# Patient Record
Sex: MALE | Race: WHITE | NOT HISPANIC OR LATINO | Employment: OTHER | URBAN - METROPOLITAN AREA
[De-identification: names, ages, dates, MRNs, and addresses within clinical notes are randomized per-mention and may not be internally consistent; named-entity substitution may affect disease eponyms.]

---

## 2017-01-03 ENCOUNTER — THERAPY VISIT (OUTPATIENT)
Dept: PHYSICAL THERAPY | Facility: CLINIC | Age: 52
End: 2017-01-03
Payer: COMMERCIAL

## 2017-01-03 DIAGNOSIS — M25.512 ACUTE PAIN OF LEFT SHOULDER: Primary | ICD-10-CM

## 2017-01-03 PROCEDURE — 97110 THERAPEUTIC EXERCISES: CPT | Mod: GP | Performed by: PHYSICAL THERAPIST

## 2017-01-03 PROCEDURE — 97530 THERAPEUTIC ACTIVITIES: CPT | Mod: GP | Performed by: PHYSICAL THERAPIST

## 2017-01-03 NOTE — PROGRESS NOTES
Subjective:    HPI                    Objective:    System    Physical Exam    General     ROS    Assessment/Plan:      DISCHARGE REPORT    Progress reporting period is from IE  to 1/3/17.       SUBJECTIVE  Subjective changes noted by patient:  .  Subjective: Had MRI showing full thickness tear.  Is scheduled to have repair 1/12/17.  states shoulder moving little better but still sore    Current pain level is  Current Pain level: 3/10.     Previous pain level was   Initial Pain level: 3/10.   Changes in function:  Yes (See Goal flowsheet attached for changes in current functional level)  Adverse reaction to treatment or activity: None    OBJECTIVE  Changes noted in objective findings:    Objective: PROM: flex 150, abd 140, ER 55     ASSESSMENT/PLAN  Updated problem list and treatment plan: Diagnosis 1:  L shoulder  Pain -  home program  Decreased ROM/flexibility - home program  Decreased strength - home program  Impaired muscle performance - home program  Decreased function - home program  STG/LTGs have been met or progress has been made towards goals:  None  Assessment of Progress: The patient's condition is improving.  Self Management Plans:  Patient is independent in a home treatment program.  Patient is independent in self management of symptoms.  I have re-evaluated this patient and find that the nature, scope, duration and intensity of the therapy is appropriate for the medical condition of the patient.  Venkata continues to require the following intervention to meet STG and LTG's:  PT intervention is no longer required to meet STG/LTG.    Recommendations:  This patient is ready to be discharged from therapy and continue their home treatment program as pt scheduled for L RCR 1/12/17.    Please refer to the daily flowsheet for treatment today, total treatment time and time spent performing 1:1 timed codes.

## 2017-01-03 NOTE — Clinical Note
[facility] PHYSICAL THERAPY  [address]  [facility] [address]  [phone]    January 3, 2017    Re: Venkata Lynn   :   1965  MRN:  [id number]   REFERRING PHYSICIAN:   Lyndon Stoll    [facility] PHYSICAL [facility]  Date of Initial Evaluation:  16  Visits:  Rxs Used: 2  Reason for Referral:  Acute pain of left shoulder    DISCHARGE REPORT  Progress reporting period is from IE  to 1/3/17.       SUBJECTIVE  Subjective changes noted by patient:  .  Subjective: Had MRI showing full thickness tear.  Is scheduled to have repair 17.  states shoulder moving little better but still sore    Current pain level is  Current Pain level: 3/10.     Previous pain level was   Initial Pain level: 3/10.   Changes in function:  Yes (See Goal flowsheet attached for changes in current functional level)  Adverse reaction to treatment or activity: None    OBJECTIVE  Changes noted in objective findings:    Objective: PROM: flex 150, abd 140, ER 55     ASSESSMENT/PLAN  Updated problem list and treatment plan: Diagnosis 1:  L shoulder  Pain -  home program  Decreased ROM/flexibility - home program  Decreased strength - home program  Impaired muscle performance - home program  Decreased function - home program  STG/LTGs have been met or progress has been made towards goals:  None  Assessment of Progress: The patient's condition is improving.  Self Management Plans:  Patient is independent in a home treatment program.  Patient is independent in self management of symptoms.  I have re-evaluated this patient and find that the nature, scope, duration and intensity of the therapy is appropriate for the medical condition of the patient.  Venkata continues to require the following intervention to meet STG and LTG's:  PT intervention is no longer required to meet STG/LTG.    Recommendations:  This patient is ready to be discharged from  therapy and continue their home treatment program as pt scheduled for L RCR 1/12/17.    Thank you for your referral.    INQUIRIES  Therapist:  Wilder Brothers, [facility]  [facility]  [address] [place] [address]  Phone: [phone]  Fax: [phone]

## 2017-01-04 DIAGNOSIS — M75.122 COMPLETE TEAR OF LEFT ROTATOR CUFF: Primary | ICD-10-CM

## 2017-01-09 ENCOUNTER — OFFICE VISIT (OUTPATIENT)
Dept: FAMILY MEDICINE | Facility: CLINIC | Age: 52
End: 2017-01-09
Payer: COMMERCIAL

## 2017-01-09 VITALS
HEART RATE: 89 BPM | SYSTOLIC BLOOD PRESSURE: 137 MMHG | DIASTOLIC BLOOD PRESSURE: 95 MMHG | BODY MASS INDEX: 29.46 KG/M2 | TEMPERATURE: 98.1 F | RESPIRATION RATE: 14 BRPM | WEIGHT: 210.4 LBS | HEIGHT: 71 IN

## 2017-01-09 DIAGNOSIS — R05.9 COUGH: ICD-10-CM

## 2017-01-09 DIAGNOSIS — R03.0 ELEVATED BLOOD PRESSURE READING WITHOUT DIAGNOSIS OF HYPERTENSION: ICD-10-CM

## 2017-01-09 DIAGNOSIS — F41.9 ANXIETY: ICD-10-CM

## 2017-01-09 DIAGNOSIS — Z01.818 PREOP GENERAL PHYSICAL EXAM: Primary | ICD-10-CM

## 2017-01-09 DIAGNOSIS — J30.89 NON-SEASONAL ALLERGIC RHINITIS DUE TO OTHER ALLERGIC TRIGGER: ICD-10-CM

## 2017-01-09 DIAGNOSIS — L71.9 ROSACEA: ICD-10-CM

## 2017-01-09 DIAGNOSIS — L71.1 RHINOPHYMA: ICD-10-CM

## 2017-01-09 DIAGNOSIS — J45.30 MILD PERSISTENT ASTHMA WITHOUT COMPLICATION: ICD-10-CM

## 2017-01-09 LAB
BASOPHILS # BLD AUTO: 0 10E9/L (ref 0–0.2)
BASOPHILS NFR BLD AUTO: 0.4 %
DIFFERENTIAL METHOD BLD: NORMAL
EOSINOPHIL # BLD AUTO: 0.3 10E9/L (ref 0–0.7)
EOSINOPHIL NFR BLD AUTO: 3 %
ERYTHROCYTE [DISTWIDTH] IN BLOOD BY AUTOMATED COUNT: 13 % (ref 10–15)
FEF 25/75: NORMAL
FEV-1: NORMAL
FEV1/FVC: NORMAL
FVC: NORMAL
HCT VFR BLD AUTO: 44.3 % (ref 40–53)
HGB BLD-MCNC: 14.9 G/DL (ref 13.3–17.7)
LYMPHOCYTES # BLD AUTO: 2 10E9/L (ref 0.8–5.3)
LYMPHOCYTES NFR BLD AUTO: 20.4 %
MCH RBC QN AUTO: 29.6 PG (ref 26.5–33)
MCHC RBC AUTO-ENTMCNC: 33.6 G/DL (ref 31.5–36.5)
MCV RBC AUTO: 88 FL (ref 78–100)
MONOCYTES # BLD AUTO: 0.7 10E9/L (ref 0–1.3)
MONOCYTES NFR BLD AUTO: 7.2 %
NEUTROPHILS # BLD AUTO: 6.6 10E9/L (ref 1.6–8.3)
NEUTROPHILS NFR BLD AUTO: 69 %
PLATELET # BLD AUTO: 364 10E9/L (ref 150–450)
RBC # BLD AUTO: 5.04 10E12/L (ref 4.4–5.9)
WBC # BLD AUTO: 9.6 10E9/L (ref 4–11)

## 2017-01-09 PROCEDURE — 36415 COLL VENOUS BLD VENIPUNCTURE: CPT | Performed by: FAMILY MEDICINE

## 2017-01-09 PROCEDURE — 99214 OFFICE O/P EST MOD 30 MIN: CPT | Mod: 25 | Performed by: FAMILY MEDICINE

## 2017-01-09 PROCEDURE — 85025 COMPLETE CBC W/AUTO DIFF WBC: CPT | Performed by: FAMILY MEDICINE

## 2017-01-09 PROCEDURE — 94010 BREATHING CAPACITY TEST: CPT | Performed by: FAMILY MEDICINE

## 2017-01-09 RX ORDER — MONTELUKAST SODIUM 10 MG/1
10 TABLET ORAL AT BEDTIME
Qty: 90 TABLET | Refills: 1 | Status: SHIPPED | OUTPATIENT
Start: 2017-01-09 | End: 2017-09-09

## 2017-01-09 RX ORDER — ALPRAZOLAM 1 MG
1 TABLET ORAL DAILY PRN
Qty: 30 TABLET | Refills: 0 | Status: SHIPPED | OUTPATIENT
Start: 2017-01-09 | End: 2017-02-17

## 2017-01-09 RX ORDER — DOXYCYCLINE HYCLATE 50 MG/1
50 CAPSULE ORAL 2 TIMES DAILY
Qty: 180 CAPSULE | Refills: 3 | Status: SHIPPED | OUTPATIENT
Start: 2017-01-09 | End: 2018-02-20

## 2017-01-09 RX ORDER — FLUTICASONE PROPIONATE 50 MCG
2 SPRAY, SUSPENSION (ML) NASAL DAILY
Qty: 16 G | Refills: 10 | Status: SHIPPED | OUTPATIENT
Start: 2017-01-09 | End: 2018-01-15

## 2017-01-09 RX ORDER — ALBUTEROL SULFATE 90 UG/1
2 AEROSOL, METERED RESPIRATORY (INHALATION) EVERY 6 HOURS PRN
Qty: 3 INHALER | Refills: 1 | Status: SHIPPED | OUTPATIENT
Start: 2017-01-09 | End: 2018-02-20

## 2017-01-09 NOTE — NURSING NOTE
"Chief Complaint   Patient presents with     Pre-Op Exam     1/12/17 Left shoulder       Initial There were no vitals taken for this visit. Estimated body mass index is 29.83 kg/(m^2) as calculated from the following:    Height as of 12/8/16: 5' 11\" (1.803 m).    Weight as of 12/8/16: 213 lb 12.8 oz (96.979 kg).  BP completed using cuff size: large left arm Mini Slade MA      "

## 2017-01-09 NOTE — PROGRESS NOTES
[facility]  [address] [facility] [id number] [phone]  Dept: [phone]    PRE-OP EVALUATION:  Today's date: 2017    Venkata Lynn (: 1965) presents for pre-operative evaluation assessment as requested by Dr. Milligan.  He requires evaluation and anesthesia risk assessment prior to undergoing surgery/procedure for treatment of left shoulder .  Proposed procedure: Left shoulder    Date of Surgery/ Procedure: 2017  Time of Surgery/ Procedure: 915 am  Hospital/Surgical Facility: [facility]  Fax [phone]  Primary Physician: Lyndon Stoll  Type of Anesthesia Anticipated: to be determined    Patient has a Health Care Directive or Living Will:  YES     1. NO - Do you have a history of heart attack, stroke, stent, bypass or surgery on an artery in the head, neck, heart or legs?  2. NO - Do you ever have any pain or discomfort in your chest?  3. NO - Do you have a history of  Heart Failure?  4. NO - Are you troubled by shortness of breath when: walking on the level, up a slight hill or at night?  5. NO - Do you currently have a cold, bronchitis or other respiratory infection?  7. NO - Do you sometimes get pains in the calves of your legs when you walk?  8. NO - Do you or anyone in your family have previous history of blood clots?  9. NO - Do you or does anyone in your family have a serious bleeding problem such as prolonged bleeding following surgeries or cuts?  10. NO - Have you ever had problems with anemia or been told to take iron pills?  11. NO - Have you had any abnormal blood loss such as black, tarry or bloody stools, or abnormal vaginal bleeding?  12. NO - Have you ever had a blood transfusion?  13. NO - Have you or any of your relatives ever had problems with anesthesia?  14. NO - Do you have sleep apnea, excessive snoring or daytime drowsiness?  15. NO - Do you have any prosthetic heart valves?  16. NO - Do you have prosthetic joints?  17. NO - Is  there any chance that you may be pregnant?  6. YES, coughs at night - Do you have a cough, shortness of breath or wheezing?     HPI:                                                      Brief HPI related to upcoming procedure: symptomatic complete tear of left rotator cuff.    MEDICAL HISTORY:                                                      Patient Active Problem List    Diagnosis Date Noted     Shoulder pain, left 12/08/2016     Priority: Medium     Complete tear of right rotator cuff 10/10/2016     Priority: Medium     Labral tear of shoulder, right, subsequent encounter 10/10/2016     Priority: Medium     Familial tremor 08/16/2016     Priority: Medium     Dyspepsia 08/16/2016     Priority: Medium     Mild persistent asthma without complication 08/16/2016     Priority: Medium     Status post surgery 03/18/2016     Priority: Medium     Cyst of meniscus of left knee 01/28/2016     Priority: Medium     Cyst of meniscus of right knee 01/28/2016     Priority: Medium     Controlled substance agreement signed 01/11/2016     Priority: Medium     Patient is followed by SAMUEL BAEZA for ongoing prescription of benzodiazepines.  All refills should be approved by this provider, or covering partner.    Medication(s): alprazolam zolpidem.   Maximum quantity per month: 30 ea  Clinic visit frequency required: Q 6  months     Controlled substance agreement on file: Yes       Date(s):    Benzodiazepine use reviewed by psychiatry:      Last [facility] website verification:     [email or web address]           Elevated blood pressure reading without diagnosis of hypertension 01/11/2016     Priority: Medium     Arthralgia of both knees 09/08/2015     Priority: Medium     Anxiety state 05/23/2014     Priority: Medium     Problem list name updated by automated process. Provider to review       Adjustment reaction 05/02/2014     Priority: Medium     Problem list name updated by automated process. Provider to review        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     Priority: Medium     Family history of rhinophyma 01/18/2012     Priority: Medium     Rosacea 01/18/2012     Priority: Medium     Sebaceous hyperplasia 01/18/2012     Priority: Medium     Tobacco abuse 01/18/2012     Priority: Medium     Rhinitis 01/18/2012     Priority: Medium     Anxiety 03/28/2011     Priority: Medium     Patient is followed by SAMUEL BAEZA for ongoing prescription of benzodiazepines.  All refills should be approved by this provider, or covering partner.    Medication(s): Xanax 1 mg.   Maximum quantity per month: 30  Clinic visit frequency required: Q 6  months     Controlled substance agreement on file: Yes       Date(s): 1/11/16  Benzodiazepine use reviewed by psychiatry:  No    Last [facility] website verification:  done on 7/26/16   [email or web address]           CARDIOVASCULAR SCREENING; LDL GOAL LESS THAN 160 10/31/2010     Priority: Medium     1.7% 10 yr risk 2015        Past Medical History   Diagnosis Date     Insomnia      Anxiety disorder      Low back pains      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     Family history of rhinophyma 1/18/2012     Rosacea 1/18/2012     Sebaceous hyperplasia 1/18/2012     CARDIOVASCULAR SCREENING; LDL GOAL LESS THAN 160 10/31/2010     Anxiety 3/28/2011     Intermittent asthma 3/28/2011     Tobacco abuse 1/18/2012     LBP (low back pain) 1/18/2012     Knee pain 1/18/2012     Rhinitis 1/18/2012     Acne vulgaris      Controlled substance agreement signed 1/11/2016     Elevated blood pressure reading without diagnosis of hypertension 1/11/2016     Coughing      at night     Arthritis      both knees     Shortness of breath      due to asthma     Familial tremor 8/16/2016     Dyspepsia 8/16/2016     Mild persistent asthma without complication 8/16/2016       Breo started 11/18/2016,       Past Surgical History   Procedure Laterality Date     Head & neck surgery       fx root of tooth     Vasectomy       Ent surgery       lipoma removed from  neck     Arthroscopy knee with medial meniscectomy Left 2/22/2016     Procedure: ARTHROSCOPY KNEE WITH MEDIAL MENISCECTOMY;  Surgeon: Chaz Moe MD;  Location: [facility] OR     Current Outpatient Prescriptions   Medication Sig Dispense Refill     albuterol (PROAIR HFA/PROVENTIL HFA/VENTOLIN HFA) 108 (90 BASE) MCG/ACT Inhaler Inhale 2 puffs into the lungs every 6 hours as needed for shortness of breath / dyspnea 3 Inhaler 1     ALPRAZolam (XANAX) 1 MG tablet Take 1 tablet (1 mg) by mouth daily as needed for anxiety 30 tablet 0     fluticasone-vilanterol (BREO ELLIPTA) 200-25 MCG/INH oral inhaler Inhale 1 puff into the lungs daily 3 Inhaler 1     montelukast (SINGULAIR) 10 MG tablet Take 1 tablet (10 mg) by mouth At Bedtime 90 tablet 1     fluticasone (FLONASE) 50 MCG/ACT spray Spray 2 sprays into both nostrils daily 16 g 10     doxycycline (VIBRAMYCIN) 50 MG capsule Take 1 capsule (50 mg) by mouth 2 times daily 180 capsule 3     VITAMIN D, CHOLECALCIFEROL, PO Take 1,000 Units by mouth daily       Multiple Vitamins-Minerals (MULTIVITAMIN ADULT PO) Take 1 tablet by mouth       aspirin 325 MG tablet Take by mouth daily       vitamin  B complex with vitamin C (VITAMIN  B COMPLEX) TABS Take 1 tablet by mouth daily       zolpidem (AMBIEN) 10 MG tablet Take 1 tablet (10 mg) by mouth nightly as needed for sleep at bedtime. 30 tablet 5     RANITIDINE HCL PO Take 300 mg by mouth as needed for heartburn       IBUPROFEN PO        [DISCONTINUED] albuterol (PROAIR HFA, PROVENTIL HFA, VENTOLIN HFA) 108 (90 BASE) MCG/ACT inhaler Inhale 2 puffs into the lungs every 6 hours as needed for shortness of breath / dyspnea 3 Inhaler 1     [DISCONTINUED] ALPRAZolam (XANAX) 1 MG tablet Take 1 tablet (1 mg) by mouth daily as needed for anxiety 30 tablet 0     [DISCONTINUED] montelukast (SINGULAIR) 10 MG tablet Take 1 tablet (10 mg) by mouth At Bedtime 90 tablet 1     [DISCONTINUED] fluticasone (FLONASE) 50 MCG/ACT nasal spray Spray 2  "sprays into both nostrils daily 16 g 10     OTC products: None, except as noted above    Allergies   Allergen Reactions     Penicillins Hives and Rash     Amoxicillin      Latex Allergy: NO    Social History   Substance Use Topics     Smoking status: Never Smoker      Smokeless tobacco: Current User     Types: Chew      Comment: occ     Alcohol Use: Yes      Comment: few beers per week     History   Drug Use No     REVIEW OF SYSTEMS:                                                    ROS: occasional chest tightness. Mild cough at night (imporves with Albuterol). No vision changes, palpitations, chest pain, heartburn, diarrhea, constipation, numbness, or tingling , bruising    This document serves as a record of the services and decisions personally performed and made by Jerman Haney MD. It was created on his behalf by Farhat Valdez a trained medical scribe. The creation of this document is based the provider's statements to the medical scribe. Farhat Valdez January 9, 2017 2:34 PM  EXAM:                                                    /95 mmHg  Pulse 89  Temp(Src) 98.1  F (36.7  C) (Oral)  Resp 14  Ht 5' 11\" (1.803 m)  Wt 210 lb 6.4 oz (95.437 kg)  BMI 29.36 kg/m2    GEN: Healthy, Alert, No distress  EYES: pupils are symmetric  ENT: ears without cerumen, mucus membranes moist  NECK: no thyroidmegaly  CHEST: Clear to auscultation, respirations unlabored  HEART: S1S2 RRR no murmur nor apical displacement  ABD: soft without mass or organomegaly   MS: no edema     DIAGNOSTICS:                                                    EKG: Not indicated due to non-vascular surgery and low risk of event (age <65 and without cardiac risk factors)    Recent Labs   Lab Test  03/21/11   1008   HGB  15.1   PLT  291   NA  140   POTASSIUM  4.5   CR  0.97      Results for orders placed or performed in visit on 01/09/17   Spirometry, Breathing Capacity: Normal Order, Clinic Performed   Result Value Ref Range    FEV-1   "    FVC      FEV1/FVC      FEF 25/75     CBC with platelets and differential   Result Value Ref Range    WBC 9.6 4.0 - 11.0 10e9/L    RBC Count 5.04 4.4 - 5.9 10e12/L    Hemoglobin 14.9 13.3 - 17.7 g/dL    Hematocrit 44.3 40.0 - 53.0 %    MCV 88 78 - 100 fl    MCH 29.6 26.5 - 33.0 pg    MCHC 33.6 31.5 - 36.5 g/dL    RDW 13.0 10.0 - 15.0 %    Platelet Count 364 150 - 450 10e9/L    Diff Method Automated Method     % Neutrophils 69.0 %    % Lymphocytes 20.4 %    % Monocytes 7.2 %    % Eosinophils 3.0 %    % Basophils 0.4 %    Absolute Neutrophil 6.6 1.6 - 8.3 10e9/L    Absolute Lymphocytes 2.0 0.8 - 5.3 10e9/L    Absolute Monocytes 0.7 0.0 - 1.3 10e9/L    Absolute Eosinophils 0.3 0.0 - 0.7 10e9/L    Absolute Basophils 0.0 0.0 - 0.2 10e9/L       IMPRESSION:                                                    Reason for surgery/procedure: symptomatic complete tear of left rotator cuff.  Diagnosis/reason for consult: assess perioperative risk     The proposed surgical procedure is considered INTERMEDIATE risk.    REVISED CARDIAC RISK INDEX  The patient has the following serious cardiovascular risks for perioperative complications such as (MI, PE, VFib and 3  AV Block):  No serious cardiac risks  INTERPRETATION: 0 risks: Class I (very low risk - 0.4% complication rate)    The patient has the following additional risks for perioperative complications:    History of asthma: Spirometry is normalized with Breo. Pulmonary function is optimized.      ICD-10-CM    1. Preop general physical exam Z01.818 CBC with platelets and differential   2. Mild persistent asthma without complication J45.30 albuterol (PROAIR HFA/PROVENTIL HFA/VENTOLIN HFA) 108 (90 BASE) MCG/ACT Inhaler     Spirometry, Breathing Capacity: Normal Order, Clinic Performed     fluticasone-vilanterol (BREO ELLIPTA) 200-25 MCG/INH oral inhaler     montelukast (SINGULAIR) 10 MG tablet   3. Anxiety F41.9 ALPRAZolam (XANAX) 1 MG tablet   4. Cough R05 Spirometry, Breathing  Capacity: Normal Order, Clinic Performed   5. Non-seasonal allergic rhinitis due to other allergic trigger J30.89 fluticasone (FLONASE) 50 MCG/ACT spray   6. Rhinophyma L71.1 doxycycline (VIBRAMYCIN) 50 MG capsule   7. Rosacea L71.9 doxycycline (VIBRAMYCIN) 50 MG capsule   8. Elevated blood pressure reading without diagnosis of hypertension R03.0      RECOMMENDATIONS:                                                      Patient is to take NO medications the AM of surgery except inhaler    APPROVAL GIVEN to proceed with proposed procedure, without further diagnostic evaluation     The information in this document, created by a scribe for me, accurately reflects the services I personally performed and the decisions made by me. I have reviewed and approved this document for accuracy.  2:06 PM January 9, 2017    Signed Electronically by: Lyndon Stoll MD    Copy of this evaluation report is provided to requesting physician.    [facility] Preop Guidelines

## 2017-01-09 NOTE — MR AVS SNAPSHOT
After Visit Summary   1/9/2017    Venkata Lynn    MRN: [id number]           Patient Information     Date Of Birth          1965        Visit Information        Provider Department      1/9/2017 1:30 PM Lyndon Stoll MD [facility]        Today's Diagnoses     Preop general physical exam    -  1     Mild persistent asthma without complication         Anxiety         Cough         Non-seasonal allergic rhinitis due to other allergic trigger         Rhinophyma         Rosacea         Elevated blood pressure reading without diagnosis of hypertension           Care Instructions      Before Your Surgery      Call your surgeon if there is any change in your health. This includes signs of a cold or flu (such as a sore throat, runny nose, cough, rash or fever).    Do not smoke, drink alcohol or take over the counter medicine (unless your surgeon or primary care doctor tells you to) for the 24 hours before and after surgery.    If you take prescribed drugs: Follow your doctor s orders about which medicines to take and which to stop until after surgery.    Eating and drinking prior to surgery: follow the instructions from your surgeon    Take a shower or bath the night before surgery. Use the soap your surgeon gave you to gently clean your skin. If you do not have soap from your surgeon, use your regular soap. Do not shave or scrub the surgery site.  Wear clean pajamas and have clean sheets on your bed.     No meds AM of surgery except inhalers        Follow-ups after your visit        Who to contact     If you have questions or need follow up information about today's clinic visit or your schedule please contact [facility] directly at [phone].  Normal or non-critical lab and imaging results will be communicated to you by MyChart, letter or phone within 4 business days after the clinic has received the results. If you do not hear from us within 7 days, please  "contact the clinic through [employer] or phone. If you have a critical or abnormal lab result, we will notify you by phone as soon as possible.  Submit refill requests through [employer] or call your pharmacy and they will forward the refill request to us. Please allow 3 business days for your refill to be completed.          Additional Information About Your Visit        [employer] Information     [employer] gives you secure access to your electronic health record. If you see a primary care provider, you can also send messages to your care team and make appointments. If you have questions, please call your primary care clinic.  If you do not have a primary care provider, please call [phone] and they will assist you.        Care EveryWhere ID     This is your Care EveryWhere ID. This could be used by other organizations to access your [facility] [facility] medical records  [id number]        Your Vitals Were     Pulse Temperature Respirations Height BMI (Body Mass Index)       89 98.1  F (36.7  C) (Oral) 14 5' 11\" (1.803 m) 29.36 kg/m2        Blood Pressure from Last 3 Encounters:   01/09/17 137/95   12/08/16 128/82   11/28/16 156/122    Weight from Last 3 Encounters:   01/09/17 210 lb 6.4 oz (95.437 kg)   12/08/16 213 lb 12.8 oz (96.979 kg)   11/28/16 210 lb (95.255 kg)              We Performed the Following     CBC with platelets and differential     Spirometry, Breathing Capacity: Normal Order, Clinic Performed          Where to get your medicines      These medications were sent to [facility] [place] [address]  [address] [facility] [address]     Phone:  [phone]    - albuterol 108 (90 BASE) MCG/ACT Inhaler  - doxycycline 50 MG capsule  - fluticasone 50 MCG/ACT spray  - fluticasone-vilanterol 200-25 MCG/INH oral inhaler  - montelukast 10 MG tablet      Some of these will need a paper prescription and others can be bought over the counter.  Ask your nurse if you have questions.     " Bring a paper prescription for each of these medications    - ALPRAZolam 1 MG tablet       Primary Care Provider Office Phone # Fax #    Lyndno Stoll [phone]       [place] [address] [address]  [facility] [address]        Thank you!     Thank you for choosing [facility]  for your care. Our goal is always to provide you with excellent care. Hearing back from our patients is one way we can continue to improve our services. Please take a few minutes to complete the written survey that you may receive in the mail after your visit with us. Thank you!             Your Updated Medication List - Protect others around you: Learn how to safely use, store and throw away your medicines at www.disposemymeds.org.          This list is accurate as of: 1/9/17  2:40 PM.  Always use your most recent med list.                   Brand Name Dispense Instructions for use    albuterol 108 (90 BASE) MCG/ACT Inhaler    PROAIR HFA/PROVENTIL HFA/VENTOLIN HFA    3 Inhaler    Inhale 2 puffs into the lungs every 6 hours as needed for shortness of breath / dyspnea       ALPRAZolam 1 MG tablet    XANAX    30 tablet    Take 1 tablet (1 mg) by mouth daily as needed for anxiety       aspirin 325 MG tablet      Take by mouth daily       doxycycline 50 MG capsule    VIBRAMYCIN    180 capsule    Take 1 capsule (50 mg) by mouth 2 times daily       fluticasone 50 MCG/ACT spray    FLONASE    16 g    Spray 2 sprays into both nostrils daily       fluticasone-vilanterol 200-25 MCG/INH oral inhaler    BREO ELLIPTA    3 Inhaler    Inhale 1 puff into the lungs daily       IBUPROFEN PO          montelukast 10 MG tablet    SINGULAIR    90 tablet    Take 1 tablet (10 mg) by mouth At Bedtime       MULTIVITAMIN ADULT PO      Take 1 tablet by mouth       RANITIDINE HCL PO      Take 300 mg by mouth as needed for heartburn       vitamin B complex with vitamin C Tabs tablet      Take 1 tablet by mouth daily        VITAMIN D (CHOLECALCIFEROL) PO      Take 1,000 Units by mouth daily       zolpidem 10 MG tablet    AMBIEN    30 tablet    Take 1 tablet (10 mg) by mouth nightly as needed for sleep at bedtime.

## 2017-01-09 NOTE — PATIENT INSTRUCTIONS
Before Your Surgery      Call your surgeon if there is any change in your health. This includes signs of a cold or flu (such as a sore throat, runny nose, cough, rash or fever).    Do not smoke, drink alcohol or take over the counter medicine (unless your surgeon or primary care doctor tells you to) for the 24 hours before and after surgery.    If you take prescribed drugs: Follow your doctor s orders about which medicines to take and which to stop until after surgery.    Eating and drinking prior to surgery: follow the instructions from your surgeon    Take a shower or bath the night before surgery. Use the soap your surgeon gave you to gently clean your skin. If you do not have soap from your surgeon, use your regular soap. Do not shave or scrub the surgery site.  Wear clean pajamas and have clean sheets on your bed.     No meds AM of surgery except inhalers

## 2017-02-17 DIAGNOSIS — F51.01 PRIMARY INSOMNIA: ICD-10-CM

## 2017-02-17 DIAGNOSIS — F41.9 ANXIETY: ICD-10-CM

## 2017-02-17 RX ORDER — ALPRAZOLAM 1 MG
1 TABLET ORAL DAILY PRN
Qty: 30 TABLET | Refills: 0 | Status: SHIPPED | OUTPATIENT
Start: 2017-02-17 | End: 2017-04-10

## 2017-02-17 RX ORDER — ZOLPIDEM TARTRATE 10 MG/1
10 TABLET ORAL
Qty: 30 TABLET | Refills: 5 | Status: SHIPPED | OUTPATIENT
Start: 2017-02-17 | End: 2017-09-21

## 2017-02-17 NOTE — TELEPHONE ENCOUNTER
Walked 2 RX's over to the [facility] pharmacy.  Ambien & Conrado.      Mecca Brady/

## 2017-02-17 NOTE — TELEPHONE ENCOUNTER
Xanax 1mg      Last Written Prescription Date:  01/09/17  Last Fill Quantity: 30,   # refills: 0  Last Office Visit with [facility], [facility] or [facility] prescribing provider: 01/09/17  Future Office visit:       Routing refill request to provider for review/approval because:  Drug not on the [facility], [facility] or [facility] refill protocol or controlled substance      Ambien 10mg      Last Written Prescription Date:  08/16/16  Last Fill Quantity: 30,   # refills: 5  Last Office Visit with [facility], [facility] or [facility] prescribing provider: 01/09/17  Future Office visit:       Routing refill request to provider for review/approval because:  Drug not on the [facility], [facility] or [facility] refill protocol or controlled substance      Thank you -  Joey Stephen, Pharmacy Technician  [facility]  On Behalf Of [facility]

## 2017-04-06 ENCOUNTER — THERAPY VISIT (OUTPATIENT)
Dept: PHYSICAL THERAPY | Facility: CLINIC | Age: 52
End: 2017-04-06
Payer: COMMERCIAL

## 2017-04-06 DIAGNOSIS — Z47.89 AFTERCARE FOLLOWING SURGERY OF THE MUSCULOSKELETAL SYSTEM: ICD-10-CM

## 2017-04-06 DIAGNOSIS — S43.422A SPRAIN OF LEFT ROTATOR CUFF CAPSULE, INITIAL ENCOUNTER: ICD-10-CM

## 2017-04-06 PROCEDURE — 97110 THERAPEUTIC EXERCISES: CPT | Mod: GP | Performed by: PHYSICAL THERAPIST

## 2017-04-06 PROCEDURE — 97161 PT EVAL LOW COMPLEX 20 MIN: CPT | Mod: GP | Performed by: PHYSICAL THERAPIST

## 2017-04-06 PROCEDURE — 97112 NEUROMUSCULAR REEDUCATION: CPT | Mod: GP | Performed by: PHYSICAL THERAPIST

## 2017-04-06 NOTE — LETTER
[facility] ATHLETIC [facility] PHYSICAL THERAPY  [address]  [facility] [address]  [phone]    2017    Re: Venkata Lynn   :   1965  MRN:  [id number]   REFERRING PHYSICIAN:   Nils Milligan    [facility]THLETIC [facility] PHYSICAL THERAPY  Date of Initial Evaluation:  17  Visits:  Rxs Used: 1  Reason for Referral:  Sprain of left rotator cuff capsule, initial encounter  Aftercare following surgery of the musculoskeletal system    EVALUATION SUMMARY    Subjective:  Venkata Lynn is a 51 year old male with a left shoulder condition.  Condition occurred with:  A fall.  Condition occurred: at home.  This is a new condition  Pt c/o L shoulder pain and weakness S/P L RCR and biceps tenodesis 17.  Initial injury 2016 after falling.  Failed conservative PT prior to surgery.  Pt is R handed.  Pt had PT at other facility initially and is starting here today.  Was in sling x 8 weeks (starting ROM PT at 6 weeks).  Patient reports pain:  Anterior and lateral.  Pain is described as shooting and aching and is intermittent and reported as 1/10 and 5/10.  Associated symptoms:  Loss of motion/stiffness and loss of strength. Pain is the same all the time.  Symptoms are exacerbated by using arm overhead, using arm behind back, using arm at shoulder level, lifting, carrying and lying on extremity and relieved by rest and ice.  Since onset symptoms are gradually improving.  General health as reported by patient is good.  Pertinent medical history includes:  Asthma and history of fractures.  Medical allergies: yes (penicillins).  Other surgeries include:  Orthopedic surgery (L knee and shoulder).  Current medications:  Sleep medication, pain medication and other (asthma).  Current occupation is Construction.  Primary job tasks include:  Lifting, repetitive tasks, driving and other (push pull).           Objective:  Shoulder  Evaluation:  ROM:  AROM:    Flexion:  Left:  123      Abduction:  Left: 115     External Rotation:  Left:  54      Extension/Internal Rotation:  Left:  L3        PROM:    Flexion:  Left:  147        Abduction:  Left:  133      External Rotation:  Left:  60      Strength:  : poor scap stab recruitment.  Flexion: Left:4-/5   Pain:      Extension:  Left: 4/5     Pain:-/+      Abduction:  Left: 4- and 3+/5   Pain:-/+      Internal Rotation:  Left:4/5      Pain:-      External Rotation:   Left:3+/5      Pain:-/+     Re: Venkata Lynn   :   1965          Mobility Tests:    Glenohumeral posterior left:  Hypomobile      Assessment/Plan:      Patient is a 51 year old male with left side shoulder complaints.    Patient has the following significant findings with corresponding treatment plan.                Diagnosis 1:  S/P L RCR  Pain -  hot/cold therapy, manual therapy, directional preference exercise and home program  Decreased ROM/flexibility - manual therapy and therapeutic exercise  Decreased joint mobility - manual therapy and therapeutic exercise  Decreased strength - therapeutic exercise and therapeutic activities  Impaired muscle performance - neuro re-education  Decreased function - therapeutic activities  Impaired posture - neuro re-education    Therapy Evaluation Codes:   1) History comprised of:   Personal factors that impact the plan of care:      None.    Comorbidity factors that impact the plan of care are:      Asthma.     Medications impacting care: Pain and Sleep.  2) Examination of Body Systems comprised of:   Body structures and functions that impact the plan of care:      Shoulder.   Activity limitations that impact the plan of care are:      Dressing, Lifting and Working.  3) Clinical presentation characteristics are:   Stable/Uncomplicated.  4) Decision-Making    Low complexity using standardized patient assessment instrument and/or measureable assessment of functional outcome.  Cumulative  Therapy Evaluation is: Low complexity.    Previous and current functional limitations:  (See Goal Flow Sheet for this information)    Short term and Long term goals: (See Goal Flow Sheet for this information)     Communication ability:  Patient appears to be able to clearly communicate and understand verbal and written communication and follow directions correctly.  Treatment Explanation - The following has been discussed with the patient:   RX ordered/plan of care  Anticipated outcomes  Possible risks and side effects  This patient would benefit from PT intervention to resume normal activities.   Rehab potential is excellent.        Re: Venkata Lynn   :   1965          Frequency:  1 X week, once daily  Duration:  for 8 weeks  Discharge Plan:  Achieve all LTG.  Independent in home treatment program.  Reach maximal therapeutic benefit.    Thank you for your referral.    INQUIRIES  Therapist: Wilder Brothers, [facility]   [facility] [place] [facility]  [address] [place] [address]  Phone: [phone]  Fax: [phone]

## 2017-04-06 NOTE — PROGRESS NOTES
Subjective:    Venkata Lynn is a 51 year old male with a left shoulder condition.  Condition occurred with:  A fall.  Condition occurred: at home.  This is a new condition  Pt c/o L shoulder pain and weakness S/P L RCR and biceps tenodesis 1/12/17.  Initial injury 11/2016 after falling.  Failed conservative PT prior to surgery.  Pt is R handed.  Pt had PT at other facility initially and is starting here today.  Was in sling x 8 weeks (starting ROM PT at 6 weeks)..    Patient reports pain:  Anterior and lateral.    Pain is described as shooting and aching and is intermittent and reported as 1/10 and 5/10.  Associated symptoms:  Loss of motion/stiffness and loss of strength. Pain is the same all the time.  Symptoms are exacerbated by using arm overhead, using arm behind back, using arm at shoulder level, lifting, carrying and lying on extremity and relieved by rest and ice.  Since onset symptoms are gradually improving.        General health as reported by patient is good.  Pertinent medical history includes:  Asthma and history of fractures.  Medical allergies: yes (penicillins).  Other surgeries include:  Orthopedic surgery (L knee and shoulder).  Current medications:  Sleep medication, pain medication and other (asthma).  Current occupation is Construction  .    Primary job tasks include:  Lifting, repetitive tasks, driving and other (push pull).                                Objective:    System                   Shoulder Evaluation:  ROM:  AROM:    Flexion:  Left:  123        Abduction:  Left: 115         External Rotation:  Left:  54                Extension/Internal Rotation:  Left:  L3        PROM:    Flexion:  Left:  147          Abduction:  Left:  133          External Rotation:  Left:  60                        Strength:  : poor scap stab recruitment.  Flexion: Left:4-/5   Pain:      Extension:  Left: 4/5     Pain:-/+      Abduction:  Left: 4- and 3+/5   Pain:-/+        Internal Rotation:  Left:4/5       Pain:-      External Rotation:   Left:3+/5      Pain:-/+                   Mobility Tests:      Glenohumeral posterior left:  Hypomobile                                               General     ROS    Assessment/Plan:      Patient is a 51 year old male with left side shoulder complaints.    Patient has the following significant findings with corresponding treatment plan.                Diagnosis 1:  S/P L RCR  Pain -  hot/cold therapy, manual therapy, directional preference exercise and home program  Decreased ROM/flexibility - manual therapy and therapeutic exercise  Decreased joint mobility - manual therapy and therapeutic exercise  Decreased strength - therapeutic exercise and therapeutic activities  Impaired muscle performance - neuro re-education  Decreased function - therapeutic activities  Impaired posture - neuro re-education    Therapy Evaluation Codes:   1) History comprised of:   Personal factors that impact the plan of care:      None.    Comorbidity factors that impact the plan of care are:      Asthma.     Medications impacting care: Pain and Sleep.  2) Examination of Body Systems comprised of:   Body structures and functions that impact the plan of care:      Shoulder.   Activity limitations that impact the plan of care are:      Dressing, Lifting and Working.  3) Clinical presentation characteristics are:   Stable/Uncomplicated.  4) Decision-Making    Low complexity using standardized patient assessment instrument and/or measureable assessment of functional outcome.  Cumulative Therapy Evaluation is: Low complexity.    Previous and current functional limitations:  (See Goal Flow Sheet for this information)    Short term and Long term goals: (See Goal Flow Sheet for this information)     Communication ability:  Patient appears to be able to clearly communicate and understand verbal and written communication and follow directions correctly.  Treatment Explanation - The following has been discussed  with the patient:   RX ordered/plan of care  Anticipated outcomes  Possible risks and side effects  This patient would benefit from PT intervention to resume normal activities.   Rehab potential is excellent.    Frequency:  1 X week, once daily  Duration:  for 8 weeks  Discharge Plan:  Achieve all LTG.  Independent in home treatment program.  Reach maximal therapeutic benefit.    Please refer to the daily flowsheet for treatment today, total treatment time and time spent performing 1:1 timed codes.

## 2017-04-10 DIAGNOSIS — Z79.899 CONTROLLED SUBSTANCE AGREEMENT SIGNED: ICD-10-CM

## 2017-04-10 DIAGNOSIS — F41.9 ANXIETY: ICD-10-CM

## 2017-04-10 NOTE — TELEPHONE ENCOUNTER
Controlled Substance Refill Request for Alprazolam  Problem List Complete:  Yes    Patient is followed by SMAUEL BAEZA for ongoing prescription of benzodiazepines.  All refills should be approved by this provider, or covering partner.    Medication(s): Xanax 1 mg.   Maximum quantity per month: 30  Clinic visit frequency required: Q 6  Months Last office visit: 1/9/17    Controlled substance agreement on file: Yes       Date(s): 1/11/16  Benzodiazepine use reviewed by psychiatry:  No    Last [facility] website verification:  done on 7/26/16     checked in past 6 months?  No, route to RN Overdue    Last picked up here: 2/20/17. Qty 30 for 30 days supply    Heather Alicia, Pharmacy [facility]

## 2017-04-11 RX ORDER — ALPRAZOLAM 1 MG
1 TABLET ORAL DAILY PRN
Qty: 30 TABLET | Refills: 0 | Status: SHIPPED | OUTPATIENT
Start: 2017-04-11 | End: 2017-05-22

## 2017-04-11 NOTE — TELEPHONE ENCOUNTER
Routing refill request to provider for review/approval because:  Drug not on the [facility] refill protocol    4.11.17, report on desk, narcotics prescribed by ortho surg Chel Lion RN, BS  Clinical Nurse Triage.

## 2017-04-13 ENCOUNTER — THERAPY VISIT (OUTPATIENT)
Dept: PHYSICAL THERAPY | Facility: CLINIC | Age: 52
End: 2017-04-13
Payer: COMMERCIAL

## 2017-04-13 DIAGNOSIS — S43.422A SPRAIN OF LEFT ROTATOR CUFF CAPSULE, INITIAL ENCOUNTER: ICD-10-CM

## 2017-04-13 DIAGNOSIS — Z47.89 AFTERCARE FOLLOWING SURGERY OF THE MUSCULOSKELETAL SYSTEM: ICD-10-CM

## 2017-04-13 PROCEDURE — 97112 NEUROMUSCULAR REEDUCATION: CPT | Mod: GP | Performed by: PHYSICAL THERAPIST

## 2017-04-13 PROCEDURE — 97110 THERAPEUTIC EXERCISES: CPT | Mod: GP | Performed by: PHYSICAL THERAPIST

## 2017-04-20 ENCOUNTER — THERAPY VISIT (OUTPATIENT)
Dept: PHYSICAL THERAPY | Facility: CLINIC | Age: 52
End: 2017-04-20
Payer: COMMERCIAL

## 2017-04-20 DIAGNOSIS — S43.422A SPRAIN OF LEFT ROTATOR CUFF CAPSULE, INITIAL ENCOUNTER: ICD-10-CM

## 2017-04-20 DIAGNOSIS — Z47.89 AFTERCARE FOLLOWING SURGERY OF THE MUSCULOSKELETAL SYSTEM: ICD-10-CM

## 2017-04-20 PROCEDURE — 97112 NEUROMUSCULAR REEDUCATION: CPT | Mod: GP | Performed by: PHYSICAL THERAPIST

## 2017-04-20 PROCEDURE — 97110 THERAPEUTIC EXERCISES: CPT | Mod: GP | Performed by: PHYSICAL THERAPIST

## 2017-04-27 ENCOUNTER — THERAPY VISIT (OUTPATIENT)
Dept: PHYSICAL THERAPY | Facility: CLINIC | Age: 52
End: 2017-04-27
Payer: COMMERCIAL

## 2017-04-27 DIAGNOSIS — S43.422A SPRAIN OF LEFT ROTATOR CUFF CAPSULE, INITIAL ENCOUNTER: ICD-10-CM

## 2017-04-27 DIAGNOSIS — Z47.89 AFTERCARE FOLLOWING SURGERY OF THE MUSCULOSKELETAL SYSTEM: ICD-10-CM

## 2017-04-27 PROCEDURE — 97112 NEUROMUSCULAR REEDUCATION: CPT | Mod: GP | Performed by: PHYSICAL THERAPIST

## 2017-04-27 PROCEDURE — 97110 THERAPEUTIC EXERCISES: CPT | Mod: GP | Performed by: PHYSICAL THERAPIST

## 2017-05-19 ENCOUNTER — THERAPY VISIT (OUTPATIENT)
Dept: PHYSICAL THERAPY | Facility: CLINIC | Age: 52
End: 2017-05-19
Payer: COMMERCIAL

## 2017-05-19 DIAGNOSIS — Z47.89 AFTERCARE FOLLOWING SURGERY OF THE MUSCULOSKELETAL SYSTEM: ICD-10-CM

## 2017-05-19 DIAGNOSIS — S43.422A SPRAIN OF LEFT ROTATOR CUFF CAPSULE, INITIAL ENCOUNTER: ICD-10-CM

## 2017-05-19 PROCEDURE — 97110 THERAPEUTIC EXERCISES: CPT | Mod: GP | Performed by: PHYSICAL THERAPIST

## 2017-05-19 PROCEDURE — 97112 NEUROMUSCULAR REEDUCATION: CPT | Mod: GP | Performed by: PHYSICAL THERAPIST

## 2017-05-22 DIAGNOSIS — F41.9 ANXIETY: ICD-10-CM

## 2017-05-22 RX ORDER — ALPRAZOLAM 1 MG
1 TABLET ORAL DAILY PRN
Qty: 30 TABLET | Refills: 0 | Status: SHIPPED | OUTPATIENT
Start: 2017-05-22 | End: 2017-05-22

## 2017-05-22 RX ORDER — ALPRAZOLAM 1 MG
1 TABLET ORAL DAILY PRN
Qty: 30 TABLET | Refills: 0 | Status: SHIPPED | OUTPATIENT
Start: 2017-06-22 | End: 2017-08-11

## 2017-05-22 NOTE — TELEPHONE ENCOUNTER
Controlled Substance Refill Request for alprazolam  Problem List Complete:  Yes    Patient is followed by SAMUEL BAEZA for ongoing prescription of benzodiazepines.  All refills should be approved by this provider, or covering partner.    Medication(s): alprazolam zolpidem.   Maximum quantity per month: 30 ea  Clinic visit frequency required: Q 6  months     Controlled substance agreement on file: Yes       Date(s):    Benzodiazepine use reviewed by psychiatry:      Last [facility] website verification:  Done 4.11.17   [email or web address]       checked in past 6 months?  Yes 4/11/17     Heather Alicia, Pharmacy [facility]

## 2017-06-07 ENCOUNTER — THERAPY VISIT (OUTPATIENT)
Dept: PHYSICAL THERAPY | Facility: CLINIC | Age: 52
End: 2017-06-07
Payer: COMMERCIAL

## 2017-06-07 DIAGNOSIS — Z47.89 AFTERCARE FOLLOWING SURGERY OF THE MUSCULOSKELETAL SYSTEM: ICD-10-CM

## 2017-06-07 DIAGNOSIS — S43.422A SPRAIN OF LEFT ROTATOR CUFF CAPSULE, INITIAL ENCOUNTER: ICD-10-CM

## 2017-06-07 PROCEDURE — 97112 NEUROMUSCULAR REEDUCATION: CPT | Mod: GP | Performed by: PHYSICAL THERAPIST

## 2017-06-07 PROCEDURE — 97110 THERAPEUTIC EXERCISES: CPT | Mod: GP | Performed by: PHYSICAL THERAPIST

## 2017-06-07 NOTE — LETTER
[facility] ATHLETIC [facility] PHYSICAL THERAPY  [address]  [facility] [address]  [phone]    2017    Re: Venkata Lynn   :   1965  MRN:  [id number]   REFERRING PHYSICIAN:   Nils Milligan    [facility]  Date of Initial Evaluation:  17  Visits:  Rxs Used: 6  Reason for Referral: Sprain of left rotator cuff capsule, initial encounter  Aftercare following surgery of the musculoskeletal system    DISCHARGE REPORT  Progress reporting period is from IE to 17.       SUBJECTIVE  Subjective changes noted by patient:  Doing well, noting no limitations functionly.       Current pain level is  Current Pain level: 0/10.  Previous pain level was   Initial Pain level: 1/10 (1-4/10).  Changes in function:  Yes (See Goal flowsheet attached for changes in current functional level).  Adverse reaction to treatment or activity: None    OBJECTIVE  Changes noted in objective findings:  Objective: L shoulder AROM WNL, MMT: flex 5/5, abd 5-/5, ER (in 90/90) 4+/5, IR (in 90/90) 5-/5.  good scap stab     ASSESSMENT/PLAN  Updated problem list and treatment plan: Diagnosis 1:  S/P L RCR  Pain -  home program.  Decreased strength - home program.  Impaired muscle performance - home program.  Decreased function - home program.  STG/LTGs have been met or progress has been made towards goals:  Yes (See Goal flow sheet completed today.).  Assessment of Progress: The patient's condition is improving.  The patient's condition has potential to improve.  Self Management Plans:  Patient is independent in a home treatment program.  Patient is independent in self management of symptoms.  I have re-evaluated this patient and find that the nature, scope, duration and intensity of the therapy is appropriate for the medical condition of the patient.  Venkata continues to require the following intervention to meet STG and LTG's:  PT  intervention is no longer required to meet STG/LTG.    Recommendations:  This patient is ready to be discharged from therapy and continue their home treatment program.    Thank you for your referral.    INQUIRIES  Therapist:  Wilder Brothers, [facility]  [facility] [place] PH[facility]ICAL TH[facility]AP[facility]  [address] [address]  Phone: [phone]/Fax: [phone]

## 2017-06-07 NOTE — PROGRESS NOTES
Subjective:    HPI                    Objective:    System    Physical Exam    General     ROS    Assessment/Plan:      DISCHARGE REPORT    Progress reporting period is from IE to 6/7/17.       SUBJECTIVE  Subjective changes noted by patient:  Doing well, noting no limitations functionly.       Current pain level is  Current Pain level: 0/10.     Previous pain level was   Initial Pain level: 1/10 (1-4/10).   Changes in function:  Yes (See Goal flowsheet attached for changes in current functional level)  Adverse reaction to treatment or activity: None    OBJECTIVE  Changes noted in objective findings:    Objective: L shoulder AROM WNL, MMT: flex 5/5, abd 5-/5, ER (in 90/90) 4+/5, IR (in 90/90) 5-/5.  good scap stab     ASSESSMENT/PLAN  Updated problem list and treatment plan: Diagnosis 1:  S/P L RCR  Pain -  home program  Decreased strength - home program  Impaired muscle performance - home program  Decreased function - home program  STG/LTGs have been met or progress has been made towards goals:  Yes (See Goal flow sheet completed today.)  Assessment of Progress: The patient's condition is improving.  The patient's condition has potential to improve.  Self Management Plans:  Patient is independent in a home treatment program.  Patient is independent in self management of symptoms.  I have re-evaluated this patient and find that the nature, scope, duration and intensity of the therapy is appropriate for the medical condition of the patient.  Venkata continues to require the following intervention to meet STG and LTG's:  PT intervention is no longer required to meet STG/LTG.    Recommendations:  This patient is ready to be discharged from therapy and continue their home treatment program.    Please refer to the daily flowsheet for treatment today, total treatment time and time spent performing 1:1 timed codes.

## 2017-06-07 NOTE — MR AVS SNAPSHOT
After Visit Summary   6/7/2017    Venkata Lynn    MRN: [id number]           Patient Information     Date Of Birth          1965        Visit Information        Provider Department      6/7/2017 8:20 AM Wilder Brothers, KEVIN [place] [facility] Physical Therapy        Today's Diagnoses     Sprain of left rotator cuff capsule, initial encounter        Aftercare following surgery of the musculoskeletal system           Follow-ups after your visit        Who to contact     If you have questions or need follow up information about today's clinic visit or your schedule please contact [place] [facility] PHYSICAL [facility] directly at [phone].  Normal or non-critical lab and imaging results will be communicated to you by eyefactivehart, letter or phone within 4 business days after the clinic has received the results. If you do not hear from us within 7 days, please contact the clinic through eyefactivehart or phone. If you have a critical or abnormal lab result, we will notify you by phone as soon as possible.  Submit refill requests through Visualmarks or call your pharmacy and they will forward the refill request to us. Please allow 3 business days for your refill to be completed.          Additional Information About Your Visit        MyChart Information     [employer] gives you secure access to your electronic health record. If you see a primary care provider, you can also send messages to your care team and make appointments. If you have questions, please call your primary care clinic.  If you do not have a primary care provider, please call [phone] and they will assist you.        Care EveryWhere ID     This is your Care EveryWhere ID. This could be used by other organizations to access your [place] medical records  [id number]         Blood Pressure from Last 3 Encounters:   01/09/17 (!) 137/95   12/08/16 128/82   11/28/16 (!) 156/122    Weight from  Last 3 Encounters:   01/09/17 95.4 kg (210 lb 6.4 oz)   12/08/16 97 kg (213 lb 12.8 oz)   11/28/16 95.3 kg (210 lb)              We Performed the Following     ROSEANNA PROGRESS NOTES REPORT     NEUROMUSCULAR RE-EDUCATION     THERAPEUTIC EXERCISES        Primary Care Provider Office Phone # Fax #    Lyndon Stoll [phone]       [address] [address]        Thank you!     Thank you for choosing [place] [facility] PHYSICAL THERAPY  for your care. Our goal is always to provide you with excellent care. Hearing back from our patients is one way we can continue to improve our services. Please take a few minutes to complete the written survey that you may receive in the mail after your visit with us. Thank you!             Your Updated Medication List - Protect others around you: Learn how to safely use, store and throw away your medicines at www.disposemymeds.org.          This list is accurate as of: 6/7/17  8:56 AM.  Always use your most recent med list.                   Brand Name Dispense Instructions for use    albuterol 108 (90 BASE) MCG/ACT Inhaler    PROAIR HFA/PROVENTIL HFA/VENTOLIN HFA    3 Inhaler    Inhale 2 puffs into the lungs every 6 hours as needed for shortness of breath / dyspnea       ALPRAZolam 1 MG tablet   Start taking on:  6/22/2017    XANAX    30 tablet    Take 1 tablet (1 mg) by mouth daily as needed for anxiety       aspirin 325 MG tablet      Take by mouth daily       doxycycline 50 MG capsule    VIBRAMYCIN    180 capsule    Take 1 capsule (50 mg) by mouth 2 times daily       fluticasone 50 MCG/ACT spray    FLONASE    16 g    Spray 2 sprays into both nostrils daily       fluticasone-vilanterol 200-25 MCG/INH oral inhaler    BREO ELLIPTA    3 Inhaler    Inhale 1 puff into the lungs daily       IBUPROFEN PO          montelukast 10 MG tablet    SINGULAIR    90 tablet    Take 1 tablet (10 mg) by mouth At  Bedtime       MULTIVITAMIN ADULT PO      Take 1 tablet by mouth       RANITIDINE HCL PO      Take 300 mg by mouth as needed for heartburn       vitamin B complex with vitamin C Tabs tablet      Take 1 tablet by mouth daily       VITAMIN D (CHOLECALCIFEROL) PO      Take 1,000 Units by mouth daily       zolpidem 10 MG tablet    AMBIEN    30 tablet    Take 1 tablet (10 mg) by mouth nightly as needed for sleep at bedtime.

## 2017-06-22 ENCOUNTER — OFFICE VISIT (OUTPATIENT)
Dept: FAMILY MEDICINE | Facility: CLINIC | Age: 52
End: 2017-06-22
Payer: COMMERCIAL

## 2017-06-22 ENCOUNTER — TELEPHONE (OUTPATIENT)
Dept: FAMILY MEDICINE | Facility: CLINIC | Age: 52
End: 2017-06-22

## 2017-06-22 VITALS
HEART RATE: 84 BPM | WEIGHT: 210 LBS | OXYGEN SATURATION: 97 % | BODY MASS INDEX: 29.29 KG/M2 | RESPIRATION RATE: 16 BRPM | SYSTOLIC BLOOD PRESSURE: 118 MMHG | TEMPERATURE: 98.7 F | DIASTOLIC BLOOD PRESSURE: 70 MMHG

## 2017-06-22 DIAGNOSIS — J45.30 MILD PERSISTENT ASTHMA WITHOUT COMPLICATION: ICD-10-CM

## 2017-06-22 DIAGNOSIS — M75.121 COMPLETE TEAR OF RIGHT ROTATOR CUFF: ICD-10-CM

## 2017-06-22 DIAGNOSIS — K92.1 BLOOD IN STOOL: Primary | ICD-10-CM

## 2017-06-22 DIAGNOSIS — Z72.0 TOBACCO ABUSE: ICD-10-CM

## 2017-06-22 DIAGNOSIS — S43.431D LABRAL TEAR OF SHOULDER, RIGHT, SUBSEQUENT ENCOUNTER: ICD-10-CM

## 2017-06-22 DIAGNOSIS — R03.0 ELEVATED BLOOD PRESSURE READING WITHOUT DIAGNOSIS OF HYPERTENSION: ICD-10-CM

## 2017-06-22 DIAGNOSIS — R10.13 DYSPEPSIA: ICD-10-CM

## 2017-06-22 PROCEDURE — 99214 OFFICE O/P EST MOD 30 MIN: CPT | Performed by: FAMILY MEDICINE

## 2017-06-22 NOTE — NURSING NOTE
"Chief Complaint   Patient presents with     Rectal Problem       Initial /70 (BP Location: Right arm, Patient Position: Chair, Cuff Size: Adult Large)  Pulse 84  Temp 98.7  F (37.1  C) (Oral)  Resp 16  Wt 210 lb (95.3 kg)  SpO2 97%  BMI 29.29 kg/m2 Estimated body mass index is 29.29 kg/(m^2) as calculated from the following:    Height as of 1/9/17: 5' 11\" (1.803 m).    Weight as of this encounter: 210 lb (95.3 kg).  Medication Reconciliation: complete.Barbara EDMOND MA      "

## 2017-06-22 NOTE — MR AVS SNAPSHOT
After Visit Summary   6/22/2017    Venkata Lynn    MRN: [id number]           Patient Information     Date Of Birth          1965        Visit Information        Provider Department      6/22/2017 1:45 PM Lyndon Stoll MD [facility]        Today's Diagnoses     Blood in stool    -  1    Tobacco abuse        Dyspepsia        Complete tear of right rotator cuff        Labral tear of shoulder, right, subsequent encounter        Elevated blood pressure reading without diagnosis of hypertension          Care Instructions    Famotidine (pepcid) 20 or ranitidine(zantac) 150    Kinzi            Follow-ups after your visit        Who to contact     If you have questions or need follow up information about today's clinic visit or your schedule please contact [facility] directly at [phone].  Normal or non-critical lab and imaging results will be communicated to you by MyChart, letter or phone within 4 business days after the clinic has received the results. If you do not hear from us within 7 days, please contact the clinic through MyChart or phone. If you have a critical or abnormal lab result, we will notify you by phone as soon as possible.  Submit refill requests through [employer] or call your pharmacy and they will forward the refill request to us. Please allow 3 business days for your refill to be completed.          Additional Information About Your Visit        MyChart Information     [employer] gives you secure access to your electronic health record. If you see a primary care provider, you can also send messages to your care team and make appointments. If you have questions, please call your primary care clinic.  If you do not have a primary care provider, please call [phone] and they will assist you.        Care EveryWhere ID     This is your Care EveryWhere ID. This could be used by other organizations to access your [facility]  records  [id number]        Your Vitals Were     Pulse Temperature Respirations Pulse Oximetry BMI (Body Mass Index)       84 98.7  F (37.1  C) (Oral) 16 97% 29.29 kg/m2        Blood Pressure from Last 3 Encounters:   06/22/17 118/70   01/09/17 (!) 137/95   12/08/16 128/82    Weight from Last 3 Encounters:   06/22/17 210 lb (95.3 kg)   01/09/17 210 lb 6.4 oz (95.4 kg)   12/08/16 213 lb 12.8 oz (97 kg)              Today, you had the following     No orders found for display       Primary Care Provider Office Phone # Fax #    Lyndon Stoll [phone]       [facility] [id number] [place] [id number]        Equal Access to Services     MEL REYES : Hadii juan carlos villa hadasho Somili, waaxda luqadaha, qaybta kaalmada adeegyada, jerel lange hayquintin loredo . So [facility] [phone].    ATENCIÓN: Si habla español, tiene a rivera disposición servicios gratuitos de asistencia lingüística. Tg al [phone].    We comply with applicable federal civil rights laws and [place] laws. We do not discriminate on the basis of race, color, national origin, age, disability sex, sexual orientation or gender identity.            Thank you!     Thank you for choosing [facility]  for your care. Our goal is always to provide you with excellent care. Hearing back from our patients is one way we can continue to improve our services. Please take a few minutes to complete the written survey that you may receive in the mail after your visit with us. Thank you!             Your Updated Medication List - Protect others around you: Learn how to safely use, store and throw away your medicines at www.disposemymeds.org.          This list is accurate as of: 6/22/17  2:18 PM.  Always use your most recent med list.                   Brand Name Dispense Instructions for use Diagnosis    albuterol 108 (90 BASE) MCG/ACT Inhaler    PROAIR HFA/PROVENTIL HFA/VENTOLIN HFA    3 Inhaler     Inhale 2 puffs into the lungs every 6 hours as needed for shortness of breath / dyspnea    Mild persistent asthma without complication       ALPRAZolam 1 MG tablet    XANAX    30 tablet    Take 1 tablet (1 mg) by mouth daily as needed for anxiety    Anxiety       aspirin 325 MG tablet      Take by mouth daily        doxycycline 50 MG capsule    VIBRAMYCIN    180 capsule    Take 1 capsule (50 mg) by mouth 2 times daily    Rhinophyma, Rosacea       fluticasone 50 MCG/ACT spray    FLONASE    16 g    Spray 2 sprays into both nostrils daily    Non-seasonal allergic rhinitis due to other allergic trigger       fluticasone-vilanterol 200-25 MCG/INH oral inhaler    BREO ELLIPTA    3 Inhaler    Inhale 1 puff into the lungs daily    Mild persistent asthma without complication       IBUPROFEN PO           montelukast 10 MG tablet    SINGULAIR    90 tablet    Take 1 tablet (10 mg) by mouth At Bedtime    Mild persistent asthma without complication       MULTIVITAMIN ADULT PO      Take 1 tablet by mouth        RANITIDINE HCL PO      Take 300 mg by mouth as needed for heartburn        vitamin B complex with vitamin C Tabs tablet      Take 1 tablet by mouth daily        VITAMIN D (CHOLECALCIFEROL) PO      Take 1,000 Units by mouth daily        zolpidem 10 MG tablet    AMBIEN    30 tablet    Take 1 tablet (10 mg) by mouth nightly as needed for sleep at bedtime.    Primary insomnia

## 2017-06-22 NOTE — PROGRESS NOTES
SUBJECTIVE:                                                    Venkata Lynn is a 51 year old male who presents to clinic today for the following health issues:      Hemorrhoids     Onset: ongoing for some time    Description:   Pain: no   Itching: YES    Accompanying Signs & Symptoms:  Blood streaked toilet paper: YES  Blood in stool: YES- sometimes  Changes in stool pattern: no    History:   Any previous GI studies done:Colonoscopy  Family History of colon cancer: no     Precipitating factors:   None    Alleviating factors:  None     Therapies Tried and outcome: none        Blood in stool  (primary encounter diagnosis)painless q 6 mos colonoscopy UTD  Tobacco abuse chewing  Dyspepsia onprn  PPI quiescent  Complete tear of right rotator cuff by previous eval.  Labral tear of shoulder, right, subsequent encounter same  Elevated blood pressure reading without diagnosis of hypertension   BP Readings from Last 6 Encounters:   06/22/17 118/70   01/09/17 (!) 137/95   12/08/16 128/82   11/28/16 (!) 156/122   11/18/16 126/87   08/16/16 141/85         Problem list and histories reviewed & adjusted, as indicated.  Additional history: as documented        Reviewed and updated as needed this visit by clinical staff  Tobacco  Allergies  Med Hx  Surg Hx  Fam Hx  Soc Hx       Past Medical History:   Diagnosis Date     Acne vulgaris      Anxiety 3/28/2011     Anxiety disorder      Arthritis     both knees     CARDIOVASCULAR SCREENING; LDL GOAL LESS THAN 160 10/31/2010     Controlled substance agreement signed 1/11/2016     Coughing     at night     Dyspepsia 8/16/2016     Elevated blood pressure reading without diagnosis of hypertension 1/11/2016     Familial tremor 8/16/2016     Family history of rhinophyma 1/18/2012     Insomnia      Intermittent asthma 3/28/2011     Knee pain 1/18/2012     LBP (low back pain) 1/18/2012     Low back pains      Mild persistent asthma without complication 8/16/2016     Rhinitis  1/18/2012     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     Rosacea 1/18/2012     Sebaceous hyperplasia 1/18/2012     Shortness of breath     due to asthma     Tobacco abuse 1/18/2012       Past Surgical History:   Procedure Laterality Date     ARTHROSCOPY KNEE WITH MEDIAL MENISCECTOMY Left 2/22/2016    Procedure: ARTHROSCOPY KNEE WITH MEDIAL MENISCECTOMY;  Surgeon: Chaz Moe MD;  Location: [facility] OR     ENT SURGERY      lipoma removed from neck     HEAD & NECK SURGERY      fx root of tooth     ROTATOR CUFF REPAIR RT/LT Left 01/2017     VASECTOMY         Family History   Problem Relation Age of Onset     Prostate Cancer Father      Other - See Comments Other      tremor       Social History   Substance Use Topics     Smoking status: Never Smoker     Smokeless tobacco: Current User     Types: Chew      Comment: occ     Alcohol use Yes      Comment: few beers per week     Reviewed and updated as needed this visit by Provider    ROS:left shoulder post surg great, no other bleeding    This document serves as a record of the services and decisions personally performed and made by Lyndon Stoll MD. It was created on his behalf by Eileen Wick, a trained medical scribe.  The creation of this document is based on the scribe's personal observations and the provider's statements to the medical scribe.  Eileen Wick, June 22, 2017 2:02 PM    OBJECTIVE  /70 (BP Location: Right arm, Patient Position: Chair, Cuff Size: Adult Large)  Pulse 84  Temp 98.7  F (37.1  C) (Oral)  Resp 16  Wt 95.3 kg (210 lb)  SpO2 97%  BMI 29.29 kg/m2    EXAM:r shoulder tender to int rotation    ASSESSMENT / PLAN:  (K92.1) Blood in stool  (primary encounter diagnosis)  Comment: rare colonoscopy UTD  Plan: discussed    (Z72.0) Tobacco abuse  Comment: cessation urged  Plan:     (R10.13) Dyspepsia  Comment: discussed famotidine (pepcid) 20 or ranitidine(zantac) 150    Plan: if possible stop PPI    (M75.121) Complete tear of right rotator cuff  Comment:  to his ortho  Plan:  he will contact his surgeon     (S43.431D) Labral tear of shoulder, right, subsequent encounter  Comment: to his  ortho  Plan: he will contact his surgeon     (R03.0) Elevated blood pressure reading without diagnosis of hypertension  Comment:   BP Readings from Last 6 Encounters:   06/22/17 118/70   01/09/17 (!) 137/95   12/08/16 128/82   11/28/16 (!) 156/122   11/18/16 126/87   08/16/16 141/85     Plan: good today          Lyndon Stoll MD        The information in this document, created by the medical scribe for me, accurately reflects the services I personally performed and the decisions made by me. I have reviewed and approved this document for accuracy prior to leaving the patient care area.  Lyndon Stoll MD June 22, 2017 2:02 PM

## 2017-06-23 ASSESSMENT — ASTHMA QUESTIONNAIRES: ACT_TOTALSCORE: 24

## 2017-06-23 NOTE — TELEPHONE ENCOUNTER
Panel Management Review      Patient has the following on his problem list:     Asthma review     ACT Total Scores 6/22/2017   ACT TOTAL SCORE (Goal Greater than or Equal to 20) 24   In the past 12 months, how many times did you visit the emergency room for your asthma without being admitted to the hospital? 0   In the past 12 months, how many times were you hospitalized overnight because of your asthma? 0      1. Is Asthma diagnosis on the Problem List? Yes    2. Is Asthma listed on Health Maintenance? Yes    3. Patient is due for:  ACT      Composite cancer screening  Chart review shows that this patient is due/due soon for the following None  Summary:    Patient is due/failing the following:   ACT    Action needed:   Patient needs to do ACT.    Type of outreach:    After further review, patient was just seen in clinic today and ACT was completed. No patient contact needed    Questions for provider review:    None                                                                                                                                    [facility]       Chart Closed .

## 2017-08-11 DIAGNOSIS — F41.9 ANXIETY: ICD-10-CM

## 2017-08-11 RX ORDER — ALPRAZOLAM 1 MG
1 TABLET ORAL DAILY PRN
Qty: 30 TABLET | Refills: 0 | Status: SHIPPED | OUTPATIENT
Start: 2017-08-11 | End: 2017-09-21

## 2017-08-11 NOTE — TELEPHONE ENCOUNTER
Controlled Substance Refill Request for alprazolam 1  Problem List Complete:  Yes    Patient is followed by SAMUEL BAEZA for ongoing prescription of benzodiazepines.  All refills should be approved by this provider, or covering partner.    Medication(s): Xanax 1 mg.   Maximum quantity per month: 30  Clinic visit frequency required: Q 6  months     Controlled substance agreement on file: Yes       Date(s): 1/11/16  Benzodiazepine use reviewed by psychiatry:  No    Last [facility] website verification:  done on 7/26/16   [email or web address]         checked in past 6 months?  Yes 4/11/2017     Please walk signed prescription to pharmacy.  Thanks.  Olamide Bernal, Rob  [facility]  [phone]

## 2017-09-09 DIAGNOSIS — J45.30 MILD PERSISTENT ASTHMA WITHOUT COMPLICATION: ICD-10-CM

## 2017-09-11 RX ORDER — MONTELUKAST SODIUM 10 MG/1
1 TABLET ORAL AT BEDTIME
Qty: 90 TABLET | Refills: 1 | Status: SHIPPED | OUTPATIENT
Start: 2017-09-11 | End: 2018-02-20

## 2017-09-11 NOTE — TELEPHONE ENCOUNTER
Prescription approved per [facility] Refill Protocol  Radha Lion RN BS

## 2017-09-11 NOTE — TELEPHONE ENCOUNTER
Montelukast       Last Written Prescription Date: 01/09/2017  Last Fill Quantity: 90,05/22/2017 # refills: 1    Last Office Visit with [facility], ANGIE or  Health prescribing provider:  06/22/2017-Dr Stoll   Future Office Visit:       Date of Last Asthma Action Plan Letter:   Asthma Action Plan Q1 Year    Topic Date Due     Asthma Action Plan - yearly  08/16/2017      Asthma Control Test:   ACT Total Scores 6/22/2017   ACT TOTAL SCORE -   ASTHMA ER VISITS -   ASTHMA HOSPITALIZATIONS -   ACT TOTAL SCORE (Goal Greater than or Equal to 20) 24   In the past 12 months, how many times did you visit the emergency room for your asthma without being admitted to the hospital? 0   In the past 12 months, how many times were you hospitalized overnight because of your asthma? 0       Date of Last Spirometry Test:   No results found for this or any previous visit.      BREO ELLIPTA 200-25 MCG/INH Inhaler        Last Written Prescription Date: 01/09/2017  Last Fill Quantity: 3 inhaler,06/09/2017 # refills: 1    Last Office Visit with [facility] ANGIE or  Health prescribing provider:  06/22/2017-Dr Stoll   Future Office Visit:       Date of Last Asthma Action Plan Letter:   Asthma Action Plan Q1 Year    Topic Date Due     Asthma Action Plan - yearly  08/16/2017      Asthma Control Test:   ACT Total Scores 6/22/2017   ACT TOTAL SCORE -   ASTHMA ER VISITS -   ASTHMA HOSPITALIZATIONS -   ACT TOTAL SCORE (Goal Greater than or Equal to 20) 24   In the past 12 months, how many times did you visit the emergency room for your asthma without being admitted to the hospital? 0   In the past 12 months, how many times were you hospitalized overnight because of your asthma? 0       Date of Last Spirometry Test:   No results found for this or any previous visit.

## 2017-09-21 DIAGNOSIS — F41.9 ANXIETY: ICD-10-CM

## 2017-09-21 DIAGNOSIS — F51.01 PRIMARY INSOMNIA: ICD-10-CM

## 2017-09-21 RX ORDER — ZOLPIDEM TARTRATE 10 MG/1
10 TABLET ORAL
Qty: 30 TABLET | Refills: 5 | Status: SHIPPED | OUTPATIENT
Start: 2017-09-21 | End: 2018-02-20

## 2017-09-21 RX ORDER — ALPRAZOLAM 1 MG
1 TABLET ORAL DAILY PRN
Qty: 30 TABLET | Refills: 0 | Status: SHIPPED | OUTPATIENT
Start: 2017-09-21 | End: 2017-10-31

## 2017-09-21 NOTE — TELEPHONE ENCOUNTER
Controlled Substance Refill Request for zolpidem, alprazolam  Problem List Complete:  Yes  Patient is followed by SAMUEL BAEZA for ongoing prescription of benzodiazepines.  All refills should be approved by this provider, or covering partner.    Medication(s): Xanax 1 mg.   Maximum quantity per month: 30  Clinic visit frequency required: Q 6  months     Controlled substance agreement on file: Yes       Date(s): 1/11/16  Benzodiazepine use reviewed by psychiatry:  No    Last [facility] website verification:  done on 7/26/16   [email or web address]       checked in past 6 months?  Yes 4/11/17     Heather Alicia, Pharmacy [facility]

## 2017-09-22 NOTE — TELEPHONE ENCOUNTER
Walked 2 prescriptions (zolpidem, alprazolam) over to the [facility] pharmacy.      Mecca Brady/

## 2017-10-31 DIAGNOSIS — F41.9 ANXIETY: ICD-10-CM

## 2017-10-31 NOTE — TELEPHONE ENCOUNTER
Controlled Substance Refill Request for alprazolam 1mg  Problem List Complete:  Yes  Patient is followed by SAMUEL BAEZA for ongoing prescription of benzodiazepines.  All refills should be approved by this provider, or covering partner.    Medication(s): alprazolam zolpidem.   Maximum quantity per month: 30 ea  Clinic visit frequency required: Q 6  months     Controlled substance agreement on file: Yes       Date(s):    Benzodiazepine use reviewed by psychiatry:      Last [facility] website verification:  Done 4.11.17   [email or web address]       checked in past 6 months?  No, route to RN - overdue    Please walk signed prescription to pharmacy.  Thanks.  Olamide Bernal, Mendel  [facility]  [phone]

## 2017-11-01 NOTE — TELEPHONE ENCOUNTER
Routing refill request to provider for review/approval because:  Drug not on the [facility] refill protocol   : 11.1.17    Radha Lion RN, BS  Clinical Nurse Triage.

## 2017-11-02 DIAGNOSIS — F41.9 ANXIETY: ICD-10-CM

## 2017-11-02 RX ORDER — ALPRAZOLAM 1 MG
1 TABLET ORAL DAILY PRN
Qty: 30 TABLET | Refills: 0 | Status: SHIPPED | OUTPATIENT
Start: 2017-11-02 | End: 2017-12-08

## 2017-11-02 RX ORDER — ALPRAZOLAM 1 MG
1 TABLET ORAL DAILY PRN
Qty: 30 TABLET | Refills: 0 | Status: SHIPPED | OUTPATIENT
Start: 2017-11-02 | End: 2017-11-02

## 2017-11-02 NOTE — TELEPHONE ENCOUNTER
Controlled Substance Refill Request for alprazolam 1  Problem List Complete:  Yes  Patient is followed by SAMUEL BAEZA for ongoing prescription of benzodiazepines.  All refills should be approved by this provider, or covering partner.    Medication(s): Xanax 1 mg.   Maximum quantity per month: 30  Clinic visit frequency required: Q 6  months     Controlled substance agreement on file: Yes       Date(s): 1/11/16  Benzodiazepine use reviewed by psychiatry:  No    Last [facility] website verification:  11.1.17   [email or web address]       checked in past 6 months?  Yes 11/01/17     Please walk signed prescription to pharmacy.  Thanks.  Olamide Bernal, Rob  [facility]  [phone]

## 2017-12-08 DIAGNOSIS — F41.9 ANXIETY: ICD-10-CM

## 2017-12-08 RX ORDER — ALPRAZOLAM 1 MG
1 TABLET ORAL DAILY PRN
Qty: 30 TABLET | Refills: 0 | Status: SHIPPED | OUTPATIENT
Start: 2017-12-08 | End: 2018-01-15

## 2017-12-08 NOTE — TELEPHONE ENCOUNTER
Alprazolam    [place] reviewed:  reviewed- no concerns    [place]  [email or web address]      Last Filled    Alprazolam  11/2/2017, #30  9/22/2017, #30    Zolpidem  10/31/2017, #30  9/22/2017, #30    Ashwini COLVIN RN, BSN, PHN  [place] [facility] RN

## 2017-12-08 NOTE — TELEPHONE ENCOUNTER
Controlled Substance Refill Request for alprazolam 1  Problem List Complete:  Yes  Patient is followed by SAMUEL BAEZA for ongoing prescription of benzodiazepines.  All refills should be approved by this provider, or covering partner.    Medication(s): alprazolam zolpidem.   Maximum quantity per month: 30 ea  Clinic visit frequency required: Q 6  months     Controlled substance agreement on file: Yes       Date(s):    Benzodiazepine use reviewed by psychiatry:      Last [facility] website verification:  Done 4.11.17   [email or web address]       checked in past 6 months?  No, route to RN - looks overdue    Please walk signed prescription to pharmacy.  Thanks.  Olamide Bernal, Mendel  [facility]  [phone]

## 2017-12-12 ENCOUNTER — TRANSFERRED RECORDS (OUTPATIENT)
Dept: HEALTH INFORMATION MANAGEMENT | Facility: CLINIC | Age: 52
End: 2017-12-12

## 2018-01-02 ENCOUNTER — TRANSFERRED RECORDS (OUTPATIENT)
Dept: HEALTH INFORMATION MANAGEMENT | Facility: CLINIC | Age: 53
End: 2018-01-02

## 2018-01-15 DIAGNOSIS — F41.9 ANXIETY: ICD-10-CM

## 2018-01-15 DIAGNOSIS — J31.0 RHINITIS: Primary | ICD-10-CM

## 2018-01-15 RX ORDER — ALPRAZOLAM 1 MG
1 TABLET ORAL DAILY PRN
Qty: 30 TABLET | Refills: 0 | Status: SHIPPED | OUTPATIENT
Start: 2018-01-15 | End: 2018-02-20

## 2018-01-15 NOTE — TELEPHONE ENCOUNTER
Controlled Substance Refill Request for alprazolam  Problem List Complete:  Yes  Patient is followed by SAMUEL BAEZA for ongoing prescription of benzodiazepines.  All refills should be approved by this provider, or covering partner.    Medication(s): Xanax 1 mg.   Maximum quantity per month: 30  Clinic visit frequency required: Q 6  months     Controlled substance agreement on file: Yes       Date(s): 1/11/16  Benzodiazepine use reviewed by psychiatry:  No    Last [facility] website verification:  11.1.17   [email or web address]     checked in past 6 months?  Yes 11/1/17     Heather Alicia, Pharmacy [facility]

## 2018-01-18 RX ORDER — FLUTICASONE PROPIONATE 50 MCG
SPRAY, SUSPENSION (ML) NASAL
Qty: 16 G | Refills: 5 | Status: SHIPPED | OUTPATIENT
Start: 2018-01-18 | End: 2018-02-20

## 2018-01-18 NOTE — TELEPHONE ENCOUNTER
Prescription approved per [facility] Refill Protocol. -- Blaine Morrissey RN -- [facility]

## 2018-02-12 DIAGNOSIS — J45.30 MILD PERSISTENT ASTHMA WITHOUT COMPLICATION: ICD-10-CM

## 2018-02-12 NOTE — TELEPHONE ENCOUNTER
"Requested Prescriptions   Pending Prescriptions Disp Refills     BREO ELLIPTA 200-25 MCG/INH Inhaler [Pharmacy Med Name: BREO ELLIPTA 200-25MCG/INH AEPB]  Last Written Prescription Date:  9/11/2017  Last Fill Quantity: 180 inhaler,  # refills: 1   Last office visit: 6/22/2017 with prescribing provider:  Jerman Joy     Sig: INHALE 1 PUFF INTO LUNGS DAILY    Inhaled Steroids Protocol Failed    2/12/2018 10:21 AM       Failed - Asthma control test 20 or greater in last 6 months    Please review ACT score.   ACT Total Scores 8/16/2016 11/18/2016 6/22/2017   ACT TOTAL SCORE - - -   ASTHMA ER VISITS - - -   ASTHMA HOSPITALIZATIONS - - -   ACT TOTAL SCORE (Goal Greater than or Equal to 20) 14 15 24   In the past 12 months, how many times did you visit the emergency room for your asthma without being admitted to the hospital? 0 0 0   In the past 12 months, how many times were you hospitalized overnight because of your asthma? 0 0 0          Failed - Recent (6 mo) or future visit with authorizing provider's specialty    Patient had office visit in the last 6 months or has a visit in the next 30 days with authorizing provider.  See \"Patient Info\" tab in inbasket, or \"Choose Columns\" in Meds & Orders section of the refill encounter.           Passed - Patient is age 12 or older          "

## 2018-02-12 NOTE — LETTER
[facility]  [address] [place], [address]  [phone]        February 14, 2018    Venkata Lynn                                                                                                                              [address]  [facility] [address]            We recently received a call from your pharmacy requesting a refill of Breo.     A review of your chart indicates that an appointment is required with your provider for yearly physical, 6 month asthma visit and med refill. Please call the clinic at [phone] to schedule your appointment.     Taking care of your health is important to us and ongoing visits with your provider are vital to your care.  We look forward to seeing you in the near future.     Sincerely,     [facility]

## 2018-02-14 NOTE — TELEPHONE ENCOUNTER
Overdue for asthma visit and ACT.  No upcoming appt.  Letter sent.  Due for PE also.  Letter sent.  Sent to provider.  Ita Woodard RN

## 2018-02-20 ENCOUNTER — OFFICE VISIT (OUTPATIENT)
Dept: FAMILY MEDICINE | Facility: CLINIC | Age: 53
End: 2018-02-20
Payer: COMMERCIAL

## 2018-02-20 VITALS
BODY MASS INDEX: 29.82 KG/M2 | DIASTOLIC BLOOD PRESSURE: 83 MMHG | HEIGHT: 71 IN | RESPIRATION RATE: 14 BRPM | HEART RATE: 67 BPM | SYSTOLIC BLOOD PRESSURE: 128 MMHG | TEMPERATURE: 98 F | WEIGHT: 213 LBS

## 2018-02-20 DIAGNOSIS — Z79.899 CONTROLLED SUBSTANCE AGREEMENT SIGNED: ICD-10-CM

## 2018-02-20 DIAGNOSIS — F41.9 ANXIETY: ICD-10-CM

## 2018-02-20 DIAGNOSIS — Z72.0 TOBACCO ABUSE: ICD-10-CM

## 2018-02-20 DIAGNOSIS — G25.0 FAMILIAL TREMOR: ICD-10-CM

## 2018-02-20 DIAGNOSIS — F51.01 PRIMARY INSOMNIA: ICD-10-CM

## 2018-02-20 DIAGNOSIS — R03.0 ELEVATED BLOOD PRESSURE READING WITHOUT DIAGNOSIS OF HYPERTENSION: ICD-10-CM

## 2018-02-20 DIAGNOSIS — L71.1 RHINOPHYMA: ICD-10-CM

## 2018-02-20 DIAGNOSIS — Z00.00 ROUTINE GENERAL MEDICAL EXAMINATION AT A HEALTH CARE FACILITY: Primary | ICD-10-CM

## 2018-02-20 DIAGNOSIS — J30.81 ALLERGIC RHINITIS DUE TO ANIMAL HAIR AND DANDER, UNSPECIFIED CHRONICITY: ICD-10-CM

## 2018-02-20 DIAGNOSIS — J45.30 MILD PERSISTENT ASTHMA WITHOUT COMPLICATION: ICD-10-CM

## 2018-02-20 DIAGNOSIS — L71.9 ROSACEA: ICD-10-CM

## 2018-02-20 LAB
ALBUMIN SERPL-MCNC: 4.1 G/DL (ref 3.4–5)
ALP SERPL-CCNC: 81 U/L (ref 40–150)
ALT SERPL W P-5'-P-CCNC: 35 U/L (ref 0–70)
ANION GAP SERPL CALCULATED.3IONS-SCNC: 5 MMOL/L (ref 3–14)
AST SERPL W P-5'-P-CCNC: 26 U/L (ref 0–45)
BILIRUB SERPL-MCNC: 0.4 MG/DL (ref 0.2–1.3)
BUN SERPL-MCNC: 15 MG/DL (ref 7–30)
CALCIUM SERPL-MCNC: 9.3 MG/DL (ref 8.5–10.1)
CHLORIDE SERPL-SCNC: 104 MMOL/L (ref 94–109)
CHOLEST SERPL-MCNC: 211 MG/DL
CO2 SERPL-SCNC: 30 MMOL/L (ref 20–32)
CREAT SERPL-MCNC: 0.86 MG/DL (ref 0.66–1.25)
GFR SERPL CREATININE-BSD FRML MDRD: >90 ML/MIN/1.7M2
GLUCOSE SERPL-MCNC: 86 MG/DL (ref 70–99)
HDLC SERPL-MCNC: 65 MG/DL
LDLC SERPL CALC-MCNC: 118 MG/DL
NONHDLC SERPL-MCNC: 146 MG/DL
POTASSIUM SERPL-SCNC: 4.4 MMOL/L (ref 3.4–5.3)
PROT SERPL-MCNC: 7.7 G/DL (ref 6.8–8.8)
SODIUM SERPL-SCNC: 139 MMOL/L (ref 133–144)
TRIGL SERPL-MCNC: 142 MG/DL

## 2018-02-20 PROCEDURE — 36415 COLL VENOUS BLD VENIPUNCTURE: CPT | Performed by: FAMILY MEDICINE

## 2018-02-20 PROCEDURE — 99396 PREV VISIT EST AGE 40-64: CPT | Performed by: FAMILY MEDICINE

## 2018-02-20 PROCEDURE — 80053 COMPREHEN METABOLIC PANEL: CPT | Performed by: FAMILY MEDICINE

## 2018-02-20 PROCEDURE — 80061 LIPID PANEL: CPT | Performed by: FAMILY MEDICINE

## 2018-02-20 RX ORDER — ALPRAZOLAM 1 MG
1 TABLET ORAL DAILY PRN
Qty: 30 TABLET | Refills: 0 | Status: SHIPPED | OUTPATIENT
Start: 2018-02-20 | End: 2018-03-22

## 2018-02-20 RX ORDER — ZOLPIDEM TARTRATE 10 MG/1
10 TABLET ORAL
Qty: 30 TABLET | Refills: 5 | Status: SHIPPED | OUTPATIENT
Start: 2018-02-20 | End: 2018-09-14

## 2018-02-20 RX ORDER — ALBUTEROL SULFATE 90 UG/1
2 AEROSOL, METERED RESPIRATORY (INHALATION) EVERY 6 HOURS PRN
Qty: 1 INHALER | Refills: 11 | Status: SHIPPED | OUTPATIENT
Start: 2018-02-20 | End: 2020-06-15

## 2018-02-20 RX ORDER — DOXYCYCLINE HYCLATE 50 MG/1
50 CAPSULE ORAL 2 TIMES DAILY
Qty: 180 CAPSULE | Refills: 3 | Status: SHIPPED | OUTPATIENT
Start: 2018-02-20 | End: 2019-04-09

## 2018-02-20 RX ORDER — MONTELUKAST SODIUM 10 MG/1
1 TABLET ORAL AT BEDTIME
Qty: 90 TABLET | Refills: 3 | Status: SHIPPED | OUTPATIENT
Start: 2018-02-20 | End: 2018-09-14

## 2018-02-20 RX ORDER — FLUTICASONE PROPIONATE 50 MCG
SPRAY, SUSPENSION (ML) NASAL
Qty: 16 G | Refills: 5 | Status: SHIPPED | OUTPATIENT
Start: 2018-02-20 | End: 2018-10-18

## 2018-02-20 RX ORDER — ALPRAZOLAM 1 MG
1 TABLET ORAL DAILY PRN
Qty: 30 TABLET | Refills: 0 | Status: CANCELLED | OUTPATIENT
Start: 2018-02-20

## 2018-02-20 NOTE — LETTER
[facility]    02/20/18    Patient: Venkata Lynn  YOB: 1965  Medical Record Number: [id number]                                                                  Controlled Substance Agreement  I understand that my care provider has prescribed controlled substances (narcotics, tranquilizers, and/or stimulants) to help manage my condition(s).  I am taking this medicine to help me function or work.  I know that this is strong medicine.  It could have serious side effects and even cause a dependency on the drug.  If I stop these medicines suddenly, I could have severe withdrawal symptoms.    The risks, benefits, and side effects of these medication(s) were explained to me.  I agree that:  1. I will take part in other treatments as advised by my provider.  This may be psychiatry or counseling, physical therapy, behavioral therapy, group treatment, or a referral to a pain clinic.  I will reduce or stop my medicine when my provider tells me to do so.   2. I will take my medicines as prescribed.  I will not change the dose or schedule unless my provider tells me to.  There will be no refills if I  run out early.   I may be contacted at any time without warning and asked to complete a drug test or pill count.   3. I will keep all my appointments at the clinic.  If I miss appointments or fail to follow instructions, my provider may stop my medicine.  4. I will not ask other providers to prescribe controlled substances. And I will not accept controlled substances from other people. If I need another prescribed controlled substance for a new reason, I will notify my provider within one business day.  5. If I enroll in the [place] Medical Marijuana program, I will tell my provider.  I will also sign an agreement to share my medical records with my provider.  6. I will use one pharmacy to fill all of my controlled substance prescriptions.  If my prescription is mailed to my pharmacy, it may  take 5 to 7 days for my medicine to be ready.  7. I understand that my provider, clinic care team, and pharmacy can track controlled substance prescriptions from other providers through a central database (prescription monitoring program).  8. I will bring in my list of medications (or my medicine bottles) each time I come to the clinic.  REV- 04/2016                                                                                                                                            Page 1 of 2      [facility]    02/20/18    Patient: Venkata Lynn  YOB: 1965  Medical Record Number: [id number]    9. Refills of controlled substances will be made only during office hours.  It is up to me to make sure that I do not run out of my medicines on weekends or holidays.    10. I am responsible for my prescriptions.  If the medicine is lost or stolen, it will not be replaced.   I also agree not to share these medicines with anyone.  11. I agree to not use ANY illegal or recreational drugs.  This includes marijuana, cocaine, bath salts or other drugs.  I agree not to use alcohol unless my provider says I may.  I agree to give urine samples whenever asked.  If I fail to give a urine sample, the provider may stop my medicine.     12. I will tell my nurse or provider right away if I become pregnant or have a new medical problem treated outside of [facility].  13. I understand that this medicine can affect my thinking and judgment.  It may be unsafe for me to drive, use machinery and do dangerous tasks.  I will not do any of these things until I know how the medicine affects me.  If my dose changes, I will wait to see how it affects me.  I will contact my provider if I have concerns about medicine side effects.  I understand that if I do not follow any of the conditions above, my prescriptions or treatment may be stopped.    I agree that my provider, clinic care team, and pharmacy may  work with any city, state or federal law enforcement agency that investigates the misuse, sale, or other diversion of my controlled medicine. I will allow my provider to discuss my care with or share a copy of this agreement with any other treating provider, pharmacy or emergency room where I receive care.  I agree to give up (waive) any right of privacy or confidentiality with respect to these authorizations.   I have read this agreement and have asked questions about anything I did not understand.   ___________________________________    ___________________________  Patient Signature                                                           Date and Time  ___________________________________     ____________________________  Witness                                                                            Date and Time  ___________________________________  Lyndon Stoll MD  REV-  04/2016                                                                                                                                                                 Page 2 of 2

## 2018-02-20 NOTE — TELEPHONE ENCOUNTER
Controlled Substance Refill Request for alprazolam  Problem List Complete:  Yes    Patient is followed by SAMUEL BAEZA for ongoing prescription of benzodiazepines.  All refills should be approved by this provider, or covering partner.    Medication(s): Xanax 1 mg.   Maximum quantity per month: 30  Clinic visit frequency required: Q 6  months     Controlled substance agreement on file: Yes       Date(s): 1/11/16  Benzodiazepine use reviewed by psychiatry:  No    Last [facility] website verification:  11.1.17   [email or web address]     checked in past 6 months?  Yes 11/1/17     Heather Alicia, Pharmacy [facility]

## 2018-02-20 NOTE — PROGRESS NOTES
SUBJECTIVE:   CC: Venkata Lynn is an 52 year old male who presents for preventative health visit.     Physical   Annual:     Getting at least 3 servings of Calcium per day::  Yes    Bi-annual eye exam::  Yes    Dental care twice a year::  Yes    Sleep apnea or symptoms of sleep apnea::  None    Diet::  Regular (no restrictions)    Taking medications regularly::  Yes    Medication side effects::  Not applicable    Additional concerns today::  No          Asthma     Diet:  Regular (no restrictions)  Taking medications regularly:  Yes  Medication side effects:  Not applicable  Additional concerns today:  No    Patient is compliant with medications and denies adverse effects. He experiences some chest tightness in the winter time, which he attributes to being inside with his pets, as pet dander is a trigger. He denies shortness of breath. He uses his albuterol inhaler infrequently.     Patient is also concerned about his familial tremor. He notices the tremor more when he gets hungry. It is most bothersome when he is at work because he worries that his coworker notice it.      Today's PHQ-2 Score:   PHQ-2 ( 1999 [employer]) 2/20/2018   Q1: Little interest or pleasure in doing things -   Q2: Feeling down, depressed or hopeless -   PHQ-2 Score -   Q1: Little interest or pleasure in doing things Not at all   Q2: Feeling down, depressed or hopeless Not at all   PHQ-2 Score 0     Abuse: Current or Past(Physical, Sexual or Emotional)- No  Do you feel safe in your environment - Yes    Social History   Substance Use Topics     Smoking status: Never Smoker     Smokeless tobacco: Current User     Types: Chew      Comment: occ     Alcohol use Yes      Comment: few beers per week   Patient is using chewing tobacco twice a week. He recently went to the dentist, who did not find any oral lesions.       Alcohol Use 2/20/2018   If you drink alcohol, do you typically have greater than 3 drinks per day OR greater than 7 drinks per  week?   Yes     AUDIT - Alcohol Use Disorders Identification Test - Reproduced from the World Health Organization Audit 2001 (Second Edition) 2/20/2018   1.  How often do you have a drink containing alcohol? 2 to 3 times a week   2.  How many drinks containing alcohol do you have on a typical day when you are drinking? 3 or 4   3.  How often do you have five or more drinks on one occasion? Monthly   4.  How often during the last year have you found that you were not able to stop drinking once you had started? Never   5.  How often during the last year have you failed to do what was normally expected of you because of drinking? Never   6.  How often during the last year have you needed a first drink in the morning to get yourself going after a heavy drinking session? Never   7.  How often during the last year have you had a feeling of guilt or remorse after drinking? Less than monthly   8.  How often during the last year have you been unable to remember what happened the night before because of your drinking? Never   9.  Have you or someone else been injured because of your drinking? No   10. Has a relative, friend, doctor or other health care worker been concerned about your drinking or suggested you cut down? No   TOTAL SCORE 7       Last PSA: No results found for: PSA    Reviewed orders with patient. Reviewed health maintenance and updated orders accordingly - Yes      Reviewed and updated as needed this visit by clinical staff  Tobacco  Allergies  Meds  Med Hx  Surg Hx  Fam Hx  Soc Hx      Past Medical History:   Diagnosis Date     Acne vulgaris      Anxiety 3/28/2011     Anxiety disorder      Arthritis     both knees     CARDIOVASCULAR SCREENING; LDL GOAL LESS THAN 160 10/31/2010     Controlled substance agreement signed 1/11/2016     Coughing     at night     Dyspepsia 8/16/2016     Elevated blood pressure reading without diagnosis of hypertension 1/11/2016     Familial tremor 8/16/2016     Family history  of rhinophyma 1/18/2012     Insomnia      Intermittent asthma 3/28/2011     Knee pain 1/18/2012     LBP (low back pain) 1/18/2012     Low back pains      Mild persistent asthma without complication 8/16/2016     Rhinitis 1/18/2012     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     Rosacea 1/18/2012     Sebaceous hyperplasia 1/18/2012     Shortness of breath     due to asthma     Tobacco abuse 1/18/2012       Past Surgical History:   Procedure Laterality Date     ARTHROSCOPY KNEE WITH MEDIAL MENISCECTOMY Left 2/22/2016    Procedure: ARTHROSCOPY KNEE WITH MEDIAL MENISCECTOMY;  Surgeon: Chaz Moe MD;  Location: [facility] OR     ENT SURGERY      lipoma removed from neck     HEAD & NECK SURGERY      fx root of tooth     ROTATOR CUFF REPAIR RT/LT Left 01/2017     VASECTOMY         Family History   Problem Relation Age of Onset     Prostate Cancer Father      Other - See Comments Other      tremor       Social History   Substance Use Topics     Smoking status: Never Smoker     Smokeless tobacco: Current User     Types: Chew      Comment: occ     Alcohol use Yes      Comment: few beers per week       Reviewed and updated as needed this visit by Provider            Review of Systems  C: NEGATIVE for fever, chills, change in weight  E: NEGATIVE for vision changes or irritation  ENT: NEGATIVE for ear, mouth and throat problems  R: NEGATIVE for significant cough or SOB  CV: NEGATIVE for  palpitations or peripheral edema  GI: NEGATIVE for nausea, abdominal pain, heartburn, or change in bowel habits   male: negative for dysuria  M: NEGATIVE for significant arthralgias or myalgia. POSITIVE for tremor of his hands.   N: NEGATIVE for weakness, dizziness or paresthesias    This document serves as a record of the services and decisions personally performed and made by Lyndon Stoll MD. It was created on his behalf by Eileen Wick, a trained medical scribe.  The creation of this document is based on the scribe's personal observations and the  "provider's statements to the medical scribe.  Eileen Wick, February 20, 2018 11:22 AM    OBJECTIVE:   Ht 5' 11\" (1.803 m)   /83 (BP Location: Right arm, Patient Position: Chair, Cuff Size: Adult Large)  Pulse 67  Temp 98  F (36.7  C) (Oral)  Resp 14  Ht 5' 11\" (1.803 m)  Wt 213 lb (96.6 kg)  BMI 29.71 kg/m2      Physical Exam  GENERAL: healthy, alert and no distress  EYES: Eyes grossly normal to inspection, PERRL and conjunctivae and sclerae normal  HENT: ear canals and TM's normal, nose and mouth without ulcers or lesions  NECK: no adenopathy, no asymmetry, masses, or scars and thyroid normal to palpation  RESP: lungs clear to auscultation - no rales, rhonchi or wheezes  CV: regular rate and rhythm, normal S1 S2, no S3 or S4, no murmur, click or rub, no peripheral edema and peripheral pulses palpable.  ABDOMEN: soft, nontender, no hepatosplenomegaly, no masses and bowel sounds normal  MS: no gross musculoskeletal defects noted, no edema. Mild tremor of the hands noted.   PSYCH: mentation appears normal, affect normal/bright    No results found for this or any previous visit (from the past 168 hour(s)).      ASSESSMENT/PLAN:   ASSESSMENT / PLAN:  (Z00.00) Routine general medical examination at a health care facility  (primary encounter diagnosis)  Comment: Plans to follow up with eye doctor. Last colonoscopy was in 2014, with recommended repeat colonoscopy in 10 years.   Plan: Comprehensive metabolic panel, Lipid panel         reflex to direct LDL Non-fasting    (Z72.0) Tobacco abuse  Comment: Discussed options for cessation today. Patient not interested at this time.     (R03.0) Elevated blood pressure reading without diagnosis of hypertension  Comment: BP today is 128/83. Does not require medcation at this time.   Plan: Continue to monitor.     (J45.30) Mild persistent asthma without complication  Comment: Well-controlled.   Plan: Asthma Control Test - HIM Scan,         fluticasone-vilanterol (BREO " "ELLIPTA) 200-25         MCG/INH oral inhaler, montelukast (SINGULAIR)         10 MG tablet, albuterol (PROAIR HFA/PROVENTIL         HFA/VENTOLIN HFA) 108 (90 BASE) MCG/ACT Inhaler    (G25.0) Familial tremor  Comment: Discussed starting beta blocker and how it may affect his asthma. Patient declines at this time.   Plan: Continue to monitor.    (J30.81) Allergic rhinitis due to animal hair and dander, unspecified chronicity  Comment:   Plan: fluticasone (FLONASE) 50 MCG/ACT spray       (F51.01) Primary insomnia  Comment: Sleeping well with current medications.  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  Plan: doxycycline (VIBRAMYCIN) 50 MG capsule    (L71.9) Rosacea  Comment: Follows with dermatology. Uses doxycycline with flares, not using currently.   Plan: doxycycline (VIBRAMYCIN) 50 MG capsule    (Z79.899) Controlled substance agreement signed  Comment: today      RTC q 6 mos    Lyndon Stoll MD        COUNSELING:   Reviewed preventive health counseling, as reflected in patient instructions         reports that he has never smoked. His smokeless tobacco use includes Chew.  Tobacco Cessation Action Plan: Information offered: Patient not interested at this time  Estimated body mass index is 29.29 kg/(m^2) as calculated from the following:    Height as of 1/9/17: 5' 11\" (1.803 m).    Weight as of 6/22/17: 210 lb (95.3 kg).   Weight management plan: maintain    Counseling Resources:  ATP IV Guidelines  Pooled Cohorts Equation Calculator  FRAX Risk Assessment  ICSI Preventive Guidelines  Dietary Guidelines for Americans, 2010  USDA's MyPlate  ASA Prophylaxis  Lung CA Screening    Lyndon Stoll MD  [facility]  Answers for HPI/ROS submitted by the patient on 2/20/2018   PHQ-2 Score: 0  The information in this document, created by the medical scribe for me, accurately reflects the services I personally performed and the decisions made by me. I " have reviewed and approved this document for accuracy prior to leaving the patient care area.  Lyndon Stoll MD February 20, 2018 11:22 AM

## 2018-02-20 NOTE — NURSING NOTE
"Chief Complaint   Patient presents with     Physical     Asthma       Initial Ht 5' 11\" (1.803 m) Estimated body mass index is 29.29 kg/(m^2) as calculated from the following:    Height as of 1/9/17: 5' 11\" (1.803 m).    Weight as of 6/22/17: 210 lb (95.3 kg).  Medication Reconciliation: complete rt arm Mini Slade MA      "

## 2018-02-20 NOTE — MR AVS SNAPSHOT
After Visit Summary   2/20/2018    Venkata Lynn    MRN: [id number]           Patient Information     Date Of Birth          1965        Visit Information        Provider Department      2/20/2018 11:00 AM Lyndon Stoll MD [facility]        Today's Diagnoses     Routine general medical examination at a health care facility    -  1    Tobacco abuse        Elevated blood pressure reading without diagnosis of hypertension        Mild persistent asthma without complication        Familial tremor        Allergic rhinitis due to animal hair and dander, unspecified chronicity        Primary insomnia        Rhinophyma        Rosacea          Care Instructions      Preventive Health Recommendations  Male Ages 50 - 64    Yearly exam:             See your health care provider every year in order to  o   Review health changes.   o   Discuss preventive care.    o   Review your medicines if your doctor has prescribed any.     Have a cholesterol test every 5 years, or more frequently if you are at risk for high cholesterol/heart disease.     Have a diabetes test (fasting glucose) every three years. If you are at risk for diabetes, you should have this test more often.     Have a colonoscopy at age 50, or have a yearly FIT test (stool test). These exams will check for colon cancer.      Talk with your health care provider about whether or not a prostate cancer screening test (PSA) is right for you.    You should be tested each year for STDs (sexually transmitted diseases), if you re at risk.     Shots: Get a flu shot each year. Get a tetanus shot every 10 years.     Nutrition:    Eat at least 5 servings of fruits and vegetables daily.     Eat whole-grain bread, whole-wheat pasta and brown rice instead of white grains and rice.     Talk to your provider about Calcium and Vitamin D.     Lifestyle    Exercise for at least 150 minutes a week (30 minutes a day, 5 days a week). This will help  "you control your weight and prevent disease.     Limit alcohol to one drink per day.     No smoking.     Wear sunscreen to prevent skin cancer.     See your dentist every six months for an exam and cleaning.     See your eye doctor every 1 to 2 years.            Follow-ups after your visit        Follow-up notes from your care team     Return in about 6 months (around 8/20/2018).      Who to contact     If you have questions or need follow up information about today's clinic visit or your schedule please contact [facility] directly at [phone].  Normal or non-critical lab and imaging results will be communicated to you by MathZeehart, letter or phone within 4 business days after the clinic has received the results. If you do not hear from us within 7 days, please contact the clinic through Updoxt or phone. If you have a critical or abnormal lab result, we will notify you by phone as soon as possible.  Submit refill requests through FitOrbit or call your pharmacy and they will forward the refill request to us. Please allow 3 business days for your refill to be completed.          Additional Information About Your Visit        MathZeehart Information     FitOrbit gives you secure access to your electronic health record. If you see a primary care provider, you can also send messages to your care team and make appointments. If you have questions, please call your primary care clinic.  If you do not have a primary care provider, please call [phone] and they will assist you.        Care EveryWhere ID     This is your Care EveryWhere ID. This could be used by other organizations to access your [place] medical records  [id number]        Your Vitals Were     Pulse Temperature Respirations Height BMI (Body Mass Index)       67 98  F (36.7  C) (Oral) 14 5' 11\" (1.803 m) 29.71 kg/m2        Blood Pressure from Last 3 Encounters:   02/20/18 128/83   06/22/17 118/70   01/09/17 (!) 137/95    Weight from Last 3 " Encounters:   02/20/18 213 lb (96.6 kg)   06/22/17 210 lb (95.3 kg)   01/09/17 210 lb 6.4 oz (95.4 kg)              We Performed the Following     Asthma Control Test - HIM Scan     Comprehensive metabolic panel     Lipid panel reflex to direct LDL Non-fasting          Today's Medication Changes          These changes are accurate as of 2/20/18 11:52 AM.  If you have any questions, ask your nurse or doctor.               These medicines have changed or have updated prescriptions.        Dose/Directions    fluticasone 50 MCG/ACT spray   Commonly known as:  FLONASE   This may have changed:  See the new instructions.   Used for:  Allergic rhinitis due to animal hair and dander, unspecified chronicity   Changed by:  Lyndon Stoll MD        SHAKE GENTLY AND USE 2 SPRAYS INTO EACH NOSTRIL DAILY   Quantity:  16 g   Refills:  5       fluticasone-vilanterol 200-25 MCG/INH oral inhaler   Commonly known as:  BREO ELLIPTA   This may have changed:  See the new instructions.   Used for:  Mild persistent asthma without complication   Changed by:  Lyndon Stoll MD        INHALE 1 PUFF INTO LUNGS DAILY   Quantity:  3 Inhaler   Refills:  1            Where to get your medicines      These medications were sent to [facility] [id number] [place] [address]  [id number] [place] [address]     Phone:  [phone]     albuterol 108 (90 BASE) MCG/ACT Inhaler    doxycycline 50 MG capsule    fluticasone 50 MCG/ACT spray    fluticasone-vilanterol 200-25 MCG/INH oral inhaler    montelukast 10 MG tablet         Some of these will need a paper prescription and others can be bought over the counter.  Ask your nurse if you have questions.     Bring a paper prescription for each of these medications     zolpidem 10 MG tablet                Primary Care Provider Office Phone # Fax #    Lyndon Stoll [phone] [address] [place] [address]        Equal Access to Services      MEL REYES : Hadii aad ku selvin Christy, waaxda luqadaha, qaybta kaalmada adeeduardo, jerel nazario panchobridger goff robertdavid loredo . So [facility] [phone].    ATENCIÓN: Si habla alycia, tiene a rivera disposición servicios gratuitos de asistencia lingüística. Llame al [phone].    We comply with applicable federal civil rights laws and [place] laws. We do not discriminate on the basis of race, color, national origin, age, disability, sex, sexual orientation, or gender identity.            Thank you!     Thank you for choosing [facility]  for your care. Our goal is always to provide you with excellent care. Hearing back from our patients is one way we can continue to improve our services. Please take a few minutes to complete the written survey that you may receive in the mail after your visit with us. Thank you!             Your Updated Medication List - Protect others around you: Learn how to safely use, store and throw away your medicines at www.disposemymeds.org.          This list is accurate as of 2/20/18 11:52 AM.  Always use your most recent med list.                   Brand Name Dispense Instructions for use Diagnosis    albuterol 108 (90 BASE) MCG/ACT Inhaler    PROAIR HFA/PROVENTIL HFA/VENTOLIN HFA    1 Inhaler    Inhale 2 puffs into the lungs every 6 hours as needed for shortness of breath / dyspnea    Mild persistent asthma without complication       ALPRAZolam 1 MG tablet    XANAX    30 tablet    Take 1 tablet (1 mg) by mouth daily as needed for anxiety    Anxiety       aspirin 325 MG tablet      Take by mouth daily        doxycycline 50 MG capsule    VIBRAMYCIN    180 capsule    Take 1 capsule (50 mg) by mouth 2 times daily    Rhinophyma, Rosacea       fluticasone 50 MCG/ACT spray    FLONASE    16 g    SHAKE GENTLY AND USE 2 SPRAYS INTO EACH NOSTRIL DAILY    Allergic rhinitis due to animal hair and dander, unspecified chronicity       fluticasone-vilanterol 200-25 MCG/INH oral  inhaler    BREO ELLIPTA    3 Inhaler    INHALE 1 PUFF INTO LUNGS DAILY    Mild persistent asthma without complication       IBUPROFEN PO           montelukast 10 MG tablet    SINGULAIR    90 tablet    Take 1 tablet (10 mg) by mouth At Bedtime    Mild persistent asthma without complication       MULTIVITAMIN ADULT PO      Take 1 tablet by mouth        RANITIDINE HCL PO      Take 300 mg by mouth as needed for heartburn        vitamin B complex with vitamin C Tabs tablet      Take 1 tablet by mouth daily        VITAMIN D (CHOLECALCIFEROL) PO      Take 1,000 Units by mouth daily        zolpidem 10 MG tablet    AMBIEN    30 tablet    Take 1 tablet (10 mg) by mouth nightly as needed for sleep at bedtime.    Primary insomnia

## 2018-03-22 DIAGNOSIS — F41.9 ANXIETY: ICD-10-CM

## 2018-03-22 RX ORDER — ALPRAZOLAM 1 MG
1 TABLET ORAL DAILY PRN
Qty: 30 TABLET | Refills: 0 | Status: SHIPPED | OUTPATIENT
Start: 2018-03-22 | End: 2018-04-25

## 2018-03-22 NOTE — TELEPHONE ENCOUNTER
Controlled Substance Refill Request for alprazolam 1  Problem List Complete:  Yes  Patient is followed by SAMUEL BAEZA for ongoing prescription of benzodiazepines.  All refills should be approved by this provider, or covering partner.    Medication(s): Xanax 1 mg.   Maximum quantity per month: 30  Clinic visit frequency required: Q 6  months     Controlled substance agreement on file: Yes       Date(s): 1/11/16  Benzodiazepine use reviewed by psychiatry:  No    Last [facility] website verification:  11.1.17   [email or web address]       checked in past 6 months?  Yes 11/01/17   Please walk signed prescription over to pharmacy.  Thanks!  Olamide Bernal, Mendel  [facility]  [phone]

## 2018-04-25 DIAGNOSIS — F41.9 ANXIETY: ICD-10-CM

## 2018-04-25 NOTE — TELEPHONE ENCOUNTER
Controlled Substance Refill Request for alprazolam   Problem List Complete:  Yes    Patient is followed by SAMUEL BAEZA for ongoing prescription of benzodiazepines.  All refills should be approved by this provider, or covering partner.    Medication(s): Xanax 1 mg.   Maximum quantity per month: 30  Clinic visit frequency required: Q 6  months 2/20/18    Controlled substance agreement on file: Yes       Date(s): 1/11/16  Benzodiazepine use reviewed by psychiatry:  No    Last [facility] website verification:  11.1.17   [email or web address]     checked in past 6 months?  Yes 11/1/17     Heather Alicia, Pharmacy [facility]

## 2018-04-26 RX ORDER — ALPRAZOLAM 1 MG
1 TABLET ORAL DAILY PRN
Qty: 30 TABLET | Refills: 2 | Status: SHIPPED | OUTPATIENT
Start: 2018-04-26 | End: 2018-08-08

## 2018-05-31 ENCOUNTER — OFFICE VISIT (OUTPATIENT)
Dept: FAMILY MEDICINE | Facility: CLINIC | Age: 53
End: 2018-05-31
Payer: COMMERCIAL

## 2018-05-31 VITALS
RESPIRATION RATE: 16 BRPM | SYSTOLIC BLOOD PRESSURE: 137 MMHG | BODY MASS INDEX: 28.56 KG/M2 | WEIGHT: 204 LBS | HEIGHT: 71 IN | HEART RATE: 85 BPM | TEMPERATURE: 99.1 F | DIASTOLIC BLOOD PRESSURE: 76 MMHG

## 2018-05-31 DIAGNOSIS — B35.4 TINEA CORPORIS: Primary | ICD-10-CM

## 2018-05-31 DIAGNOSIS — M75.121 COMPLETE TEAR OF RIGHT ROTATOR CUFF: ICD-10-CM

## 2018-05-31 DIAGNOSIS — F41.9 ANXIETY: ICD-10-CM

## 2018-05-31 DIAGNOSIS — R03.0 ELEVATED BLOOD PRESSURE READING WITHOUT DIAGNOSIS OF HYPERTENSION: ICD-10-CM

## 2018-05-31 PROCEDURE — 99214 OFFICE O/P EST MOD 30 MIN: CPT | Performed by: FAMILY MEDICINE

## 2018-05-31 NOTE — PROGRESS NOTES
SUBJECTIVE:   Venkata Lynn is a 52 year old male who presents to clinic today for the following health issues:      Rash  Onset: 1 1/2 week     Description:   Location: under arm pit  Character: red burning  Itching (Pruritis): no     Progression of Symptoms:  same    Accompanying Signs & Symptoms:  Fever: no   Body aches or joint pain: no   Sore throat symptoms: no   Recent cold symptoms: no     History:   Previous similar rash: no     Precipitating factors:   Exposure to similar rash: no   New exposures: Deodorant   Recent travel: no     Alleviating factors:      Therapies Tried and outcome:  none    Anxiety questions concerns about benzo use, addiction  Elevated blood pressure reading without diagnosis of hypertension   BP Readings from Last 6 Encounters:   05/31/18 137/76   02/20/18 128/83   06/22/17 118/70   01/09/17 (!) 137/95   12/08/16 128/82   11/28/16 (!) 156/122       Complete tear of right rotator cuff he has been imaged, has a complete tear with retraction, stable. He desires our opinion. He has pain beyond 90 Degrees, is avoiding activities that hurt      Problem list and histories reviewed & adjusted, as indicated.  Additional history: none        Reviewed and updated as needed this visit by clinical staff  Tobacco  Allergies  Med Hx  Surg Hx  Fam Hx  Soc Hx      Reviewed and updated as needed this visit by Provider       Past Medical History:   Diagnosis Date     Acne vulgaris      Anxiety 3/28/2011     Anxiety disorder      Arthritis     both knees     CARDIOVASCULAR SCREENING; LDL GOAL LESS THAN 160 10/31/2010     Controlled substance agreement signed 1/11/2016     Coughing     at night     Dyspepsia 8/16/2016     Elevated blood pressure reading without diagnosis of hypertension 1/11/2016     Familial tremor 8/16/2016     Family history of rhinophyma 1/18/2012     Insomnia      Intermittent asthma 3/28/2011     Knee pain 1/18/2012     LBP (low back pain) 1/18/2012     Low back pains   "    Mild persistent asthma without complication 8/16/2016     Rhinitis 1/18/2012     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     Rosacea 1/18/2012     Sebaceous hyperplasia 1/18/2012     Shortness of breath     due to asthma     Tobacco abuse 1/18/2012       Past Surgical History:   Procedure Laterality Date     ARTHROSCOPY KNEE WITH MEDIAL MENISCECTOMY Left 2/22/2016    Procedure: ARTHROSCOPY KNEE WITH MEDIAL MENISCECTOMY;  Surgeon: Chaz Moe MD;  Location: [facility] OR     ENT SURGERY      lipoma removed from neck     HEAD & NECK SURGERY      fx root of tooth     ROTATOR CUFF REPAIR RT/LT Left 01/2017     VASECTOMY         Family History   Problem Relation Age of Onset     Prostate Cancer Father      Other - See Comments Other      tremor       Social History   Substance Use Topics     Smoking status: Never Smoker     Smokeless tobacco: Current User     Types: Chew      Comment: occ     Alcohol use Yes      Comment: few beers per week       ROS tinea cruris, no systemic symptoms.   /76 (BP Location: Right arm, Patient Position: Chair, Cuff Size: Adult Large)  Pulse 85  Temp 99.1  F (37.3  C) (Oral)  Resp 16  Ht 5' 11\" (1.803 m)  Wt 204 lb (92.5 kg)  BMI 28.45 kg/m2  Axillary intertrigo  ASSESSMENT / PLAN:  (B35.4) Tinea corporis  (primary encounter diagnosis)  Comment: discussed  Plan: antifungals, drying agents. May be related to deodorant: withdraw, challenge    (F41.9) Anxiety  Comment: low dose benzo. Discussed use as he ages  Plan: MENTAL HEALTH REFERRAL  - Adult; Outpatient         Treatment; Individual/Couples/Family/Group         Therapy/Health Psychology; [facility]: [facility] [phone]; We will         contact you to schedule the appointment or         please call with any questions        CBT    (R03.0) Elevated blood pressure reading without diagnosis of hypertension  Comment: not today  Plan:     (M75.121) Complete tear of right rotator cuff  Comment: I recommend elective " repair  Plan:       [facility]    Lyndon Stoll MD

## 2018-05-31 NOTE — MR AVS SNAPSHOT
After Visit Summary   5/31/2018    Venkata Lynn    MRN: [id number]           Patient Information     Date Of Birth          1965        Visit Information        Provider Department      5/31/2018 2:45 PM Lyndon Stoll MD [facility]        Today's Diagnoses     Tinea corporis    -  1    Anxiety        Elevated blood pressure reading without diagnosis of hypertension        Complete tear of right rotator cuff          Care Instructions    2 different  Antifungals\treat 2 weeks after gone          Follow-ups after your visit        Additional Services     MENTAL HEALTH REFERRAL  - Adult; Outpatient Treatment; Individual/Couples/Family/Group Therapy/Health Psychology; [facility]: [facility] [phone]; We will contact you to schedule the appointment or please call with any questions       All scheduling is subject to the client's specific insurance plan & benefits, provider/location availability, and provider clinical specialities.  Please arrive 15 minutes early for your first appointment and bring your completed paperwork.    Please be aware that coverage of these services is subject to the terms and limitations of your health insurance plan.  Call member services at your health plan with any benefit or coverage questions.  CBT for anxiety                  Follow-up notes from your care team     Return in about 3 months (around 8/31/2018).      Who to contact     If you have questions or need follow up information about today's clinic visit or your schedule please contact [facility] directly at [phone].  Normal or non-critical lab and imaging results will be communicated to you by MyChart, letter or phone within 4 business days after the clinic has received the results. If you do not hear from us within 7 days, please contact the clinic through MyChart or phone. If you have a critical or abnormal lab result, we will notify you by phone  "as soon as possible.  Submit refill requests through TrioMed Innovations or call your pharmacy and they will forward the refill request to us. Please allow 3 business days for your refill to be completed.          Additional Information About Your Visit        FishlabsharGlyde Information     TrioMed Innovations gives you secure access to your electronic health record. If you see a primary care provider, you can also send messages to your care team and make appointments. If you have questions, please call your primary care clinic.  If you do not have a primary care provider, please call [phone] and they will assist you.        Care EveryWhere ID     This is your Care EveryWhere ID. This could be used by other organizations to access your [place] medical records  [id number]        Your Vitals Were     Pulse Temperature Respirations Height BMI (Body Mass Index)       85 99.1  F (37.3  C) (Oral) 16 5' 11\" (1.803 m) 28.45 kg/m2        Blood Pressure from Last 3 Encounters:   05/31/18 137/76   02/20/18 128/83   06/22/17 118/70    Weight from Last 3 Encounters:   05/31/18 204 lb (92.5 kg)   02/20/18 213 lb (96.6 kg)   06/22/17 210 lb (95.3 kg)              We Performed the Following     MENTAL HEALTH REFERRAL  - Adult; Outpatient Treatment; Individual/Couples/Family/Group Therapy/Health Psychology; [facility]: [facility] [phone]; We will contact you to schedule the appointment or please call with any questions        Primary Care Provider Office Phone # Fax #    Lyndon Stoll [phone] [address] [facility] [address]        Equal Access to Services     [facility]: Hadii aad ku hadasho Soynesali, waaxda luqadaha, qaybta kaalmada jerel pradhan. So [facility] [phone].    ATENCIÓN: Si habla español, tiene a rivera disposición servicios gratuitos de asistencia lingüística. Llame al [phone].    We comply with applicable federal civil rights laws and [place] " laws. We do not discriminate on the basis of race, color, national origin, age, disability, sex, sexual orientation, or gender identity.            Thank you!     Thank you for choosing [facility]  for your care. Our goal is always to provide you with excellent care. Hearing back from our patients is one way we can continue to improve our services. Please take a few minutes to complete the written survey that you may receive in the mail after your visit with us. Thank you!             Your Updated Medication List - Protect others around you: Learn how to safely use, store and throw away your medicines at www.disposemymeds.org.          This list is accurate as of 5/31/18 11:59 PM.  Always use your most recent med list.                   Brand Name Dispense Instructions for use Diagnosis    albuterol 108 (90 Base) MCG/ACT Inhaler    PROAIR HFA/PROVENTIL HFA/VENTOLIN HFA    1 Inhaler    Inhale 2 puffs into the lungs every 6 hours as needed for shortness of breath / dyspnea    Mild persistent asthma without complication       ALPRAZolam 1 MG tablet    XANAX    30 tablet    Take 1 tablet (1 mg) by mouth daily as needed for anxiety    Anxiety       aspirin 325 MG tablet      Take by mouth daily        doxycycline 50 MG capsule    VIBRAMYCIN    180 capsule    Take 1 capsule (50 mg) by mouth 2 times daily    Rhinophyma, Rosacea       fluticasone 50 MCG/ACT spray    FLONASE    16 g    SHAKE GENTLY AND USE 2 SPRAYS INTO EACH NOSTRIL DAILY    Allergic rhinitis due to animal hair and dander, unspecified chronicity       fluticasone-vilanterol 200-25 MCG/INH oral inhaler    BREO ELLIPTA    3 Inhaler    INHALE 1 PUFF INTO LUNGS DAILY    Mild persistent asthma without complication       IBUPROFEN PO           montelukast 10 MG tablet    SINGULAIR    90 tablet    Take 1 tablet (10 mg) by mouth At Bedtime    Mild persistent asthma without complication       MULTIVITAMIN ADULT PO      Take 1 tablet by mouth         RANITIDINE HCL PO      Take 300 mg by mouth as needed for heartburn        vitamin B complex with vitamin C Tabs tablet      Take 1 tablet by mouth daily        VITAMIN D (CHOLECALCIFEROL) PO      Take 1,000 Units by mouth daily        zolpidem 10 MG tablet    AMBIEN    30 tablet    Take 1 tablet (10 mg) by mouth nightly as needed for sleep at bedtime.    Primary insomnia

## 2018-07-02 ENCOUNTER — OFFICE VISIT (OUTPATIENT)
Dept: BEHAVIORAL HEALTH | Facility: CLINIC | Age: 53
End: 2018-07-02
Attending: FAMILY MEDICINE
Payer: COMMERCIAL

## 2018-07-02 DIAGNOSIS — F41.0 PANIC DISORDER WITHOUT AGORAPHOBIA: Primary | ICD-10-CM

## 2018-07-02 PROCEDURE — 90791 PSYCH DIAGNOSTIC EVALUATION: CPT | Performed by: COUNSELOR

## 2018-07-02 ASSESSMENT — ANXIETY QUESTIONNAIRES
2. NOT BEING ABLE TO STOP OR CONTROL WORRYING: SEVERAL DAYS
3. WORRYING TOO MUCH ABOUT DIFFERENT THINGS: SEVERAL DAYS
GAD7 TOTAL SCORE: 6
7. FEELING AFRAID AS IF SOMETHING AWFUL MIGHT HAPPEN: SEVERAL DAYS
6. BECOMING EASILY ANNOYED OR IRRITABLE: SEVERAL DAYS
5. BEING SO RESTLESS THAT IT IS HARD TO SIT STILL: NOT AT ALL
1. FEELING NERVOUS, ANXIOUS, OR ON EDGE: SEVERAL DAYS
IF YOU CHECKED OFF ANY PROBLEMS ON THIS QUESTIONNAIRE, HOW DIFFICULT HAVE THESE PROBLEMS MADE IT FOR YOU TO DO YOUR WORK, TAKE CARE OF THINGS AT HOME, OR GET ALONG WITH OTHER PEOPLE: SOMEWHAT DIFFICULT

## 2018-07-02 ASSESSMENT — PATIENT HEALTH QUESTIONNAIRE - PHQ9: 5. POOR APPETITE OR OVEREATING: SEVERAL DAYS

## 2018-07-02 NOTE — MR AVS SNAPSHOT
MRN:[id number]                      After Visit Summary   7/2/2018    Venkata Lynn    MRN: [id number]           Visit Information        Provider Department      7/2/2018 5:00 PM Zuly Mas [facility]      Your next 10 appointments already scheduled     Jul 18, 2018  5:30 PM CDT   Return Visit with Zuly Mas [facility] [place] ([facility])    [address]  [facility] [address] [phone]            Aug 01, 2018  5:30 PM CDT   Return Visit with Zuly Mas [facility] [place] [facility]    [address]  [facility] [address] [phone]              MyChart Information     HidInImagehart gives you secure access to your electronic health record. If you see a primary care provider, you can also send messages to your care team and make appointments. If you have questions, please call your primary care clinic.  If you do not have a primary care provider, please call [phone] and they will assist you.        Care EveryWhere ID     This is your Care EveryWhere ID. This could be used by other organizations to access your [place] medical records  [id number]        Equal Access to Services     MEL REYES : Hadii juan carlos bonillao Soynesali, waaxda luqadaha, qaybta kaalmada adeegyada, jerel machado. So [facility] [phone].    ATENCIÓN: Si habla español, tiene a rivera disposición servicios gratuitos de asistencia lingüística. Llame al [phone].    We comply with applicable federal civil rights laws and [place] laws. We do not discriminate on the basis of race, color, national origin, age, disability, sex, sexual orientation, or gender identity.
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
## 2018-07-03 ASSESSMENT — ANXIETY QUESTIONNAIRES: GAD7 TOTAL SCORE: 6

## 2018-07-03 ASSESSMENT — PATIENT HEALTH QUESTIONNAIRE - PHQ9: SUM OF ALL RESPONSES TO PHQ QUESTIONS 1-9: 4

## 2018-07-18 ENCOUNTER — OFFICE VISIT (OUTPATIENT)
Dept: BEHAVIORAL HEALTH | Facility: CLINIC | Age: 53
End: 2018-07-18
Payer: COMMERCIAL

## 2018-07-18 DIAGNOSIS — F41.9 ANXIETY: Primary | ICD-10-CM

## 2018-07-18 PROCEDURE — 90834 PSYTX W PT 45 MINUTES: CPT | Performed by: COUNSELOR

## 2018-07-18 NOTE — PROGRESS NOTES
Progress Note    Client Name: Venkata Lynn  Date: 7/18/2018           Service Type: Individual      Session Start Time: 5:30pm  Session End Time: 6:10pm      Session Length: 40 min     Session #: 2     Attendees: Client attended alone    Treatment Plan Last Reviewed: to be created   PHQ-9 / CARMEN-7 : PHQ9=  GAD7=     DATA      Progress Since Last Session (Related to Symptoms / Goals / Homework):   Symptoms: No change still taking daily xanax, discussed switch to SSRI, job related     Current / Ongoing Stressors and Concerns:   Work, wife's health      Intervention:   CBT: what can I control vs. what I can't control worksheet, work boundaries        ASSESSMENT: Current Emotional / Mental Status (status of significant symptoms):   Risk status (Self / Other harm or suicidal ideation)   Client denies current fears or concerns for personal safety.   Client denies current or recent suicidal ideation or behaviors.   Client denies current or recent homicidal ideation or behaviors.   Client denies current or recent self injurious behavior or ideation.   Client denies other safety concerns.   Client Client reports there has been no change in risk factors since their last session.     Client Client reports there has been no change in protective factors since their last session.     A safety and risk management plan has not been developed at this time, however client was given the after-hours number / 911 should there be a change in any of these risk factors.     Appearance:   Appropriate    Eye Contact:   Good    Psychomotor Behavior: Restless    Attitude:   Cooperative    Orientation:   All   Speech    Rate / Production: Pressured     Volume:  Normal    Mood:    Anxious  Normal   Affect:    Appropriate    Thought Content:  Clear    Thought Form:  Coherent  Logical    Insight:    Good      Medication Review:   No changes to current psychiatric medication(s)     Medication  Compliance:   NA     Changes in Health Issues:   None reported     Chemical Use Review:   Substance Use: Chemical use reviewed, no active concerns identified      Tobacco Use: No change in amount of tobacco use since last session.  Provided encouragement to quit      Collateral Reports Completed:   Not Applicable    PLAN: (Client Tasks / Therapist Tasks / Other)  Client reported he has had a good couple of weeks. Client stated that his anxiety is situational around work and he has not experience any difficult clients lately. Client reported that he remains calm when clients are upset and does not engage in conflict with them. Client reported he has trouble with bringing his work home with him so he never really feels he has off time. 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 Provider encouraged client to evaluate these boundaries when it comes to his work and decide on on a time that works for him. Provider gave client locus of control worksheet so client can educate himself on what he can and cannot control and put his efforts into what he can. Client reported good use of boundaries with unhealthy family members.         DARRON Patricio                                                             DSM-V Diagnoses: 300.02 (F41.1) Generalized Anxiety Disorder  Psychosocial / Contextual Factors: work, family  WHODAS: due feb 2019    Referral / Collaboration:  Discussed talking to PCP about SSRI instead of daily xanax.    Anticipated number of session or this episode of care: 6        DARRON Patricio  July 18, 2018

## 2018-07-18 NOTE — MR AVS SNAPSHOT
MRN:[id number]                      After Visit Summary   7/18/2018    Venkata Lynn    MRN: [id number]           Visit Information        Provider Department      7/18/2018 5:30 PM Zuly Mas [facility]      Your next 10 appointments already scheduled     Aug 01, 2018  5:30 PM CDT   Return Visit with Zuly Mas [facility] [place] ([facility])    [address]  [facility] [address] [phone]            Aug 13, 2018  5:30 PM CDT   Return Visit with Zuly Mas [facility] [place] ([facility])    [address]  [facility] [address] [phone]              MyChart Information     [facility] [employer] gives you secure access to your electronic health record. If you see a primary care provider, you can also send messages to your care team and make appointments. If you have questions, please call your primary care clinic.  If you do not have a primary care provider, please call [phone] and they will assist you.        Care EveryWhere ID     This is your Care EveryWhere ID. This could be used by other organizations to access your [facility] medical records  [id number]        Equal Access to Services     MEL REYES : Hadii juan carlos bonillao Soynesali, waaxda luqadaha, qaybta kaalmada adeegyada, jerel machado. So [facility] [phone].    ATENCIÓN: Si habla español, tiene a rivera disposición servicios gratuitos de asistencia lingüística. Llame al [phone].    We comply with applicable federal civil rights laws and [place] laws. We do not discriminate on the basis of race, color, national origin, age, disability, sex, sexual orientation, or gender identity.

## 2018-07-19 ASSESSMENT — ANXIETY QUESTIONNAIRES
IF YOU CHECKED OFF ANY PROBLEMS ON THIS QUESTIONNAIRE, HOW DIFFICULT HAVE THESE PROBLEMS MADE IT FOR YOU TO DO YOUR WORK, TAKE CARE OF THINGS AT HOME, OR GET ALONG WITH OTHER PEOPLE: NOT DIFFICULT AT ALL
1. FEELING NERVOUS, ANXIOUS, OR ON EDGE: SEVERAL DAYS
6. BECOMING EASILY ANNOYED OR IRRITABLE: NOT AT ALL
GAD7 TOTAL SCORE: 1
2. NOT BEING ABLE TO STOP OR CONTROL WORRYING: NOT AT ALL
5. BEING SO RESTLESS THAT IT IS HARD TO SIT STILL: NOT AT ALL
3. WORRYING TOO MUCH ABOUT DIFFERENT THINGS: NOT AT ALL
7. FEELING AFRAID AS IF SOMETHING AWFUL MIGHT HAPPEN: NOT AT ALL

## 2018-07-19 ASSESSMENT — PATIENT HEALTH QUESTIONNAIRE - PHQ9: 5. POOR APPETITE OR OVEREATING: NOT AT ALL

## 2018-07-20 ASSESSMENT — ANXIETY QUESTIONNAIRES: GAD7 TOTAL SCORE: 1

## 2018-07-20 ASSESSMENT — PATIENT HEALTH QUESTIONNAIRE - PHQ9: SUM OF ALL RESPONSES TO PHQ QUESTIONS 1-9: 3

## 2018-07-24 ENCOUNTER — OFFICE VISIT (OUTPATIENT)
Dept: PODIATRY | Facility: CLINIC | Age: 53
End: 2018-07-24
Payer: COMMERCIAL

## 2018-07-24 ENCOUNTER — RADIANT APPOINTMENT (OUTPATIENT)
Dept: GENERAL RADIOLOGY | Facility: CLINIC | Age: 53
End: 2018-07-24
Attending: PODIATRIST
Payer: COMMERCIAL

## 2018-07-24 VITALS
WEIGHT: 200 LBS | HEIGHT: 71 IN | DIASTOLIC BLOOD PRESSURE: 70 MMHG | SYSTOLIC BLOOD PRESSURE: 128 MMHG | BODY MASS INDEX: 28 KG/M2

## 2018-07-24 DIAGNOSIS — M79.672 LEFT FOOT PAIN: ICD-10-CM

## 2018-07-24 DIAGNOSIS — S92.502A CLOSED FRACTURE OF FIFTH TOE OF LEFT FOOT, INITIAL ENCOUNTER: ICD-10-CM

## 2018-07-24 DIAGNOSIS — M79.672 LEFT FOOT PAIN: Primary | ICD-10-CM

## 2018-07-24 PROCEDURE — 99203 OFFICE O/P NEW LOW 30 MIN: CPT | Performed by: PODIATRIST

## 2018-07-24 PROCEDURE — 73630 X-RAY EXAM OF FOOT: CPT | Mod: RT

## 2018-07-24 NOTE — LETTER
7/24/2018         RE: Venkata Lynn  [address] [facility] [address]        Dear Colleague,    Thank you for referring your patient, Venkata Lynn, to the [facility]. Please see a copy of my visit note below.    PATIENT HISTORY:    Venkata Lynn is a 53 year old male who presents to clinic for pain to left great toe. Notes he stubbed it twice on the same box. Has been painful for 4 days. Pain is 8/10. Worse with walking. A little better with icing and ibuprofen. Is wondering if it is broken or what can be done to help with pain.     Review of Systems:  Patient denies fever, chills, rash, wound, stiffness, numbness, weakness, heart burn, blood in stool, chest pain with activity, calf pain when walking, shortness of breath with activity, chronic cough, easy bleeding/bruising, swelling of ankles, excessive thirst, fatigue, depression, anxiety.  Patient admits to limping.     PAST MEDICAL HISTORY:   Past Medical History:   Diagnosis Date     Acne vulgaris      Anxiety 3/28/2011     Anxiety disorder      Arthritis     both knees     CARDIOVASCULAR SCREENING; LDL GOAL LESS THAN 160 10/31/2010     Controlled substance agreement signed 1/11/2016     Coughing     at night     Dyspepsia 8/16/2016     Elevated blood pressure reading without diagnosis of hypertension 1/11/2016     Familial tremor 8/16/2016     Family history of rhinophyma 1/18/2012     Insomnia      Intermittent asthma 3/28/2011     Knee pain 1/18/2012     LBP (low back pain) 1/18/2012     Low back pains      Mild persistent asthma without complication 8/16/2016     Rhinitis 1/18/2012     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     Rosacea 1/18/2012     Sebaceous hyperplasia 1/18/2012     Shortness of breath     due to asthma     Tobacco abuse 1/18/2012        PAST SURGICAL HISTORY:   Past Surgical History:   Procedure Laterality Date     ARTHROSCOPY KNEE WITH MEDIAL MENISCECTOMY Left 2/22/2016    Procedure: ARTHROSCOPY KNEE WITH  MEDIAL MENISCECTOMY;  Surgeon: Chaz Moe MD;  Location: [facility] OR     ENT SURGERY      lipoma removed from neck     HEAD & NECK SURGERY      fx root of tooth     ROTATOR CUFF REPAIR RT/LT Left 01/2017     VASECTOMY          MEDICATIONS:   Current Outpatient Prescriptions:      ALPRAZolam (XANAX) 1 MG tablet, Take 1 tablet (1 mg) by mouth daily as needed for anxiety, Disp: 30 tablet, Rfl: 2     aspirin 325 MG tablet, Take by mouth daily, Disp: , Rfl:      doxycycline (VIBRAMYCIN) 50 MG capsule, Take 1 capsule (50 mg) by mouth 2 times daily, Disp: 180 capsule, Rfl: 3     fluticasone (FLONASE) 50 MCG/ACT spray, SHAKE GENTLY AND USE 2 SPRAYS INTO EACH NOSTRIL DAILY, Disp: 16 g, Rfl: 5     fluticasone-vilanterol (BREO ELLIPTA) 200-25 MCG/INH oral inhaler, INHALE 1 PUFF INTO LUNGS DAILY, Disp: 3 Inhaler, Rfl: 1     IBUPROFEN PO, , Disp: , Rfl:      Multiple Vitamins-Minerals (MULTIVITAMIN ADULT PO), Take 1 tablet by mouth, Disp: , Rfl:      RANITIDINE HCL PO, Take 300 mg by mouth as needed for heartburn, Disp: , Rfl:      vitamin  B complex with vitamin C (VITAMIN  B COMPLEX) TABS, Take 1 tablet by mouth daily, Disp: , Rfl:      VITAMIN D, CHOLECALCIFEROL, PO, Take 1,000 Units by mouth daily, Disp: , Rfl:      zolpidem (AMBIEN) 10 MG tablet, Take 1 tablet (10 mg) by mouth nightly as needed for sleep at bedtime., Disp: 30 tablet, Rfl: 5     albuterol (PROAIR HFA/PROVENTIL HFA/VENTOLIN HFA) 108 (90 BASE) MCG/ACT Inhaler, Inhale 2 puffs into the lungs every 6 hours as needed for shortness of breath / dyspnea (Patient not taking: Reported on 7/2/2018), Disp: 1 Inhaler, Rfl: 11     montelukast (SINGULAIR) 10 MG tablet, Take 1 tablet (10 mg) by mouth At Bedtime (Patient not taking: Reported on 7/2/2018), Disp: 90 tablet, Rfl: 3     ALLERGIES:    Allergies   Allergen Reactions     Penicillins Hives and Rash     Amoxicillin        SOCIAL HISTORY:   Social History     Social History     Marital status:      Spouse  "name: N/A     Number of children: N/A     Years of education: N/A     Occupational History     Not on file.     Social History Main Topics     Smoking status: Never Smoker     Smokeless tobacco: Current User     Types: Chew      Comment: occ     Alcohol use Yes      Comment: few beers per week     Drug use: No     Sexual activity: Yes     Partners: Female     Other Topics Concern     Not on file     Social History Narrative        FAMILY HISTORY:   Family History   Problem Relation Age of Onset     Prostate Cancer Father      Other - See Comments Other      tremor        EXAM:Vitals: /70  Ht 5' 11\" (1.803 m)  Wt 200 lb (90.7 kg)  BMI 27.89 kg/m2  BMI= Body mass index is 27.89 kg/(m^2).    General appearance: Patient is alert and fully cooperative with history & exam.  No sign of distress is noted during the visit.     Psychiatric: Affect is pleasant & appropriate.  Patient appears motivated to improve health.     Respiratory: Breathing is regular & unlabored while sitting.     HEENT: Hearing is intact to spoken word.  Speech is clear.  No gross evidence of visual impairment that would impact ambulation.     Dermatologic: Skin is intact to both lower extremities without significant lesions, rash or abrasion.  No paronychia or evidence of soft tissue infection is noted.     Vascular: DP & PT pulses are intact & regular bilaterally.  No significant edema or varicosities noted.  CFT and skin temperature is normal to both lower extremities.     Neurologic: Lower extremity sensation is intact to light touch.  No evidence of weakness or contracture in the lower extremities.  No evidence of neuropathy.     Musculoskeletal: Patient is ambulatory without assistive device or brace.  Pain to right 5th toe.    Radiographs:  I personally reviewed the xrays. Oblique non displaced fracture of right 5th proximal phalanx.      ASSESSMENT:    Left foot pain  Closed fracture of fifth toe of left foot, initial encounter   "   PLAN:  Reviewed patient's chart in epic. Reviewed xrays. Talked about fractures. Discussed that healing can take 6-10 weeks. Risk that the fracture will not heal and we may need to do surgery. Risk is increased 10-15% if you smoke.     Recommend taping. No surgery indicated. Recommend vit amin daily for next 6 weeks. Offered short aircast boot but is going to wear large working boot.        Grace Ortiz DPM, Podiatry/Foot and Ankle Surgery    Weight management plan: Patient was referred to their PCP to discuss a diet and exercise plan.      Again, thank you for allowing me to participate in the care of your patient.        Sincerely,        Grace Ortiz DPM, Podiatry/Foot and Ankle Surgery

## 2018-07-24 NOTE — PATIENT INSTRUCTIONS
Thank you for choosing [facility]!    DR. [facility]S CLINIC SCHEDULE  MONDAY AM - [place]U TUESDAY - [place]   [address] [address] [place] [address] [place] [address]   [phone] / [phone] / [phone]       WEDNESDAY - [place] FRIDAY AM - [facility]   [address]   [place] [address] [place] [address]   [phone] / [phone]       FRIDAY PM - [place] SCHEDULE SURGERY: [phone]   [address] [facility] [address] BILLING QUESTIONS: [phone]   [place] [address] AFTER HOURS: [phone]   [phone] / [phone] APPOINTMENTS: [phone]     [employer] Price Line (CPL) [phone]       TOE & METATARSAL FRACTURES  The structure of the foot is complex, consisting of bones, muscles, tendons, and other soft tissues. Of the 26 bones in the foot, 19 are toe bones (phalanges) and metatarsal bones (the long bones in the midfoot). Fractures of the toe and metatarsal bones are common and require evaluation by a specialist. A foot and ankle surgeon should be seen for proper diagnosis and treatment, even if initial treatment has been received in an emergency room.  A fracture is a break in the bone. Fractures can be divided into two categories: traumatic fractures and stress fractures.  TRAUMATIC FRACTURES (also called acute fractures) are caused by a direct blow or impact, such as seriously stubbing your toe. Traumatic fractures can be displaced or non-displaced. If the fracture is displaced, the bone is broken in such a way that it has changed in position (dislocated).  Signs and symptoms of a traumatic fracture include:  You may hear a sound at the time of the break.    Pinpoint pain  (pain at the place of impact) at the time the fracture occurs and perhaps for a few hours later, but often the pain goes away after several hours.   Crooked or abnormal appearance of the toe.    Bruising and swelling the next day.   It is not true that  if you can walk on it, it s not broken.  Evaluation by a foot and ankle surgeon is always recommended.   STRESS FRACTURES are tiny, hairline breaks that are usually caused by repetitive stress. Stress fractures often afflict athletes who, for example, too rapidly increase their running mileage. They can also be caused by an abnormal foot structure, deformities, or osteoporosis. Improper footwear may also lead to stress fractures. Stress fractures should not be ignored. They require proper medical attention to heal correctly.  Symptoms of stress fractures include:  Pain with or after normal activity   Pain that goes away when resting and then returns when standing or during activity    Pinpoint pain  (pain at the site of the fracture) when touched   Swelling, but no bruising   IMPROPER TREATMENT  Some people say that  the doctor can t do anything for a broken bone in the foot.  This is usually not true. In fact, if a fractured toe or metatarsal bone is not treated correctly, serious complications may develop. For example:  A deformity in the bony architecture which may limit the ability to move the foot or cause difficulty in fitting shoes   Arthritis, which may be caused by a fracture in a joint (the juncture where two bones meet), or may be a result of angular deformities that develop when a displaced fracture is severe or hasn t been properly corrected   Chronic pain and deformity   Non-union, or failure to heal, can lead to subsequent surgery or chronic pain.   PROPER TREATMENT FOR TOES  Fractures of the toe bones are almost always traumatic fractures. Treatment for traumatic fractures depends on the break itself and may include these options:  Rest. Sometimes rest is all that is needed to treat a traumatic fracture of the toe.   Splinting. The toe may be fitted with a splint to keep it in a fixed position.   Rigid or stiff-soled shoe. Wearing a  stiff-soled shoe protects the toe and helps keep it properly positioned.    Agustin taping  the fractured toe to another toe is sometimes appropriate, but in other cases it may be harmful.   Surgery. If the break is badly displaced or if the joint is affected, surgery may be necessary. Surgery often involves the use of fixation devices, such as pins.   PROPER TREATMENT OF METATARSALS  Breaks in the metatarsal bones may be either stress or traumatic fractures. Certain kinds of fractures of the metatarsal bones present unique challenges.  For example, sometimes a fracture of the first metatarsal bone (behind the big toe) can lead to arthritis. Since the big toe is used so frequently and bears more weight than other toes, arthritis in that area can make it painful to walk, bend, or even stand.  Another type of break, called a Zelaya fracture, occurs at the base of the fifth metatarsal bone (behind the little toe). It is often misdiagnosed as an ankle sprain, and misdiagnosis can have serious consequences since sprains and fractures require different treatments. Your foot and ankle surgeon is an expert in correctly identifying these conditions as well as other problems of the foot.  Treatment of metatarsal fractures depends on the type and extent of the fracture, and may include:  Rest. Sometimes rest is the only treatment needed to promote healing of a stress or traumatic fracture of a metatarsal bone.   Avoid the offending activity. Because stress fractures result from repetitive stress, it is important to avoid the activity that led to the fracture. Crutches or a wheelchair are sometimes required to offload weight from the foot to give it time to heal.   Immobilization, casting, or rigid shoe. A stiff-soled shoe or other form of immobilization may be used to protect the fractured bone while it is healing.   Surgery. Some traumatic fractures of the metatarsal bones require surgery, especially if the break is badly  displaced.   Follow-up care. Your foot and ankle surgeon will provide instructions for care following surgical or non-surgical treatment. Physical therapy, exercises and rehabilitation may be included in a schedule for return to normal activities.       Body Mass Index (BMI)  Many things can cause foot and ankle problems. Foot structure, activity level, foot mechanics and injuries are common causes of pain.  One very important issue that often goes unmentioned, is body weight. Extra weight can cause increased stress on muscles, ligaments, bones and tendons.  Sometimes just a few extra pounds is all it takes to put one over her/his threshold. Without reducing that stress, it can be difficult to alleviate pain. Some people are uncomfortable addressing this issue, but we feel it is important for you to think about it. As Foot &  Ankle specialists, our job is addressing the lower extremity problem and possible causes. Regarding extra body weight, we encourage patients to discuss diet and weight management plans with their primary care doctors. It is this team approach that gives you the best opportunity for pain relief and getting you back on your feet.

## 2018-07-24 NOTE — MR AVS SNAPSHOT
After Visit Summary   7/24/2018    Venkata Lynn    MRN: [id number]           Patient Information     Date Of Birth          1965        Visit Information        Provider Department      7/24/2018 1:45 PM Grace Ortiz DPM, Podiatry/Foot and Ankle Surgery [facility]        Today's Diagnoses     Left foot pain    -  1      Care Instructions    Thank you for choosing [facility]!    DR. ORTIZ'S CLINIC SCHEDULE  MONDAY AM - [place] TUESDAY - [place]   [address] [address] [address] [place] [address] [place] [address]   [phone] / [phone] / [phone]       WEDNESDAY - [place] FRIDAY AM - [facility]   [address]   [place] [address] [place] [address]   [phone] / [phone]       FRIDAY PM - [place] SCHEDULE SURGERY: [phone]   [address] BILLING QUESTIONS: [phone]   [place] [address] AFTER HOURS: [phone]   [phone] / [phone] APPOINTMENTS: [phone]     Consumer Price Line (CPL) [phone]       TOE & METATARSAL FRACTURES  The structure of the foot is complex, consisting of bones, muscles, tendons, and other soft tissues. Of the 26 bones in the foot, 19 are toe bones (phalanges) and metatarsal bones (the long bones in the midfoot). Fractures of the toe and metatarsal bones are common and require evaluation by a specialist. A foot and ankle surgeon should be seen for proper diagnosis and treatment, even if initial treatment has been received in an emergency room.  A fracture is a break in the bone. Fractures can be divided into two categories: traumatic fractures and stress fractures.  TRAUMATIC FRACTURES (also called acute fractures) are caused by a direct blow or impact, such as seriously stubbing your toe. Traumatic fractures can be displaced or non-displaced. If the fracture is displaced, the  bone is broken in such a way that it has changed in position (dislocated).  Signs and symptoms of a traumatic fracture include:  You may hear a sound at the time of the break.    Pinpoint pain  (pain at the place of impact) at the time the fracture occurs and perhaps for a few hours later, but often the pain goes away after several hours.   Crooked or abnormal appearance of the toe.   Bruising and swelling the next day.   It is not true that  if you can walk on it, it s not broken.  Evaluation by a foot and ankle surgeon is always recommended.   STRESS FRACTURES are tiny, hairline breaks that are usually caused by repetitive stress. Stress fractures often afflict athletes who, for example, too rapidly increase their running mileage. They can also be caused by an abnormal foot structure, deformities, or osteoporosis. Improper footwear may also lead to stress fractures. Stress fractures should not be ignored. They require proper medical attention to heal correctly.  Symptoms of stress fractures include:  Pain with or after normal activity   Pain that goes away when resting and then returns when standing or during activity    Pinpoint pain  (pain at the site of the fracture) when touched   Swelling, but no bruising   IMPROPER TREATMENT  Some people say that  the doctor can t do anything for a broken bone in the foot.  This is usually not true. In fact, if a fractured toe or metatarsal bone is not treated correctly, serious complications may develop. For example:  A deformity in the bony architecture which may limit the ability to move the foot or cause difficulty in fitting shoes   Arthritis, which may be caused by a fracture in a joint (the juncture where two bones meet), or may be a result of angular deformities that develop when a displaced fracture is severe or hasn t been properly corrected   Chronic pain and deformity   Non-union, or failure to heal, can lead to subsequent surgery or chronic pain.   PROPER  TREATMENT FOR TOES  Fractures of the toe bones are almost always traumatic fractures. Treatment for traumatic fractures depends on the break itself and may include these options:  Rest. Sometimes rest is all that is needed to treat a traumatic fracture of the toe.   Splinting. The toe may be fitted with a splint to keep it in a fixed position.   Rigid or stiff-soled shoe. Wearing a stiff-soled shoe protects the toe and helps keep it properly positioned.    Agustin taping  the fractured toe to another toe is sometimes appropriate, but in other cases it may be harmful.   Surgery. If the break is badly displaced or if the joint is affected, surgery may be necessary. Surgery often involves the use of fixation devices, such as pins.   PROPER TREATMENT OF METATARSALS  Breaks in the metatarsal bones may be either stress or traumatic fractures. Certain kinds of fractures of the metatarsal bones present unique challenges.  For example, sometimes a fracture of the first metatarsal bone (behind the big toe) can lead to arthritis. Since the big toe is used so frequently and bears more weight than other toes, arthritis in that area can make it painful to walk, bend, or even stand.  Another type of break, called a Zelaya fracture, occurs at the base of the fifth metatarsal bone (behind the little toe). It is often misdiagnosed as an ankle sprain, and misdiagnosis can have serious consequences since sprains and fractures require different treatments. Your foot and ankle surgeon is an expert in correctly identifying these conditions as well as other problems of the foot.  Treatment of metatarsal fractures depends on the type and extent of the fracture, and may include:  Rest. Sometimes rest is the only treatment needed to promote healing of a stress or traumatic fracture of a metatarsal bone.   Avoid the offending activity. Because stress fractures result from repetitive stress, it is important to avoid the activity that led to the  fracture. Crutches or a wheelchair are sometimes required to offload weight from the foot to give it time to heal.   Immobilization, casting, or rigid shoe. A stiff-soled shoe or other form of immobilization may be used to protect the fractured bone while it is healing.   Surgery. Some traumatic fractures of the metatarsal bones require surgery, especially if the break is badly displaced.   Follow-up care. Your foot and ankle surgeon will provide instructions for care following surgical or non-surgical treatment. Physical therapy, exercises and rehabilitation may be included in a schedule for return to normal activities.       Body Mass Index (BMI)  Many things can cause foot and ankle problems. Foot structure, activity level, foot mechanics and injuries are common causes of pain.  One very important issue that often goes unmentioned, is body weight. Extra weight can cause increased stress on muscles, ligaments, bones and tendons.  Sometimes just a few extra pounds is all it takes to put one over her/his threshold. Without reducing that stress, it can be difficult to alleviate pain. Some people are uncomfortable addressing this issue, but we feel it is important for you to think about it. As Foot &  Ankle specialists, our job is addressing the lower extremity problem and possible causes. Regarding extra body weight, we encourage patients to discuss diet and weight management plans with their primary care doctors. It is this team approach that gives you the best opportunity for pain relief and getting you back on your feet.                  Follow-ups after your visit        Your next 10 appointments already scheduled     Aug 01, 2018  5:30 PM CDT   Return Visit with DARRON [facility]    [address]  [facility] [address]   [phone]            Aug 13, 2018  5:30 PM CDT   Return Visit with DARRON Garcia [facility]  "[facility] [place] ([facility])    [address]  [facility] [address] [phone]              Who to contact     If you have questions or need follow up information about today's clinic visit or your schedule please contact [facility] directly at [phone].  Normal or non-critical lab and imaging results will be communicated to you by MyChart, letter or phone within 4 business days after the clinic has received the results. If you do not hear from us within 7 days, please contact the clinic through MyChart or phone. If you have a critical or abnormal lab result, we will notify you by phone as soon as possible.  Submit refill requests through [employer] or call your pharmacy and they will forward the refill request to us. Please allow 3 business days for your refill to be completed.          Additional Information About Your Visit        [email or web address] Information     [employer] gives you secure access to your electronic health record. If you see a primary care provider, you can also send messages to your care team and make appointments. If you have questions, please call your primary care clinic.  If you do not have a primary care provider, please call [phone] and they will assist you.        Care EveryWhere ID     This is your Care EveryWhere ID. This could be used by other organizations to access your [facility] medical records  [id number]        Your Vitals Were     Height BMI (Body Mass Index)                5' 11\" (1.803 m) 27.89 kg/m2           Blood Pressure from Last 3 Encounters:   07/24/18 128/70   05/31/18 137/76   02/20/18 128/83    Weight from Last 3 Encounters:   07/24/18 200 lb (90.7 kg)   05/31/18 204 lb (92.5 kg)   02/20/18 213 lb (96.6 kg)              Today, you had the following     No orders found for display       Primary Care Provider Office Phone # Fax #    Lyndon Stoll [phone] [address]  [facility] [address]        Equal " Access to Services     [facility]: Hadii aad ku hadhomerscotty Anabelali, waalainada luqadaha, qaybta kaalmajerel peter. So [facility] [phone].    ATENCIÓN: Si deepikala alycia, tiene a rivera disposición servicios gratuitos de asistencia lingüística. Llame al [phone].    We comply with applicable federal civil rights laws and [place] laws. We do not discriminate on the basis of race, color, national origin, age, disability, sex, sexual orientation, or gender identity.            Thank you!     Thank you for choosing [facility]  for your care. Our goal is always to provide you with excellent care. Hearing back from our patients is one way we can continue to improve our services. Please take a few minutes to complete the written survey that you may receive in the mail after your visit with us. Thank you!             Your Updated Medication List - Protect others around you: Learn how to safely use, store and throw away your medicines at www.disposemymeds.org.          This list is accurate as of 7/24/18  2:09 PM.  Always use your most recent med list.                   Brand Name Dispense Instructions for use Diagnosis    albuterol 108 (90 Base) MCG/ACT Inhaler    PROAIR HFA/PROVENTIL HFA/VENTOLIN HFA    1 Inhaler    Inhale 2 puffs into the lungs every 6 hours as needed for shortness of breath / dyspnea    Mild persistent asthma without complication       ALPRAZolam 1 MG tablet    XANAX    30 tablet    Take 1 tablet (1 mg) by mouth daily as needed for anxiety    Anxiety       aspirin 325 MG tablet      Take by mouth daily        doxycycline 50 MG capsule    VIBRAMYCIN    180 capsule    Take 1 capsule (50 mg) by mouth 2 times daily    Rhinophyma, Rosacea       fluticasone 50 MCG/ACT spray    FLONASE    16 g    SHAKE GENTLY AND USE 2 SPRAYS INTO EACH NOSTRIL DAILY    Allergic rhinitis due to animal hair and dander, unspecified chronicity       fluticasone-vilanterol  200-25 MCG/INH oral inhaler    BREO ELLIPTA    3 Inhaler    INHALE 1 PUFF INTO LUNGS DAILY    Mild persistent asthma without complication       IBUPROFEN PO           montelukast 10 MG tablet    SINGULAIR    90 tablet    Take 1 tablet (10 mg) by mouth At Bedtime    Mild persistent asthma without complication       MULTIVITAMIN ADULT PO      Take 1 tablet by mouth        RANITIDINE HCL PO      Take 300 mg by mouth as needed for heartburn        vitamin B complex with vitamin C Tabs tablet      Take 1 tablet by mouth daily        VITAMIN D (CHOLECALCIFEROL) PO      Take 1,000 Units by mouth daily        zolpidem 10 MG tablet    AMBIEN    30 tablet    Take 1 tablet (10 mg) by mouth nightly as needed for sleep at bedtime.    Primary insomnia

## 2018-07-24 NOTE — PROGRESS NOTES
PATIENT HISTORY:    Venkata Lynn is a 53 year old male who presents to clinic for pain to left great toe. Notes he stubbed it twice on the same box. Has been painful for 4 days. Pain is 8/10. Worse with walking. A little better with icing and ibuprofen. Is wondering if it is broken or what can be done to help with pain.     Review of Systems:  Patient denies fever, chills, rash, wound, stiffness, numbness, weakness, heart burn, blood in stool, chest pain with activity, calf pain when walking, shortness of breath with activity, chronic cough, easy bleeding/bruising, swelling of ankles, excessive thirst, fatigue, depression, anxiety.  Patient admits to limping.     PAST MEDICAL HISTORY:   Past Medical History:   Diagnosis Date     Acne vulgaris      Anxiety 3/28/2011     Anxiety disorder      Arthritis     both knees     CARDIOVASCULAR SCREENING; LDL GOAL LESS THAN 160 10/31/2010     Controlled substance agreement signed 1/11/2016     Coughing     at night     Dyspepsia 8/16/2016     Elevated blood pressure reading without diagnosis of hypertension 1/11/2016     Familial tremor 8/16/2016     Family history of rhinophyma 1/18/2012     Insomnia      Intermittent asthma 3/28/2011     Knee pain 1/18/2012     LBP (low back pain) 1/18/2012     Low back pains      Mild persistent asthma without complication 8/16/2016     Rhinitis 1/18/2012     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     Rosacea 1/18/2012     Sebaceous hyperplasia 1/18/2012     Shortness of breath     due to asthma     Tobacco abuse 1/18/2012        PAST SURGICAL HISTORY:   Past Surgical History:   Procedure Laterality Date     ARTHROSCOPY KNEE WITH MEDIAL MENISCECTOMY Left 2/22/2016    Procedure: ARTHROSCOPY KNEE WITH MEDIAL MENISCECTOMY;  Surgeon: Chaz Moe MD;  Location: [facility] OR     ENT SURGERY      lipoma removed from neck     HEAD & NECK SURGERY      fx root of tooth     ROTATOR CUFF REPAIR RT/LT Left 01/2017     VASECTOMY          MEDICATIONS:   Current  Outpatient Prescriptions:      ALPRAZolam (XANAX) 1 MG tablet, Take 1 tablet (1 mg) by mouth daily as needed for anxiety, Disp: 30 tablet, Rfl: 2     aspirin 325 MG tablet, Take by mouth daily, Disp: , Rfl:      doxycycline (VIBRAMYCIN) 50 MG capsule, Take 1 capsule (50 mg) by mouth 2 times daily, Disp: 180 capsule, Rfl: 3     fluticasone (FLONASE) 50 MCG/ACT spray, SHAKE GENTLY AND USE 2 SPRAYS INTO EACH NOSTRIL DAILY, Disp: 16 g, Rfl: 5     fluticasone-vilanterol (BREO ELLIPTA) 200-25 MCG/INH oral inhaler, INHALE 1 PUFF INTO LUNGS DAILY, Disp: 3 Inhaler, Rfl: 1     IBUPROFEN PO, , Disp: , Rfl:      Multiple Vitamins-Minerals (MULTIVITAMIN ADULT PO), Take 1 tablet by mouth, Disp: , Rfl:      RANITIDINE HCL PO, Take 300 mg by mouth as needed for heartburn, Disp: , Rfl:      vitamin  B complex with vitamin C (VITAMIN  B COMPLEX) TABS, Take 1 tablet by mouth daily, Disp: , Rfl:      VITAMIN D, CHOLECALCIFEROL, PO, Take 1,000 Units by mouth daily, Disp: , Rfl:      zolpidem (AMBIEN) 10 MG tablet, Take 1 tablet (10 mg) by mouth nightly as needed for sleep at bedtime., Disp: 30 tablet, Rfl: 5     albuterol (PROAIR HFA/PROVENTIL HFA/VENTOLIN HFA) 108 (90 BASE) MCG/ACT Inhaler, Inhale 2 puffs into the lungs every 6 hours as needed for shortness of breath / dyspnea (Patient not taking: Reported on 7/2/2018), Disp: 1 Inhaler, Rfl: 11     montelukast (SINGULAIR) 10 MG tablet, Take 1 tablet (10 mg) by mouth At Bedtime (Patient not taking: Reported on 7/2/2018), Disp: 90 tablet, Rfl: 3     ALLERGIES:    Allergies   Allergen Reactions     Penicillins Hives and Rash     Amoxicillin        SOCIAL HISTORY:   Social History     Social History     Marital status:      Spouse name: N/A     Number of children: N/A     Years of education: N/A     Occupational History     Not on file.     Social History Main Topics     Smoking status: Never Smoker     Smokeless tobacco: Current User     Types: Chew      Comment: occ     Alcohol  "use Yes      Comment: few beers per week     Drug use: No     Sexual activity: Yes     Partners: Female     Other Topics Concern     Not on file     Social History Narrative        FAMILY HISTORY:   Family History   Problem Relation Age of Onset     Prostate Cancer Father      Other - See Comments Other      tremor        EXAM:Vitals: /70  Ht 5' 11\" (1.803 m)  Wt 200 lb (90.7 kg)  BMI 27.89 kg/m2  BMI= Body mass index is 27.89 kg/(m^2).    General appearance: Patient is alert and fully cooperative with history & exam.  No sign of distress is noted during the visit.     Psychiatric: Affect is pleasant & appropriate.  Patient appears motivated to improve health.     Respiratory: Breathing is regular & unlabored while sitting.     HEENT: Hearing is intact to spoken word.  Speech is clear.  No gross evidence of visual impairment that would impact ambulation.     Dermatologic: Skin is intact to both lower extremities without significant lesions, rash or abrasion.  No paronychia or evidence of soft tissue infection is noted.     Vascular: DP & PT pulses are intact & regular bilaterally.  No significant edema or varicosities noted.  CFT and skin temperature is normal to both lower extremities.     Neurologic: Lower extremity sensation is intact to light touch.  No evidence of weakness or contracture in the lower extremities.  No evidence of neuropathy.     Musculoskeletal: Patient is ambulatory without assistive device or brace.  Pain to right 5th toe.    Radiographs:  I personally reviewed the xrays. Oblique non displaced fracture of right 5th proximal phalanx.      ASSESSMENT:    Left foot pain  Closed fracture of fifth toe of left foot, initial encounter     PLAN:  Reviewed patient's chart in epic. Reviewed xrays. Talked about fractures. Discussed that healing can take 6-10 weeks. Risk that the fracture will not heal and we may need to do surgery. Risk is increased 10-15% if you smoke.     Recommend taping. No " surgery indicated. Recommend vit amin daily for next 6 weeks. Offered short aircast boot but is going to wear large working boot.        Grace Ortiz DPM, Podiatry/Foot and Ankle Surgery    Weight management plan: Patient was referred to their PCP to discuss a diet and exercise plan.

## 2018-08-01 ENCOUNTER — OFFICE VISIT (OUTPATIENT)
Dept: BEHAVIORAL HEALTH | Facility: CLINIC | Age: 53
End: 2018-08-01
Payer: COMMERCIAL

## 2018-08-01 DIAGNOSIS — F41.0 PANIC DISORDER WITHOUT AGORAPHOBIA: Primary | ICD-10-CM

## 2018-08-01 PROCEDURE — 90834 PSYTX W PT 45 MINUTES: CPT | Performed by: COUNSELOR

## 2018-08-01 NOTE — MR AVS SNAPSHOT
MRN:[id number]                      After Visit Summary   8/1/2018    Venkata Lynn    MRN: [id number]           Visit Information        Provider Department      8/1/2018 5:30 PM Zuly Mas LPCC [facility] Generic      Your next 10 appointments already scheduled     Aug 13, 2018  5:30 PM CDT   Return Visit with DARRON Garcia   [facility] [place] ([facility])    [address]  [facility] [id number] [phone]              MyChart Information     [employer] gives you secure access to your electronic health record. If you see a primary care provider, you can also send messages to your care team and make appointments. If you have questions, please call your primary care clinic.  If you do not have a primary care provider, please call [phone] and they will assist you.        Care EveryWhere ID     This is your Care EveryWhere ID. This could be used by other organizations to access your [facility] medical records  [id number]        Equal Access to Services     MEL REYES : Hadii juan carlos villa hadasho Soomaali, waaxda luqadaha, qaybta kaalmada adeegyakarina, jerel machado. So [facility] [phone].    ATENCIÓN: Si habla español, tiene a rivera disposición servicios gratuitos de asistencia lingüística. Llame al [phone].    We comply with applicable federal civil rights laws and [place] laws. We do not discriminate on the basis of race, color, national origin, age, disability, sex, sexual orientation, or gender identity.

## 2018-08-01 NOTE — PROGRESS NOTES
Progress Note    Client Name: Venkata Lynn  Date: 8/1/2018         Service Type: Individual      Session Start Time: 5:30pm  Session End Time: 6:09pm      Session Length: 39 mins     Session #: 3     Attendees: Client attended alone      PHQ-9 / CARMEN-7 : PHQ9= 0 GAD7= 2     DATA      Progress Since Last Session (Related to Symptoms / Goals / Homework):   Symptoms: Improving client CARMEN and PDQ9 continue to decrease, cleint reports no recent panic    Homework: Achieved / completed to satisfaction        Current / Ongoing Stressors and Concerns:   Work       Intervention:   CBT: noticing thoughts, deep breathing, cold showers for anxiety        ASSESSMENT: Current Emotional / Mental Status (status of significant symptoms):   Risk status (Self / Other harm or suicidal ideation)   Client denies current fears or concerns for personal safety.   Client denies current or recent suicidal ideation or behaviors.   Client denies current or recent homicidal ideation or behaviors.   Client denies current or recent self injurious behavior or ideation.   Client denies other safety concerns.   Client Client reports there has been no change in risk factors since their last session.     Client Client reports there has been no change in protective factors since their last session.     A safety and risk management plan has not been developed at this time, however client was given the after-hours number / 911 should there be a change in any of these risk factors.     Appearance:   Appropriate    Eye Contact:   Good    Psychomotor Behavior: Normal    Attitude:   Cooperative    Orientation:   All   Speech    Rate / Production: Normal     Volume:  Normal    Mood:    Normal   Affect:    Appropriate    Thought Content:  Clear    Thought Form:  Coherent  Logical    Insight:    Good      Medication Review:   Changes to psychiatric medications, see updated Medication List in EPIC.      Medication  Compliance:   Yes     Changes in Health Issues:   Yes: Pain, No Psychological Distress. Foot is fractured     Chemical Use Review:   Substance Use: Chemical use reviewed, no active concerns identified      Tobacco Use: No current tobacco use.       Collateral Reports Completed:   Not Applicable    PLAN: (Client Tasks / Therapist Tasks / Other)  Client reported that things have been going well for him the past couple of weeks. Client PHQ9 and GAD7 continue to decrease. Client reported he changed the voicemail on his phone to reflect that he will return calls to customers when he is available instead of setting a time limit for himself and worrying about following it. Client reported that 5 days of the last 2 weeks he has put his phone away at 8pm and not answered any calls after that time. Client reported that he will take a walk, work on a motor or watch TV after a rough day of work. Provider educated client on changing body temperature using the shower or an ice cube can help with anxiousness. Client reported that the locus of control worksheet has been helpful and he keeps it close to remind him. Client stated he has always been a perfectionist and has high expectations of himself and them may beat himself up if he does not do things perfect. Provider educated client on self compassion and self talk statements that can focus on positives.         Zuly Mas, [facility] 8/1/2018                                                           ________________________________________________________________________    Treatment Plan    Client's Name: Venkata Lynn  YOB: 1965    Date: 8/1/2018      DSM-V Diagnoses: 300.01 (F41.0) Panic Disorder  Psychosocial / Contextual Factors:  Work   WHODAS: To be reviewed Feb 2019    Referral / Collaboration:  Referral to another professional/service is not indicated at this time..    Anticipated number of session or this episode of care: 6      MeasurableTreatment  Goal(s) related to diagnosis / functional impairment(s)  Goal 1: Client will have consistent score of 2 or less on GAD7    I will know I've met my goal when I have coping skills to use when I feel anxious.      Objective #A (Client Action)    Client will practice deep breathing at least 2x a day.  Status: New - Date: 8/1/18     Intervention(s)  Therapist will assign homework that includes client to practice breathing and check in with client on how it is going. Affirm clients use of daily breathing and discuss benefits in session.     Objective #B  Client will set bounadries in relation to work and follow them . Client will not answer work calls after 8pm. Client will change his voicemail to state he will get back to customers when he is available to.   Status: New - Date: 8/1/18     Intervention(s)  Therapist will affirm and check in. Assess number of days client has practiced this boundaries and discuss how they relate to feeling of anxiousness.    Objective #C  Client will use cognitive strategies identified in therapy to challenge anxious thoughts. Client will also use self compassion thoughts to overcome perfectionist ideals.   Status: New - Date: 8/1/18     Intervention(s)  Therapist will assign homework thought stopping and changing. Give client example of self compassion thoughts. Discuss locus of control and how that relates to his job and anxiety caused by difficult customers. Client will practice known self care such as: walking, working on car and TV after a tough day. Provider will check in on use of self care and affirm.           Client has reviewed and agreed to the above plan.      Zuly Mas, [facility]  August 1, 2018

## 2018-08-08 DIAGNOSIS — F41.9 ANXIETY: ICD-10-CM

## 2018-08-08 NOTE — TELEPHONE ENCOUNTER
Controlled Substance Refill Request for alprazolam 1  Problem List Complete:  Yes  Patient is followed by SAMUEL BAEZA for ongoing prescription of benzodiazepines.  All refills should be approved by this provider, or covering partner.    Medication(s): Xanax 1 mg.   Maximum quantity per month: 30  Clinic visit frequency required: Q 6  months     Controlled substance agreement on file: Yes       Date(s): 1/11/16  Benzodiazepine use reviewed by psychiatry:  No    Last [facility] website verification:  11.1.17   [email or web address]       checked in past 3 months?  No, route to RN - looks overdue.  Also, on the med list it says this was discontinued.  Is that correct?    Please walk signed prescription over to pharmacy.  Thanks!  Olamide Bernal, [place]  [facility]  [phone]

## 2018-08-09 RX ORDER — ALPRAZOLAM 1 MG
1 TABLET ORAL DAILY PRN
Qty: 30 TABLET | Refills: 0 | Status: SHIPPED | OUTPATIENT
Start: 2018-08-09 | End: 2018-09-14

## 2018-08-09 NOTE — TELEPHONE ENCOUNTER
updated.  No concerns noted.  Not PSO med.  Sent to provider.  Ita Woodard, RN      5/31/18  / PLAN:  (B35.4) Tinea corporis  (primary encounter diagnosis)  Comment: discussed  Plan: antifungals, drying agents. May be related to deodorant: withdraw, challenge     (F41.9) Anxiety  Comment: low dose benzo. Discussed use as he ages  Plan: MENTAL HEALTH REFERRAL  - Adult; Outpatient         Treatment; Individual/Couples/Family/Group         Therapy/Health Psychology; [facility]: [facility] [phone]; We will         contact you to schedule the appointment or         please call with any questions        CBT     (R03.0) Elevated blood pressure reading without diagnosis of hypertension  Comment: not today  Plan:      (M75.121) Complete tear of right rotator cuff  Comment: I recommend elective repair  Plan:         RTC3 mos     Lyndon Stoll MD            Instructions        Return in about 3 months (around 8/31/2018).   2 different  Antifungals\treat 2 weeks after gone

## 2018-08-13 ENCOUNTER — OFFICE VISIT (OUTPATIENT)
Dept: BEHAVIORAL HEALTH | Facility: CLINIC | Age: 53
End: 2018-08-13
Payer: COMMERCIAL

## 2018-08-13 DIAGNOSIS — F41.0 PANIC DISORDER WITHOUT AGORAPHOBIA: Primary | ICD-10-CM

## 2018-08-13 PROCEDURE — 90834 PSYTX W PT 45 MINUTES: CPT | Performed by: COUNSELOR

## 2018-08-13 ASSESSMENT — ANXIETY QUESTIONNAIRES
1. FEELING NERVOUS, ANXIOUS, OR ON EDGE: NOT AT ALL
2. NOT BEING ABLE TO STOP OR CONTROL WORRYING: NOT AT ALL
3. WORRYING TOO MUCH ABOUT DIFFERENT THINGS: NOT AT ALL
7. FEELING AFRAID AS IF SOMETHING AWFUL MIGHT HAPPEN: NOT AT ALL
GAD7 TOTAL SCORE: 0
6. BECOMING EASILY ANNOYED OR IRRITABLE: NOT AT ALL
5. BEING SO RESTLESS THAT IT IS HARD TO SIT STILL: NOT AT ALL
IF YOU CHECKED OFF ANY PROBLEMS ON THIS QUESTIONNAIRE, HOW DIFFICULT HAVE THESE PROBLEMS MADE IT FOR YOU TO DO YOUR WORK, TAKE CARE OF THINGS AT HOME, OR GET ALONG WITH OTHER PEOPLE: NOT DIFFICULT AT ALL

## 2018-08-13 ASSESSMENT — PATIENT HEALTH QUESTIONNAIRE - PHQ9: 5. POOR APPETITE OR OVEREATING: NOT AT ALL

## 2018-08-13 NOTE — PROGRESS NOTES
Progress Note    Client Name: Venkata Lynn  Date: 8/13/2018           Service Type: Individual      Session Start Time: 5:30pm  Session End Time: 6:17pm      Session Length: 47 mins     Session #: 4     Attendees: Client attended alone    Treatment Plan Last Reviewed: To be reviewed   PHQ-9 / CARMEN-7 : GAD7=0 PHQ9=1      DATA      Progress Since Last Session (Related to Symptoms / Goals / Homework):   Symptoms: Improving Client reported all 0's on his assessments. Reported no symptoms at this session    Homework: Achieved / completed to satisfaction      Episode of Care Goals: Achieved / completed to satisfaction - ACTION (Actively working towards change); Intervened by reinforcing change plan / affirming steps taken     Current / Ongoing Stressors and Concerns:   work     Treatment Objective(s) Addressed in This Session:   use thought-stopping strategy daily to reduce intrusive thoughts  Boundaries for work and family      Intervention:   CBT: mindfulness        ASSESSMENT: Current Emotional / Mental Status (status of significant symptoms):   Risk status (Self / Other harm or suicidal ideation)   Client denies current fears or concerns for personal safety.   Client denies current or recent suicidal ideation or behaviors.   Client denies current or recent homicidal ideation or behaviors.   Client denies current or recent self injurious behavior or ideation.   Client denies other safety concerns.   Client Client reports there has been no change in risk factors since their last session.     Client Client reports there has been no change in protective factors since their last session.     A safety and risk management plan has not been developed at this time, however client was given the after-hours number / 911 should there be a change in any of these risk factors.     Appearance:   Appropriate    Eye Contact:   Good    Psychomotor Behavior: Normal     Attitude:   Cooperative    Orientation:   All   Speech    Rate / Production: Normal     Volume:  Normal    Mood:    Normal   Affect:    Appropriate    Thought Content:  Clear    Thought Form:  Coherent  Logical    Insight:    Good      Medication Review:   No changes to current psychiatric medication(s)     Medication Compliance:   Yes     Changes in Health Issues:   None reported     Chemical Use Review:   Substance Use: Chemical use reviewed, no active concerns identified      Tobacco Use: No current tobacco use.       Collateral Reports Completed:   Routed note to PCP    PLAN: (Client Tasks / Therapist Tasks / Other)  Client reported that things have been going well for him. Client stated that he has been putting his phone away at 8pm quite often and has found it to be very helpful. Client stated that his brother called him 2x in the last month and he decided to not take the calls. Client reported that his brother often asks for money and seems to be mentally unstable so the client had to put boundaries on the relationship. Provider affirmed the use of healthy boundaries with family members being tough and reinforced the clients choice to do so. Client reported that he falls asleep fine but sometimes wakes int he night and has trouble falling back to sleep. Provider modeled guided imagery for the client and wrote down how to access it for when he is having trouble relaxing at night. Client stated he has been practicing his deep breathing 2x a day and finds it to be very helpful. Provider educated client on mindfulness and how it can be used to help with anxiety. Provider assigned client to maintain his current coping skills practice and add mindfulness practices as well. Provider and client decided to make the next session further out as client reports he is doing well and would like to try it.         Zuly Mas, [facility] 8/13/2018                                                            ________________________________________________________________________     Treatment Plan     Client's Name: Venkata Lynn                                    YOB: 1965     Date: 8/1/2018        DSM-V Diagnoses: 300.01 (F41.0) Panic Disorder  Psychosocial / Contextual Factors:  Work   WHODAS: To be reviewed Feb 2019     Referral / Collaboration:  Referral to another professional/service is not indicated at this time..     Anticipated number of session or this episode of care: 6        MeasurableTreatment Goal(s) related to diagnosis / functional impairment(s)  Goal 1: Client will have consistent score of 2 or less on GAD7    I will know I've met my goal when I have coping skills to use when I feel anxious.       Objective #A (Client Action)                                    Client will practice deep breathing at least 2x a day.  Status: New - Date: 8/1/18      Intervention(s)  Therapist will assign homework that includes client to practice breathing and check in with client on how it is going. Affirm clients use of daily breathing and discuss benefits in session.      Objective #B  Client will set bounadries in relation to work and follow them . Client will not answer work calls after 8pm. Client will change his voicemail to state he will get back to customers when he is available to.   Status: New - Date: 8/1/18      Intervention(s)  Therapist will affirm and check in. Assess number of days client has practiced this boundaries and discuss how they relate to feeling of anxiousness.     Objective #C  Client will use cognitive strategies identified in therapy to challenge anxious thoughts. Client will also use self compassion thoughts to overcome perfectionist ideals.   Status: New - Date: 8/1/18      Intervention(s)  Therapist will assign homework thought stopping and changing. Give client example of self compassion thoughts. Discuss locus of control and how that relates to his job  and anxiety caused by difficult customers. Client will practice known self care such as: walking, working on car and TV after a tough day. Provider will check in on use of self care and affirm.               Client has reviewed and agreed to the above plan.        [facility] Mas, [facility]                                      August 1, 2018

## 2018-08-13 NOTE — Clinical Note
Milo Stoll-  Today I saw Champ for a therapy session. Champ reported decreased anxiety and has been doing very well with practicing what he learns in session and applying it to his life. I wanted to send you our most recent session note to keep you updated on how things are going. Thank you!

## 2018-08-13 NOTE — MR AVS SNAPSHOT
MRN:[id number]                      After Visit Summary   8/13/2018    Venkata Lynn    MRN: [id number]           Visit Information        Provider Department      8/13/2018 5:30 PM Zuly Mas [facility] Generic      Your next 10 appointments already scheduled     Sep 10, 2018  5:30 PM CDT   Return Visit with DARRON Garcia   [facility] [place] ([facility])    [address]  [facility] [id number] [phone]              MyChart Information     [employer] gives you secure access to your electronic health record. If you see a primary care provider, you can also send messages to your care team and make appointments. If you have questions, please call your primary care clinic.  If you do not have a primary care provider, please call [phone] and they will assist you.        Care EveryWhere ID     This is your Care EveryWhere ID. This could be used by other organizations to access your [facility] medical records  [id number]        Equal Access to Services     MEL REYES : Hadii juan carlos villa hadasho Soomaali, waaxda luqadaha, qaybta kaalmada adeegyakarina, jerel machado. So [facility] [phone].    ATENCIÓN: Si habla español, tiene a rivera disposición servicios gratuitos de asistencia lingüística. Llame al [phone].    We comply with applicable federal civil rights laws and [place] laws. We do not discriminate on the basis of race, color, national origin, age, disability, sex, sexual orientation, or gender identity.

## 2018-08-14 ASSESSMENT — PATIENT HEALTH QUESTIONNAIRE - PHQ9: SUM OF ALL RESPONSES TO PHQ QUESTIONS 1-9: 1

## 2018-08-14 ASSESSMENT — ANXIETY QUESTIONNAIRES: GAD7 TOTAL SCORE: 0

## 2018-09-06 ENCOUNTER — TELEPHONE (OUTPATIENT)
Dept: FAMILY MEDICINE | Facility: CLINIC | Age: 53
End: 2018-09-06

## 2018-09-06 DIAGNOSIS — J45.30 MILD PERSISTENT ASTHMA WITHOUT COMPLICATION: Primary | ICD-10-CM

## 2018-09-06 NOTE — TELEPHONE ENCOUNTER
Panel Management Review      Patient has the following on his problem list:     Asthma review     ACT Total Scores 2/20/2018   ACT TOTAL SCORE -   ASTHMA ER VISITS -   ASTHMA HOSPITALIZATIONS -   ACT TOTAL SCORE (Goal Greater than or Equal to 20) 23   In the past 12 months, how many times did you visit the emergency room for your asthma without being admitted to the hospital? -   In the past 12 months, how many times were you hospitalized overnight because of your asthma? 0      1. Is Asthma diagnosis on the Problem List? Yes    2. Is Asthma listed on Health Maintenance? Yes    3. Patient is due for:  ACT and AAP      Composite cancer screening  Chart review shows that this patient is due/due soon for the following None  Summary:    Patient is due/failing the following:   AAP and ACT    Action needed:   Patient needs to do ACT.    Type of outreach:    Copy of ACT mailed to patient, will reach out in 5 days.    Questions for provider review:    None                                                                                                                                    Jyothi Medellin CMA       Chart routed to panel pool .

## 2018-09-06 NOTE — LETTER
[facility]  [address] [facility] [address] [phone]  September 6, 2018    Venkata Lynn  [address]  [place] [address]    Dear Venkata,    I care about your health and have reviewed your health plan. I have reviewed your medical conditions, medication list, and lab results and am making recommendations based on this review, to better manage your health.    You are in particular need of attention regarding:  -Asthma    I am recommending that you:  {recommendations: -Complete and return the attached ASTHMA CONTROL TEST.  If your total score is 19 or less or you have been to the ER or urgent care for your asthma, then please schedule an asthma followup appointment.    Here is a list of Health Maintenance topics that are due now or due soon:  Health Maintenance Due   Topic Date Due     HIV SCREEN (SYSTEM ASSIGNED)  07/14/1985     ASTHMA ACTION PLAN Q1 YR  08/16/2017     INFLUENZA VACCINE (1) 09/01/2018     ASTHMA CONTROL TEST Q6 MOS  08/20/2018       Please call us at [phone] (or use [email or web address]) to address the above recommendations.     Thank you for trusting [facility] and we appreciate the opportunity to serve you.  We look forward to supporting your healthcare needs in the future.    Healthy Regards,    Lyndon Stoll MD

## 2018-09-10 ENCOUNTER — OFFICE VISIT (OUTPATIENT)
Dept: BEHAVIORAL HEALTH | Facility: CLINIC | Age: 53
End: 2018-09-10
Payer: COMMERCIAL

## 2018-09-10 DIAGNOSIS — F41.0 PANIC DISORDER WITHOUT AGORAPHOBIA: Primary | ICD-10-CM

## 2018-09-10 PROCEDURE — 90834 PSYTX W PT 45 MINUTES: CPT | Performed by: COUNSELOR

## 2018-09-10 ASSESSMENT — ANXIETY QUESTIONNAIRES
2. NOT BEING ABLE TO STOP OR CONTROL WORRYING: NOT AT ALL
6. BECOMING EASILY ANNOYED OR IRRITABLE: NOT AT ALL
GAD7 TOTAL SCORE: 2
5. BEING SO RESTLESS THAT IT IS HARD TO SIT STILL: SEVERAL DAYS
IF YOU CHECKED OFF ANY PROBLEMS ON THIS QUESTIONNAIRE, HOW DIFFICULT HAVE THESE PROBLEMS MADE IT FOR YOU TO DO YOUR WORK, TAKE CARE OF THINGS AT HOME, OR GET ALONG WITH OTHER PEOPLE: NOT DIFFICULT AT ALL
3. WORRYING TOO MUCH ABOUT DIFFERENT THINGS: NOT AT ALL
1. FEELING NERVOUS, ANXIOUS, OR ON EDGE: SEVERAL DAYS
7. FEELING AFRAID AS IF SOMETHING AWFUL MIGHT HAPPEN: NOT AT ALL

## 2018-09-10 ASSESSMENT — PATIENT HEALTH QUESTIONNAIRE - PHQ9: 5. POOR APPETITE OR OVEREATING: NOT AT ALL

## 2018-09-10 NOTE — PROGRESS NOTES
Discharge Summary  Multiple Sessions    Client Name: Venkata Lynn MRN#: [id number] YOB: 1965    Discharge Date:   September 10, 2018      Service Type: Individual      Session Start Time: 5:30pm  Session End Time: 6:11 pm      Session Length: 41 mins      Session #: 5     Attendees: Client attended alone    Focus of Treatment Objective(s):  Client's presenting concerns included: Anxiety - related to work  Stage of Change at time of Discharge: MAINTENANCE (Working to maintain change, with risk of relapse)    Medication Adherence:  Yes    Chemical Use:  No    Assessment: Current Emotional / Mental Status (status of significant symptoms):    Risk status (Self / Other harm or suicidal ideation)  Client denies current fears or concerns for personal safety.  Client denies current or recent suicidal ideation or behaviors.  Client denies current or recent homicidal ideation or behaviors.  Client denies current or recent self injurious behavior or ideation.  Client denies other safety concerns.  A safety and risk management plan has not been developed at this time, however client was given the after-hours number should there be a change in any of these risk factors.    Appearance:   Appropriate   Eye Contact:   Good   Psychomotor Behavior: Normal   Attitude:   Cooperative   Orientation:   All  Speech   Rate / Production: Normal    Volume:  Normal   Mood:    Normal  Affect:    Appropriate   Thought Content:  Clear   Thought Form:  Coherent  Logical   Insight:   Good     DSM5 Diagnoses: (Sustained by DSM5 Criteria Listed Above)  Diagnoses: 300.01 (F41.0) Panic Disorder  Psychosocial & Contextual Factors: work and family   WHODAS 2.0 (12 item) Score:24 at intake     Reason for Discharge:  Goals completed      Aftercare Plan:  Client may resume counseling services at any time in the future by calling the [facility] Intake Office, [phone].      Zuly Mas, [facility] 9/10/2018

## 2018-09-10 NOTE — MR AVS SNAPSHOT
MRN:[id number]                      After Visit Summary   9/10/2018    Venkata Lynn    MRN: [id number]           Visit Information        Provider Department      9/10/2018 5:30 PM Zuly Mas, [facility] Generic      MyChart Information     [employer] gives you secure access to your electronic health record. If you see a primary care provider, you can also send messages to your care team and make appointments. If you have questions, please call your primary care clinic.  If you do not have a primary care provider, please call [phone] and they will assist you.        Care EveryWhere ID     This is your Care EveryWhere ID. This could be used by other organizations to access your [facility] medical records  [id number]        Equal Access to Services     MEL REYES : Glenn bonillao [place], waaxda luqadaha, qaybta kaalmada adeeduardo, jerel machado. So [facility] [phone].    ATENCIÓN: Si habla español, tiene a rivera disposición servicios gratuitos de asistencia lingüística. Llame al [phone].    We comply with applicable federal civil rights laws and [place] laws. We do not discriminate on the basis of race, color, national origin, age, disability, sex, sexual orientation, or gender identity.

## 2018-09-11 ASSESSMENT — PATIENT HEALTH QUESTIONNAIRE - PHQ9: SUM OF ALL RESPONSES TO PHQ QUESTIONS 1-9: 0

## 2018-09-11 ASSESSMENT — ANXIETY QUESTIONNAIRES: GAD7 TOTAL SCORE: 2

## 2018-09-12 DIAGNOSIS — F41.9 ANXIETY: ICD-10-CM

## 2018-09-12 DIAGNOSIS — F51.01 PRIMARY INSOMNIA: ICD-10-CM

## 2018-09-12 RX ORDER — ZOLPIDEM TARTRATE 10 MG/1
10 TABLET ORAL
Qty: 30 TABLET | Refills: 5 | Status: CANCELLED | OUTPATIENT
Start: 2018-09-12

## 2018-09-12 RX ORDER — ALPRAZOLAM 1 MG
1 TABLET ORAL DAILY PRN
Qty: 30 TABLET | Refills: 0 | Status: CANCELLED | OUTPATIENT
Start: 2018-09-12

## 2018-09-12 NOTE — TELEPHONE ENCOUNTER
Controlled Substance Refill Request for alprazolam  Problem List Complete:  Yes    Patient is followed by SAMUEL BAEZA for ongoing prescription of benzodiazepines.  All refills should be approved by this provider, or covering partner.    Medication(s): alprazolam zolpidem.   Maximum quantity per month: 30 ea  Clinic visit frequency required: Q 6  months     Controlled substance agreement on file: Yes       Date(s):    Benzodiazepine use reviewed by psychiatry:      Last [facility] website verification:  Done 4.11.17   [email or web address]     checked in past 3 months?  Yes 8-8-2018     Controlled Substance Refill Request for zolpidem 10mg   Problem List Complete:  Yes    Patient is followed by SAMUEL BAEZA for ongoing prescription of benzodiazepines.  All refills should be approved by this provider, or covering partner.    Medication(s): alprazolam zolpidem.   Maximum quantity per month: 30 ea  Clinic visit frequency required: Q 6  months     Controlled substance agreement on file: Yes       Date(s):    Benzodiazepine use reviewed by psychiatry:      Last [facility] website verification:  Done 4.11.17   [email or web address]     checked in past 3 months?  No, route to RN     Last refill 8-8-18 alprazolam only,  updated,  not sure last  check also inculde zolpidem or not.    Thanks,    Josue Arroyo  [facility]  [address] [place] [id number]  [phone]

## 2018-09-13 NOTE — TELEPHONE ENCOUNTER
Type of outreach:  Phone, spoke to patient.  He is scheduled with Dr Stoll on 9/14/18 for a office visit.    Nancy Vazquez MA on 9/13/2018 at 2:07 PM

## 2018-09-14 ENCOUNTER — OFFICE VISIT (OUTPATIENT)
Dept: FAMILY MEDICINE | Facility: CLINIC | Age: 53
End: 2018-09-14
Payer: COMMERCIAL

## 2018-09-14 VITALS
OXYGEN SATURATION: 97 % | TEMPERATURE: 98.2 F | HEART RATE: 69 BPM | SYSTOLIC BLOOD PRESSURE: 118 MMHG | WEIGHT: 206 LBS | RESPIRATION RATE: 18 BRPM | DIASTOLIC BLOOD PRESSURE: 74 MMHG | HEIGHT: 71 IN | BODY MASS INDEX: 28.84 KG/M2

## 2018-09-14 DIAGNOSIS — J45.30 MILD PERSISTENT ASTHMA WITHOUT COMPLICATION: Primary | ICD-10-CM

## 2018-09-14 DIAGNOSIS — Z11.4 SCREENING FOR HIV (HUMAN IMMUNODEFICIENCY VIRUS): ICD-10-CM

## 2018-09-14 DIAGNOSIS — M77.12 LATERAL EPICONDYLITIS OF LEFT ELBOW: ICD-10-CM

## 2018-09-14 DIAGNOSIS — Z79.899 CONTROLLED SUBSTANCE AGREEMENT SIGNED: ICD-10-CM

## 2018-09-14 DIAGNOSIS — Z23 NEED FOR PROPHYLACTIC VACCINATION AND INOCULATION AGAINST INFLUENZA: ICD-10-CM

## 2018-09-14 DIAGNOSIS — F51.01 PRIMARY INSOMNIA: ICD-10-CM

## 2018-09-14 DIAGNOSIS — F41.9 ANXIETY: ICD-10-CM

## 2018-09-14 PROCEDURE — 90471 IMMUNIZATION ADMIN: CPT | Performed by: FAMILY MEDICINE

## 2018-09-14 PROCEDURE — 87389 HIV-1 AG W/HIV-1&-2 AB AG IA: CPT | Performed by: FAMILY MEDICINE

## 2018-09-14 PROCEDURE — 90686 IIV4 VACC NO PRSV 0.5 ML IM: CPT | Performed by: FAMILY MEDICINE

## 2018-09-14 PROCEDURE — 99214 OFFICE O/P EST MOD 30 MIN: CPT | Mod: 25 | Performed by: FAMILY MEDICINE

## 2018-09-14 PROCEDURE — 36415 COLL VENOUS BLD VENIPUNCTURE: CPT | Performed by: FAMILY MEDICINE

## 2018-09-14 PROCEDURE — 80307 DRUG TEST PRSMV CHEM ANLYZR: CPT | Mod: 90 | Performed by: FAMILY MEDICINE

## 2018-09-14 PROCEDURE — 99000 SPECIMEN HANDLING OFFICE-LAB: CPT | Performed by: FAMILY MEDICINE

## 2018-09-14 RX ORDER — MONTELUKAST SODIUM 10 MG/1
1 TABLET ORAL AT BEDTIME
Qty: 90 TABLET | Refills: 3 | Status: SHIPPED | OUTPATIENT
Start: 2018-09-14 | End: 2019-08-27

## 2018-09-14 RX ORDER — ALPRAZOLAM 1 MG
1 TABLET ORAL DAILY PRN
Qty: 30 TABLET | Refills: 0 | Status: SHIPPED | OUTPATIENT
Start: 2018-09-14 | End: 2018-09-14

## 2018-09-14 RX ORDER — ALPRAZOLAM 1 MG
1 TABLET ORAL DAILY PRN
Qty: 30 TABLET | Refills: 0 | Status: SHIPPED | OUTPATIENT
Start: 2018-11-06 | End: 2019-01-03

## 2018-09-14 RX ORDER — ZOLPIDEM TARTRATE 10 MG/1
10 TABLET ORAL
Qty: 30 TABLET | Refills: 5 | Status: SHIPPED | OUTPATIENT
Start: 2018-09-14 | End: 2019-04-15

## 2018-09-14 RX ORDER — ALPRAZOLAM 1 MG
1 TABLET ORAL DAILY PRN
Qty: 30 TABLET | Refills: 0 | Status: SHIPPED | OUTPATIENT
Start: 2018-10-07 | End: 2018-09-14

## 2018-09-14 NOTE — PROGRESS NOTES
SUBJECTIVE:   Venkata Lynn is a 53 year old male who presents to clinic today for the following health issues:    Mild persistent asthma without complication  (primary encounter diagnosis)  Medication Followup of BREO ELLIPTA   Intermittent chest tightness which he attributes to allergies. Has not been using albuterol on the onset of these symptoms, wondering if he should be.    Taking Medication as prescribed: yes    Side Effects:  None    Medication Helping Symptoms:  yes     Anxiety - Controlled     Primary insomnia - Controlled     Lateral epicondylitis of left elbow - Pain in left elbow - use of NSAID's ice and heat. Palliatives and provocatives not noted.  Perhaps 6 months        Problem list and histories reviewed & adjusted, as indicated.  Additional history: as documented    Past Medical History:   Diagnosis Date     Acne vulgaris      Anxiety 3/28/2011     Anxiety disorder      Arthritis     both knees     CARDIOVASCULAR SCREENING; LDL GOAL LESS THAN 160 10/31/2010     Controlled substance agreement signed 1/11/2016     Coughing     at night     Dyspepsia 8/16/2016     Elevated blood pressure reading without diagnosis of hypertension 1/11/2016     Familial tremor 8/16/2016     Family history of rhinophyma 1/18/2012     Insomnia      Intermittent asthma 3/28/2011     Knee pain 1/18/2012     LBP (low back pain) 1/18/2012     Low back pains      Mild persistent asthma without complication 8/16/2016     Rhinitis 1/18/2012     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     Rosacea 1/18/2012     Sebaceous hyperplasia 1/18/2012     Shortness of breath     due to asthma     Tobacco abuse 1/18/2012     Past Surgical History:   Procedure Laterality Date     ARTHROSCOPY KNEE WITH MEDIAL MENISCECTOMY Left 2/22/2016    Procedure: ARTHROSCOPY KNEE WITH MEDIAL MENISCECTOMY;  Surgeon: Chaz Moe MD;  Location: [facility] OR     ENT SURGERY      lipoma removed from neck     HEAD & NECK SURGERY      fx root of tooth     ROTATOR CUFF  "REPAIR RT/LT Left 01/2017     VASECTOMY       Family History   Problem Relation Age of Onset     Prostate Cancer Father      Other - See Comments Other      tremor     Social History   Substance Use Topics     Smoking status: Never Smoker     Smokeless tobacco: Current User     Types: Chew      Comment: occ     Alcohol use Yes      Comment: few beers per week     Reviewed and updated as needed this visit by clinical staff  Tobacco  Allergies  Meds  Med Hx  Surg Hx  Fam Hx  Soc Hx      Reviewed and updated as needed this visit by Provider       ROS:  Shoulder, responsive to steroid injection and retracting tendons by , imaging. No systemic symptoms      This document serves as a record of the services and decisions personally performed and made by Lyndon Stoll MD. It was created on his behalf by Sathish Hendrix, a trained medical scribe.  The creation of this document is based on the scribe's personal observations and the provider's statements to the medical scribe.  Sathish Hendrix, September 14, 2018 12:14 PM  OBJECTIVE:   /74 (BP Location: Right arm, Patient Position: Chair, Cuff Size: Adult Large)  Pulse 69  Temp 98.2  F (36.8  C) (Oral)  Resp 18  Ht 1.803 m (5' 11\")  Wt 93.4 kg (206 lb)  SpO2 97%  BMI 28.73 kg/m2  Body mass index is 28.73 kg/(m^2).    GENERAL: Healthy, alert, no acute distress  CHEST: Clear to auscultation, respirations unlabored  PSYCH: Affect normal / bright, Very agreeable   Pain reproduced by palpation of left lateral epicondyle  Diagnostic Test Results:  No results found for this or any previous visit (from the past 24 hour(s)).  ASSESSMENT/PLAN:   (J45.30) Mild persistent asthma without complication  (primary encounter diagnosis)9  Comment: Refilled   Plan: fluticasone-vilanterol (BREO ELLIPTA) 200-25         MCG/INH inhaler, montelukast (SINGULAIR) 10 MG         Tablet  recommend albuterol for  as needed dyspnea          (F41.9) Anxiety  Comment: Well controlled, refilled "   Plan: ALPRAZolam (XANAX) 1 MG tablet, Drug  Screen         Comprehensive, Urine w/o Reported Meds (Pain         Care Package), DISCONTINUED: ALPRAZolam (XANAX)        1 MG tablet, DISCONTINUED: ALPRAZolam (XANAX) 1        MG tablet          (F51.01) Primary insomnia  Comment: Refilled   Plan: zolpidem (AMBIEN) 10 MG tablet          (Z23) Need for prophylactic vaccination and inoculation against influenza  Comment: Administered   Plan: FLU VACCINE, SPLIT VIRUS, IM (QUADRIVALENT)         [27889]- >3 YRS, Vaccine Administration,         Initial [08650]          (Z11.4) Screening for HIV (human immunodeficiency virus)  Comment: Universal HIV testing introduced, rationale reviewed, all questions answered, permission granted to test  Plan: HIV Antigen Antibody Combo          (Z79.899) Controlled substance agreement signed  Comment: Today   Plan:     (M77.12) Lateral epicondylitis of left elbow  Comment: Discussed  Plan: Exercises discussed.  If unsuccessful refer to PT    The information in this document, created by the medical scribe for me, accurately reflects the services I personally performed and the decisions made by me. I have reviewed and approved this document for accuracy prior to leaving the patient care area.  Lyndon Stoll MD September 14, 2018 12:14 PM    Lyndon Stoll MD  [facility]

## 2018-09-14 NOTE — PROGRESS NOTES

## 2018-09-14 NOTE — MR AVS SNAPSHOT
After Visit Summary   9/14/2018    Venkata Lynn    MRN: [id number]           Patient Information     Date Of Birth          1965        Visit Information        Provider Department      9/14/2018 11:45 AM Lyndon Stoll MD [facility]        Today's Diagnoses     Mild persistent asthma without complication    -  1    Anxiety        Primary insomnia        Need for prophylactic vaccination and inoculation against influenza        Screening for HIV (human immunodeficiency virus)        Controlled substance agreement signed        Lateral epicondylitis of left elbow          Care Instructions    Use Albuterol at onset of asthma / allergies.           Follow-ups after your visit        Follow-up notes from your care team     Return in about 6 months (around 3/14/2019).      Who to contact     If you have questions or need follow up information about today's clinic visit or your schedule please contact [facility] directly at [phone].  Normal or non-critical lab and imaging results will be communicated to you by Visiarch[employer], letter or phone within 4 business days after the clinic has received the results. If you do not hear from us within 7 days, please contact the clinic through Visiarchart or phone. If you have a critical or abnormal lab result, we will notify you by phone as soon as possible.  Submit refill requests through [employer] or call your pharmacy and they will forward the refill request to us. Please allow 3 business days for your refill to be completed.          Additional Information About Your Visit        MyChart Information     [employer] gives you secure access to your electronic health record. If you see a primary care provider, you can also send messages to your care team and make appointments. If you have questions, please call your primary care clinic.  If you do not have a primary care provider, please call [phone] and they will assist  "you.        Care EveryWhere ID     This is your Care EveryWhere ID. This could be used by other organizations to access your [facility] medical records  [id number]        Your Vitals Were     Pulse Temperature Respirations Height Pulse Oximetry BMI (Body Mass Index)    69 98.2  F (36.8  C) (Oral) 18 5' 11\" (1.803 m) 97% 28.73 kg/m2       Blood Pressure from Last 3 Encounters:   09/14/18 118/74   07/24/18 128/70   05/31/18 137/76    Weight from Last 3 Encounters:   09/14/18 206 lb (93.4 kg)   07/24/18 200 lb (90.7 kg)   05/31/18 204 lb (92.5 kg)              We Performed the Following     Drug  Screen Comprehensive, Urine w/o Reported Meds (Pain Care Package)     FLU VACCINE, SPLIT VIRUS, IM (QUADRIVALENT) [57982]- >3 YRS     HIV Antigen Antibody Combo     Vaccine Administration, Initial [94675]          Today's Medication Changes          These changes are accurate as of 9/14/18  8:20 PM.  If you have any questions, ask your nurse or doctor.               Start taking these medicines.        Dose/Directions    ALPRAZolam 1 MG tablet   Commonly known as:  XANAX   Used for:  Anxiety   Started by:  Lyndon Stoll MD        Dose:  1 mg   Start taking on:  11/6/2018   Take 1 tablet (1 mg) by mouth daily as needed for anxiety   Quantity:  30 tablet   Refills:  0            Where to get your medicines      These medications were sent to [facility] [address]  [address] [facility] [address]     Phone:  [phone]     fluticasone-vilanterol 200-25 MCG/INH inhaler    montelukast 10 MG tablet         Some of these will need a paper prescription and others can be bought over the counter.  Ask your nurse if you have questions.     Bring a paper prescription for each of these medications     ALPRAZolam 1 MG tablet    zolpidem 10 MG tablet                Primary Care Provider Office Phone # Fax #    Lyndon Stoll [phone] [address]  [place] " [facility] [address]        Equal Access to Services     [facility]EL[facility] : Hadii juan carlos villa hadhomerscotty Westley, waaxda luqadaha, qaybta kaalmada carolynn, jerel machado. So [facility] [phone].    ATENCIÓN: Si habla español, tiene a rivera disposición servicios gratuitos de asistencia lingüística. Tg al [phone].    We comply with applicable federal civil rights laws and [place] laws. We do not discriminate on the basis of race, color, national origin, age, disability, sex, sexual orientation, or gender identity.            Thank you!     Thank you for choosing [facility]  for your care. Our goal is always to provide you with excellent care. Hearing back from our patients is one way we can continue to improve our services. Please take a few minutes to complete the written survey that you may receive in the mail after your visit with us. Thank you!             Your Updated Medication List - Protect others around you: Learn how to safely use, store and throw away your medicines at www.disposemymeds.org.          This list is accurate as of 9/14/18  8:20 PM.  Always use your most recent med list.                   Brand Name Dispense Instructions for use Diagnosis    albuterol 108 (90 Base) MCG/ACT inhaler    PROAIR HFA/PROVENTIL HFA/VENTOLIN HFA    1 Inhaler    Inhale 2 puffs into the lungs every 6 hours as needed for shortness of breath / dyspnea    Mild persistent asthma without complication       ALPRAZolam 1 MG tablet   Start taking on:  11/6/2018    XANAX    30 tablet    Take 1 tablet (1 mg) by mouth daily as needed for anxiety    Anxiety       aspirin 325 MG tablet      Take by mouth daily        doxycycline 50 MG capsule    VIBRAMYCIN    180 capsule    Take 1 capsule (50 mg) by mouth 2 times daily    Rhinophyma, Rosacea       fluticasone 50 MCG/ACT spray    FLONASE    16 g    SHAKE GENTLY AND USE 2 SPRAYS INTO EACH NOSTRIL DAILY    Allergic rhinitis due to animal hair and  dander, unspecified chronicity       fluticasone-vilanterol 200-25 MCG/INH inhaler    BREO ELLIPTA    3 Inhaler    INHALE 1 PUFF INTO LUNGS DAILY    Mild persistent asthma without complication       IBUPROFEN PO           montelukast 10 MG tablet    SINGULAIR    90 tablet    Take 1 tablet (10 mg) by mouth At Bedtime    Mild persistent asthma without complication       MULTIVITAMIN ADULT PO      Take 1 tablet by mouth        RANITIDINE HCL PO      Take 300 mg by mouth as needed for heartburn        vitamin B complex with vitamin C Tabs tablet      Take 1 tablet by mouth daily        VITAMIN D (CHOLECALCIFEROL) PO      Take 1,000 Units by mouth daily        zolpidem 10 MG tablet    AMBIEN    30 tablet    Take 1 tablet (10 mg) by mouth nightly as needed for sleep at bedtime.    Primary insomnia

## 2018-09-15 ASSESSMENT — ASTHMA QUESTIONNAIRES: ACT_TOTALSCORE: 23

## 2018-09-17 LAB — HIV 1+2 AB+HIV1 P24 AG SERPL QL IA: NONREACTIVE

## 2018-09-18 LAB — COMPREHEN DRUG ANALYSIS UR: NORMAL

## 2018-10-18 DIAGNOSIS — J30.81 ALLERGIC RHINITIS DUE TO ANIMAL HAIR AND DANDER: ICD-10-CM

## 2018-10-18 RX ORDER — FLUTICASONE PROPIONATE 50 MCG
SPRAY, SUSPENSION (ML) NASAL
Qty: 16 G | Refills: 5 | Status: SHIPPED | OUTPATIENT
Start: 2018-10-18 | End: 2019-05-14

## 2018-12-31 DIAGNOSIS — F41.9 ANXIETY: ICD-10-CM

## 2018-12-31 NOTE — TELEPHONE ENCOUNTER
Last Written Prescription Date:  11/06/18  Last Fill Quantity: 30,  # refills: 0   Last office visit: 9/14/2018 with prescribing provider:  Dr Stoll    Future Office Visit:        Requested Prescriptions   Pending Prescriptions Disp Refills     ALPRAZolam (XANAX) 1 MG tablet 30 tablet 0     Sig: Take 1 tablet (1 mg) by mouth daily as needed for anxiety    There is no refill protocol information for this order        Gisele Smith/TONEY

## 2019-01-03 RX ORDER — ALPRAZOLAM 1 MG
1 TABLET ORAL DAILY PRN
Qty: 30 TABLET | Refills: 0 | Status: SHIPPED | OUTPATIENT
Start: 2019-01-03 | End: 2019-02-11

## 2019-02-12 ENCOUNTER — OFFICE VISIT (OUTPATIENT)
Dept: FAMILY MEDICINE | Facility: CLINIC | Age: 54
End: 2019-02-12
Payer: COMMERCIAL

## 2019-02-12 VITALS
BODY MASS INDEX: 28.84 KG/M2 | DIASTOLIC BLOOD PRESSURE: 85 MMHG | TEMPERATURE: 98.4 F | HEART RATE: 95 BPM | HEIGHT: 71 IN | SYSTOLIC BLOOD PRESSURE: 120 MMHG | WEIGHT: 206 LBS | RESPIRATION RATE: 16 BRPM

## 2019-02-12 DIAGNOSIS — F41.9 ANXIETY: Primary | ICD-10-CM

## 2019-02-12 DIAGNOSIS — Z79.899 CONTROLLED SUBSTANCE AGREEMENT SIGNED: ICD-10-CM

## 2019-02-12 DIAGNOSIS — Z23 NEED FOR VACCINATION: ICD-10-CM

## 2019-02-12 DIAGNOSIS — M75.101 ROTATOR CUFF SYNDROME, RIGHT: ICD-10-CM

## 2019-02-12 PROCEDURE — 99214 OFFICE O/P EST MOD 30 MIN: CPT | Mod: 25 | Performed by: FAMILY MEDICINE

## 2019-02-12 PROCEDURE — 90715 TDAP VACCINE 7 YRS/> IM: CPT | Performed by: FAMILY MEDICINE

## 2019-02-12 PROCEDURE — 90471 IMMUNIZATION ADMIN: CPT | Performed by: FAMILY MEDICINE

## 2019-02-12 RX ORDER — ALPRAZOLAM 1 MG
1 TABLET ORAL DAILY PRN
Qty: 30 TABLET | Refills: 0 | Status: SHIPPED | OUTPATIENT
Start: 2019-04-12 | End: 2019-05-14

## 2019-02-12 RX ORDER — ALPRAZOLAM 1 MG
1 TABLET ORAL DAILY PRN
Qty: 30 TABLET | Refills: 0 | Status: SHIPPED | OUTPATIENT
Start: 2019-03-13 | End: 2019-02-12

## 2019-02-12 ASSESSMENT — MIFFLIN-ST. JEOR: SCORE: 1811.54

## 2019-02-12 NOTE — PROGRESS NOTES
"  SUBJECTIVE:   Venkata Lynn is a 53 year old male who presents to clinic today for the following health issues:  Anxiety  (primary encounter diagnosis)  HPI  Medication Followup of Xanax     Taking Medication as prescribed: yes    Side Effects:  None    Medication Helping Symptoms:  yes     Problem list and histories reviewed & adjusted, as indicated.  Additional history: none    Patient needs refills on medications.    Controlled substance agreement signed today  Rotator cuff syndrome, right      Right shoulder - Patient reports that is sore for a couple weeks and uses Asprin for relief and \" is ok for a few days\". Would like to wait till next fall to get fixed because wife recently had back surgery, wonders if he will cause more damage between now until fall. Imaging a few years ago showed retracting supraspinatus, other tendonopathies    PAST MEDICAL HISTORY:   Past Medical History:   Diagnosis Date     Acne vulgaris      Anxiety 3/28/2011     Anxiety disorder      Arthritis     both knees     CARDIOVASCULAR SCREENING; LDL GOAL LESS THAN 160 10/31/2010     Controlled substance agreement signed 1/11/2016     Coughing     at night     Dyspepsia 8/16/2016     Elevated blood pressure reading without diagnosis of hypertension 1/11/2016     Familial tremor 8/16/2016     Family history of rhinophyma 1/18/2012     Insomnia      Intermittent asthma 3/28/2011     Knee pain 1/18/2012     Lateral epicondylitis of left elbow 9/14/2018     LBP (low back pain) 1/18/2012     Low back pains      Mild persistent asthma without complication 8/16/2016     Rhinitis 1/18/2012     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     Rosacea 1/18/2012     Sebaceous hyperplasia 1/18/2012     Shortness of breath     due to asthma     Tobacco abuse 1/18/2012   PMHx: Hx of PT for R shoulder    PAST SURGICAL HISTORY:   Past Surgical History:   Procedure Laterality Date     ARTHROSCOPY KNEE WITH MEDIAL MENISCECTOMY Left 2/22/2016    Procedure: ARTHROSCOPY KNEE " "WITH MEDIAL MENISCECTOMY;  Surgeon: Chaz Moe MD;  Location: [facility] OR     ENT SURGERY      lipoma removed from neck     HEAD & NECK SURGERY      fx root of tooth     ROTATOR CUFF REPAIR RT/LT Left 01/2017     VASECTOMY       FAMILY HISTORY:   Family History   Problem Relation Age of Onset     Prostate Cancer Father      Other - See Comments Other         tremor       SOCIAL HISTORY:   Social History     Tobacco Use     Smoking status: Never Smoker     Smokeless tobacco: Current User     Types: Chew     Tobacco comment: occ   Substance Use Topics     Alcohol use: Yes     Comment: few beers per week         ROS:  No intolerance, mood good    This document serves as a record of the services and decisions personally performed and made by Lyndon Stoll MD. It was created on his behalf by Bill Eid, a trained medical scribe. The creation of this document is based on the provider's statements to the medical scribe.  Bill Eid February 12, 2019 2:36 PM      OBJECTIVE:     /85 (BP Location: Right arm, Patient Position: Chair, Cuff Size: Adult Large)   Pulse 95   Temp 98.4  F (36.9  C) (Oral)   Resp 16   Ht 1.803 m (5' 11\")   Wt 93.4 kg (206 lb)   BMI 28.73 kg/m    Body mass index is 28.73 kg/m .  GENERAL: healthy, alert   NEURO:mentation intact and speech normal  MSK tender R shoulder to int rotation    ASSESSMENT/PLAN:   (F41.9) Anxiety  Comment: refill. New guidelines discussed  Plan: ALPRAZolam (XANAX) 1 MG tablet, DISCONTINUED:         ALPRAZolam (XANAX) 1 MG tablet    (Z79.899) Controlled substance agreement signed  Comment: on file  Plan:     (M75.101) Rotator cuff syndrome, right  Comment: Previously evaluated,  Discussed treatment   Plan: he would like to arrange surgery in the fall to allow for wife's recovery    Vaccination: TD administered today    The information in this document, created by the medical scribe for me, accurately reflects the services I personally performed and the " decisions made by me. I have reviewed and approved this document for accuracy prior to leaving the patient care area.  February 12, 2019 2:20 PM      Lyndon Stoll MD  [facility]

## 2019-02-12 NOTE — LETTER
[facility]  02/12/19    Patient: Venkata Lynn  YOB: 1965  Medical Record Number: [id number]  CSN: [id number]                                                                              Non-opioid Controlled Substance Agreement    I understand that my care provider has prescribed a controlled substance to help manage my condition(s). I am taking this medicine to help me function or work. I know this is strong medicine, and that it can cause serious side effects. Controlled substances can be sedating, addicting and may cause a dependency on the drug. They can affect my ability to drive or think, and cause depression. They need to be taken exactly as prescribed. Combining controlled substances with certain medicines or chemicals (such as cocaine, sedatives and tranquilizers, sleeping pills, meth) can be dangerous or even fatal. Also, if I stop controlled substances suddenly, I may have severe withdrawal symptoms.  If not helpful, I may be asked to stop them.    The risks, benefits, and side effects of these medicine(s) were explained to me. I agree that:    1. I will take part in other treatments as advised by my care team. This may be psychiatry or counseling, physical therapy, behavioral therapy, group treatment or a referral to a pain clinic. I will reduce or stop my medicine when my care team tells me to do so.  2. I will take my medicines as prescribed. I will not change the dose or schedule unless my care team tells me to. There will be no refills if I  run out early.   I may be contactedwithout warning and asked to complete a urine drug test or pill count at any time.   3. I will keep all my appointments, and understand this is part of the monitoring of controlled substances. My care team may require an office visit for EVERY controlled substance refill. If I miss appointments or don t follow instructions, my care team may stop my medicine.  4. I will not ask other providers  to prescribe controlled substances, and I will not accept controlled substances from other people. If I need another prescribed controlled substance for a new reason, I will tell my care team within 1 business day.  5. I will use one pharmacy to fill all of my controlled substance prescriptions, and it is up to me to make sure that I do not run out of my medicines on weekends or holidays. If my care team is willing to refill my controlled substance prescription without a visit, I must request refills only during office hours, refills may take up to 3 days to process, and it may take up to 5 to 7 days for my medicine to be mailed and ready at my pharmacy. Prescriptions will not be mailed anywhere except my pharmacy.    6. I am responsible for my prescriptions. If the medicine/prescription is lost or stolen, it will not be replaced. I also agree not to share controlled substance medicines with anyone.              [facility]  02/12/19  Patient:  Venkata Lynn  YOB: 1965  Medical Record Number: [id number]  [facility]: [id number]    7. I agree to not use ANY illegal or recreational drugs. This includes marijuana, cocaine, bath salts or other drugs. I agree not to use alcohol unless my care team says I may. I agree to give urine samples whenever asked. If I don t give a urine sample, the care team may stop my medicine.    8. If I enroll in the [place] Medical Marijuana program, I will tell my care team. I will also sign an agreement to share my medical records with my care team.    9. I will bring in my list of medicines (or my medicine bottles) each time I come to the clinic.   10. I will tell my care team right away if I become pregnant or have a new medical problem treated outside of my regular clinic.  11. I understand that this medicine can affect my thinking and judgment. It may be unsafe for me to drive, use machinery and do dangerous tasks. I will not do any of these things until I  know how the medicine affects me. If my dose changes, I will wait to see how it affects me. I will contact my care team if I have concerns about medicine side effects.    I understand that if I do not follow any of the conditions above, my prescriptions or treatment may be stopped.      I agree that my provider, clinic care team, and pharmacy may work with any city, state or federal law enforcement agency that investigates the misuse, sale, or other diversion of my controlled medicine. I will allow my provider to discuss my care with or share a copy of this agreement with any other treating provider, pharmacy or emergency room where I receive care. I agree to give up (waive) any right of privacy or confidentiality with respect to these consents.   I have read this agreement and have asked questions about anything I did not understand.    ____________________________________________________    ____________  ________  Patient signature                                                         Date      Time    ____________________________________________________     ____________  ________  Witness                                                          Date      Time    ____________________________________________________  Provider signature

## 2019-04-09 DIAGNOSIS — L71.9 ROSACEA: ICD-10-CM

## 2019-04-09 DIAGNOSIS — L71.1 RHINOPHYMA: ICD-10-CM

## 2019-04-09 DIAGNOSIS — J45.30 MILD PERSISTENT ASTHMA WITHOUT COMPLICATION: ICD-10-CM

## 2019-04-09 NOTE — TELEPHONE ENCOUNTER
"Requested Prescriptions   Pending Prescriptions Disp Refills     doxycycline hyclate (VIBRAMYCIN) 50 MG capsule [Pharmacy Med Name: DOXYCYCLINE HYCLATE 50MG CAPS]  Last Written Prescription Date:  2/20/218  Last Fill Quantity: 180 capsule,   # refills: 3  Last Office Visit: 2/12/2019, Jerman  Future Office visit:       Routing refill request to provider for review/approval because:  Drug not on the [facility], P or  Health refill protocol or controlled substance   180 capsule 3     Sig: TAKE ONE CAPSULE BY MOUTH TWICE A DAY       There is no refill protocol information for this order        BREO ELLIPTA 200-25 MCG/INH Inhaler [Pharmacy Med Name: BREO ELLIPTA 200-25MCG/INH AEPB]  Last Written Prescription Date:  9/14/2018  Last Fill Quantity: 3 inhaler,  # refills: 1   Last office visit: 2/12/2019 with prescribing provider:  Jerman       1     Sig: INHALE ONE PUFF BY MOUTH EVERY DAY       Inhaled Steroids Protocol Failed - 4/9/2019  1:06 PM        Failed - Asthma control assessment score within normal limits in last 6 months     ACT Total Scores 6/22/2017 2/20/2018 9/14/2018   ACT TOTAL SCORE - - -   ASTHMA ER VISITS - - -   ASTHMA HOSPITALIZATIONS - - -   ACT TOTAL SCORE (Goal Greater than or Equal to 20) 24 23 23   In the past 12 months, how many times did you visit the emergency room for your asthma without being admitted to the hospital? 0 - 0   In the past 12 months, how many times were you hospitalized overnight because of your asthma? 0 0 0               Passed - Patient is age 12 or older        Passed - Medication is active on med list        Passed - Recent (6 mo) or future (30 days) visit within the authorizing provider's specialty     Patient had office visit in the last 6 months or has a visit in the next 30 days with authorizing provider or within the authorizing provider's specialty.  See \"Patient Info\" tab in inbasket, or \"Choose Columns\" in Meds & Orders section of the refill encounter.              "

## 2019-04-10 RX ORDER — DOXYCYCLINE HYCLATE 50 MG/1
CAPSULE ORAL
Qty: 180 CAPSULE | Refills: 0 | Status: SHIPPED | OUTPATIENT
Start: 2019-04-10 | End: 2019-08-27

## 2019-04-10 NOTE — TELEPHONE ENCOUNTER
Medication is being filled for 1 time refill only due to:  Patient needs to be seen because due in May for OV.     Ruddy Paiz RN, BSN

## 2019-04-15 DIAGNOSIS — F51.01 PRIMARY INSOMNIA: ICD-10-CM

## 2019-04-15 NOTE — TELEPHONE ENCOUNTER
Controlled Substance Refill Request for zolpidem (AMBIEN) 10 MG tablet  Problem List Complete:    No     PROVIDER TO CONSIDER COMPLETION OF PROBLEM LIST AND OVERVIEW/CONTROLLED SUBSTANCE AGREEMENT    Last Written Prescription Date:  9/14/2018  Last Fill Quantity: 30 tablet,   # refills: 5    THE MOST RECENT OFFICE VISIT MUST BE WITHIN THE PAST 3 MONTHS. AT LEAST ONE FACE TO FACE VISIT MUST OCCUR EVERY 6 MONTHS. ADDITIONAL VISITS CAN BE VIRTUAL.  (THIS STATEMENT SHOULD BE DELETED.)    [place] [facility]: 2/12/2019Jerman    Future Office visit:     Controlled substance agreement:   Encounter-Level CSA - 02/20/2018:    Controlled Substance Agreement - Scan on 3/3/2018  7:29 AM: CONTROLLED SUBSTANCE AGREEMENT (below)       Encounter-Level CSA - 01/11/2016:    Controlled Substance Agreement - Scan on 1/13/2016  8:59 AM: CONTROLLED SUBSTANCE AGREEMENT 1-11-16 (below)       Encounter-Level CSA - 08/17/2015:    Controlled Substance Agreement - Scan on 8/19/2015 11:08 AM: CONTROLLED MEDICATION AGREEMENT 8-17-15 (below)       Patient-Level CSA:    Controlled Substance Agreement - Opioid - Scan on 2/20/2019 11:08 AM (below)           Last Urine Drug Screen: No results found for: John LAMB Drug Analysis UR   Date Value Ref Range Status   09/14/2018 FINAL  Final     Comment:     (Note)  ====================================================================  COMPREHENSIVE DRUG ANALYSIS,UR  ====================================================================  Test                             Result       Flag       Units        Drug Present   Alpha-hydroxyalprazolam        188                     ng/mg creat    Alpha-hydroxyalprazolam is an expected metabolite of alprazolam.     Source of alprazolam is a scheduled prescription medication.   Zolpidem                       PRESENT                               Zolpidem Acid                  PRESENT                                Zolpidem acid is  an expected metabolite of zolpidem.   Ibuprofen                      PRESENT                              ====================================================================  Test                      Result    Flag   Units      Ref Range        Creatinine              32               mg/dL      >=20            ====================================================================  For clinical consultation, please call [phone].  ====================================================================  Analysis performed by [employer] [place] [address]     , No results found for: THC13, PCP13, COC13, MAMP13, OPI13, AMP13, BZO13, TCA13, MTD13, BAR13, OXY13, PPX13, BUP13     Processing:  Staff will hand deliver Rx to on-site pharmacy     [email or web address]       checked in past 3 months?  Yes 2/12/2019     Last [facility] website verification:  Done 4.11.17 2/12/19   [email or web address]

## 2019-04-17 NOTE — TELEPHONE ENCOUNTER
Last RX 9.14.18 #30 R 5  Last filled 3.12.19        Return in about 3 months (around 5/12/2019).     Routing refill request to provider for review/approval because:  Drug not on the [facility] refill protocol     Radha Lion RN, BS  Clinical Nurse Triage.

## 2019-04-18 RX ORDER — ZOLPIDEM TARTRATE 10 MG/1
TABLET ORAL
Qty: 30 TABLET | Refills: 5 | Status: SHIPPED | OUTPATIENT
Start: 2019-04-18 | End: 2019-10-21

## 2019-05-07 DIAGNOSIS — J45.30 MILD PERSISTENT ASTHMA WITHOUT COMPLICATION: ICD-10-CM

## 2019-05-07 NOTE — TELEPHONE ENCOUNTER
"Last Written Prescription Date:  4/10/19  Last Fill Quantity: 1 inhaler,  # refills: 0   Last office visit: 2/12/2019 with prescribing provider:  Jerman   Future Office Visit:      ACT Total Scores 6/22/2017 2/20/2018 9/14/2018   ACT TOTAL SCORE - - -   ASTHMA ER VISITS - - -   ASTHMA HOSPITALIZATIONS - - -   ACT TOTAL SCORE (Goal Greater than or Equal to 20) 24 23 23   In the past 12 months, how many times did you visit the emergency room for your asthma without being admitted to the hospital? 0 - 0   In the past 12 months, how many times were you hospitalized overnight because of your asthma? 0 0 0     Requested Prescriptions   Pending Prescriptions Disp Refills     BREO ELLIPTA 200-25 MCG/INH Inhaler [Pharmacy Med Name: BREO ELLIPTA 200-25MCG/INH AEPB]  0     Sig: INHALE ONE PUFF BY MOUTH ONCE DAILY (NEED TO BE SEEN IN CLINIC FOR FURTHER REFILLS)       Inhaled Steroids Protocol Failed - 5/7/2019 12:42 PM        Failed - Asthma control assessment score within normal limits in last 6 months     Please review ACT score.           Passed - Patient is age 12 or older        Passed - Medication is active on med list        Passed - Recent (6 mo) or future (30 days) visit within the authorizing provider's specialty     Patient had office visit in the last 6 months or has a visit in the next 30 days with authorizing provider or within the authorizing provider's specialty.  See \"Patient Info\" tab in inbasket, or \"Choose Columns\" in Meds & Orders section of the refill encounter.              "

## 2019-05-08 NOTE — TELEPHONE ENCOUNTER
Pt calls.  Informed due for ACT. He agrees to complete questionnaire in our pharmacy at . Note for pharmacy staff on Rx.  Pharmacy staff replied in agreement.   Per 2/12/19 OV note f/u 3 months.  Agrees. Call forwarded to scheduling.   Porter Fink RN

## 2019-05-10 ENCOUNTER — TELEPHONE (OUTPATIENT)
Dept: FAMILY MEDICINE | Facility: CLINIC | Age: 54
End: 2019-05-10

## 2019-05-11 ASSESSMENT — ASTHMA QUESTIONNAIRES: ACT_TOTALSCORE: 20

## 2019-05-14 ENCOUNTER — OFFICE VISIT (OUTPATIENT)
Dept: FAMILY MEDICINE | Facility: CLINIC | Age: 54
End: 2019-05-14
Payer: COMMERCIAL

## 2019-05-14 VITALS
DIASTOLIC BLOOD PRESSURE: 69 MMHG | OXYGEN SATURATION: 98 % | TEMPERATURE: 98 F | WEIGHT: 206 LBS | BODY MASS INDEX: 28.84 KG/M2 | HEIGHT: 71 IN | SYSTOLIC BLOOD PRESSURE: 100 MMHG | RESPIRATION RATE: 16 BRPM | HEART RATE: 94 BPM

## 2019-05-14 DIAGNOSIS — M75.121 COMPLETE TEAR OF RIGHT ROTATOR CUFF: ICD-10-CM

## 2019-05-14 DIAGNOSIS — J45.30 MILD PERSISTENT ASTHMA WITHOUT COMPLICATION: ICD-10-CM

## 2019-05-14 DIAGNOSIS — F41.9 ANXIETY: ICD-10-CM

## 2019-05-14 DIAGNOSIS — R03.0 ELEVATED BLOOD PRESSURE READING WITHOUT DIAGNOSIS OF HYPERTENSION: Primary | ICD-10-CM

## 2019-05-14 DIAGNOSIS — J30.81 ALLERGIC RHINITIS DUE TO ANIMAL HAIR AND DANDER: ICD-10-CM

## 2019-05-14 PROCEDURE — 99214 OFFICE O/P EST MOD 30 MIN: CPT | Performed by: FAMILY MEDICINE

## 2019-05-14 RX ORDER — FLUTICASONE PROPIONATE 50 MCG
SPRAY, SUSPENSION (ML) NASAL
Qty: 16 G | Refills: 5 | Status: SHIPPED | OUTPATIENT
Start: 2019-05-14 | End: 2019-10-14

## 2019-05-14 RX ORDER — ALPRAZOLAM 1 MG
1 TABLET ORAL DAILY PRN
Qty: 30 TABLET | Refills: 0 | Status: SHIPPED | OUTPATIENT
Start: 2019-06-13 | End: 2019-08-27

## 2019-05-14 RX ORDER — ALPRAZOLAM 1 MG
1 TABLET ORAL DAILY PRN
Qty: 30 TABLET | Refills: 0 | Status: SHIPPED | OUTPATIENT
Start: 2019-07-13 | End: 2019-08-27

## 2019-05-14 RX ORDER — ALPRAZOLAM 1 MG
1 TABLET ORAL DAILY PRN
Qty: 30 TABLET | Refills: 0 | Status: SHIPPED | OUTPATIENT
Start: 2019-05-14 | End: 2019-08-27

## 2019-05-14 ASSESSMENT — ANXIETY QUESTIONNAIRES
GAD7 TOTAL SCORE: 1
7. FEELING AFRAID AS IF SOMETHING AWFUL MIGHT HAPPEN: NOT AT ALL
3. WORRYING TOO MUCH ABOUT DIFFERENT THINGS: NOT AT ALL
6. BECOMING EASILY ANNOYED OR IRRITABLE: NOT AT ALL
7. FEELING AFRAID AS IF SOMETHING AWFUL MIGHT HAPPEN: NOT AT ALL
2. NOT BEING ABLE TO STOP OR CONTROL WORRYING: NOT AT ALL
1. FEELING NERVOUS, ANXIOUS, OR ON EDGE: SEVERAL DAYS
GAD7 TOTAL SCORE: 1
5. BEING SO RESTLESS THAT IT IS HARD TO SIT STILL: NOT AT ALL
GAD7 TOTAL SCORE: 1
4. TROUBLE RELAXING: NOT AT ALL

## 2019-05-14 ASSESSMENT — MIFFLIN-ST. JEOR: SCORE: 1811.54

## 2019-05-14 ASSESSMENT — PATIENT HEALTH QUESTIONNAIRE - PHQ9
SUM OF ALL RESPONSES TO PHQ QUESTIONS 1-9: 0
10. IF YOU CHECKED OFF ANY PROBLEMS, HOW DIFFICULT HAVE THESE PROBLEMS MADE IT FOR YOU TO DO YOUR WORK, TAKE CARE OF THINGS AT HOME, OR GET ALONG WITH OTHER PEOPLE: NOT DIFFICULT AT ALL
SUM OF ALL RESPONSES TO PHQ QUESTIONS 1-9: 0

## 2019-05-14 NOTE — PROGRESS NOTES
SUBJECTIVE:   Venkata Lynn is a 53 year old male who presents to clinic today for the following health issues:      Asthma     Medication Followup of Xanax     Taking Medication as prescribed: yes    Side Effects:  None    Medication Helping Symptoms:  yes     Answers for HPI/ROS submitted by the patient on 5/14/2019   If you checked off any problems, how difficult have these problems made it for you to do your work, take care of things at home, or get along with other people?: Not difficult at all  PHQ9 TOTAL SCORE: 0  CARMEN 7 TOTAL SCORE: 1      Elevated blood pressure reading without diagnosis of hypertension  (primary encounter diagnosis)-   BP Readings from Last 3 Encounters:   05/14/19 100/69   02/12/19 120/85   09/14/18 118/74       Anxiety- Taking xanax in the morning.     Allergic rhinitis due to animal hair and dander-  Using Flonase as needed.    Mild persistent asthma without complication- Patient reports he is breathing well. Thinks he has allergies, they are worse in June and July. Coughing a lot in the evening. Interested in daily antihistamine.  Swimming in lakes and rivers flares his allergies.    Complete tear of right rotator cuff- Right shoulder pain is present. Stopped exercises above shoulder, notices an improvement. Wants to pursue surgical intervention in November.         Additional history: none    Reviewed and updated as needed this visit by clinical staff  Tobacco  Allergies  Meds  Med Hx  Surg Hx  Fam Hx  Soc Hx        Reviewed and updated as needed this visit by Provider       Past Medical History:   Diagnosis Date     Acne vulgaris      Anxiety 3/28/2011     Anxiety disorder      Arthritis     both knees     CARDIOVASCULAR SCREENING; LDL GOAL LESS THAN 160 10/31/2010     Controlled substance agreement signed 1/11/2016     Coughing     at night     Dyspepsia 8/16/2016     Elevated blood pressure reading without diagnosis of hypertension 1/11/2016     Familial tremor 8/16/2016  "    Family history of rhinophyma 1/18/2012     Insomnia      Intermittent asthma 3/28/2011     Knee pain 1/18/2012     Lateral epicondylitis of left elbow 9/14/2018     LBP (low back pain) 1/18/2012     Low back pains      Mild persistent asthma without complication 8/16/2016     Rhinitis 1/18/2012     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     Rosacea 1/18/2012     Rotator cuff syndrome, right 2/12/2019     Sebaceous hyperplasia 1/18/2012     Shortness of breath     due to asthma     Tobacco abuse 1/18/2012       Past Surgical History:   Procedure Laterality Date     ARTHROSCOPY KNEE WITH MEDIAL MENISCECTOMY Left 2/22/2016    Procedure: ARTHROSCOPY KNEE WITH MEDIAL MENISCECTOMY;  Surgeon: Chaz Moe MD;  Location: [facility] OR     ENT SURGERY      lipoma removed from neck     HEAD & NECK SURGERY      fx root of tooth     ROTATOR CUFF REPAIR RT/LT Left 01/2017     VASECTOMY         Family History   Problem Relation Age of Onset     Prostate Cancer Father      Other - See Comments Other         tremor       Social History     Tobacco Use     Smoking status: Never Smoker     Smokeless tobacco: Current User     Types: Chew     Tobacco comment: occ   Substance Use Topics     Alcohol use: Yes     Comment: few beers per week         ROS:  See above.    This document serves as a record of the services and decisions personally performed and made by Lyndon Stoll MD. It was created on his behalf by Valentino Waller, a trained medical scribe. The creation of this document is based on the provider's statements to the medical scribe.  Valentino Waller May 14, 2019 11:11 AM    OBJECTIVE:     /69 (BP Location: Right arm, Patient Position: Chair, Cuff Size: Adult Large)   Pulse 94   Temp 98  F (36.7  C) (Oral)   Resp 16   Ht 1.803 m (5' 11\")   Wt 93.4 kg (206 lb)   SpO2 98%   BMI 28.73 kg/m    Body mass index is 28.73 kg/m .       ASSESSMENT/PLAN:   (R03.0) Elevated blood pressure reading without diagnosis of hypertension  (primary " encounter diagnosis)  Comment: Variable  Monitor*      (F41.9) Anxiety  Comment: Stable, continue  Plan: ALPRAZolam (XANAX) 1 MG tablet, ALPRAZolam         (XANAX) 1 MG tablet, ALPRAZolam (XANAX) 1 MG         tablet            (J30.81) Allergic rhinitis due to animal hair and dander  Comment: Effective, continue  Plan: fluticasone (FLONASE) 50 MCG/ACT nasal spray            (J45.30) Mild persistent asthma without complication  Comment: Quiescent.  Refill  Plan: fluticasone-vilanterol (BREO ELLIPTA) 200-25         MCG/INH inhaler            (M75.121) Complete tear of right rotator cuff  Comment:   Discussed at length.  No interventions now      The information in this document, created by the medical scribe for me, accurately reflects the services I personally performed and the decisions made by me. I have reviewed and approved this document for accuracy prior to leaving the patient care area.  May 14, 2019 11:11 AM    Lyndon Stoll MD  [facility]

## 2019-05-15 ASSESSMENT — PATIENT HEALTH QUESTIONNAIRE - PHQ9: SUM OF ALL RESPONSES TO PHQ QUESTIONS 1-9: 0

## 2019-05-15 ASSESSMENT — ANXIETY QUESTIONNAIRES: GAD7 TOTAL SCORE: 1

## 2019-07-19 DIAGNOSIS — F41.9 ANXIETY: Primary | ICD-10-CM

## 2019-07-19 RX ORDER — ALPRAZOLAM 1 MG
TABLET ORAL
Qty: 30 TABLET | Refills: 0 | Status: SHIPPED | OUTPATIENT
Start: 2019-07-19 | End: 2019-09-20

## 2019-07-19 NOTE — LETTER
July 19, 2019      Venkata Wraygarcía  [address] [facility] [address]            We recently received a call from your pharmacy requesting a refill of your alprazolam.    A review of your chart indicates that an appointment is required.  Please contact our office at [phone] to schedule your doctor's appointment.    We have authorized one refill of your medication to allow time for you to schedule your appointment.            Lyndon Stoll MD/Elisa Purvis, MSN, RN, PHN

## 2019-07-19 NOTE — TELEPHONE ENCOUNTER
Dr. Stoll -  Routing refill request to provider for review/approval because:  Drug not on the [facility] refill protocol   : no concerns. 30 day supply filled 6/19/19.   Due soon for appointment. Letter sent.     Elisa Purvis, MSN, RN, PHN

## 2019-08-27 ENCOUNTER — OFFICE VISIT (OUTPATIENT)
Dept: FAMILY MEDICINE | Facility: CLINIC | Age: 54
End: 2019-08-27
Payer: COMMERCIAL

## 2019-08-27 VITALS
RESPIRATION RATE: 19 BRPM | SYSTOLIC BLOOD PRESSURE: 128 MMHG | BODY MASS INDEX: 28.17 KG/M2 | OXYGEN SATURATION: 97 % | HEART RATE: 84 BPM | WEIGHT: 202 LBS | TEMPERATURE: 98.2 F | DIASTOLIC BLOOD PRESSURE: 82 MMHG

## 2019-08-27 DIAGNOSIS — J30.81 ALLERGIC RHINITIS DUE TO ANIMAL HAIR AND DANDER: ICD-10-CM

## 2019-08-27 DIAGNOSIS — F41.9 ANXIETY: Primary | ICD-10-CM

## 2019-08-27 DIAGNOSIS — G25.0 FAMILIAL TREMOR: ICD-10-CM

## 2019-08-27 DIAGNOSIS — Z72.0 TOBACCO ABUSE: ICD-10-CM

## 2019-08-27 DIAGNOSIS — L71.9 ROSACEA: ICD-10-CM

## 2019-08-27 DIAGNOSIS — R03.0 ELEVATED BLOOD PRESSURE READING WITHOUT DIAGNOSIS OF HYPERTENSION: ICD-10-CM

## 2019-08-27 DIAGNOSIS — M75.121 COMPLETE TEAR OF RIGHT ROTATOR CUFF, UNSPECIFIED WHETHER TRAUMATIC: ICD-10-CM

## 2019-08-27 DIAGNOSIS — J45.30 MILD PERSISTENT ASTHMA WITHOUT COMPLICATION: ICD-10-CM

## 2019-08-27 DIAGNOSIS — L71.1 RHINOPHYMA: ICD-10-CM

## 2019-08-27 DIAGNOSIS — R10.13 DYSPEPSIA: ICD-10-CM

## 2019-08-27 PROCEDURE — 99214 OFFICE O/P EST MOD 30 MIN: CPT | Performed by: FAMILY MEDICINE

## 2019-08-27 PROCEDURE — 80307 DRUG TEST PRSMV CHEM ANLYZR: CPT | Mod: 90 | Performed by: FAMILY MEDICINE

## 2019-08-27 PROCEDURE — 99000 SPECIMEN HANDLING OFFICE-LAB: CPT | Performed by: FAMILY MEDICINE

## 2019-08-27 RX ORDER — DOXYCYCLINE HYCLATE 20 MG
20 TABLET ORAL 2 TIMES DAILY
COMMUNITY
Start: 2019-08-27 | End: 2023-11-21

## 2019-08-27 RX ORDER — DOXYCYCLINE HYCLATE 50 MG/1
CAPSULE ORAL
Qty: 180 CAPSULE | Refills: 3 | Status: SHIPPED | OUTPATIENT
Start: 2019-08-27 | End: 2019-08-27

## 2019-08-27 RX ORDER — PROPRANOLOL HYDROCHLORIDE 80 MG/1
80 CAPSULE, EXTENDED RELEASE ORAL DAILY
Qty: 90 CAPSULE | Refills: 0 | Status: SHIPPED | OUTPATIENT
Start: 2019-08-27 | End: 2019-11-18

## 2019-08-27 RX ORDER — MONTELUKAST SODIUM 10 MG/1
1 TABLET ORAL AT BEDTIME
Qty: 90 TABLET | Refills: 3 | Status: SHIPPED | OUTPATIENT
Start: 2019-08-27 | End: 2020-06-15

## 2019-08-27 NOTE — LETTER
My Asthma Action Plan    Name: Venkata Lynn   YOB: 1965  Date: 8/27/2019   My doctor: Lyndon Stoll MD   My clinic: [facility]        My Control Medicine: Breo  My Rescue Medicine:  albuterol   My Asthma Severity:   Moderate Persistent  Know your asthma triggers:                GREEN ZONE   Good Control    I feel good    No cough or wheeze    Can work, sleep and play without asthma symptoms       Take your asthma control medicine every day.     1. If exercise triggers your asthma, take your rescue medication    15 minutes before exercise or sports, and    During exercise if you have asthma symptoms  2. Spacer to use with inhaler: If you have a spacer, make sure to use it with your inhaler             YELLOW ZONE Getting Worse  I have ANY of these:    I do not feel good    Cough or wheeze    Chest feels tight    Wake up at night   1. Keep taking your Green Zone medications  2. Start taking your rescue medicine:    every 20 minutes for up to 1 hour. Then every 4 hours for 24-48 hours.  3. If you stay in the Yellow Zone for more than 12-24 hours, contact your doctor.  4. If you do not return to the Green Zone in 12-24 hours or you get worse, start taking your oral steroid medicine if prescribed by your provider.           RED ZONE Medical Alert - Get Help  I have ANY of these:    I feel awful    Medicine is not helping    Breathing getting harder    Trouble walking or talking    Nose opens wide to breathe       1. Take your rescue medicine NOW  2. If your provider has prescribed an oral steroid medicine, start taking it NOW  3. Call your doctor NOW  4. If you are still in the Red Zone after 20 minutes and you have not reached your doctor:    Take your rescue medicine again and    Call 911 or go to the emergency room right away    See your regular doctor within 2 weeks of an Emergency Room or Urgent Care visit for follow-up treatment.          Annual Reminders:  Meet with Asthma  Educator,  Flu Shot in the Fall, consider Pneumonia Vaccination for patients with asthma (aged 19 and older).    Pharmacy: [facility] [place], [place] - [address]                          Asthma Triggers  How To Control Things That Make Your Asthma Worse    Triggers are things that make your asthma worse.  Look at the list below to help you find your triggers and what you can do about them.  You can help prevent asthma flare-ups by staying away from your triggers.      Trigger                                                          What you can do   Cigarette Smoke  Tobacco smoke can make asthma worse. Do not allow smoking in your home, car or around you.  Be sure no one smokes at a child s day care or school.  If you smoke, ask your health care provider for ways to help you quit.  Ask family members to quit too.  Ask your health care provider for a referral to Quit Plan to help you quit smoking, or call 7-208-974-PLAN.     Colds, Flu, Bronchitis  These are common triggers of asthma. Wash your hands often.  Don t touch your eyes, nose or mouth.  Get a flu shot every year.     Dust Mites  These are tiny bugs that live in cloth or carpet. They are too small to see. Wash sheets and blankets in hot water every week.   Encase pillows and mattress in dust mite proof covers.  Avoid having carpet if you can. If you have carpet, vacuum weekly.   Use a dust mask and HEPA vacuum.   Pollen and Outdoor Mold  Some people are allergic to trees, grass, or weed pollen, or molds. Try to keep your windows closed.  Limit time out doors when pollen count is high.   Ask you health care provider about taking medicine during allergy season.     Animal Dander  Some people are allergic to skin flakes, urine or saliva from pets with fur or feathers. Keep pets with fur or feathers out of your home.    If you can t keep the pet outdoors, then keep the pet out of your bedroom.  Keep the bedroom door closed.  Keep pets  off cloth furniture and away from stuffed toys.     Mice, Rats, and Cockroaches   Some people are allergic to the waste from these pests.   Cover food and garbage.  Clean up spills and food crumbs.  Store grease in the refrigerator.   Keep food out of the bedroom.   Indoor Mold  This can be a trigger if your home has high moisture. Fix leaking faucets, pipes, or other sources of water.   Clean moldy surfaces.  Dehumidify basement if it is damp and smelly.   Smoke, Strong Odors, and Sprays  These can reduce air quality. Stay away from strong odors and sprays, such as perfume, powder, hair spray, paints, smoke incense, paint, cleaning products, candles and new carpet.   Exercise or Sports  Some people with asthma have this trigger. Be active!  Ask your doctor about taking medicine before sports or exercise to prevent symptoms.    Warm up for 5-10 minutes before and after sports or exercise.     Other Triggers of Asthma  Cold air:  Cover your nose and mouth with a scarf.  Sometimes laughing or crying can be a trigger.  Some medicines and food can trigger asthma.

## 2019-08-27 NOTE — PROGRESS NOTES
"Subjective     Venkata Lynn is a 54 year old male who presents to clinic today for the following health issues:    History of Present Illness      Asthma:  He presents for follow up of asthma.  He has some cough, no wheezing, and some shortness of breath. He is using a relief medication a few times a month. He typically misses taking his controller medication 7 time(s) per week.Patient is aware of the following triggers: animal dander, dust mites, exercise or sports, humidity, pollens and strong odors and fumes. The patient has not had a visit to the Emergency Room, Urgent Care or Hospital due to asthma since the last clinic visit.      Medication Followup of All    Taking Medication as prescribed: yes    Side Effects:  None    Medication Helping Symptoms:  yes     Sore in the left side of his nose. Also talk about the tremors he is having in hands.    Anxiety  (primary encounter diagnosis)- Xanax effective.  CARMEN-7 SCORE 8/13/2018 9/10/2018 5/14/2019   Total Score - - -   Total Score - - 1 (minimal anxiety)   Total Score 0 2 1         Familial tremor- Patient complains of hand tremors after \"working hard, being in the heat, hungry.\" Bothersome when working with tools, writing.     Allergic rhinitis due to animal hair and dander- Plugged sinuses.  Intermittent sore in nose, flonase made sore worse. Taking OTC allergy pills, not helpful.    Mild persistent asthma without complication- Controlled.    Rhinophyma- derm    Rosacea- derm    Complete tear of right rotator cuff, unspecified whether traumatic- Patient is planning on surgery this year with Nils Milligan.    Dyspepsia- Heartburn flaring in the last month in spite of   Taking Prilosec.    Tobacco abuse- chew \"a couple times a week.\" Interested in cessation, he wonders what measures can assist.      Patient wonders about Aspirin use.      Past Medical History:   Diagnosis Date     Acne vulgaris      Anxiety 3/28/2011     Anxiety disorder      Arthritis     " both knees     CARDIOVASCULAR SCREENING; LDL GOAL LESS THAN 160 10/31/2010     Controlled substance agreement signed 1/11/2016     Coughing     at night     Dyspepsia 8/16/2016     Elevated blood pressure reading without diagnosis of hypertension 1/11/2016     Familial tremor 8/16/2016     Family history of rhinophyma 1/18/2012     Insomnia      Intermittent asthma 3/28/2011     Knee pain 1/18/2012     Lateral epicondylitis of left elbow 9/14/2018     LBP (low back pain) 1/18/2012     Low back pains      Mild persistent asthma without complication 8/16/2016     Rhinitis 1/18/2012     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     Rosacea 1/18/2012     Rotator cuff syndrome, right 2/12/2019     Sebaceous hyperplasia 1/18/2012     Shortness of breath     due to asthma     Tobacco abuse 1/18/2012       Past Surgical History:   Procedure Laterality Date     ARTHROSCOPY KNEE WITH MEDIAL MENISCECTOMY Left 2/22/2016    Procedure: ARTHROSCOPY KNEE WITH MEDIAL MENISCECTOMY;  Surgeon: Chaz Moe MD;  Location: [facility] OR     ENT SURGERY      lipoma removed from neck     HEAD & NECK SURGERY      fx root of tooth     ROTATOR CUFF REPAIR RT/LT Left 01/2017     VASECTOMY         Family History   Problem Relation Age of Onset     Prostate Cancer Father      Other - See Comments Other         tremor       Social History     Tobacco Use     Smoking status: Never Smoker     Smokeless tobacco: Current User     Types: Chew     Tobacco comment: occ   Substance Use Topics     Alcohol use: Yes     Comment: few beers per week       Reviewed and updated as needed this visit by Provider       Review of Systems   No dysphagia, no systemic symptoms, no CP    This document serves as a record of the services and decisions personally performed and made by Lyndon Stoll MD. It was created on his behalf by Valentino Waller, a trained medical scribe. The creation of this document is based on the provider's statements to the medical scribe.  Valentino Waller August 27,  2019 10:52 AM          Objective    /82   Pulse 84   Temp 98.2  F (36.8  C) (Oral)   Resp 19   Wt 91.6 kg (202 lb)   SpO2 97%   BMI 28.17 kg/m    Body mass index is 28.17 kg/m .     Physical Exam   PSYCH: Bright affect.  No tremor  Diagnostic Test Results:  Labs reviewed in Epic  No results found for this or any previous visit (from the past 24 hour(s)).        Assessment & Plan   Problem List Items Addressed This Visit        Nervous and Auditory    Familial tremor     Start Propranolol. Intermittent, freq unclear         Relevant Medications    propranolol ER (INDERAL LA) 80 MG 24 hr capsule       Respiratory    Rhinitis (Chronic)     Start non alcohol containing nasal steroids. Flonase yields nosebleed         Relevant Medications    fluticasone-vilanterol (BREO ELLIPTA) 200-25 MCG/INH inhaler    montelukast (SINGULAIR) 10 MG tablet    Mild persistent asthma without complication     Controlled.         Relevant Medications    fluticasone-vilanterol (BREO ELLIPTA) 200-25 MCG/INH inhaler    montelukast (SINGULAIR) 10 MG tablet    Other Relevant Orders    Asthma Action Plan (AAP) (Completed)       Digestive    Dyspepsia     In spite of  daily PPI. Endoscopy indicated         Relevant Orders    GASTROENTEROLOGY ADULT REF PROCEDURE ONLY Rashis Allen [phone]; No Provider Preference       Musculoskeletal and Integumentary    Rosacea     Dermatology         Relevant Medications    fluticasone-vilanterol (BREO ELLIPTA) 200-25 MCG/INH inhaler    montelukast (SINGULAIR) 10 MG tablet    doxycycline hyclate (PERIOSTAT) 20 MG tablet    Complete tear of right rotator cuff     Planning on surgery with Dr Nils Milligan.# provided         Relevant Orders    ORTHOPEDICS ADULT REFERRAL       Behavioral    Tobacco abuse     Contemplative. Discussed measures.            Other    Anxiety - Primary     Stable.         Relevant Orders    Drug  Screen Comprehensive, Urine w/o Reported Meds (Pain Care Package)  "(Completed)    Rhinophyma     Dermatology Doxycycline         Relevant Medications    doxycycline hyclate (PERIOSTAT) 20 MG tablet    Elevated blood pressure reading without diagnosis of hypertension     BP Readings from Last 1 Encounters:   08/27/19 128/82     On repeat                    BMI:   Estimated body mass index is 28.17 kg/m  as calculated from the following:    Height as of 5/14/19: 1.803 m (5' 11\").    Weight as of this encounter: 91.6 kg (202 lb).     Return in about 3 months (around 11/27/2019).    The information in this document, created by the medical scribe for me, accurately reflects the services I personally performed and the decisions made by me. I have reviewed and approved this document for accuracy prior to leaving the patient care area.  August 27, 2019 10:53 AM    Lyndon Stoll MD  [facility]  "

## 2019-08-30 LAB — COMPREHEN DRUG ANALYSIS UR: NORMAL

## 2019-09-16 DIAGNOSIS — M25.511 RIGHT SHOULDER PAIN, UNSPECIFIED CHRONICITY: Primary | ICD-10-CM

## 2019-09-18 ENCOUNTER — TELEPHONE (OUTPATIENT)
Dept: ORTHOPEDICS | Facility: CLINIC | Age: 54
End: 2019-09-18

## 2019-09-18 NOTE — TELEPHONE ENCOUNTER
[facility] Call Center    Phone Message    May a detailed message be left on voicemail: yes    Reason for Call: Order(s): Other:   Reason for requested: SHOULD PT get a MRI done for his right shoulder before the upcoming 9/30/19 appt with Dr. Milligan?  Please call pt back to let pt know IF MRI is needed or not. Thank you.  Date needed: not sure  Provider name: Dr. Nils Milligan      Action Taken: Message routed to:  [facility] ([facility]  Orthopedics

## 2019-09-20 ENCOUNTER — HOSPITAL ENCOUNTER (OUTPATIENT)
Facility: CLINIC | Age: 54
Discharge: HOME | End: 2019-09-20
Attending: INTERNAL MEDICINE | Admitting: INTERNAL MEDICINE
Payer: COMMERCIAL

## 2019-09-20 VITALS
OXYGEN SATURATION: 94 % | HEIGHT: 71 IN | WEIGHT: 202 LBS | DIASTOLIC BLOOD PRESSURE: 79 MMHG | HEART RATE: 66 BPM | RESPIRATION RATE: 12 BRPM | BODY MASS INDEX: 28.28 KG/M2 | SYSTOLIC BLOOD PRESSURE: 119 MMHG

## 2019-09-20 DIAGNOSIS — F41.9 ANXIETY: ICD-10-CM

## 2019-09-20 LAB — UPPER GI ENDOSCOPY: NORMAL

## 2019-09-20 PROCEDURE — 25000128 H RX IP 250 OP 636: Performed by: INTERNAL MEDICINE

## 2019-09-20 PROCEDURE — 40000104 ZZH STATISTIC MODERATE SEDATION < 10 MIN: Performed by: INTERNAL MEDICINE

## 2019-09-20 PROCEDURE — 25000125 ZZHC RX 250: Performed by: INTERNAL MEDICINE

## 2019-09-20 PROCEDURE — 43235 EGD DIAGNOSTIC BRUSH WASH: CPT | Performed by: INTERNAL MEDICINE

## 2019-09-20 RX ORDER — FENTANYL CITRATE 50 UG/ML
INJECTION, SOLUTION INTRAMUSCULAR; INTRAVENOUS PRN
Status: DISCONTINUED | OUTPATIENT
Start: 2019-09-20 | End: 2019-09-20 | Stop reason: HOSPADM

## 2019-09-20 RX ORDER — LIDOCAINE 40 MG/G
CREAM TOPICAL
Status: DISCONTINUED | OUTPATIENT
Start: 2019-09-20 | End: 2019-09-20 | Stop reason: HOSPADM

## 2019-09-20 RX ORDER — ALPRAZOLAM 1 MG
TABLET ORAL
Qty: 30 TABLET | Refills: 0 | Status: SHIPPED | OUTPATIENT
Start: 2019-09-20 | End: 2019-10-22

## 2019-09-20 RX ORDER — ONDANSETRON 2 MG/ML
4 INJECTION INTRAMUSCULAR; INTRAVENOUS
Status: DISCONTINUED | OUTPATIENT
Start: 2019-09-20 | End: 2019-09-20 | Stop reason: HOSPADM

## 2019-09-20 ASSESSMENT — MIFFLIN-ST. JEOR: SCORE: 1788.4

## 2019-09-20 NOTE — TELEPHONE ENCOUNTER
updated.  No concerns noted.  Last RF 8/22/19 per .  Not [facility] med.  Sent to provider.  Please advise.  Ita Woodard RN      8/27/19     Assessment & Plan           Problem List Items Addressed This Visit                 Nervous and Auditory      Familial tremor        Start Propranolol. Intermittent, freq unclear            Relevant Medications      propranolol ER (INDERAL LA) 80 MG 24 hr capsule            Respiratory      Rhinitis (Chronic)        Start non alcohol containing nasal steroids. Flonase yields nosebleed            Relevant Medications      fluticasone-vilanterol (BREO ELLIPTA) 200-25 MCG/INH inhaler      montelukast (SINGULAIR) 10 MG tablet      Mild persistent asthma without complication        Controlled.            Relevant Medications      fluticasone-vilanterol (BREO ELLIPTA) 200-25 MCG/INH inhaler      montelukast (SINGULAIR) 10 MG tablet      Other Relevant Orders      Asthma Action Plan (AAP) (Completed)            Digestive      Dyspepsia        In spite of  daily PPI. Endoscopy indicated            Relevant Orders      GASTROENTEROLOGY ADULT REF PROCEDURE ONLY Nica Allen [phone]; No Provider Preference            Musculoskeletal and Integumentary      Rosacea        Dermatology            Relevant Medications      fluticasone-vilanterol (BREO ELLIPTA) 200-25 MCG/INH inhaler      montelukast (SINGULAIR) 10 MG tablet      doxycycline hyclate (PERIOSTAT) 20 MG tablet      Complete tear of right rotator cuff        Planning on surgery with Dr Nils Milligan.# provided            Relevant Orders      ORTHOPEDICS ADULT REFERRAL            Behavioral      Tobacco abuse        Contemplative. Discussed measures.                  Other      Anxiety - Primary        Stable.            Relevant Orders      Drug  Screen Comprehensive, Urine w/o Reported Meds (Pain Care Package) (Completed)      Rhinophyma        Dermatology Doxycycline            Relevant Medications       "doxycycline hyclate (PERIOSTAT) 20 MG tablet      Elevated blood pressure reading without diagnosis of hypertension            BP Readings from Last 1 Encounters:   08/27/19 128/82      On repeat                          BMI:   Estimated body mass index is 28.17 kg/m  as calculated from the following:    Height as of 5/14/19: 1.803 m (5' 11\").    Weight as of this encounter: 91.6 kg (202 lb).      Return in about 3 months (around 11/27/2019).     The information in this document, created by the medical scribe for me, accurately reflects the services I personally performed and the decisions made by me. I have reviewed and approved this document for accuracy prior to leaving the patient care area.  August 27, 2019 10:53 AM     Lyndon Stoll MD  [facility]         Other Notes      All notes   Instructions         Return in about 3 months (around 11/27/2019).     "

## 2019-09-20 NOTE — H&P
Pre-Endoscopy History and Physical     Venkata Lynn MRN# [id number]   YOB: 1965 Age: 54 year old     Date of Procedure: 9/20/2019  Primary care provider: Lyndon Stoll  Type of Endoscopy: Gastroscopy with possible biopsy, possible dilation  Reason for Procedure: pain  Type of Anesthesia Anticipated: Conscious Sedation    HPI:    Venkata is a 54 year old male who will be undergoing the above procedure.      A history and physical has been performed. The patient's medications and allergies have been reviewed. The risks and benefits of the procedure and the sedation options and risks were discussed with the patient.  All questions were answered and informed consent was obtained.      He denies a personal or family history of anesthesia complications or bleeding disorders.     Patient Active Problem List   Diagnosis     CARDIOVASCULAR SCREENING; LDL GOAL LESS THAN 160     Anxiety     Rhinophyma     Rosacea     Sebaceous hyperplasia     Tobacco abuse     Rhinitis     Adjustment reaction     Arthralgia of both knees     Controlled substance agreement signed     Elevated blood pressure reading without diagnosis of hypertension     Cyst of meniscus of left knee     Cyst of meniscus of right knee     Familial tremor     Dyspepsia     Mild persistent asthma without complication     Complete tear of right rotator cuff     Shoulder pain, left     Sprain of left rotator cuff capsule, initial encounter     Aftercare following surgery of the musculoskeletal system     Lateral epicondylitis of left elbow     Rotator cuff syndrome, right        Past Medical History:   Diagnosis Date     Acne vulgaris      Anxiety 3/28/2011     Anxiety disorder      Arthritis     both knees     CARDIOVASCULAR SCREENING; LDL GOAL LESS THAN 160 10/31/2010     Controlled substance agreement signed 1/11/2016     Coughing     at night     Dyspepsia 8/16/2016     Elevated blood pressure reading without diagnosis of hypertension  1/11/2016     Familial tremor 8/16/2016     Family history of rhinophyma 1/18/2012     Insomnia      Intermittent asthma 3/28/2011     Knee pain 1/18/2012     Lateral epicondylitis of left elbow 9/14/2018     LBP (low back pain) 1/18/2012     Low back pains      Mild persistent asthma without complication 8/16/2016     Rhinitis 1/18/2012     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     Rosacea 1/18/2012     Rotator cuff syndrome, right 2/12/2019     Sebaceous hyperplasia 1/18/2012     Shortness of breath     due to asthma     Tobacco abuse 1/18/2012        Past Surgical History:   Procedure Laterality Date     ARTHROSCOPY KNEE WITH MEDIAL MENISCECTOMY Left 2/22/2016    Procedure: ARTHROSCOPY KNEE WITH MEDIAL MENISCECTOMY;  Surgeon: Cahz Moe MD;  Location: [facility] OR     ENT SURGERY      lipoma removed from neck     HEAD & NECK SURGERY      fx root of tooth     ROTATOR CUFF REPAIR RT/LT Left 01/2017     VASECTOMY         Social History     Tobacco Use     Smoking status: Never Smoker     Smokeless tobacco: Current User     Types: Chew     Tobacco comment: occ   Substance Use Topics     Alcohol use: Yes     Comment: few beers per week       Family History   Problem Relation Age of Onset     Prostate Cancer Father      Other - See Comments Other         tremor       Prior to Admission medications    Medication Sig Start Date End Date Taking? Authorizing Provider   albuterol (PROAIR HFA/PROVENTIL HFA/VENTOLIN HFA) 108 (90 BASE) MCG/ACT Inhaler Inhale 2 puffs into the lungs every 6 hours as needed for shortness of breath / dyspnea 2/20/18  Yes Lyndon Stoll MD   ALPRAZolam (XANAX) 1 MG tablet TAKE ONE TABLET BY MOUTH ONCE DAILY AS NEEDED FOR ANXIETY 7/19/19  Yes Lyndon Stoll MD   doxycycline hyclate (PERIOSTAT) 20 MG tablet Take 1 tablet (20 mg) by mouth 2 times daily 8/27/19  Yes Lyndon Stoll MD   fluticasone-vilanterol (BREO ELLIPTA) 200-25 MCG/INH inhaler INHALE ONE PUFF BY MOUTH ONCE DAILY (NEED TO BE SEEN IN CLINIC  "FOR FURTHER REFILLS) 8/27/19  Yes Lyndon Stoll MD   IBUPROFEN PO    Yes Reported, Patient   montelukast (SINGULAIR) 10 MG tablet Take 1 tablet (10 mg) by mouth At Bedtime 8/27/19  Yes Lyndon Stoll MD   Multiple Vitamins-Minerals (MULTIVITAMIN ADULT PO) Take 1 tablet by mouth   Yes Reported, Patient   propranolol ER (INDERAL LA) 80 MG 24 hr capsule Take 1 capsule (80 mg) by mouth daily 8/27/19  Yes Lyndon Stoll MD   RANITIDINE HCL PO Take 300 mg by mouth as needed for heartburn   Yes Reported, Patient   vitamin  B complex with vitamin C (VITAMIN  B COMPLEX) TABS Take 1 tablet by mouth daily   Yes Reported, Patient   VITAMIN D, CHOLECALCIFEROL, PO Take 1,000 Units by mouth daily   Yes Reported, Patient   zolpidem (AMBIEN) 10 MG tablet TAKE ONE TABLET BY MOUTH NIGHTLY AS NEEDED FOR SLEEP AT BEDTIME 4/18/19  Yes Lyndon Stoll MD   fluticasone (FLONASE) 50 MCG/ACT nasal spray SHAKE GENTLY AND USE 2 SPRAYS INTO EACH NOSTRIL ONCE DAILY 5/14/19   Lyndon Stoll MD       Allergies   Allergen Reactions     Penicillins Hives and Rash     Amoxicillin        REVIEW OF SYSTEMS:   5 point ROS negative except as noted above in HPI, including Gen., Resp., CV, GI &  system review.    PHYSICAL EXAM:   There were no vitals taken for this visit. Estimated body mass index is 28.17 kg/m  as calculated from the following:    Height as of 5/14/19: 1.803 m (5' 11\").    Weight as of 8/27/19: 91.6 kg (202 lb).   GENERAL APPEARANCE: alert, and oriented  MENTAL STATUS: alert  AIRWAY EXAM: Mallampatti Class I (visualization of the soft palate, fauces, uvula, anterior and posterior pillars)  RESP: lungs clear to auscultation - no rales, rhonchi or wheezes  CV: regular rates and rhythm  DIAGNOSTICS:    Not indicated    IMPRESSION   ASA Class 1 - Healthy patient, no medical problems    PLAN:   Plan for Gastroscopy with possible biopsy, possible dilation. We discussed the risks, benefits and alternatives and the patient wished to " proceed.    The above has been forwarded to the consulting provider.      Signed Electronically by: Ethan Davidson MD  September 20, 2019

## 2019-09-20 NOTE — LETTER
September 18, 2019      Venkata Lynn  [id number] [facility] [address]      Thank you for choosing [facility]. You are scheduled for the following service(s).   Please be aware that coverage of these services is subject to the terms and limitations of your health insurance plan.  Call member services at your health plan with any benefit or coverage questions.    Date:   Friday September 20, 2019      Procedure: UPPER ENDOSCOPY-EGD  Doctor: Dr Davidson           Arrival Time:  0830    *check in at Emergency/Endoscopy desk*  Procedure Time: 0900       Location:   [facility]        Endoscopy Department, First Floor (Enter through ER Doors) *         [address] [place], [place] [address] [phone] or [phone] () to reschedule          PRE-PROCEDURE CHECKLIST    If you have diabetes, ask your regular doctor for diet and medication restrictions.  If you take any antiplatelet or anticoagulant medications (such as Coumadin, Lovenox, Plavix, etc.) and have not already discussed this, please call your primary physician for advice on holding this medication.  If you take Aspirin, you may continue to do so.  If you are or may be pregnant, please discuss the risks and benefits of this procedure with your doctor.  You must arrange for a ride for the day of your exam. If you fail to arrange transportation with a responsible adult, your procedure will need to be cancelled and rescheduled. Taxi, bus and medical transport are not acceptable unless you have a responsible adult that you know & trust with you. Please arrange for this  to be able to pick you up in our department, approximately one hour after your scheduled procedure, if they are not able to stay with you.      Canceling or rescheduling   If you must cancel or reschedule your appointment, please call [phone] as soon as possible.      Upper Endoscopy or  Esophagogastroduodenoscopy (EGD) is a test performed to evaluate symptoms of persistent abdominal pain, nausea, vomiting and difficulty swallowing. It may also be used to treat various conditions of the upper gastrointestinal tract, such as bleeding, narrowing or abnormal growths.     What happens during an upper endoscopy?  On the day of your procedure, plan to spend up to one and a half hour after your arrival at the endoscopy center. The exam itself takes about 5 to 10 minutes.    Before the exam:  - You will change into a gown.   - Your medical history and medication list will be reviewed with you, unless it has already been done over the phone.   - A nurse will insert an intravenous (IV) line into your hand or arm.  - The doctor will talk to you and give you a consent form to sign.    During the exam:  - Medicine will be given through the IV line to help you relax and feel comfortable.   - Your heart rate and oxygen levels will be monitored. If your blood pressure is low, you may be given fluids through the IV line.   - The doctor will insert a flexible, hollow tube, called an endoscope, into your mouth and will advance it slowly through the esophagus, stomach and duodenum (the first part of your small intestine).   - You may have a feeling of pressure or fullness.   - If you have difficulty swallowing, and the doctor finds a narrowing in your esophagus, it may be possible for the area to be expanded-dilated during the exam.   - If abnormal tissue is found, the doctor may remove it through the endoscope (biopsy it) for closer examination. The tissue removal is painless.    After the exam:  - Any tissue samples removed during the exam will be sent to a lab for evaluation. It may take 5 to 7 working days for you to be notified of the results  - The doctor will prepare a full report for the physician who referred you for the upper endoscopy.   - The doctor will talk with you about the initial results of your exam.    - You may feel bloated after the procedure. That is normal and should not last long.   - Your throat may feel sore for a short time.   - Following the exam, you may resume your normal diet. Avoid alcohol until the next day.   - You may resume your regular activities the day after the procedure.   - Medication given during the exam will prohibit you from driving for the rest of the day.  - A nurse will provide you with complete discharge instructions before you leave the endoscopy center. Be sure to ask the nurse for specific instructions if you take blood thinners such as Aspirin , Coumadin , Lovenox , Plavix , etc.       PREPARATION    To ensure a successful exam, please follow all instructions carefully.      The night before your exam:    STOP eating solid foods at 11:45 pm.     Clear liquids are okay to drink (examples: Gatorade , apple juice, clear broth,coffee or tea without milk or cream, etc.).     DO NOT drink red liquids or alcoholic beverages.    The day of your exam:    STOP drinking clear liquids 4 hours before your exam.     You may take your usual medications with 4 oz. of water, but it needs to be at least 4 hours prior to your procedure.    When you leave for the procedure:    Bring a list of all of your current medications, including any allergy or over-the-counter medications, unless you have already reviewed that with an Endoscopy RN over the phone.     Bring a photo ID as well as up-to-date insurance information, such as your insurance card and any referral forms that might be required by your payer.         DIRECTIONS TO THE ENDOSCOPY DEPARTMENT    From the north ([facility])  Take [address] south, exit on [address] . Get into the left hand [place], turn left (east), go one-half mile to [address] and turn left. Go north to the first stoplight, take a right on [address] and follow it to the Emergency entrance.    From the south ([facility])  Take [address]N  to the [address]E split and exit on [address] . On [address] , turn left (west) to [address]. Turn right (north) on [address]. Go north to the first stoplight, take a right on [address] and follow it to the Emergency entrance.    From the east via 35E ([facility])  Take [address]E south to [place] [address] exit. Turn right on [address] . Go west to [address]. Turn right (north) on [address]. Go to the first stoplight, take a right and follow on [address] to the Emergency entrance.    From the east via [address] ([facility])  Take [address] to [address]. Turn left (south) on [address] to [address]. Turn left (east) on [address] and follow it to the Emergency entrance.    From the west via [address] ([place])  Take [address] east to [address]. Turn right (south) on [address] to [address]. Turn left (east) on [address] and follow it to the Emergency entrance.

## 2019-09-24 NOTE — TELEPHONE ENCOUNTER
[facility] Call Center    Phone Message    May a detailed message be left on voicemail: yes    Reason for Call: Other: Champ never received a response.  Would Dr. Milligan want him to get a new MR of the right shoulder prior to his appointment?  Or are the same day Xray's all Dr. Milligan needs at this time?     Action Taken: Message routed to:  [facility] ([facility] ump ortho      Called pt and informed him that he doesn't need MRI prior to his 9/30/19 appointment. He'll do XR on the same day.    ADRIANO Arellano

## 2019-09-29 ENCOUNTER — HEALTH MAINTENANCE LETTER (OUTPATIENT)
Age: 54
End: 2019-09-29

## 2019-09-30 ENCOUNTER — ANCILLARY PROCEDURE (OUTPATIENT)
Dept: GENERAL RADIOLOGY | Facility: CLINIC | Age: 54
End: 2019-09-30
Attending: ORTHOPAEDIC SURGERY
Payer: COMMERCIAL

## 2019-09-30 ENCOUNTER — OFFICE VISIT (OUTPATIENT)
Dept: ORTHOPEDICS | Facility: CLINIC | Age: 54
End: 2019-09-30
Attending: FAMILY MEDICINE
Payer: COMMERCIAL

## 2019-09-30 VITALS — BODY MASS INDEX: 28.28 KG/M2 | HEIGHT: 71 IN | WEIGHT: 202 LBS

## 2019-09-30 DIAGNOSIS — M25.511 RIGHT SHOULDER PAIN, UNSPECIFIED CHRONICITY: ICD-10-CM

## 2019-09-30 DIAGNOSIS — M25.511 RIGHT SHOULDER PAIN, UNSPECIFIED CHRONICITY: Primary | ICD-10-CM

## 2019-09-30 ASSESSMENT — ENCOUNTER SYMPTOMS
FLANK PAIN: 0
DIFFICULTY URINATING: 0
COUGH: 0
DYSURIA: 0
SHORTNESS OF BREATH: 1
DYSPNEA ON EXERTION: 0
SNORES LOUDLY: 1
HEMOPTYSIS: 0
WHEEZING: 0
POSTURAL DYSPNEA: 0
COUGH DISTURBING SLEEP: 1
SPUTUM PRODUCTION: 0
HEMATURIA: 0

## 2019-09-30 ASSESSMENT — MIFFLIN-ST. JEOR: SCORE: 1788.4

## 2019-09-30 NOTE — PROGRESS NOTES
I saw the patient with the resident.  I agree with the resident note and plan of care.      Nils Milligan MD    [facility]  ORTHOPAEDIC SURGERY CONSULT    DATE OF CONSULT: 9/30/2019 11:18 AM    REQUESTING PROVIDER: Lyndon Stoll MD, MD - [facility] Staff.    CC: R shoulder pain     HISTORY OF PRESENT ILLNESS:   Venkata Lynn is a 54 year old right-hand-dominant male with a previous history of known right rotator cuff tear and status post left rotator cuff repair in 2017 with Dr. Marquez who presents today with worsening right shoulder pain.  Most of the new pain that he is experiencing is in the front of the shoulder and over the AC joint.  Is also found that overhead activity has become more challenging and more painful.  Occasionally will have some pain at nighttime but it is not constant.  He manages his pain with Aleve when it bothers him.  He has had no new acute injuries.  No new onset of numbness or tingling in his right upper extremity.      PAST MEDICAL HISTORY:   Past Medical History:   Diagnosis Date     Acne vulgaris      Anxiety 3/28/2011     Anxiety disorder      Arthritis     both knees     CARDIOVASCULAR SCREENING; LDL GOAL LESS THAN 160 10/31/2010     Controlled substance agreement signed 1/11/2016     Coughing     at night     Dyspepsia 8/16/2016     Elevated blood pressure reading without diagnosis of hypertension 1/11/2016     Familial tremor 8/16/2016     Family history of rhinophyma 1/18/2012     Insomnia      Intermittent asthma 3/28/2011     Knee pain 1/18/2012     Lateral epicondylitis of left elbow 9/14/2018     LBP (low back pain) 1/18/2012     Low back pains      Mild persistent asthma without complication 8/16/2016     Rhinitis 1/18/2012     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     Rosacea 1/18/2012     Rotator cuff syndrome, right 2/12/2019     Sebaceous hyperplasia 1/18/2012     Shortness of breath     due to asthma     Tobacco abuse 1/18/2012     Patient denies any personal  history of bleeding disorders, clotting disorders, or adverse reactions to anesthesia.    PAST SURGICAL HISTORY:   Past Surgical History:   Procedure Laterality Date     ARTHROSCOPY KNEE WITH MEDIAL MENISCECTOMY Left 2/22/2016    Procedure: ARTHROSCOPY KNEE WITH MEDIAL MENISCECTOMY;  Surgeon: Chaz Moe MD;  Location:  OR     ENT SURGERY      lipoma removed from neck     ESOPHAGOSCOPY, GASTROSCOPY, DUODENOSCOPY (EGD), COMBINED N/A 9/20/2019    Procedure: ESOPHAGOGASTRODUODENOSCOPY (EGD);  Surgeon: Ethan Davidson MD;  Location:  GI     HEAD & NECK SURGERY      fx root of tooth     ROTATOR CUFF REPAIR RT/LT Left 01/2017     VASECTOMY           MEDICATIONS:   Prior to Admission medications    Medication Sig Last Dose Taking? Auth Provider   albuterol (PROAIR HFA/PROVENTIL HFA/VENTOLIN HFA) 108 (90 BASE) MCG/ACT Inhaler Inhale 2 puffs into the lungs every 6 hours as needed for shortness of breath / dyspnea Taking Yes Lyndon Stoll MD   ALPRAZolam (XANAX) 1 MG tablet TAKE ONE TABLET BY MOUTH ONCE DAILY AS NEEDED FOR ANXIETY Taking Yes Conrado Humphrey MD   doxycycline hyclate (PERIOSTAT) 20 MG tablet Take 1 tablet (20 mg) by mouth 2 times daily Taking Yes Lyndon Stoll MD   fluticasone (FLONASE) 50 MCG/ACT nasal spray SHAKE GENTLY AND USE 2 SPRAYS INTO EACH NOSTRIL ONCE DAILY Taking Yes Lyndon Stoll MD   fluticasone-vilanterol (BREO ELLIPTA) 200-25 MCG/INH inhaler INHALE ONE PUFF BY MOUTH ONCE DAILY (NEED TO BE SEEN IN CLINIC FOR FURTHER REFILLS) Taking Yes Lyndon Stoll MD   IBUPROFEN PO  Taking Yes Reported, Patient   montelukast (SINGULAIR) 10 MG tablet Take 1 tablet (10 mg) by mouth At Bedtime Taking Yes Lyndon Stoll MD   Multiple Vitamins-Minerals (MULTIVITAMIN ADULT PO) Take 1 tablet by mouth Taking Yes Reported, Patient   propranolol ER (INDERAL LA) 80 MG 24 hr capsule Take 1 capsule (80 mg) by mouth daily Taking Yes Lyndon Stoll MD   RANITIDINE HCL PO Take 300 mg by mouth as needed  "for heartburn Taking Yes Reported, Patient   vitamin  B complex with vitamin C (VITAMIN  B COMPLEX) TABS Take 1 tablet by mouth daily Taking Yes Reported, Patient   VITAMIN D, CHOLECALCIFEROL, PO Take 1,000 Units by mouth daily Taking Yes Reported, Patient   zolpidem (AMBIEN) 10 MG tablet TAKE ONE TABLET BY MOUTH NIGHTLY AS NEEDED FOR SLEEP AT BEDTIME Taking Yes Lyndon Stoll MD       ALLERGIES:   Penicillins    SOCIAL HISTORY:   Occupation: Construction  Tobacco: No      FAMILY HISTORY:  No history of shoulder dislocations  Patient denies known family history of bleeding, clotting, or anesthesia related complications.     REVIEW OF SYSTEMS:   Otherwise, a 10-point reviews of systems was negative, see below. Exceptions noted above in the HPI.     PHYSICAL EXAM:   Vitals:    09/30/19 1058   Weight: 91.6 kg (202 lb)   Height: 1.803 m (5' 11\")     General: Awake, alert, appropriate, following commands, NAD.  Psych: Appropriate affect.   HEENT: extraocular movements are normal  Skin: No rashes,  skin color normal. Normal hair and sweat patterns.  No swelling/palpable lymphadenopathy in upper arm region  HEENT: Normal.   Lungs: Breathing comfortably and nonlabored, no wheezes or stridor noted.  Heart/Cardiovascular: Regular pulse, no peripheral cyanosis.    RUE:  180 degrees of forward flexion, 180 degrees of abduction, 45 ER, internal rotation to the level of the thoracic spine with some discomfort. Positive tenderness to palpation over the AC joint. Positive tenderness to palaption over the biceps tendon. negative belly press, negative lift off, negative bear hug.  Good strength to Carloyn's/supraspinatus testing, + Speed's, + Yergason's. + O'Briens which localizes to biceps.   5/5 strength to deltoid, biceps, triceps, wrist flexors, wrist extensors, EPL, FPL, finger abductos, intrinsics. SILT to axillary, median, ulnar and radial n distribution.   No neck pain.       IMAGING:  XR: R shoulder - AC arthrosis, intact GH " joint space    MRI from 9/2016  IMPRESSION:  1.  There is a full-thickness tear of the anterior fibers of the right  supraspinatus tendon at the footprint, with 10 mm of tendon  retraction. Additionally, there is a small amount of intrasubstance  tearing in the mid to posterior fibers of the supraspinatous tendon.  Supraspinous muscle bulk is normal.  2. Low-grade articular and intrasubstance partial-thickness tear of  the right shoulder subscapularis tendon without full-thickness tear or  tendon retraction. Normal muscle bulk.  3. Degenerative tearing of the superior and posterior right shoulder  labrum.  4. Marked degenerative changes of the right acromioclavicular joint.  5. Tendinosis of the intra-articular biceps tendon.    ASSESSMENT AND PLAN:   Venkata Lynn is a 54-year-old male who presents today with right shoulder pain in setting of known rotator cuff tear with worsening symptoms at the AC joint and biceps tendon.  We recommend that he obtain a new MRI scan to further evaluate the rotator cuff.  This would determine treatment course, rotator cuff repair versus discussion of operative versus nonoperative management of his biceps tendinosis and AC arthrosis.  Patient was amenable with this plan and will follow-up after a new MRI scan of the right shoulder is obtained.    Patient was seen and discussed with Dr. Milligan, Orthopaedic Surgery staff.     Lazarus Avila MD   Orthopaedic Surgery PGY-5  Pager: [phone]

## 2019-09-30 NOTE — NURSING NOTE
"Reason For Visit:   Chief Complaint   Patient presents with     Consult     Right rotator cuff tear       PCP: Lyndon Stoll    ? 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  Date of injury: Many injuries over the years but no specific     Date of surgery: 1/2017  Type of surgery: Left rotator cuff repair By erik.  Smoker: No     Right hand dominant    SANE score  Affected shoulder: Right   Right shoulder SANE: 80-85  Left shoulder SANE: 90    Dynamometer  Forward Elevation:  R = 23 pounds,  L = 25 pounds  External Rotation:   R = 25 pounds,  L = 30 pounds    Ht 1.803 m (5' 11\")   Wt 91.6 kg (202 lb)   BMI 28.17 kg/m        Pain Assessment  Patient Currently in Pain: Yes  0-10 Pain Scale: 1  Primary Pain Location: Shoulder  Pain Descriptors: Discomfort      Asha Gentile LPN          "

## 2019-09-30 NOTE — LETTER
9/30/2019       RE: Venkata Lynn  [address] [facility] [address]     Dear Colleague,    Thank you for referring your patient, Venkata Lynn, to the [facility] at [facility]. Please see a copy of my visit note below.      [facility]  ORTHOPAEDIC SURGERY CONSULT    DATE OF CONSULT: 9/30/2019 11:18 AM    REQUESTING PROVIDER: Lyndon Stoll MD, MD - [facility] Staff.    CC: R shoulder pain     HISTORY OF PRESENT ILLNESS:   Venkata Lynn is a 54 year old right-hand-dominant male with a previous history of known right rotator cuff tear and status post left rotator cuff repair in 2017 with Dr. Marquez who presents today with worsening right shoulder pain.  Most of the new pain that he is experiencing is in the front of the shoulder and over the AC joint.  Is also found that overhead activity has become more challenging and more painful.  Occasionally will have some pain at nighttime but it is not constant.  He manages his pain with Aleve when it bothers him.  He has had no new acute injuries.  No new onset of numbness or tingling in his right upper extremity.      PAST MEDICAL HISTORY:   Past Medical History:   Diagnosis Date     Acne vulgaris      Anxiety 3/28/2011     Anxiety disorder      Arthritis     both knees     CARDIOVASCULAR SCREENING; LDL GOAL LESS THAN 160 10/31/2010     Controlled substance agreement signed 1/11/2016     Coughing     at night     Dyspepsia 8/16/2016     Elevated blood pressure reading without diagnosis of hypertension 1/11/2016     Familial tremor 8/16/2016     Family history of rhinophyma 1/18/2012     Insomnia      Intermittent asthma 3/28/2011     Knee pain 1/18/2012     Lateral epicondylitis of left elbow 9/14/2018     LBP (low back pain) 1/18/2012     Low back pains      Mild persistent asthma without complication 8/16/2016     Rhinitis 1/18/2012     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     Rosacea 1/18/2012     Rotator  cuff syndrome, right 2/12/2019     Sebaceous hyperplasia 1/18/2012     Shortness of breath     due to asthma     Tobacco abuse 1/18/2012     Patient denies any personal history of bleeding disorders, clotting disorders, or adverse reactions to anesthesia.    PAST SURGICAL HISTORY:   Past Surgical History:   Procedure Laterality Date     ARTHROSCOPY KNEE WITH MEDIAL MENISCECTOMY Left 2/22/2016    Procedure: ARTHROSCOPY KNEE WITH MEDIAL MENISCECTOMY;  Surgeon: Chaz Moe MD;  Location: [facility] OR     ENT SURGERY      lipoma removed from neck     ESOPHAGOSCOPY, GASTROSCOPY, DUODENOSCOPY (EGD), COMBINED N/A 9/20/2019    Procedure: ESOPHAGOGASTRODUODENOSCOPY (EGD);  Surgeon: Ethan Davidson MD;  Location:  GI     HEAD & NECK SURGERY      fx root of tooth     ROTATOR CUFF REPAIR RT/LT Left 01/2017     VASECTOMY           MEDICATIONS:   Prior to Admission medications    Medication Sig Last Dose Taking? Auth Provider   albuterol (PROAIR HFA/PROVENTIL HFA/VENTOLIN HFA) 108 (90 BASE) MCG/ACT Inhaler Inhale 2 puffs into the lungs every 6 hours as needed for shortness of breath / dyspnea Taking Yes Lyndon Stoll MD   ALPRAZolam (XANAX) 1 MG tablet TAKE ONE TABLET BY MOUTH ONCE DAILY AS NEEDED FOR ANXIETY Taking Yes Conrado Humphrey MD   doxycycline hyclate (PERIOSTAT) 20 MG tablet Take 1 tablet (20 mg) by mouth 2 times daily Taking Yes Lyndon Stoll MD   fluticasone (FLONASE) 50 MCG/ACT nasal spray SHAKE GENTLY AND USE 2 SPRAYS INTO EACH NOSTRIL ONCE DAILY Taking Yes Lyndon Stoll MD   fluticasone-vilanterol (BREO ELLIPTA) 200-25 MCG/INH inhaler INHALE ONE PUFF BY MOUTH ONCE DAILY (NEED TO BE SEEN IN CLINIC FOR FURTHER REFILLS) Taking Yes Lyndon Stoll MD   IBUPROFEN PO  Taking Yes Reported, Patient   montelukast (SINGULAIR) 10 MG tablet Take 1 tablet (10 mg) by mouth At Bedtime Taking Yes Lyndon Stoll MD   Multiple Vitamins-Minerals (MULTIVITAMIN ADULT PO) Take 1 tablet by mouth Taking Yes Reported, Patient  "  propranolol ER (INDERAL LA) 80 MG 24 hr capsule Take 1 capsule (80 mg) by mouth daily Taking Yes Lyndon Stoll MD   RANITIDINE HCL PO Take 300 mg by mouth as needed for heartburn Taking Yes Reported, Patient   vitamin  B complex with vitamin C (VITAMIN  B COMPLEX) TABS Take 1 tablet by mouth daily Taking Yes Reported, Patient   VITAMIN D, CHOLECALCIFEROL, PO Take 1,000 Units by mouth daily Taking Yes Reported, Patient   zolpidem (AMBIEN) 10 MG tablet TAKE ONE TABLET BY MOUTH NIGHTLY AS NEEDED FOR SLEEP AT BEDTIME Taking Yes Lyndon Stoll MD       ALLERGIES:   Penicillins    SOCIAL HISTORY:   Occupation: Construction  Tobacco: No      FAMILY HISTORY:  No history of shoulder dislocations  Patient denies known family history of bleeding, clotting, or anesthesia related complications.     REVIEW OF SYSTEMS:   Otherwise, a 10-point reviews of systems was negative, see below. Exceptions noted above in the HPI.     PHYSICAL EXAM:   Vitals:    09/30/19 1058   Weight: 91.6 kg (202 lb)   Height: 1.803 m (5' 11\")     General: Awake, alert, appropriate, following commands, NAD.  Psych: Appropriate affect.   HEENT: extraocular movements are normal  Skin: No rashes,  skin color normal. Normal hair and sweat patterns.  No swelling/palpable lymphadenopathy in upper arm region  HEENT: Normal.   Lungs: Breathing comfortably and nonlabored, no wheezes or stridor noted.  Heart/Cardiovascular: Regular pulse, no peripheral cyanosis.    RUE:  180 degrees of forward flexion, 180 degrees of abduction, 45 ER, internal rotation to the level of the thoracic spine with some discomfort. Positive tenderness to palpation over the AC joint. Positive tenderness to palaption over the biceps tendon. negative belly press, negative lift off, negative bear hug.  Good strength to Carolyn's/supraspinatus testing, + Speed's, + Yergason's. + O'Briens which localizes to biceps.   5/5 strength to deltoid, biceps, triceps, wrist flexors, wrist extensors, " EPL, FPL, finger abductos, intrinsics. SILT to axillary, median, ulnar and radial n distribution.   No neck pain.     IMAGING:  XR: R shoulder - AC arthrosis, intact GH joint space    MRI from 9/2016  IMPRESSION:  1.  There is a full-thickness tear of the anterior fibers of the right  supraspinatus tendon at the footprint, with 10 mm of tendon  retraction. Additionally, there is a small amount of intrasubstance  tearing in the mid to posterior fibers of the supraspinatous tendon.  Supraspinous muscle bulk is normal.  2. Low-grade articular and intrasubstance partial-thickness tear of  the right shoulder subscapularis tendon without full-thickness tear or  tendon retraction. Normal muscle bulk.  3. Degenerative tearing of the superior and posterior right shoulder  labrum.  4. Marked degenerative changes of the right acromioclavicular joint.  5. Tendinosis of the intra-articular biceps tendon.    ASSESSMENT AND PLAN:   Venkata Lynn is a 54-year-old male who presents today with right shoulder pain in setting of known rotator cuff tear with worsening symptoms at the AC joint and biceps tendon.  We recommend that he obtain a new MRI scan to further evaluate the rotator cuff.  This would determine treatment course, rotator cuff repair versus discussion of operative versus nonoperative management of his biceps tendinosis and AC arthrosis.  Patient was amenable with this plan and will follow-up after a new MRI scan of the right shoulder is obtained.    Patient was seen and discussed with Dr. Milligan, Orthopaedic Surgery staff.     Lazarus Avila MD   Orthopaedic Surgery PGY-5  Pager: [phone]    I saw the patient with the resident.  I agree with the resident note and plan of care.      Nils Milligan MD

## 2019-10-03 ENCOUNTER — ANCILLARY PROCEDURE (OUTPATIENT)
Dept: MRI IMAGING | Facility: CLINIC | Age: 54
End: 2019-10-03
Attending: ORTHOPAEDIC SURGERY
Payer: COMMERCIAL

## 2019-10-03 DIAGNOSIS — M25.511 RIGHT SHOULDER PAIN, UNSPECIFIED CHRONICITY: ICD-10-CM

## 2019-10-14 ENCOUNTER — PREP FOR PROCEDURE (OUTPATIENT)
Dept: ORTHOPEDICS | Facility: CLINIC | Age: 54
End: 2019-10-14

## 2019-10-14 ENCOUNTER — OFFICE VISIT (OUTPATIENT)
Dept: ORTHOPEDICS | Facility: CLINIC | Age: 54
End: 2019-10-14
Payer: COMMERCIAL

## 2019-10-14 DIAGNOSIS — M75.100 ROTATOR CUFF TEAR: Primary | ICD-10-CM

## 2019-10-14 DIAGNOSIS — M75.121 COMPLETE TEAR OF RIGHT ROTATOR CUFF, UNSPECIFIED WHETHER TRAUMATIC: Primary | ICD-10-CM

## 2019-10-14 NOTE — NURSING NOTE
Teaching Flowsheet   Relevant Diagnosis: Rotator cuff tear  Teaching Topic: Pre-op for right ARCR, ASD, biceps tenodesis, distal clavicle excision - surgery coordinator will call to schedule     Person(s) involved in teaching:   Patient     Motivation Level:  Asks Questions: Yes  Cooperative: Yes  Receptive (willing/able to accept information): Yes  Any cultural factors/Pent[employer]al beliefs that may influence understanding or compliance? No     Patient demonstrates understanding of the following:  Reason for the appointment, diagnosis and treatment plan: Yes  Knowledge of proper use of medications and conditions for which they are ordered (with special attention to potential side effects or drug interactions): Yes  Which situations necessitate calling provider and whom to contact: Yes     Teaching Concerns Addressed:   Pre-op H&P by provider at [facility].  Spouse will provide transportation and assistance with cares after surgery.  Left ARCR 2 years ago - aware of post-op limitations    Proper use and care of sling x 8 weeks (medical equip, care aids, etc.): Yes  Nutritional needs and diet plan: Yes  Pain management techniques: Yes  Wound Care: Yes  How and/when to access community resources: Yes     Instructional Materials Used/Given: Pre-op packet, surgical soap, written guidelines for pre and post-op expectations for rotator cuff repair

## 2019-10-14 NOTE — NURSING NOTE
Reason For Visit:   Chief Complaint   Patient presents with     RECHECK     Follow up MRI of right shoulder        PCP: Lyndon Stoll     ? 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  Date of injury: Many injuries over the years but no specific      Date of surgery: 1/2017  Type of surgery: Left rotator cuff repair By erik.  Smoker: No      Right hand dominant    SANE score  Affected shoulder: Right  Right shoulder SANE: 80  Left shoulder SANE: 90    There were no vitals taken for this visit.      Pain Assessment  Patient Currently in Pain: Yes  0-10 Pain Scale: 2  Primary Pain Location: Shoulder(Right)  Pain Descriptors: Aching  Alleviating Factors: NSAIDS, Pain medication  Aggravating Factors: Movement, Other (comment)(Over head activites)    Eileen Lawson ATC

## 2019-10-14 NOTE — PROGRESS NOTES
CHIEF COMPLAINT:  Right shoulder MRI followup.      HISTORY OF PRESENT ILLNESS:  Mr. Lynn returns today for followup.  He continues to have a lot of pain in the anterior aspect of his shoulder.  He tolerated his MRI scan well.      I reviewed the findings on his MRI scan with him.  He has a full thickness supraspinatus tear that is retracted 1 cm from its footprint.  He has no evidence of fatty atrophy.  He does have AC arthritis with increased T2 signal on the clavicular side of the joint.  The biceps is abnormal on the axial cuts with clear evidence of tenosynovitis.      PHYSICAL EXAMINATION:  On exam today, he is tender to palpation of the bicipital groove.  He has a positive Speed and Yergason.  He has tenderness to palpation at the acromioclavicular joint.  His Osborne is positive and improves with supination, localizes anteriorly.  He has 4/5 forward elevator and 5/5 external rotator strength.      ASSESSMENT:   1.  Right shoulder full thickness rotator cuff tear.   2.  Right shoulder biceps disease.     3.  Right shoulder symptomatic acromioclavicular joint arthritis.     4.  Right shoulder subacromial bursitis.      PLAN:  I had a nice talk with Mr. Abebe today.  We talked at length about surgical and nonsurgical treatment of his shoulder.  I told him there are no guarantees with surgery, he could be no better or even worse, he could get stiff, infected, need for further surgery, have injury to the nerves and arteries that power the hand and arm, or reaction to anesthetic with damage to the heart, lungs, brain and even death.  He demonstrated a good understanding of this and wished to proceed with surgical scheduling.  I look forward to seeing him back on the date of his surgery for arthroscopic rotator cuff repair, arthroscopic biceps tenodesis, arthroscopic distal clavicle excision, arthroscopic subacromial decompression if indicated.     TT: 16 mins  CT: 12 mins

## 2019-10-14 NOTE — LETTER
10/14/2019       RE: Venkata Lynn  [address] [facility] [address]     Dear Colleague,    Thank you for referring your patient, Venkata Lynn, to the [facility]INIC at [facility]. Please see a copy of my visit note below.    CHIEF COMPLAINT:  Right shoulder MRI followup.      HISTORY OF PRESENT ILLNESS:  Mr. Lynn returns today for followup.  He continues to have a lot of pain in the anterior aspect of his shoulder.  He tolerated his MRI scan well.      I reviewed the findings on his MRI scan with him.  He has a full thickness supraspinatus tear that is retracted 1 cm from its footprint.  He has no evidence of fatty atrophy.  He does have AC arthritis with increased T2 signal on the clavicular side of the joint.  The biceps is abnormal on the axial cuts with clear evidence of tenosynovitis.      PHYSICAL EXAMINATION:  On exam today, he is tender to palpation of the bicipital groove.  He has a positive Speed and Yergason.  He has tenderness to palpation at the acromioclavicular joint.  His Laurens is positive and improves with supination, localizes anteriorly.  He has 4/5 forward elevator and 5/5 external rotator strength.      ASSESSMENT:   1.  Right shoulder full thickness rotator cuff tear.   2.  Right shoulder biceps disease.     3.  Right shoulder symptomatic acromioclavicular joint arthritis.     4.  Right shoulder subacromial bursitis.      PLAN:  I had a nice talk with Mr. Abebe today.  We talked at length about surgical and nonsurgical treatment of his shoulder.  I told him there are no guarantees with surgery, he could be no better or even worse, he could get stiff, infected, need for further surgery, have injury to the nerves and arteries that power the hand and arm, or reaction to anesthetic with damage to the heart, lungs, brain and even death.  He demonstrated a good understanding of this and wished to proceed with surgical  scheduling.  I look forward to seeing him back on the date of his surgery for arthroscopic rotator cuff repair, arthroscopic biceps tenodesis, arthroscopic distal clavicle excision, arthroscopic subacromial decompression if indicated.     TT: 16 mins  CT: 12 mins    Again, thank you for allowing me to participate in the care of your patient.      Sincerely,    Nils Milligan MD

## 2019-10-16 ENCOUNTER — TELEPHONE (OUTPATIENT)
Dept: ORTHOPEDICS | Facility: CLINIC | Age: 54
End: 2019-10-16

## 2019-10-16 ENCOUNTER — PREP FOR PROCEDURE (OUTPATIENT)
Dept: ORTHOPEDICS | Facility: CLINIC | Age: 54
End: 2019-10-16

## 2019-10-16 PROBLEM — M75.100 ROTATOR CUFF TEAR: Status: ACTIVE | Noted: 2019-10-16

## 2019-10-16 NOTE — TELEPHONE ENCOUNTER
Left VM for patient to call back [phone] to confirm surgery date with Dr. Milligan on 12/12/19 at the [facility].   Requested patient to call back

## 2019-10-16 NOTE — TELEPHONE ENCOUNTER
Patient is scheduled for surgery with Dr. Milligan    Spoke or left message with: patient via phone call     Date of Surgery: 12/12/19    Location: [facility]     Informed patient they will need an adult  yes    Post-ops made 2 and 6 wks     Pre-op with surgeon (if applicable): yes     H&P: Scheduled with PCP     Additional imaging/appointments: n/a     Surgery packet: given in clinic     Additional comments:n/a

## 2019-10-21 ENCOUNTER — MYC REFILL (OUTPATIENT)
Dept: FAMILY MEDICINE | Facility: CLINIC | Age: 54
End: 2019-10-21

## 2019-10-21 DIAGNOSIS — F41.9 ANXIETY: ICD-10-CM

## 2019-10-21 DIAGNOSIS — F51.01 PRIMARY INSOMNIA: ICD-10-CM

## 2019-10-21 RX ORDER — ALPRAZOLAM 1 MG
TABLET ORAL
Qty: 30 TABLET | Refills: 0 | Status: CANCELLED | OUTPATIENT
Start: 2019-10-21

## 2019-10-21 NOTE — TELEPHONE ENCOUNTER
Controlled Substance Refill Request for zolpidem (AMBIEN) 10 MG tablet   Problem List Complete:    No     PROVIDER TO CONSIDER COMPLETION OF PROBLEM LIST AND OVERVIEW/CONTROLLED SUBSTANCE AGREEMENT    Last Written Prescription Date:  4/18/2019  Last Fill Quantity: 30 tablet,   # refills: 5    THE MOST RECENT OFFICE VISIT MUST BE WITHIN THE PAST 3 MONTHS. AT LEAST ONE FACE TO FACE VISIT MUST OCCUR EVERY 6 MONTHS. ADDITIONAL VISITS CAN BE VIRTUAL.  (THIS STATEMENT SHOULD BE DELETED.)    Last Office Visit with [facility] primary care provider: 8/27/2019Jerman    Future Office visit:     Controlled substance agreement:   Encounter-Level CSA - 02/20/2018:    Controlled Substance Agreement - Scan on 3/3/2018  7:29 AM: CONTROLLED SUBSTANCE AGREEMENT     Encounter-Level CSA - 01/11/2016:    Controlled Substance Agreement - Scan on 1/13/2016  8:59 AM: CONTROLLED SUBSTANCE AGREEMENT 1-11-16     Encounter-Level CSA - 08/17/2015:    Controlled Substance Agreement - Scan on 8/19/2015 11:08 AM: CONTROLLED MEDICATION AGREEMENT 8-17-15     Patient-Level CSA:    Controlled Substance Agreement - Opioid - Scan on 2/20/2019 11:08 AM         Last Urine Drug Screen: No results found for: [facility] Drug Analysis UR   Date Value Ref Range Status   08/27/2019 FINAL  Final     Comment:     (Note)  ====================================================================  COMPREHENSIVE DRUG ANALYSIS,UR  ====================================================================  Specimen Alert  Note:  Urinary creatinine is low; ability to detect some  drugs may be compromised.  Interpret results  with caution.  ====================================================================  Test                             Result       Flag       Units        Drug Present   Ephedrine/Pseudoephedrine      PRESENT                               Phenylpropanolamine            PRESENT                                Source of ephedrine/pseudoephedrine is most  commonly    pseudoephedrine in over-the-counter or prescription cold and    allergy medications. Phenylpropanolamine is an expected    metabolite of ephedrine/pseudoephedrine.   Zolpidem                       PRESENT                               Zolpidem Acid                  PRESENT                                Zolpidem acid is an expected metabolite of zolpidem.   Diphenhydramine                PRESENT                              ====================================================================  Test                      Result    Flag   Units      Ref Range        Creatinine              11        L      mg/dL      >=20            ====================================================================  For clinical consultation, please call [phone].  ====================================================================  Analysis performed by [employer] [place] [address]     , No results found for: THC13, PCP13, COC13, MAMP13, OPI13, AMP13, BZO13, TCA13, MTD13, BAR13, OXY13, PPX13, BUP13     Processing:  Staff will hand deliver Rx to on-site pharmacy     [email or web address]       checked in past 3 months?  Yes 9/20/2019

## 2019-10-21 NOTE — TELEPHONE ENCOUNTER
Controlled Substance Refill Request for ALPRAZolam (XANAX) 1 MG tablet  Problem List Complete:    No     PROVIDER TO CONSIDER COMPLETION OF PROBLEM LIST AND OVERVIEW/CONTROLLED SUBSTANCE AGREEMENT    Last Written Prescription Date:  9/20/2019  Last Fill Quantity: 30 tablet,   # refills: 0    THE MOST RECENT OFFICE VISIT MUST BE WITHIN THE PAST 3 MONTHS. AT LEAST ONE FACE TO FACE VISIT MUST OCCUR EVERY 6 MONTHS. ADDITIONAL VISITS CAN BE VIRTUAL.  (THIS STATEMENT SHOULD BE DELETED.)    Last Office Visit with [facility] primary care provider: 8/27/2019Jerman    Future Office visit:     Controlled substance agreement:   Encounter-Level CSA - 02/20/2018:    Controlled Substance Agreement - Scan on 3/3/2018  7:29 AM: CONTROLLED SUBSTANCE AGREEMENT     Encounter-Level CSA - 01/11/2016:    Controlled Substance Agreement - Scan on 1/13/2016  8:59 AM: CONTROLLED SUBSTANCE AGREEMENT 1-11-16     Encounter-Level CSA - 08/17/2015:    Controlled Substance Agreement - Scan on 8/19/2015 11:08 AM: CONTROLLED MEDICATION AGREEMENT 8-17-15     Patient-Level CSA:    Controlled Substance Agreement - Opioid - Scan on 2/20/2019 11:08 AM         Last Urine Drug Screen: No results found for: John [facility] Drug Analysis UR   Date Value Ref Range Status   08/27/2019 FINAL  Final     Comment:     (Note)  ====================================================================  COMPREHENSIVE DRUG ANALYSIS,UR  ====================================================================  Specimen Alert  Note:  Urinary creatinine is low; ability to detect some  drugs may be compromised.  Interpret results  with caution.  ====================================================================  Test                             Result       Flag       Units        Drug Present   Ephedrine/Pseudoephedrine      PRESENT                               Phenylpropanolamine            PRESENT                                Source of ephedrine/pseudoephedrine is most  commonly    pseudoephedrine in over-the-counter or prescription cold and    allergy medications. Phenylpropanolamine is an expected    metabolite of ephedrine/pseudoephedrine.   Zolpidem                       PRESENT                               Zolpidem Acid                  PRESENT                                Zolpidem acid is an expected metabolite of zolpidem.   Diphenhydramine                PRESENT                              ====================================================================  Test                      Result    Flag   Units      Ref Range        Creatinine              11        L      mg/dL      >=20            ====================================================================  For clinical consultation, please call [phone].  ====================================================================  Analysis performed by [employer] [place] [address]     , No results found for: THC13, PCP13, COC13, MAMP13, OPI13, AMP13, BZO13, TCA13, MTD13, BAR13, OXY13, PPX13, BUP13     Processing:  Staff will hand deliver Rx to on-site pharmacy     [email or web address]       checked in past 3 months?  No, route to RN

## 2019-10-22 DIAGNOSIS — F41.9 ANXIETY: ICD-10-CM

## 2019-10-22 RX ORDER — ZOLPIDEM TARTRATE 10 MG/1
10 TABLET ORAL
Qty: 30 TABLET | Refills: 1 | Status: SHIPPED | OUTPATIENT
Start: 2019-10-22 | End: 2019-12-12

## 2019-10-23 RX ORDER — ALPRAZOLAM 1 MG
TABLET ORAL
Qty: 30 TABLET | Refills: 0 | Status: SHIPPED | OUTPATIENT
Start: 2019-10-23 | End: 2019-11-18

## 2019-10-23 NOTE — TELEPHONE ENCOUNTER
Return in about 3 months (around 11/27/2019), see other refill encounter for alprazolam, sent [employer] response, will monitor reply  Jayda Salazar RN, BSN  Message handled by Nurse Triage.

## 2019-10-23 NOTE — TELEPHONE ENCOUNTER
Controlled Substance Refill Request for   ALPRAZolam (XANAX) 1 MG tablet  Problem List Complete:  Yes   checked in past 3 months?  Yes 9/20/19  Last Written Prescription Date:  9/20/19  Last Fill Quantity: 30 tablet,  # refills: 0   Last office visit: 8/27/2019 with prescribing provider:  Jerman    Future Office Visit:    Anxiety   Problem Detail     Noted:  3/28/2011   Priority:  Medium   Overview Addendum 9/20/2019  2:10 PM by Ita Woodard RN   Patient is followed by SAMUEL BAEZA for ongoing prescription of benzodiazepines.  All refills should be approved by this provider, or covering partner.     Medication(s): Xanax 1 mg.   Maximum quantity per month: 30  Clinic visit frequency required: Q 6  months      Controlled substance agreement on file: Yes       Date(s): 2/12/19  Benzodiazepine use reviewed by psychiatry:  No     Last [facility] website verification:  9/20/19   [email or web address]

## 2019-10-23 NOTE — TELEPHONE ENCOUNTER
Routing refill request to provider to review approval because:  Drug not on the [facility], [facility] or [facility] refill protocol or controlled substance  Jayda Salazar RN, BSN  Message handled by Nurse Triage.

## 2019-11-18 DIAGNOSIS — F41.9 ANXIETY: ICD-10-CM

## 2019-11-18 RX ORDER — ALPRAZOLAM 1 MG
TABLET ORAL
Qty: 30 TABLET | Refills: 0 | Status: SHIPPED | OUTPATIENT
Start: 2019-11-22 | End: 2019-12-12

## 2019-11-18 NOTE — TELEPHONE ENCOUNTER
Last Written Prescription Date:  10.23.19  Last Fill Quantity: 30,  # refills: 0   Last office visit: 8/27/2019 with prescribing provider:  Jerman   Future Office Visit:   Next 5 appointments (look out 90 days)    Nov 22, 2019  3:20 PM CST  Pre-Operative Physical with Lyndon Stoll MD  [facility] ([facility]) [address] [place] [id number] [phone]           Requested Prescriptions   Pending Prescriptions Disp Refills     ALPRAZolam (XANAX) 1 MG tablet [Pharmacy Med Name: ALPRAZOLAM 1MG TABS] 30 tablet 0     Sig: TAKE ONE TABLET BY MOUTH ONCE DAILY AS NEEDED FOR ANXIETY       There is no refill protocol information for this order

## 2019-11-22 ENCOUNTER — OFFICE VISIT (OUTPATIENT)
Dept: FAMILY MEDICINE | Facility: CLINIC | Age: 54
End: 2019-11-22
Payer: COMMERCIAL

## 2019-11-22 VITALS
HEIGHT: 71 IN | OXYGEN SATURATION: 98 % | DIASTOLIC BLOOD PRESSURE: 80 MMHG | HEART RATE: 59 BPM | TEMPERATURE: 98.5 F | WEIGHT: 205.13 LBS | BODY MASS INDEX: 28.72 KG/M2 | SYSTOLIC BLOOD PRESSURE: 130 MMHG

## 2019-11-22 DIAGNOSIS — B07.8 OTHER VIRAL WARTS: ICD-10-CM

## 2019-11-22 DIAGNOSIS — G25.0 FAMILIAL TREMOR: ICD-10-CM

## 2019-11-22 DIAGNOSIS — M65.30 TRIGGER FINGER, ACQUIRED: ICD-10-CM

## 2019-11-22 DIAGNOSIS — J45.30 MILD PERSISTENT ASTHMA WITHOUT COMPLICATION: ICD-10-CM

## 2019-11-22 DIAGNOSIS — Z01.818 PREOP GENERAL PHYSICAL EXAM: Primary | ICD-10-CM

## 2019-11-22 LAB
BASOPHILS # BLD AUTO: 0 10E9/L (ref 0–0.2)
BASOPHILS NFR BLD AUTO: 0.3 %
DIFFERENTIAL METHOD BLD: NORMAL
EOSINOPHIL # BLD AUTO: 0.2 10E9/L (ref 0–0.7)
EOSINOPHIL NFR BLD AUTO: 2.7 %
ERYTHROCYTE [DISTWIDTH] IN BLOOD BY AUTOMATED COUNT: 12.8 % (ref 10–15)
HCT VFR BLD AUTO: 41.9 % (ref 40–53)
HGB BLD-MCNC: 14.1 G/DL (ref 13.3–17.7)
LYMPHOCYTES # BLD AUTO: 1.9 10E9/L (ref 0.8–5.3)
LYMPHOCYTES NFR BLD AUTO: 25.1 %
MCH RBC QN AUTO: 29.4 PG (ref 26.5–33)
MCHC RBC AUTO-ENTMCNC: 33.7 G/DL (ref 31.5–36.5)
MCV RBC AUTO: 88 FL (ref 78–100)
MONOCYTES # BLD AUTO: 0.7 10E9/L (ref 0–1.3)
MONOCYTES NFR BLD AUTO: 9.2 %
NEUTROPHILS # BLD AUTO: 4.9 10E9/L (ref 1.6–8.3)
NEUTROPHILS NFR BLD AUTO: 62.7 %
PLATELET # BLD AUTO: 300 10E9/L (ref 150–450)
RBC # BLD AUTO: 4.79 10E12/L (ref 4.4–5.9)
WBC # BLD AUTO: 7.7 10E9/L (ref 4–11)

## 2019-11-22 PROCEDURE — 36415 COLL VENOUS BLD VENIPUNCTURE: CPT | Performed by: FAMILY MEDICINE

## 2019-11-22 PROCEDURE — 80048 BASIC METABOLIC PNL TOTAL CA: CPT | Performed by: FAMILY MEDICINE

## 2019-11-22 PROCEDURE — 99214 OFFICE O/P EST MOD 30 MIN: CPT | Mod: 25 | Performed by: FAMILY MEDICINE

## 2019-11-22 PROCEDURE — 17110 DESTRUCTION B9 LES UP TO 14: CPT | Performed by: FAMILY MEDICINE

## 2019-11-22 PROCEDURE — 85025 COMPLETE CBC W/AUTO DIFF WBC: CPT | Performed by: FAMILY MEDICINE

## 2019-11-22 RX ORDER — PROPRANOLOL HYDROCHLORIDE 80 MG/1
CAPSULE, EXTENDED RELEASE ORAL
Qty: 90 CAPSULE | Refills: 3 | Status: SHIPPED | OUTPATIENT
Start: 2019-11-22 | End: 2020-09-29

## 2019-11-22 SDOH — SOCIAL STABILITY: SOCIAL NETWORK: IN A TYPICAL WEEK, HOW MANY TIMES DO YOU TALK ON THE PHONE WITH FAMILY, FRIENDS, OR NEIGHBORS?: THREE TIMES A WEEK

## 2019-11-22 SDOH — ECONOMIC STABILITY: TRANSPORTATION INSECURITY
IN THE PAST 12 MONTHS, HAS LACK OF TRANSPORTATION KEPT YOU FROM MEETINGS, WORK, OR FROM GETTING THINGS NEEDED FOR DAILY LIVING?: NO

## 2019-11-22 SDOH — SOCIAL STABILITY: SOCIAL NETWORK
DO YOU BELONG TO ANY CLUBS OR ORGANIZATIONS SUCH AS CHURCH GROUPS UNIONS, FRATERNAL OR ATHLETIC GROUPS, OR SCHOOL GROUPS?: NO

## 2019-11-22 SDOH — ECONOMIC STABILITY: INCOME INSECURITY: HOW HARD IS IT FOR YOU TO PAY FOR THE VERY BASICS LIKE FOOD, HOUSING, MEDICAL CARE, AND HEATING?: NOT VERY HARD

## 2019-11-22 SDOH — SOCIAL STABILITY: SOCIAL NETWORK: HOW OFTEN DO YOU ATTENT MEETINGS OF THE CLUB OR ORGANIZATION YOU BELONG TO?: NEVER

## 2019-11-22 SDOH — HEALTH STABILITY: PHYSICAL HEALTH: ON AVERAGE, HOW MANY DAYS PER WEEK DO YOU ENGAGE IN MODERATE TO STRENUOUS EXERCISE (LIKE A BRISK WALK)?: 5 DAYS

## 2019-11-22 SDOH — HEALTH STABILITY: MENTAL HEALTH: HOW OFTEN DO YOU HAVE 6 OR MORE DRINKS ON ONE OCCASION?: MONTHLY

## 2019-11-22 SDOH — HEALTH STABILITY: MENTAL HEALTH: HOW OFTEN DO YOU HAVE A DRINK CONTAINING ALCOHOL?: 2-3 TIMES A WEEK

## 2019-11-22 SDOH — ECONOMIC STABILITY: TRANSPORTATION INSECURITY
IN THE PAST 12 MONTHS, HAS THE LACK OF TRANSPORTATION KEPT YOU FROM MEDICAL APPOINTMENTS OR FROM GETTING MEDICATIONS?: NO

## 2019-11-22 SDOH — SOCIAL STABILITY: SOCIAL NETWORK: HOW OFTEN DO YOU GET TOGETHER WITH FRIENDS OR RELATIVES?: PATIENT DECLINED

## 2019-11-22 SDOH — SOCIAL STABILITY: SOCIAL NETWORK: ARE YOU MARRIED, WIDOWED, DIVORCED, SEPARATED, NEVER MARRIED, OR LIVING WITH A PARTNER?: MARRIED

## 2019-11-22 SDOH — HEALTH STABILITY: MENTAL HEALTH
STRESS IS WHEN SOMEONE FEELS TENSE, NERVOUS, ANXIOUS, OR CAN'T SLEEP AT NIGHT BECAUSE THEIR MIND IS TROUBLED. HOW STRESSED ARE YOU?: ONLY A LITTLE

## 2019-11-22 SDOH — HEALTH STABILITY: MENTAL HEALTH: HOW MANY STANDARD DRINKS CONTAINING ALCOHOL DO YOU HAVE ON A TYPICAL DAY?: 3 OR 4

## 2019-11-22 SDOH — ECONOMIC STABILITY: FOOD INSECURITY: WITHIN THE PAST 12 MONTHS, THE FOOD YOU BOUGHT JUST DIDN'T LAST AND YOU DIDN'T HAVE MONEY TO GET MORE.: NEVER TRUE

## 2019-11-22 SDOH — ECONOMIC STABILITY: FOOD INSECURITY: WITHIN THE PAST 12 MONTHS, YOU WORRIED THAT YOUR FOOD WOULD RUN OUT BEFORE YOU GOT MONEY TO BUY MORE.: NEVER TRUE

## 2019-11-22 SDOH — SOCIAL STABILITY: SOCIAL NETWORK: HOW OFTEN DO YOU ATTEND CHURCH OR RELIGIOUS SERVICES?: NEVER

## 2019-11-22 SDOH — HEALTH STABILITY: PHYSICAL HEALTH: ON AVERAGE, HOW MANY MINUTES DO YOU ENGAGE IN EXERCISE AT THIS LEVEL?: 50 MIN

## 2019-11-22 ASSESSMENT — MIFFLIN-ST. JEOR: SCORE: 1802.57

## 2019-11-22 NOTE — PATIENT INSTRUCTIONS
Before Your Surgery      Call your surgeon if there is any change in your health. This includes signs of a cold or flu (such as a sore throat, runny nose, cough, rash or fever).    Do not smoke, drink alcohol or take over the counter medicine (unless your surgeon or primary care doctor tells you to) for the 24 hours before and after surgery.    If you take prescribed drugs: Follow your doctor s orders about which medicines to take and which to stop until after surgery.    Eating and drinking prior to surgery: follow the instructions from your surgeon    Take a shower or bath the night before surgery. Use the soap your surgeon gave you to gently clean your skin. If you do not have soap from your surgeon, use your regular soap. Do not shave or scrub the surgery site.  Wear clean pajamas and have clean sheets on your bed.   Before Your Surgery    Call your surgeon if there is any change in your health. This includes signs of a cold or flu (such as a sore throat, runny nose, cough, rash or fever).  Do not smoke, drink alcohol or take over the counter medicine (unless your surgeon or primary care doctor tells you to) for the 24 hours before and after surgery.  If you take prescribed drugs: Follow your doctor s orders about which medicines to take and which to stop until after surgery.  Eating and drinking prior to surgery: follow the instructions from your surgeon  Take a shower or bath the night before surgery. Use the soap your surgeon gave you to gently clean your skin. If you do not have soap from your surgeon, use your regular soap. Do not shave or scrub the surgery site.  Wear clean pajamas and have clean sheets on your bed.   Before Your Surgery    Call your surgeon if there is any change in your health. This includes signs of a cold or flu (such as a sore throat, runny nose, cough, rash or fever).  Do not smoke, drink alcohol or take over the counter medicine (unless your surgeon or primary care doctor tells  you to) for the 24 hours before and after surgery.  If you take prescribed drugs: Follow your doctor s orders about which medicines to take and which to stop until after surgery.  Eating and drinking prior to surgery: follow the instructions from your surgeon  Take a shower or bath the night before surgery. Use the soap your surgeon gave you to gently clean your skin. If you do not have soap from your surgeon, use your regular soap. Do not shave or scrub the surgery site.  Wear clean pajamas and have clean sheets on your bed.     BREO only AM of surgery, stop ibuprofen

## 2019-11-22 NOTE — PROGRESS NOTES
"  Familial tremor well controlled with beta-blockade   mild persistent asthma without complication completely quiescent  Trigger finger, acquired fourth digit bilateral left greater than right.  He has been treating this with a tape about his proximal phalanx intermittent variable pain a.m. locking  Other viral warts right forehead treated with fingernail clippers ineffectively    /80 (BP Location: Right arm, Patient Position: Chair, Cuff Size: Adult Large)   Pulse 59   Temp 98.5  F (36.9  C) (Oral)   Ht 1.803 m (5' 11\")   Wt 93 kg (205 lb 2 oz)   SpO2 98%   BMI 28.61 kg/m    No trigger phenomenon demonstrated    Typical verrucous papule.  Cryotherapy applied right forehead    No tremor    ASSESSMENT / PLAN:    (G25.0) Familial tremor  Comment: Controlled  Plan: propranolol ER (INDERAL LA) 80 MG 24 hr capsule        Continue    (J45.30) Mild persistent asthma without complication  Comment: Controlled  Plan: Asthma Control Test - HIM Scan            (M65.30) Trigger finger, acquired  Comment: Discussed therapeutic options directional massage  Plan:     (B07.8) Other viral warts  Comment: Discussed care  Plan: DESTRUCT BENIGN LESION, UP TO 14                  Lyndon Stoll MD      "

## 2019-11-22 NOTE — PROGRESS NOTES
Well-controlled asthma  [facility]  [address] [address] [address]  [phone]  Dept: [phone]    PRE-OP EVALUATION:  Today's date: 2019    Venkata Lynn (: 1965) presents for pre-operative evaluation assessment as requested by Dr. Milligan.  He requires evaluation and anesthesia risk assessment prior to undergoing surgery/procedure for treatment of rotator cuff repair  .    Fax number for surgical facility:   Primary Physician: Lyndon Stoll  Type of Anesthesia Anticipated: to be determined    Patient has a Health Care Directive or Living Will:  YES     Preop Questions 2019   Who is doing your surgery? Dr. Curly Milligan   What are you having done? Right shoulder rotator cuff repair   Date of Surgery/Procedure: 2019   Facility or Hospital where procedure/surgery will be performed: [facility]   1.  Do you have a history of Heart attack, stroke, stent, coronary bypass surgery, or other heart surgery? No   2.  Do you ever have any pain or discomfort in your chest? No   3.  Do you have a history of  Heart Failure? No   4.   Are you troubled by shortness of breath when:  walking on a level surface, or up a slight hill, or at night? No   5.  Do you currently have a cold, bronchitis or other respiratory infection? YES    6.  Do you have a cough, shortness of breath, or wheezing? YES    7.  Do you sometimes get pains in the calves of your legs when you walk? No   8. Do you or anyone in your family have previous history of blood clots? No   9.  Do you or does anyone in your family have a serious bleeding problem such as prolonged bleeding following surgeries or cuts? No   10. Have you ever had problems with anemia or been told to take iron pills? No   11. Have you had any abnormal blood loss such as black, tarry or bloody stools? No   12. Have you ever had a blood transfusion? No   13. Have you or any of your relatives ever had  problems with anesthesia? No   14. Do you have sleep apnea, excessive snoring or daytime drowsiness? No   15. Do you have any prosthetic heart valves? No   16. Do you have prosthetic joints? No         HPI:     HPI related to upcoming procedure: right shoulder pain, internal derangement      MEDICAL HISTORY:     Patient Active Problem List    Diagnosis Date Noted     Rotator cuff tear 10/16/2019     Priority: Medium     Added automatically from request for surgery [id number]       Rotator cuff syndrome, right 02/12/2019     Priority: Medium     Lateral epicondylitis of left elbow 09/14/2018     Priority: Medium     Sprain of left rotator cuff capsule, initial encounter 04/06/2017     Priority: Medium     Aftercare following surgery of the musculoskeletal system 04/06/2017     Priority: Medium     Shoulder pain, left 12/08/2016     Priority: Medium     Complete tear of right rotator cuff 10/10/2016     Priority: Medium     Familial tremor 08/16/2016     Priority: Medium     Dyspepsia 08/16/2016     Priority: Medium     Mild persistent asthma without complication 08/16/2016     Priority: Medium     Cyst of meniscus of left knee 01/28/2016     Priority: Medium     Cyst of meniscus of right knee 01/28/2016     Priority: Medium     Controlled substance agreement signed 01/11/2016     Priority: Medium     Patient is followed by SAMUEL BAEZA for ongoing prescription of benzodiazepines.  All refills should be approved by this provider, or covering partner.    Medication(s): alprazolam    Maximum quantity per month: 30 ea  Clinic visit frequency required: Q 3  months     Controlled substance agreement on file: Yes       Date(s):  2/12/19  Benzodiazepine use reviewed by psychiatry:      Last [facility] website verification:  Done 4.11.17 2/12/19   [email or web address]           Elevated blood pressure reading without diagnosis of hypertension 01/11/2016     Priority: Medium     Arthralgia of both knees 09/08/2015      Priority: Medium     Adjustment reaction 05/02/2014     Priority: Medium     Problem list name updated by automated process. Provider to review       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     Priority: Medium     Rosacea 01/18/2012     Priority: Medium     Sebaceous hyperplasia 01/18/2012     Priority: Medium     Tobacco abuse 01/18/2012     Priority: Medium     Rhinitis 01/18/2012     Priority: Medium     Anxiety 03/28/2011     Priority: Medium     Patient is followed by SAMUEL BAEZA for ongoing prescription of benzodiazepines.  All refills should be approved by this provider, or covering partner.    Medication(s): Xanax 1 mg.   Maximum quantity per month: 30  Clinic visit frequency required: Q 6  months     Controlled substance agreement on file: Yes       Date(s): 2/12/19  Benzodiazepine use reviewed by psychiatry:  No    Last [facility] website verification:  9/20/19   [email or web address]           CARDIOVASCULAR SCREENING; LDL GOAL LESS THAN 160 10/31/2010     Priority: Medium     1.7% 10 yr risk 2015        Past Medical History:   Diagnosis Date     Acne vulgaris      Anxiety 3/28/2011     Anxiety disorder      Arthritis     both knees     CARDIOVASCULAR SCREENING; LDL GOAL LESS THAN 160 10/31/2010     Controlled substance agreement signed 1/11/2016     Coughing     at night     Dyspepsia 8/16/2016     Elevated blood pressure reading without diagnosis of hypertension 1/11/2016     Familial tremor 8/16/2016     Family history of rhinophyma 1/18/2012     Insomnia      Intermittent asthma 3/28/2011     Knee pain 1/18/2012     Lateral epicondylitis of left elbow 9/14/2018     LBP (low back pain) 1/18/2012     Low back pains      Mild persistent asthma without complication 8/16/2016     Rhinitis 1/18/2012     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     Rosacea 1/18/2012     Rotator cuff syndrome, right 2/12/2019     Sebaceous hyperplasia 1/18/2012     Shortness of breath     due to asthma     Tobacco abuse 1/18/2012     Past Surgical History:    Procedure Laterality Date     ARTHROSCOPY KNEE WITH MEDIAL MENISCECTOMY Left 2/22/2016    Procedure: ARTHROSCOPY KNEE WITH MEDIAL MENISCECTOMY;  Surgeon: Chaz Moe MD;  Location: [facility] OR     ENT SURGERY      lipoma removed from neck     ESOPHAGOSCOPY, GASTROSCOPY, DUODENOSCOPY (EGD), COMBINED N/A 9/20/2019    Procedure: ESOPHAGOGASTRODUODENOSCOPY (EGD);  Surgeon: Ethan Davidson MD;  Location:  GI     HEAD & NECK SURGERY      fx root of tooth     ROTATOR CUFF REPAIR RT/LT Left 01/2017     VASECTOMY       Current Outpatient Medications   Medication Sig Dispense Refill     albuterol (PROAIR HFA/PROVENTIL HFA/VENTOLIN HFA) 108 (90 BASE) MCG/ACT Inhaler Inhale 2 puffs into the lungs every 6 hours as needed for shortness of breath / dyspnea 1 Inhaler 11     ALPRAZolam (XANAX) 1 MG tablet TAKE ONE TABLET BY MOUTH ONCE DAILY AS NEEDED FOR ANXIETY 30 tablet 0     doxycycline hyclate (PERIOSTAT) 20 MG tablet Take 1 tablet (20 mg) by mouth 2 times daily       fluticasone-vilanterol (BREO ELLIPTA) 200-25 MCG/INH inhaler INHALE ONE PUFF BY MOUTH ONCE DAILY (NEED TO BE SEEN IN CLINIC FOR FURTHER REFILLS) 3 Inhaler 3     IBUPROFEN PO        montelukast (SINGULAIR) 10 MG tablet Take 1 tablet (10 mg) by mouth At Bedtime 90 tablet 3     Multiple Vitamins-Minerals (MULTIVITAMIN ADULT PO) Take 1 tablet by mouth       propranolol ER (INDERAL LA) 80 MG 24 hr capsule TAKE ONE CAPSULE BY MOUTH ONCE DAILY 30 capsule 0     RANITIDINE HCL PO Take 300 mg by mouth as needed for heartburn       vitamin  B complex with vitamin C (VITAMIN  B COMPLEX) TABS Take 1 tablet by mouth daily       VITAMIN D, CHOLECALCIFEROL, PO Take 1,000 Units by mouth daily       zolpidem (AMBIEN) 10 MG tablet Take 1 tablet (10 mg) by mouth nightly as needed for sleep 30 tablet 1     OTC products: None, except as noted above    Allergies   Allergen Reactions     Penicillins Hives and Rash     Amoxicillin      Latex Allergy: NO    Social History  "    Tobacco Use     Smoking status: Never Smoker     Smokeless tobacco: Current User     Types: Chew     Tobacco comment: occ   Substance Use Topics     Alcohol use: Yes     Comment: few beers per week     History   Drug Use No       REVIEW OF SYSTEMS:   ENT: Fading URI  No fever no chest pain no cough no bleeding no GI no  symptoms    Constitutional, neuro, ENT, endocrine, pulmonary, cardiac, gastrointestinal, genitourinary, musculoskeletal, integument and psychiatric systems are negative, except as otherwise noted.    This document serves as a record of the services and decisions personally performed and made by Lyndon Stoll MD. It was created on his behalf by Valentino Waller, a trained medical scribe. The creation of this document is based on the provider's statements to the medical scribe.  Valentino Waller November 22, 2019 3:42 PM    EXAM:   /80 (BP Location: Right arm, Patient Position: Chair, Cuff Size: Adult Large)   Pulse 59   Temp 98.5  F (36.9  C) (Oral)   Ht 1.803 m (5' 11\")   Wt 93 kg (205 lb 2 oz)   SpO2 98%   BMI 28.61 kg/m          EYES: EOMI,  PERRL     HENT: ear canals and TM's normal and nose and mouth without ulcers or lesions     NECK: no adenopathy, no asymmetry, masses, or scars and thyroid normal to palpation     RESP: lungs clear to auscultation - no rales, rhonchi or wheezes     HEART: S1S2 RRR no murmur nor apical displacement     ABDOMEN:  soft, nontender, no HSM or masses and bowel sounds normal     MS: Per orthopedics.        NEURO: mentation intact and speech normal     PSYCH: mentation appears normal. and affect normal/bright     LYMPHATICS: No cervical adenopathy    DIAGNOSTICS:   EKG: Not indicated due to non-vascular surgery and low risk of event (age <65 and without cardiac risk factors)    Recent Labs   Lab Test 02/20/18  1156 01/09/17  1414   HGB  --  14.9   PLT  --  364     --    POTASSIUM 4.4  --    CR 0.86  --         IMPRESSION:   Reason for surgery/procedure: " Multifactorial shoulder derangement  Diagnosis/reason for consult: Assess preoperative risk.    The proposed surgical procedure is considered INTERMEDIATE risk.    REVISED CARDIAC RISK INDEX  The patient has the following serious cardiovascular risks for perioperative complications such as (MI, PE, VFib and 3  AV Block):  No serious cardiac risks  INTERPRETATION: 0 risks: Class I (very low risk - 0.4% complication rate)    The patient has the following additional risks for perioperative complications: well controlled asthma          RECOMMENDATIONS:     Take BREO inhaler morning of surgery, stop all other Rx hold all other medications.  Beta-blockers taken at bedtime.  Stop anti-inflammatories    APPROVAL GIVEN to proceed with proposed procedure, without further diagnostic evaluation     The information in this document, created by the medical scribe for me, accurately reflects the services I personally performed and the decisions made by me. I have reviewed and approved this document for accuracy prior to leaving the patient care area.  November 22, 2019 3:43 PM    Signed Electronically by: Lyndon Stoll MD    Copy of this evaluation report is provided to requesting physician.    [place] Preop Guidelines    Revised Cardiac Risk Index

## 2019-11-22 NOTE — PROGRESS NOTES
"[facility]  [address] [facility] [address] [address]  [phone]  Dept: [phone]    PRE-OP EVALUATION:  Today's date: 2019    Venkata Lynn (: 1965) presents for pre-operative evaluation assessment as requested by  ***.  He requires evaluation and anesthesia risk assessment prior to undergoing surgery/procedure for treatment of *** .    Fax number for surgical facility: ***  Primary Physician: Lyndon Stoll  Type of Anesthesia Anticipated: {ANESTHESIA:926466}    Patient has a Health Care Directive or Living Will:  {YES/NO:440347::\"NO\"}    Preop Questions 2019   Who is doing your surgery? Dr. Curly Milligan   What are you having done? Right shoulder rotator cuff repair   Date of Surgery/Procedure: 2019   Facility or Hospital where procedure/surgery will be performed: [facility]   1.  Do you have a history of Heart attack, stroke, stent, coronary bypass surgery, or other heart surgery? No   2.  Do you ever have any pain or discomfort in your chest? No   3.  Do you have a history of  Heart Failure? No   4.   Are you troubled by shortness of breath when:  walking on a level surface, or up a slight hill, or at night? No   5.  Do you currently have a cold, bronchitis or other respiratory infection? YES - ***         HPI:     HPI related to upcoming procedure: ***      {. Problems:697696}    MEDICAL HISTORY:     Patient Active Problem List    Diagnosis Date Noted     Rotator cuff tear 10/16/2019     Priority: Medium     Added automatically from request for surgery [id number]       Rotator cuff syndrome, right 2019     Priority: Medium     Lateral epicondylitis of left elbow 2018     Priority: Medium     Sprain of left rotator cuff capsule, initial encounter 2017     Priority: Medium     Aftercare following surgery of the musculoskeletal system 2017     Priority: Medium     Shoulder pain, left 2016     " Priority: Medium     Complete tear of right rotator cuff 10/10/2016     Priority: Medium     Familial tremor 08/16/2016     Priority: Medium     Dyspepsia 08/16/2016     Priority: Medium     Mild persistent asthma without complication 08/16/2016     Priority: Medium     Cyst of meniscus of left knee 01/28/2016     Priority: Medium     Cyst of meniscus of right knee 01/28/2016     Priority: Medium     Controlled substance agreement signed 01/11/2016     Priority: Medium     Patient is followed by SAMUEL BAEZA for ongoing prescription of benzodiazepines.  All refills should be approved by this provider, or covering partner.    Medication(s): alprazolam    Maximum quantity per month: 30 ea  Clinic visit frequency required: Q 3  months     Controlled substance agreement on file: Yes       Date(s):  2/12/19  Benzodiazepine use reviewed by psychiatry:      Last [facility] website verification:  Done 4.11.17 2/12/19   [email or web address]           Elevated blood pressure reading without diagnosis of hypertension 01/11/2016     Priority: Medium     Arthralgia of both knees 09/08/2015     Priority: Medium     Adjustment reaction 05/02/2014     Priority: Medium     Problem list name updated by automated process. Provider to review       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     Priority: Medium     Rosacea 01/18/2012     Priority: Medium     Sebaceous hyperplasia 01/18/2012     Priority: Medium     Tobacco abuse 01/18/2012     Priority: Medium     Rhinitis 01/18/2012     Priority: Medium     Anxiety 03/28/2011     Priority: Medium     Patient is followed by SAMUEL BAEZA for ongoing prescription of benzodiazepines.  All refills should be approved by this provider, or covering partner.    Medication(s): Xanax 1 mg.   Maximum quantity per month: 30  Clinic visit frequency required: Q 6  months     Controlled substance agreement on file: Yes       Date(s): 2/12/19  Benzodiazepine use reviewed by psychiatry:  No    Last [facility] website  verification:  9/20/19   [email or web address]           CARDIOVASCULAR SCREENING; LDL GOAL LESS THAN 160 10/31/2010     Priority: Medium     1.7% 10 yr risk 2015        Past Medical History:   Diagnosis Date     Acne vulgaris      Anxiety 3/28/2011     Anxiety disorder      Arthritis     both knees     CARDIOVASCULAR SCREENING; LDL GOAL LESS THAN 160 10/31/2010     Controlled substance agreement signed 1/11/2016     Coughing     at night     Dyspepsia 8/16/2016     Elevated blood pressure reading without diagnosis of hypertension 1/11/2016     Familial tremor 8/16/2016     Family history of rhinophyma 1/18/2012     Insomnia      Intermittent asthma 3/28/2011     Knee pain 1/18/2012     Lateral epicondylitis of left elbow 9/14/2018     LBP (low back pain) 1/18/2012     Low back pains      Mild persistent asthma without complication 8/16/2016     Rhinitis 1/18/2012     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     Rosacea 1/18/2012     Rotator cuff syndrome, right 2/12/2019     Sebaceous hyperplasia 1/18/2012     Shortness of breath     due to asthma     Tobacco abuse 1/18/2012     Past Surgical History:   Procedure Laterality Date     ARTHROSCOPY KNEE WITH MEDIAL MENISCECTOMY Left 2/22/2016    Procedure: ARTHROSCOPY KNEE WITH MEDIAL MENISCECTOMY;  Surgeon: Chaz Moe MD;  Location: [facility] OR     ENT SURGERY      lipoma removed from neck     ESOPHAGOSCOPY, GASTROSCOPY, DUODENOSCOPY (EGD), COMBINED N/A 9/20/2019    Procedure: ESOPHAGOGASTRODUODENOSCOPY (EGD);  Surgeon: Ethan Davidson MD;  Location:  GI     HEAD & NECK SURGERY      fx root of tooth     ROTATOR CUFF REPAIR RT/LT Left 01/2017     VASECTOMY       Current Outpatient Medications   Medication Sig Dispense Refill     albuterol (PROAIR HFA/PROVENTIL HFA/VENTOLIN HFA) 108 (90 BASE) MCG/ACT Inhaler Inhale 2 puffs into the lungs every 6 hours as needed for shortness of breath / dyspnea 1 Inhaler 11     ALPRAZolam (XANAX) 1 MG tablet TAKE ONE TABLET BY MOUTH ONCE  "DAILY AS NEEDED FOR ANXIETY 30 tablet 0     doxycycline hyclate (PERIOSTAT) 20 MG tablet Take 1 tablet (20 mg) by mouth 2 times daily       fluticasone-vilanterol (BREO ELLIPTA) 200-25 MCG/INH inhaler INHALE ONE PUFF BY MOUTH ONCE DAILY (NEED TO BE SEEN IN CLINIC FOR FURTHER REFILLS) 3 Inhaler 3     IBUPROFEN PO        montelukast (SINGULAIR) 10 MG tablet Take 1 tablet (10 mg) by mouth At Bedtime 90 tablet 3     Multiple Vitamins-Minerals (MULTIVITAMIN ADULT PO) Take 1 tablet by mouth       propranolol ER (INDERAL LA) 80 MG 24 hr capsule TAKE ONE CAPSULE BY MOUTH ONCE DAILY 30 capsule 0     RANITIDINE HCL PO Take 300 mg by mouth as needed for heartburn       vitamin  B complex with vitamin C (VITAMIN  B COMPLEX) TABS Take 1 tablet by mouth daily       VITAMIN D, CHOLECALCIFEROL, PO Take 1,000 Units by mouth daily       zolpidem (AMBIEN) 10 MG tablet Take 1 tablet (10 mg) by mouth nightly as needed for sleep 30 tablet 1     OTC products: {OTC ANALGESICS:450383}    Allergies   Allergen Reactions     Penicillins Hives and Rash     Amoxicillin      Latex Allergy: {YES/NO WITH DEFAULT:130355::\"NO\"}    Social History     Tobacco Use     Smoking status: Never Smoker     Smokeless tobacco: Current User     Types: Chew     Tobacco comment: occ   Substance Use Topics     Alcohol use: Yes     Comment: few beers per week     History   Drug Use No       REVIEW OF SYSTEMS:   {ROS Preop Choices:703426}    EXAM:   There were no vitals taken for this visit.  {EXAM Preop Choices:992442}    DIAGNOSTICS:   {DIAGNOSTIC FOR PREOP:332138}    Recent Labs   Lab Test 02/20/18  1156 01/09/17  1414   HGB  --  14.9   PLT  --  364     --    POTASSIUM 4.4  --    CR 0.86  --         IMPRESSION:   {PREOP REASONS:983656::\"Reason for surgery/procedure: ***\",\"Diagnosis/reason for consult: ***\"}    The proposed surgical procedure is considered {HIGH=major cardiovascular or procedures requiring prolonged anesthesia >4 hours or large fluid shifts;  " "  INTERMEDIATE=abdominal, most orthopedic and intrathoracic surgery; LOW= endoscopy, cataract and breast surgery:275974} risk.    REVISED CARDIAC RISK INDEX  The patient has the following serious cardiovascular risks for perioperative complications such as (MI, PE, VFib and 3  AV Block):  {PREOP REVISED CARDIAC INDEX (RCI):297804:p:\"No serious cardiac risks\"}  INTERPRETATION: {REVISED CARDIAC RISK INTERPRETATION:025582}    The patient has the following additional risks for perioperative complications:  {Additional perioperative risks:383621:p:\"No identified additional risks\"}      ICD-10-CM    1. Preop general physical exam Z01.818    2. Familial tremor G25.0 propranolol ER (INDERAL LA) 80 MG 24 hr capsule       RECOMMENDATIONS:     {IMPORTANT - Conditions - complete carefully!!:877107}    {IMPORTANT - Medications:571994::\"--Patient is to take all scheduled medications on the day of surgery EXCEPT for modifications listed below.\"}    {IMPORTANT - Approval:123375:p:\"APPROVAL GIVEN to proceed with proposed procedure, without further diagnostic evaluation\"}       Signed Electronically by: Lyndon Stoll MD    Copy of this evaluation report is provided to requesting physician.    [facility] Preop Guidelines    Revised Cardiac Risk Index  "

## 2019-11-23 LAB
ANION GAP SERPL CALCULATED.3IONS-SCNC: 6 MMOL/L (ref 3–14)
BUN SERPL-MCNC: 17 MG/DL (ref 7–30)
CALCIUM SERPL-MCNC: 9.2 MG/DL (ref 8.5–10.1)
CHLORIDE SERPL-SCNC: 105 MMOL/L (ref 94–109)
CO2 SERPL-SCNC: 24 MMOL/L (ref 20–32)
CREAT SERPL-MCNC: 0.95 MG/DL (ref 0.66–1.25)
GFR SERPL CREATININE-BSD FRML MDRD: >90 ML/MIN/{1.73_M2}
GLUCOSE SERPL-MCNC: 86 MG/DL (ref 70–99)
POTASSIUM SERPL-SCNC: 3.9 MMOL/L (ref 3.4–5.3)
SODIUM SERPL-SCNC: 135 MMOL/L (ref 133–144)

## 2019-12-11 ENCOUNTER — ANESTHESIA EVENT (OUTPATIENT)
Dept: SURGERY | Facility: AMBULATORY SURGERY CENTER | Age: 54
End: 2019-12-11

## 2019-12-12 ENCOUNTER — TELEPHONE (OUTPATIENT)
Dept: NURSING | Facility: CLINIC | Age: 54
End: 2019-12-12

## 2019-12-12 ENCOUNTER — HOSPITAL ENCOUNTER (OUTPATIENT)
Facility: AMBULATORY SURGERY CENTER | Age: 54
End: 2019-12-12
Attending: ORTHOPAEDIC SURGERY
Payer: COMMERCIAL

## 2019-12-12 ENCOUNTER — ANESTHESIA (OUTPATIENT)
Dept: SURGERY | Facility: AMBULATORY SURGERY CENTER | Age: 54
End: 2019-12-12

## 2019-12-12 VITALS
TEMPERATURE: 97.4 F | WEIGHT: 205 LBS | HEART RATE: 62 BPM | HEIGHT: 71 IN | BODY MASS INDEX: 28.7 KG/M2 | RESPIRATION RATE: 14 BRPM | OXYGEN SATURATION: 95 % | SYSTOLIC BLOOD PRESSURE: 121 MMHG | DIASTOLIC BLOOD PRESSURE: 75 MMHG

## 2019-12-12 DIAGNOSIS — M75.101 ROTATOR CUFF SYNDROME, RIGHT: Primary | ICD-10-CM

## 2019-12-12 DIAGNOSIS — M75.100 ROTATOR CUFF TEAR: ICD-10-CM

## 2019-12-12 DEVICE — IMP ANCHOR ARTHREX BC SWVLK TENODESIS 6.25X19.1MM AR-1662BC: Type: IMPLANTABLE DEVICE | Site: SHOULDER | Status: FUNCTIONAL

## 2019-12-12 DEVICE — IMP ANCHOR ARTHREX BIO-SWIVELOCK 4.75X22MM AR-2324BCC-2: Type: IMPLANTABLE DEVICE | Site: SHOULDER | Status: FUNCTIONAL

## 2019-12-12 DEVICE — IMP ANCHOR ARTHREX 5.5MM BIOCOMP CRKSCR TIGRTL AR-1927BCFT: Type: IMPLANTABLE DEVICE | Site: SHOULDER | Status: FUNCTIONAL

## 2019-12-12 RX ORDER — GABAPENTIN 300 MG/1
300 CAPSULE ORAL AT BEDTIME
Qty: 30 CAPSULE | Refills: 0 | Status: SHIPPED | OUTPATIENT
Start: 2019-12-12 | End: 2020-01-27

## 2019-12-12 RX ORDER — FENTANYL CITRATE 50 UG/ML
25-50 INJECTION, SOLUTION INTRAMUSCULAR; INTRAVENOUS
Status: DISCONTINUED | OUTPATIENT
Start: 2019-12-12 | End: 2019-12-12 | Stop reason: HOSPADM

## 2019-12-12 RX ORDER — NALOXONE HYDROCHLORIDE 0.4 MG/ML
.1-.4 INJECTION, SOLUTION INTRAMUSCULAR; INTRAVENOUS; SUBCUTANEOUS
Status: DISCONTINUED | OUTPATIENT
Start: 2019-12-12 | End: 2019-12-13 | Stop reason: HOSPADM

## 2019-12-12 RX ORDER — SODIUM CHLORIDE, SODIUM LACTATE, POTASSIUM CHLORIDE, CALCIUM CHLORIDE 600; 310; 30; 20 MG/100ML; MG/100ML; MG/100ML; MG/100ML
INJECTION, SOLUTION INTRAVENOUS CONTINUOUS
Status: DISCONTINUED | OUTPATIENT
Start: 2019-12-12 | End: 2019-12-12 | Stop reason: HOSPADM

## 2019-12-12 RX ORDER — ONDANSETRON 2 MG/ML
INJECTION INTRAMUSCULAR; INTRAVENOUS PRN
Status: DISCONTINUED | OUTPATIENT
Start: 2019-12-12 | End: 2019-12-12

## 2019-12-12 RX ORDER — OXYCODONE HYDROCHLORIDE 5 MG/1
5 TABLET ORAL EVERY 6 HOURS PRN
Qty: 40 TABLET | Refills: 0 | Status: SHIPPED | OUTPATIENT
Start: 2019-12-12 | End: 2020-01-27

## 2019-12-12 RX ORDER — NALOXONE HYDROCHLORIDE 0.4 MG/ML
.1-.4 INJECTION, SOLUTION INTRAMUSCULAR; INTRAVENOUS; SUBCUTANEOUS
Status: DISCONTINUED | OUTPATIENT
Start: 2019-12-12 | End: 2019-12-12 | Stop reason: HOSPADM

## 2019-12-12 RX ORDER — MEPERIDINE HYDROCHLORIDE 25 MG/ML
12.5 INJECTION INTRAMUSCULAR; INTRAVENOUS; SUBCUTANEOUS
Status: DISCONTINUED | OUTPATIENT
Start: 2019-12-12 | End: 2019-12-13 | Stop reason: HOSPADM

## 2019-12-12 RX ORDER — HYDROXYZINE PAMOATE 25 MG/1
25 CAPSULE ORAL 3 TIMES DAILY PRN
Qty: 30 CAPSULE | Refills: 0 | Status: SHIPPED | OUTPATIENT
Start: 2019-12-12 | End: 2020-06-15

## 2019-12-12 RX ORDER — OXYCODONE HYDROCHLORIDE 5 MG/1
5 TABLET ORAL EVERY 4 HOURS PRN
Status: DISCONTINUED | OUTPATIENT
Start: 2019-12-12 | End: 2019-12-13 | Stop reason: HOSPADM

## 2019-12-12 RX ORDER — BUPIVACAINE HYDROCHLORIDE 5 MG/ML
INJECTION, SOLUTION EPIDURAL; INTRACAUDAL PRN
Status: DISCONTINUED | OUTPATIENT
Start: 2019-12-12 | End: 2019-12-12

## 2019-12-12 RX ORDER — GABAPENTIN 300 MG/1
300 CAPSULE ORAL ONCE
Status: COMPLETED | OUTPATIENT
Start: 2019-12-12 | End: 2019-12-12

## 2019-12-12 RX ORDER — LIDOCAINE 40 MG/G
CREAM TOPICAL
Status: DISCONTINUED | OUTPATIENT
Start: 2019-12-12 | End: 2019-12-12 | Stop reason: HOSPADM

## 2019-12-12 RX ORDER — ONDANSETRON 4 MG/1
4 TABLET, ORALLY DISINTEGRATING ORAL EVERY 30 MIN PRN
Status: DISCONTINUED | OUTPATIENT
Start: 2019-12-12 | End: 2019-12-13 | Stop reason: HOSPADM

## 2019-12-12 RX ORDER — PROPOFOL 10 MG/ML
INJECTION, EMULSION INTRAVENOUS CONTINUOUS PRN
Status: DISCONTINUED | OUTPATIENT
Start: 2019-12-12 | End: 2019-12-12

## 2019-12-12 RX ORDER — ACETAMINOPHEN 325 MG/1
975 TABLET ORAL ONCE
Status: COMPLETED | OUTPATIENT
Start: 2019-12-12 | End: 2019-12-12

## 2019-12-12 RX ORDER — FLUMAZENIL 0.1 MG/ML
0.2 INJECTION, SOLUTION INTRAVENOUS
Status: DISCONTINUED | OUTPATIENT
Start: 2019-12-12 | End: 2019-12-12 | Stop reason: HOSPADM

## 2019-12-12 RX ORDER — PROPOFOL 10 MG/ML
INJECTION, EMULSION INTRAVENOUS PRN
Status: DISCONTINUED | OUTPATIENT
Start: 2019-12-12 | End: 2019-12-12

## 2019-12-12 RX ORDER — IBUPROFEN 800 MG/1
800 TABLET, FILM COATED ORAL
Qty: 42 TABLET | Refills: 0 | Status: SHIPPED | OUTPATIENT
Start: 2019-12-12 | End: 2020-06-15

## 2019-12-12 RX ORDER — DEXAMETHASONE SODIUM PHOSPHATE 4 MG/ML
INJECTION, SOLUTION INTRA-ARTICULAR; INTRALESIONAL; INTRAMUSCULAR; INTRAVENOUS; SOFT TISSUE PRN
Status: DISCONTINUED | OUTPATIENT
Start: 2019-12-12 | End: 2019-12-12

## 2019-12-12 RX ORDER — FENTANYL CITRATE 50 UG/ML
INJECTION, SOLUTION INTRAMUSCULAR; INTRAVENOUS PRN
Status: DISCONTINUED | OUTPATIENT
Start: 2019-12-12 | End: 2019-12-12

## 2019-12-12 RX ORDER — SODIUM CHLORIDE, SODIUM LACTATE, POTASSIUM CHLORIDE, CALCIUM CHLORIDE 600; 310; 30; 20 MG/100ML; MG/100ML; MG/100ML; MG/100ML
INJECTION, SOLUTION INTRAVENOUS CONTINUOUS
Status: DISCONTINUED | OUTPATIENT
Start: 2019-12-12 | End: 2019-12-13 | Stop reason: HOSPADM

## 2019-12-12 RX ORDER — ONDANSETRON 2 MG/ML
4 INJECTION INTRAMUSCULAR; INTRAVENOUS EVERY 30 MIN PRN
Status: DISCONTINUED | OUTPATIENT
Start: 2019-12-12 | End: 2019-12-13 | Stop reason: HOSPADM

## 2019-12-12 RX ORDER — KETAMINE HYDROCHLORIDE 10 MG/ML
INJECTION, SOLUTION INTRAMUSCULAR; INTRAVENOUS PRN
Status: DISCONTINUED | OUTPATIENT
Start: 2019-12-12 | End: 2019-12-12

## 2019-12-12 RX ADMIN — FENTANYL CITRATE 25 MCG: 50 INJECTION, SOLUTION INTRAMUSCULAR; INTRAVENOUS at 09:59

## 2019-12-12 RX ADMIN — DEXAMETHASONE SODIUM PHOSPHATE 10 MG: 4 INJECTION, SOLUTION INTRA-ARTICULAR; INTRALESIONAL; INTRAMUSCULAR; INTRAVENOUS; SOFT TISSUE at 10:32

## 2019-12-12 RX ADMIN — PROPOFOL 150 MCG/KG/MIN: 10 INJECTION, EMULSION INTRAVENOUS at 10:25

## 2019-12-12 RX ADMIN — SODIUM CHLORIDE, SODIUM LACTATE, POTASSIUM CHLORIDE, CALCIUM CHLORIDE: 600; 310; 30; 20 INJECTION, SOLUTION INTRAVENOUS at 10:03

## 2019-12-12 RX ADMIN — BUPIVACAINE HYDROCHLORIDE 15 ML: 5 INJECTION, SOLUTION EPIDURAL; INTRACAUDAL at 10:04

## 2019-12-12 RX ADMIN — FENTANYL CITRATE 50 MCG: 50 INJECTION, SOLUTION INTRAMUSCULAR; INTRAVENOUS at 10:23

## 2019-12-12 RX ADMIN — PROPOFOL 30 MG: 10 INJECTION, EMULSION INTRAVENOUS at 10:24

## 2019-12-12 RX ADMIN — GABAPENTIN 300 MG: 300 CAPSULE ORAL at 09:43

## 2019-12-12 RX ADMIN — OXYCODONE HYDROCHLORIDE 5 MG: 5 TABLET ORAL at 12:53

## 2019-12-12 RX ADMIN — PROPOFOL: 10 INJECTION, EMULSION INTRAVENOUS at 12:13

## 2019-12-12 RX ADMIN — KETAMINE HYDROCHLORIDE 10 MG: 10 INJECTION, SOLUTION INTRAMUSCULAR; INTRAVENOUS at 12:16

## 2019-12-12 RX ADMIN — ONDANSETRON 4 MG: 2 INJECTION INTRAMUSCULAR; INTRAVENOUS at 10:32

## 2019-12-12 RX ADMIN — KETAMINE HYDROCHLORIDE 20 MG: 10 INJECTION, SOLUTION INTRAMUSCULAR; INTRAVENOUS at 10:25

## 2019-12-12 RX ADMIN — ACETAMINOPHEN 975 MG: 325 TABLET ORAL at 09:43

## 2019-12-12 ASSESSMENT — MIFFLIN-ST. JEOR: SCORE: 1802

## 2019-12-12 NOTE — OR NURSING
Patient received right side Supraclavicular nerve block  without Exparel.  Fentanyl 25mcg and Versed 2mg given. Tolerated procedure well.

## 2019-12-12 NOTE — ANESTHESIA PREPROCEDURE EVALUATION
Anesthesia Pre-Procedure Evaluation    Patient: Venkata Lynn   MRN:     [id number] Gender:   male   Age:    54 year old :      1965        Preoperative Diagnosis: Rotator cuff tear [M75.100]   Procedure(s):  Right Arthroscopic rotator cuff repair, arthroscopic subacromial decompression, arthroscopic biceps tenodesis, arthroscopic distal clavicle excision     Past Medical History:   Diagnosis Date     Acne vulgaris      Anxiety 3/28/2011     Anxiety disorder      Arthritis     both knees     CARDIOVASCULAR SCREENING; LDL GOAL LESS THAN 160 10/31/2010     Controlled substance agreement signed 2016     Coughing     at night     Dyspepsia 2016     Elevated blood pressure reading without diagnosis of hypertension 2016     Familial tremor 2016     Family history of rhinophyma 2012     Insomnia      Intermittent asthma 3/28/2011     Knee pain 2012     Lateral epicondylitis of left elbow 2018     LBP (low back pain) 2012     Low back pains      Mild persistent asthma without complication 2016     Rhinitis 2012     Rhinophyma 2012     Rosacea 2012     Rotator cuff syndrome, right 2019     Sebaceous hyperplasia 2012     Shortness of breath     due to asthma     Tobacco abuse 2012     Trigger finger, acquired 2019      Past Surgical History:   Procedure Laterality Date     ARTHROSCOPY KNEE WITH MEDIAL MENISCECTOMY Left 2016    Procedure: ARTHROSCOPY KNEE WITH MEDIAL MENISCECTOMY;  Surgeon: Chaz Moe MD;  Location:  OR     ENT SURGERY      lipoma removed from neck     ESOPHAGOSCOPY, GASTROSCOPY, DUODENOSCOPY (EGD), COMBINED N/A 2019    Procedure: ESOPHAGOGASTRODUODENOSCOPY (EGD);  Surgeon: Ethan Davidson MD;  Location:  GI     HEAD & NECK SURGERY      fx root of tooth     ROTATOR CUFF REPAIR RT/LT Left 2017     VASECTOMY            Anesthesia Evaluation     .             ROS/MED HX    ENT/Pulmonary:      (+)Intermittent asthma Treatment: Inhaler prn,  , . .    Neurologic:  - neg neurologic ROS     Cardiovascular:     (+) hypertension----. : . . . :. .       METS/Exercise Tolerance:     Hematologic:  - neg hematologic  ROS       Musculoskeletal:   (+)  other musculoskeletal- Right rotator cuff injury      GI/Hepatic:  - neg GI/hepatic ROS       Renal/Genitourinary:  - ROS Renal section negative       Endo:  - neg endo ROS       Psychiatric:  - neg psychiatric ROS       Infectious Disease:  - neg infectious disease ROS       Malignancy:      - no malignancy   Other:                         PHYSICAL EXAM:   Mental Status/Neuro: A/A/O   Airway: Facies: Feasible  Mallampati: I  Mouth/Opening: Full  TM distance: > 6 cm  Neck ROM: Full   Respiratory: Auscultation: CTAB     Resp. Rate: Normal     Resp. Effort: Normal      CV: Rhythm: Regular  Rate: Age appropriate  Heart: Normal Sounds  Edema: None   Comments:      Dental: Normal Dentition                LABS:  CBC:   Lab Results   Component Value Date    WBC 7.7 11/22/2019    WBC 9.6 01/09/2017    HGB 14.1 11/22/2019    HGB 14.9 01/09/2017    HCT 41.9 11/22/2019    HCT 44.3 01/09/2017     11/22/2019     01/09/2017     BMP:   Lab Results   Component Value Date     11/22/2019     02/20/2018    POTASSIUM 3.9 11/22/2019    POTASSIUM 4.4 02/20/2018    CHLORIDE 105 11/22/2019    CHLORIDE 104 02/20/2018    CO2 24 11/22/2019    CO2 30 02/20/2018    BUN 17 11/22/2019    BUN 15 02/20/2018    CR 0.95 11/22/2019    CR 0.86 02/20/2018    GLC 86 11/22/2019    GLC 86 02/20/2018     COAGS: No results found for: PTT, INR, FIBR  POC: No results found for: BGM, HCG, HCGS  OTHER:   Lab Results   Component Value Date    TENZIN 9.2 11/22/2019    ALBUMIN 4.1 02/20/2018    PROTTOTAL 7.7 02/20/2018    ALT 35 02/20/2018    AST 26 02/20/2018    ALKPHOS 81 02/20/2018    BILITOTAL 0.4 02/20/2018    TSH 2.30 04/29/2014        Preop Vitals    BP Readings from Last 3 Encounters:  "  11/22/19 130/80   09/20/19 119/79   08/27/19 128/82    Pulse Readings from Last 3 Encounters:   11/22/19 59   09/20/19 66   08/27/19 84      Resp Readings from Last 3 Encounters:   09/20/19 12   08/27/19 19   05/14/19 16    SpO2 Readings from Last 3 Encounters:   11/22/19 98%   09/20/19 94%   08/27/19 97%      Temp Readings from Last 1 Encounters:   11/22/19 36.9  C (98.5  F) (Oral)    Ht Readings from Last 1 Encounters:   11/22/19 1.803 m (5' 11\")      Wt Readings from Last 1 Encounters:   11/22/19 93 kg (205 lb 2 oz)    Estimated body mass index is 28.61 kg/m  as calculated from the following:    Height as of 11/22/19: 1.803 m (5' 11\").    Weight as of 11/22/19: 93 kg (205 lb 2 oz).     LDA:        Assessment:   ASA SCORE: 2    H&P: History and physical reviewed and following examination; no interval change.   Smoking Status:  Non-Smoker/Unknown   NPO Status: NPO Appropriate     Plan:   Anes. Type:  General; Peripheral Nerve Block     Block Details: Single Shot; Exparel; Interscalene   Pre-Medication: None   Induction:  IV (Standard)   Airway: LMA   Access/Monitoring: PIV   Maintenance: Balanced     Postop Plan:   Postop Pain: Opioids  Postop Sedation/Airway: Not planned  Disposition: Outpatient     PONV Management:   Adult Risk Factors:, Non-Smoker, Postop Opioids   Prevention: Ondansetron, Dexamethasone     CONSENT: Direct conversation   Plan and risks discussed with: Patient                      Chaz Antoine MD  "

## 2019-12-12 NOTE — DISCHARGE INSTRUCTIONS
"[facility]  Home Care Following Anesthesia  For 24 hours after surgery:  1. Get plenty of rest.  A responsible adult must stay with you for at least 24 hours after you leave the surgery center.  2. Do not drive or use heavy equipment.  If you have weakness or tingling, don't drive or use heavy equipment until this feeling goes away.   3. Do not drink alcohol.   4. Avoid strenuous or risky activities.  Ask for help when climbing stairs.  5. You may feel lightheaded.  IF so, sit for a few minutes before standing.  Have someone help you get up.   6. If you have nausea (feel sick to your stomach): Drink only clear liquids such as apple juice, ginger ale, broth or 7-Up.  Rest may also help.  Be sure to drink enough fluids.  Move to a regular diet as you feel able.   7. You may have a slight fever.  Call the doctor if your fever is over 100 F (37.7 C) (taken under the tongue) or lasts longer than 24 hours.  8. You may have a dry mouth, a sore throat, muscle aches or trouble sleeping. These should go away after 24 hours.  9. Do not make important or legal decisions.        Today you received a Marcaine or bupivacaine block to numb the nerves near your surgery site.  This is a block using local anesthetic or \"numbing\" medication injected around the nerves to anesthetize or \"numb\" the area supplied by those nerves.  This block is injected into the muscle layer near your surgical site.  The medication may numb the location where you had surgery for 6-18 hours, but may last up to 24 hours.  If your surgical site is an arm or leg you should be careful with your affected limb, since it is possible to injure your limb without being aware of it due to the numbing.  Until full feeling returns, you should guard against bumping or hitting your limb, and avoid extreme hot or cold temperatures on the skin.  As the block wears off, the feeling will return as a tingling or prickly sensation near your " surgical site.  You will experience more discomfort from your incision as the feeling returns.  You may want to take a pain pill (a narcotic or Tylenol if this was prescribed by your surgeon) when you start to experience mild pain before the pain beccomes more severe.  If your pain medications do not control your pain you should notifiy your surgeon.    Tips for taking pain medications  To get the best pain relief possible, remember these points:    Take pain medications as directed, before pain becomes severe.    Pain medication can upset your stomach: taking it with food may help.    Constipation is a common side effect of pain medication. Drink plenty of  fluids.    Eat foods high in fiber. Take a stool softener if recommended by your doctor or pharmacist.    Do not drink alcohol, drive or operate machinery while taking pain medications.    Ask about other ways to control pain, such as with heat, ice or relaxation.    Tylenol/Acetaminophen Consumption  To help encourage the safe use of acetaminophen, the makers of TYLENOL  have lowered the maximum daily dose for single-ingredient Extra Strength TYLENOL  (acetaminophen) products sold in the [place] from 8 pills per day (4,000 mg) to 6 pills per day (3,000 mg). The dosing interval has also changed from 2 pills every 4-6 hours to 2 pills every 6 hours.    If you feel your pain relief is insufficient, you may take Tylenol/Acetaminophen in addition to your narcotic pain medication.     Be careful not to exceed 3,000 mg of Tylenol/Acetaminophen in a 24 hour period from all sources.    If you are taking extra strength Tylenol/acetaminophen (500 mg), the maximum dose is 6 tablets in 24 hours.    If you are taking regular strength acetaminophen (325 mg), the maximum dose is 9 tablets in 24 hours.  975 mg of Tylenol given at 9:45 am next dose may be given after 3:45 pm    Call a doctor for any of the followin. Signs of infection (fever, growing tenderness at the surgery  site, a large amount of drainage or bleeding, severe pain, foul-smelling drainage, redness, swelling).  2. It has been over 8 to 10 hours since surgery and you are still not able to urinate (pass water).  3. Headache for over 24 hours.    Your doctor is:  Dr. Curly Milligan, Orthopaedics: [phone]  Or dial [phone] and ask for the resident on call for:  Orthopaedics  For emergency care, call the:  [facility] Emergency Department: [phone] (TTY for hearing impaired: [phone])    Post Operative Instructions: Regional Anesthetic for Upper Extremity    General Information:   Regional anesthesia is when local anesthetic or  numbing  medication is injected around the nerves to anesthetize or  numb  the area supplied by that set of nerves.      Types of Regional Blocks:  Interscalene: A block injected into the neck on the operative shoulder/arm of a patient having shoulder surgery  Supraclavicular: A block injected near the clavicle on the operative shoulder of a patient having elbow, forearm, or hand surgery    Procedure:  The type of anesthesia your doctor used to numb your shoulder or arm will usually not wear off for 6-18 hours, but may last as long as 24 hours. You should be careful during that period, since it is possible to injure your arm without being aware of the injury. While your arm is numb, you should:    Avoid striking or bumping your arm    Avoid extreme hot or cold    Diet:  There are no restrictions on your diet. You should drink plenty of fluids.     Discomfort:  You will have a tingling and prickly sensation in your arm as the feeling begins to return. You can also expect some discomfort. The amount of discomfort is unpredictable, but if you have more pain than can be controlled with pain medication you should notify your physician.     Pain Medicine:   Begin taking your oral pain pills (if you have not already done so) before bedtime and during the night to avoid a sudden onset of pain  as the block wears off.  Do not engage in drinking, driving, or hazardous occupations while taking pain medication.     Stitches:   You may have stitches or special skin closures. You doctor will inform you when to return to the office to have them removed.     Activity:  On the day of surgery you should try to stay in bed with your hand elevated on pillows. You may resume your normal activity after that, wearing a sling for comfort. Contact your physician if you have any of the problems:     Continued numbness or tingling in the arm or hand after 24 hours    Swelling of the fingers or fingers that are cold to the touch    Excessive bleeding or drainage    Severe pain

## 2019-12-12 NOTE — BRIEF OP NOTE
[facility]    Brief Operative Note    Pre-operative diagnosis: Rotator cuff tear [M75.100]  Post-operative diagnosis Same as pre-operative diagnosis    Procedure: Procedure(s):  Right Arthroscopic Rotator Cuff Repair, Arthroscopic Subacromial Decompression, Arthroscopic Biceps Tenodesis, Arthroscopic Distal Clavicle Excision  Surgeon: Surgeon(s) and Role:     * Nils Milligan MD - Primary     * Long Nunes MD - Fellow - Assisting  Anesthesia: General with Adductor Block   Estimated blood loss: * No values recorded between 12/12/2019 10:53 AM and 12/12/2019 12:30 PM *  Drains: None  Specimens: * No specimens in log *  Findings:   None.  Complications: None.  Implants:   Implant Name Type Inv. Item Serial No.  Lot No. LRB No. Used   IMP ANCHOR ARTHREX BC SWVLK TENODESIS [id number] Metallic Hardware/[place] IMP ANCHOR ARTHREX BC SWVLK TENODESIS 6.25X19.1MM [id number]  ARTHREX [id number] Right 1   IMP ANCHOR ARTHREX 5.5MM BIOCOMP CRKSCR TIGRTL [id number] Metallic Hardware/[place] IMP ANCHOR ARTHREX 5.5MM BIOCOMP CRKSCR TIGRTL [id number]  ARTHREX [id number] Right 1   IMP ANCHOR ARTHREX BIO-SWIVELOCK [id number] Metallic Hardware/[place] IMP ANCHOR ARTHREX BIO-SWIVELOCK [id number]  ARTHREX [id number] Right 2

## 2019-12-12 NOTE — ANESTHESIA PROCEDURE NOTES
Peripheral Nerve Block Procedure Note    Staff:     Anesthesiologist:  Anthony Sharma DO    Resident/CRNA:  Chaz Antoine MD    Block performed by resident/CRNA in the presence of a teaching physician    Location: Pre-op  Procedure Start/Stop TImes:     patient identified, IV checked, site marked, risks and benefits discussed, informed consent, monitors and equipment checked, pre-op evaluation, at physician/surgeon's request and post-op pain management      Correct Patient: Yes      Correct Position: Yes      Correct Site: Yes      Correct Procedure: Yes      Correct Laterality:  Yes    Site Marked:  Yes  Procedure details:     Procedure:  Interscalene    Laterality:  Right    Position:  Supine    Sterile Prep: chloraprep, mask and sterile gloves      Needle:  Short bevel    Needle gauge:  21    Needle length (mm):  100    Ultrasound: Yes      Ultrasound used to identify targeted nerve, plexus, or vascular structure and placed a needle adjacent to it      Permanent Image entered into patiient's record      Abnormal pain on injection: No      Blood Aspirated: No      Paresthesias:  No    Bleeding at site: No      Test dose negative for signs of intravascular injection: Yes      Bolus via:  Needle    Infusion Method:  Single Shot    Blood aspirated via catheter: No      Complications:  None  Assessment/Narrative:     Injection made incrementally with aspirations every (mL):  5

## 2019-12-12 NOTE — ANESTHESIA POSTPROCEDURE EVALUATION
Anesthesia POST Procedure Evaluation    Patient: Venkata Lynn   MRN:     [id number] Gender:   male   Age:    54 year old :      1965        Preoperative Diagnosis: Rotator cuff tear [M75.100]   Procedure(s):  Right Arthroscopic Rotator Cuff Repair, Arthroscopic Subacromial Decompression, Arthroscopic Biceps Tenodesis, Arthroscopic Distal Clavicle Excision   Postop Comments: No value filed.       Anesthesia Type:  Not documented  General, Peripheral Nerve Block    Reportable Event: NO     PAIN: Uncomplicated   Sign Out status: Comfortable, Well controlled pain     PONV: No PONV   Sign Out status:  No Nausea or Vomiting     Neuro/Psych: Uneventful perioperative course   Sign Out Status: Preoperative baseline; Age appropriate mentation     Airway/Resp.: Uneventful perioperative course   Sign Out Status: Non labored breathing, age appropriate RR; Resp. Status within EXPECTED Parameters     CV: Uneventful perioperative course   Sign Out status: Appropriate BP and perfusion indices; Appropriate HR/Rhythm     Disposition:   Sign Out in:  PACU  Disposition:  Phase II; Home  Recovery Course: Uneventful  Follow-Up: Not required           Last Anesthesia Record Vitals:  CRNA VITALS  2019 1200 - 2019 1300      2019             Resp Rate (set):  10          Last PACU Vitals:  Vitals Value Taken Time   /57 2019 12:35 PM   Temp 36.4  C (97.5  F) 2019 12:35 PM   Pulse     Resp 14 2019 12:35 PM   SpO2 94 % 2019 12:35 PM   Temp src     NIBP 114/81 2019 12:26 PM   Pulse 69 2019 12:29 PM   SpO2 98 % 2019 12:29 PM   Resp     Temp 37  C (98.6  F) 2019 12:24 PM   Ht Rate 64 2019 12:25 PM   Temp 2           Electronically Signed By: Anthony Sharma DO, 2019, 2:22 PM

## 2019-12-12 NOTE — TELEPHONE ENCOUNTER
Warm transfer from call center.  Champ had Right Arthroscopic Rotator Cuff Repair, Arthroscopic Subacromial Decompression, Arthroscopic Biceps Tenodesis, Arthroscopic Distal Clavicle Excision today with Dr Milligan.  He is having drainage, light yellow with red color from under the dressing. He has been up and moving, he has a hard time sitting still.  At discharge was told to reinforce dressing on top of what is on, to not disturb dressing for 4 days.  Reinforced post op op instructions of reinforcing dressing, applying ice to area.  He and his wife Natali have done this and drainage right before calling clinic and drainage is slowing down.   Advised on need to not be overly active along with reinforcing dressing, which will add pressure to the area along with icing.  Instructed to call back if drainage does not stop.  No further questions or concerns at this time.  Advised will forward note to Ortho department.

## 2019-12-12 NOTE — OP NOTE
Procedure Date: 12/12/2019      PREOPERATIVE DIAGNOSES:   1.  Right shoulder rotator cuff tear.   2.  Right shoulder subacromial bursitis.   3.  Right shoulder symptomatic acromioclavicular joint arthritis.   4.  Right shoulder biceps disease.      POSTOPERATIVE DIAGNOSES:   1.  Right shoulder rotator cuff tear.   2.  Right shoulder subacromial bursitis.   3.  Right shoulder symptomatic acromioclavicular joint arthritis.   4.  Right shoulder biceps disease.      SURGICAL PROCEDURES:   1.  Right shoulder arthroscopic rotator cuff repair of a 2 cm anterior to posterior supraspinatus tear with infraspinatus involvement.   2.  Right shoulder arthroscopic biceps tenodesis.   3.  Right shoulder arthroscopic distal clavicle excision.   4.  Right shoulder arthroscopic subacromial decompression with bony acromioplasty.      SURGEON:  Nils Milligan MD      ASSISTANT:  Long Nunes MD.  The assistance of Dr. Nunes was necessitated by the orthopedic complexity of the case, the need to position the arm in space and hold retractors.  No other level of surgical assistant would have provided adequate support for the patient's surgical best interest.      IMPLANTS:   1.  Arthrex 5.5 mm BioComposite corkscrew anchor x1 medially.   2.  Arthrex 4.75 mm double-loaded BioComposite SwiveLock anchor x2 laterally.   3.  Arthrex 6.25 mm BioComposite SwiveLock anchor x1.      INDICATIONS:  Mr. Lynn is a very pleasant 54-year-old male with history of pain and disability in his shoulder.  He had a trial of nonsurgical management including activity modification and analgesics.  After discussion of risks and benefits of surgical versus nonsurgical management, he elected to proceed with surgical remediation.      DESCRIPTION OF PROCEDURE:  The patient was positively identified in the preanesthesia care area, the surgical site was initialed by me and his consent was reviewed with him.  He was then given interscalene blockade by my  "excellent colleagues in Anesthesia.  Following this, he was taken to the operating theater where he was placed in a well-padded beachchair position, prepped and draped in the usual sterile fashion for right upper extremity surgery.      Prior to surgical initiation, a \"time out\" was held in accordance with hospital policy.  Verbal verification of delivery of prophylactic intravenous antibiotics was performed.      After establishment of a posterior viewing portal, an internal portal was made under direct visualization.  Diagnostic arthroscopy was then possible with findings as follows:  The upper portion of the subscapularis was intact.  The biceps was abnormal at its origin and frayed as it entered the bicipital groove.  The pouch was synovitic, but devoid of loose bodies.      Posterior cuff was intact.  Superior cuff was torn.  Articular surface of the humerus and glenoid had grade 2 changes, but no evidence of full thickness cartilage loss.      I tagged the biceps tendon twice with 0 PDS sutures and amputated off the anterosuperior labrum.  I debrided the anterior, superior and posterior cherry.  I turned my attention to the subacromial space.      Upon entering the subacromial space, massive and significant bursitis was encountered.  I debrided this with a shaver and an ablator.  I denuded the undersurface of the acromion of all soft tissues including the CA ligament used a motorized bur to resect an anterolaterally based wedge of bone from the undersurface of the acromion that tapered posteromedially until type 1 acromial morphology was created.  I verified from its orthogonal views that this was indeed the case.      I identified the rotator cuff tear.  I made a low anterolateral portal under direct visualization.  Viewing from the anterolateral portal, I opened the transverse humeral ligament, brought the biceps tendon out through the low anterolateral portal and placed rotating retrograde migratory [place] " stitch.  I amputated 4 cm of diseased tendon, placed the biceps tendon deep into a 6.5 mm drill hole in the inferior aspect of the bicipital groove and held it with a 6.25 mm BioComposite SwiveLock anchor.  This effected an excellent biceps tenodesis.      I denuded the rest of the contents of the bicipital groove of all soft tissues using a shaver and an ablator.      I placed an inverted mattress in the anterior aspect of the rotator cuff tear and the posterior aspect.  I prepared the footprint with an arthroscopic curette and a shaver and placed a medial 5.5 mm BioComposite corkscrew anchor.  I passed all 4 strands in a mattress fashion.  I tied the medial row anchor pegs to 10 stitches and this effected an excellent reapproximation of that area.  I bridged 2 strands from the posterior tag stitch and 2 strands from the medial anchor into an anterolateral 4.75 mm double-loaded BioComposite SwiveLock anchor and repeated this with the remaining 4 strands in a posterolateral double-loaded BioComposite SwiveLock anchor.        There was no gapping at the with the arm at the side nor motion with vigorous internal and external rotation.      Viewing from posteriorly, I used an ablator to resect the anterior, inferior and post-capsular constraints.  I resected the lateral aspect of the clavicle until a 1 cm resection had been performed.  All instruments were removed.  Closure was with 3-0 interrupted Monocryl sutures.  Steri-Strips and sterile nonadherent dressing were applied.  A sling was placed.      POSTOPERATIVE PLAN:   1.  The patient will be discharged home today on oral analgesics.  He should have no active or passive range of motion at this time.   2.  At the 2-week margarita, he will continue with no active or passive range of motion.   3.  At the 6-week margarita, he will begin gentle progressive 4-quadrant stretching.   4.  Total sling time will be 8 weeks.   5.  At 3 months, he will have unrestrictive 4-quadrant  stretching, periscapular stabilization and strengthening.  Radiographs will also be obtained at that visit.         BLANCO JOSHI MD             D: 2019   T: 2019   MT: baljeet      Name:     FERNANDO SALDIVAR   MRN:      [id number]        Account:        [id number]   :      1965           Procedure Date: 2019      Document: [id number]

## 2019-12-12 NOTE — ANESTHESIA CARE TRANSFER NOTE
Patient: Venkata Lynn    Procedure(s):  Right Arthroscopic Rotator Cuff Repair, Arthroscopic Subacromial Decompression, Arthroscopic Biceps Tenodesis, Arthroscopic Distal Clavicle Excision    Diagnosis: Rotator cuff tear [M75.100]  Diagnosis Additional Information: No value filed.    Anesthesia Type:   General, Peripheral Nerve Block     Note:  Airway :Room Air  Patient transferred to:Phase II  Handoff Report: Identifed the Patient, Identified the Reponsible Provider, Reviewed the pertinent medical history, Discussed the surgical course, Reviewed Intra-OP anesthesia mangement and issues during anesthesia, Set expectations for post-procedure period and Allowed opportunity for questions and acknowledgement of understanding      Vitals: (Last set prior to Anesthesia Care Transfer)    CRNA VITALS  12/12/2019 1200 - 12/12/2019 1240      12/12/2019             Resp Rate (set):  10                Electronically Signed By: ABIGAIL Mueller CRNA  December 12, 2019  12:40 PM

## 2019-12-13 NOTE — TELEPHONE ENCOUNTER
Patient was contacted for an update.  He reports no further drainage is noted.  He requested verification of exercises allowed.  He was informed he can do hand, wrist, and elbow exercises but no shoulder motion.  Patient verbalized understanding.

## 2019-12-23 DIAGNOSIS — F51.01 PRIMARY INSOMNIA: ICD-10-CM

## 2019-12-23 DIAGNOSIS — F41.9 ANXIETY: ICD-10-CM

## 2019-12-23 NOTE — TELEPHONE ENCOUNTER
Last Written Prescription Date:  1022.19  Last Fill Quantity: 30,  # refills: 1   Last office visit: 11/22/2019 with prescribing provider:  Jerman   Future Office Visit:      Routing refill request to provider for review/approval because:  Drug not on the [facility] refill protocol   Was discontinued at hospital discharge

## 2019-12-24 RX ORDER — ZOLPIDEM TARTRATE 10 MG/1
10 TABLET ORAL
Qty: 30 TABLET | Refills: 5 | Status: SHIPPED | OUTPATIENT
Start: 2019-12-24 | End: 2020-06-15

## 2019-12-24 RX ORDER — ALPRAZOLAM 1 MG
TABLET ORAL
Qty: 30 TABLET | Refills: 0 | Status: SHIPPED | OUTPATIENT
Start: 2019-12-24 | End: 2020-01-28

## 2019-12-24 NOTE — TELEPHONE ENCOUNTER
Last Written Prescription Date:  11.22.19  Last Fill Quantity: 30,  # refills: 0   Last office visit: 11/22/2019 with prescribing provider:  Jerman   Future Office Visit:      Routing refill request to provider for review/approval because:  Drug not on the [facility] refill protocol

## 2019-12-30 ENCOUNTER — OFFICE VISIT (OUTPATIENT)
Dept: ORTHOPEDICS | Facility: CLINIC | Age: 54
End: 2019-12-30
Payer: COMMERCIAL

## 2019-12-30 DIAGNOSIS — M75.121 NONTRAUMATIC COMPLETE TEAR OF RIGHT ROTATOR CUFF: Primary | ICD-10-CM

## 2019-12-30 NOTE — PROGRESS NOTES
Nerve block was much more tolerable than the one he had in 2017.    S:  Doing well.    O:  Incision clean, dry, and intact  Sets Deltoid  Wiggles fingers  Warm and well-perfused hand with palpable pulse    A/P:  Doing well  Advance per op report

## 2019-12-30 NOTE — LETTER
12/30/2019       RE: Venkata Lynn  [address] [address] [facility] [address]     Dear Colleague,    Thank you for referring your patient, Venkata Lynn, to the [facility] at [facility]. Please see a copy of my visit note below.    Nerve block was much more tolerable than the one he had in 2017.    S:  Doing well.    O:  Incision clean, dry, and intact  Sets Deltoid  Wiggles fingers  Warm and well-perfused hand with palpable pulse    A/P:  Doing well  Advance per op report    Again, thank you for allowing me to participate in the care of your patient.      Sincerely,    Nils Milligan MD

## 2020-01-17 DIAGNOSIS — M75.121 NONTRAUMATIC COMPLETE TEAR OF RIGHT ROTATOR CUFF: Primary | ICD-10-CM

## 2020-01-27 ENCOUNTER — OFFICE VISIT (OUTPATIENT)
Dept: ORTHOPEDICS | Facility: CLINIC | Age: 55
End: 2020-01-27
Payer: COMMERCIAL

## 2020-01-27 DIAGNOSIS — M25.511 RIGHT SHOULDER PAIN, UNSPECIFIED CHRONICITY: Primary | ICD-10-CM

## 2020-01-27 DIAGNOSIS — F41.9 ANXIETY: ICD-10-CM

## 2020-01-27 NOTE — LETTER
1/27/2020       RE: Venkata Lynn  [address] [address] [facility] [address]     Dear Colleague,    Thank you for referring your patient, Venkata Lynn, to the [facility] at [facility]. Please see a copy of my visit note below.    S:  Doing well. Occasional achiness. Takes ibuprofen PRN.     O:  Incision clean, dry, and intact  Sets Deltoid  Wiggles fingers  Warm and well-perfused hand with palpable pulse  40->130/20    A/P:  Doing well  Advance per op report      Again, thank you for allowing me to participate in the care of your patient.      Sincerely,    Nils Milligan MD

## 2020-01-27 NOTE — NURSING NOTE
Reason For Visit:   Chief Complaint   Patient presents with     Surgical Followup     6 wk post-op Arthroscopic rotator cuff repair, arthroscopic subacromial decompression, arthroscopic biceps tenodesis, arthroscopic distal clavicle excision  DOS 12/12/19       PCP: Lyndon Stoll     ? 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  Date of injury: Many injuries over the years but no specific      Date of surgery: 1/2017  Type of surgery: Left rotator cuff repair By erik.  Date of surgery: 12/12/19  Type of surgery:   1.  Right shoulder arthroscopic rotator cuff repair of a 2 cm anterior to posterior supraspinatus tear with infraspinatus involvement.   2.  Right shoulder arthroscopic biceps tenodesis.   3.  Right shoulder arthroscopic distal clavicle excision.   4.  Right shoulder arthroscopic subacromial decompression with bony acromioplasty.  Smoker: No      Right hand dominant      SANE score  Affected shoulder: Right  Right shoulder SANE: 0  Left shoulder SANE: 95      Pain Assessment  Patient Currently in Pain: Corina Gentile LPN

## 2020-01-27 NOTE — PROGRESS NOTES
S:  Doing well. Occasional achiness. Takes ibuprofen PRN.     O:  Incision clean, dry, and intact  Sets Deltoid  Wiggles fingers  Warm and well-perfused hand with palpable pulse  40->130/20    A/P:  Doing well  Advance per op report

## 2020-01-28 ENCOUNTER — THERAPY VISIT (OUTPATIENT)
Dept: PHYSICAL THERAPY | Facility: CLINIC | Age: 55
End: 2020-01-28
Payer: COMMERCIAL

## 2020-01-28 DIAGNOSIS — M75.121 NONTRAUMATIC COMPLETE TEAR OF RIGHT ROTATOR CUFF: ICD-10-CM

## 2020-01-28 DIAGNOSIS — M25.511 PAIN IN JOINT OF RIGHT SHOULDER: ICD-10-CM

## 2020-01-28 PROCEDURE — 97161 PT EVAL LOW COMPLEX 20 MIN: CPT | Mod: GP | Performed by: PHYSICAL THERAPIST

## 2020-01-28 PROCEDURE — 97110 THERAPEUTIC EXERCISES: CPT | Mod: GP | Performed by: PHYSICAL THERAPIST

## 2020-01-28 RX ORDER — ALPRAZOLAM 1 MG
TABLET ORAL
Qty: 30 TABLET | Refills: 0 | Status: SHIPPED | OUTPATIENT
Start: 2020-01-28 | End: 2020-03-02

## 2020-01-28 NOTE — PROGRESS NOTES
[facility] Initial Evaluation  Subjective:  The history is provided by the patient. No  was used.   Venkata Lynn being seen for S/P R shoulder.   Problem began 12/12/2019 (date of surgery). Problem occurred: overuse over years  and reported as 4/10 on pain scale.     Other medical allergies details: See EPIC.  Surgeries include:  Orthopedic surgery. Other surgery history details: shoulder and knee.  Current medications:  Anti-inflammatory.   Primary job tasks include:  Lifting/carrying, driving, pushing/pulling and repetitive tasks.  Pain is described as aching and is constant. Pain is the same all the time. Since onset symptoms are gradually improving.      Patient is Contractor. Restrictions include:  Currently not working due to present treatment.    Barriers include:  None as reported by patient.  Red flags:  None as reported by patient.  Type of problem:  Right shoulder    This is a new condition   Problem details: Pt c/o R shoulder pain S/P R RCR repair with biceps tenodesis and decompression 12/12/19.  Had similar surgery L shoulder 2017 with good recovery.  Pt is R handed.  Pt states years of overuse lead to surgery..   Patient reports pain:  Anterior.   Symptoms are exacerbated by carrying, lifting, lying on extremity, using arm behind back, using arm overhead, using arm at shoulder level and certain positions and relieved by ice and rest.                      Objective:  System                   Shoulder Evaluation:  ROM:    PROM:    Flexion:  Right: 60    Extension:  Right:  30  Abduction:  Right:  45      External Rotation:  Right:  15                    Strength:  : fair scap stab.                          Palpation:  Palpation assessed shoulder: min swelling with well healing incision with sutures                                          General     ROS    Assessment/Plan:    Patient is a 54 year old male with right side shoulder complaints.    Patient has  the following significant findings with corresponding treatment plan.                Diagnosis 1:  S/P R RCR  Pain -  hot/cold therapy, manual therapy and home program  Decreased ROM/flexibility - manual therapy and therapeutic exercise  Decreased strength - therapeutic exercise and therapeutic activities  Decreased function - therapeutic activities  Impaired posture - neuro re-education    Therapy Evaluation Codes:   Cumulative Therapy Evaluation is: Low complexity.    Previous and current functional limitations:  (See Goal Flow Sheet for this information)    Short term and Long term goals: (See Goal Flow Sheet for this information)     Communication ability:  Patient appears to be able to clearly communicate and understand verbal and written communication and follow directions correctly.  Treatment Explanation - The following has been discussed with the patient:   RX ordered/plan of care  Anticipated outcomes  Possible risks and side effects  This patient would benefit from PT intervention to resume normal activities.   Rehab potential is good.    Frequency:  2 X week, once daily  Duration:  for 6 weeks  Discharge Plan:  Achieve all LTG.  Independent in home treatment program.  Reach maximal therapeutic benefit.    Please refer to the daily flowsheet for treatment today, total treatment time and time spent performing 1:1 timed codes.

## 2020-01-28 NOTE — TELEPHONE ENCOUNTER
Last Written Prescription Date:  12.24.19  Last Fill Quantity: 30,  # refills: 0   Last office visit: 11/22/2019 with prescribing provider:  Jerman   Future Office Visit:      Routing refill request to provider for review/approval because:  Drug not on the [facility] refill protocol

## 2020-01-31 ENCOUNTER — THERAPY VISIT (OUTPATIENT)
Dept: PHYSICAL THERAPY | Facility: CLINIC | Age: 55
End: 2020-01-31
Payer: COMMERCIAL

## 2020-01-31 DIAGNOSIS — Z47.89 AFTERCARE FOLLOWING SURGERY OF THE MUSCULOSKELETAL SYSTEM: ICD-10-CM

## 2020-01-31 DIAGNOSIS — M25.511 PAIN IN JOINT OF RIGHT SHOULDER: ICD-10-CM

## 2020-01-31 PROCEDURE — 97110 THERAPEUTIC EXERCISES: CPT | Mod: GP | Performed by: PHYSICAL THERAPIST

## 2020-02-04 ENCOUNTER — THERAPY VISIT (OUTPATIENT)
Dept: PHYSICAL THERAPY | Facility: CLINIC | Age: 55
End: 2020-02-04
Payer: COMMERCIAL

## 2020-02-04 DIAGNOSIS — Z47.89 AFTERCARE FOLLOWING SURGERY OF THE MUSCULOSKELETAL SYSTEM: ICD-10-CM

## 2020-02-04 DIAGNOSIS — M25.511 PAIN IN JOINT OF RIGHT SHOULDER: ICD-10-CM

## 2020-02-04 PROCEDURE — 97110 THERAPEUTIC EXERCISES: CPT | Mod: GP | Performed by: PHYSICAL THERAPIST

## 2020-02-07 ENCOUNTER — THERAPY VISIT (OUTPATIENT)
Dept: PHYSICAL THERAPY | Facility: CLINIC | Age: 55
End: 2020-02-07
Payer: COMMERCIAL

## 2020-02-07 DIAGNOSIS — Z47.89 AFTERCARE FOLLOWING SURGERY OF THE MUSCULOSKELETAL SYSTEM: ICD-10-CM

## 2020-02-07 DIAGNOSIS — M25.511 PAIN IN JOINT OF RIGHT SHOULDER: ICD-10-CM

## 2020-02-07 PROCEDURE — 97110 THERAPEUTIC EXERCISES: CPT | Mod: GP | Performed by: PHYSICAL THERAPIST

## 2020-02-10 ENCOUNTER — THERAPY VISIT (OUTPATIENT)
Dept: PHYSICAL THERAPY | Facility: CLINIC | Age: 55
End: 2020-02-10
Payer: COMMERCIAL

## 2020-02-10 DIAGNOSIS — M25.511 PAIN IN JOINT OF RIGHT SHOULDER: ICD-10-CM

## 2020-02-10 DIAGNOSIS — Z47.89 AFTERCARE FOLLOWING SURGERY OF THE MUSCULOSKELETAL SYSTEM: ICD-10-CM

## 2020-02-18 ENCOUNTER — THERAPY VISIT (OUTPATIENT)
Dept: PHYSICAL THERAPY | Facility: CLINIC | Age: 55
End: 2020-02-18
Payer: COMMERCIAL

## 2020-02-18 DIAGNOSIS — M25.511 PAIN IN JOINT OF RIGHT SHOULDER: ICD-10-CM

## 2020-02-18 DIAGNOSIS — Z47.89 AFTERCARE FOLLOWING SURGERY OF THE MUSCULOSKELETAL SYSTEM: ICD-10-CM

## 2020-02-18 PROCEDURE — 97110 THERAPEUTIC EXERCISES: CPT | Mod: GP | Performed by: PHYSICAL THERAPIST

## 2020-02-21 ENCOUNTER — THERAPY VISIT (OUTPATIENT)
Dept: PHYSICAL THERAPY | Facility: CLINIC | Age: 55
End: 2020-02-21
Payer: COMMERCIAL

## 2020-02-21 DIAGNOSIS — M25.511 PAIN IN JOINT OF RIGHT SHOULDER: ICD-10-CM

## 2020-02-21 DIAGNOSIS — Z47.89 AFTERCARE FOLLOWING SURGERY OF THE MUSCULOSKELETAL SYSTEM: ICD-10-CM

## 2020-02-21 PROCEDURE — 97110 THERAPEUTIC EXERCISES: CPT | Mod: GP | Performed by: PHYSICAL THERAPIST

## 2020-02-28 ENCOUNTER — THERAPY VISIT (OUTPATIENT)
Dept: PHYSICAL THERAPY | Facility: CLINIC | Age: 55
End: 2020-02-28
Payer: COMMERCIAL

## 2020-02-28 DIAGNOSIS — Z47.89 AFTERCARE FOLLOWING SURGERY OF THE MUSCULOSKELETAL SYSTEM: ICD-10-CM

## 2020-02-28 DIAGNOSIS — M25.511 PAIN IN JOINT OF RIGHT SHOULDER: ICD-10-CM

## 2020-02-28 PROCEDURE — 97110 THERAPEUTIC EXERCISES: CPT | Mod: GP | Performed by: PHYSICAL THERAPIST

## 2020-03-02 DIAGNOSIS — F41.9 ANXIETY: ICD-10-CM

## 2020-03-02 RX ORDER — ALPRAZOLAM 1 MG
TABLET ORAL
Qty: 30 TABLET | Refills: 0 | Status: SHIPPED | OUTPATIENT
Start: 2020-03-02 | End: 2020-04-07

## 2020-03-02 NOTE — TELEPHONE ENCOUNTER
Controlled Substance Refill Request for Alprazolam  Problem List Complete:  Yes   checked in past 3 months?  No, route to RN    [place] reviewed:  reviewed- no concerns    [place]  [email or web address]    Last Written Prescription Date:  1/28/20  Last Fill Quantity: 30,  # refills: 0   Last office visit: 11/22/2019 with prescribing provider:  11/22/19   Future Office Visit:      Not [facility] med.  Sent to provider.  Please advise.  Ita Woodard RN

## 2020-03-04 ENCOUNTER — THERAPY VISIT (OUTPATIENT)
Dept: PHYSICAL THERAPY | Facility: CLINIC | Age: 55
End: 2020-03-04
Payer: COMMERCIAL

## 2020-03-04 DIAGNOSIS — M25.511 PAIN IN JOINT OF RIGHT SHOULDER: ICD-10-CM

## 2020-03-04 DIAGNOSIS — Z47.89 AFTERCARE FOLLOWING SURGERY OF THE MUSCULOSKELETAL SYSTEM: ICD-10-CM

## 2020-03-04 PROCEDURE — 97110 THERAPEUTIC EXERCISES: CPT | Mod: GP | Performed by: PHYSICAL THERAPIST

## 2020-03-11 ENCOUNTER — THERAPY VISIT (OUTPATIENT)
Dept: PHYSICAL THERAPY | Facility: CLINIC | Age: 55
End: 2020-03-11
Payer: COMMERCIAL

## 2020-03-11 DIAGNOSIS — M25.511 PAIN IN JOINT OF RIGHT SHOULDER: ICD-10-CM

## 2020-03-11 DIAGNOSIS — Z47.89 AFTERCARE FOLLOWING SURGERY OF THE MUSCULOSKELETAL SYSTEM: ICD-10-CM

## 2020-03-11 PROCEDURE — 97110 THERAPEUTIC EXERCISES: CPT | Mod: GP | Performed by: PHYSICAL THERAPIST

## 2020-03-18 ENCOUNTER — THERAPY VISIT (OUTPATIENT)
Dept: PHYSICAL THERAPY | Facility: CLINIC | Age: 55
End: 2020-03-18
Payer: COMMERCIAL

## 2020-03-18 DIAGNOSIS — Z47.89 AFTERCARE FOLLOWING SURGERY OF THE MUSCULOSKELETAL SYSTEM: ICD-10-CM

## 2020-03-18 DIAGNOSIS — M25.511 PAIN IN JOINT OF RIGHT SHOULDER: ICD-10-CM

## 2020-03-18 PROCEDURE — 97110 THERAPEUTIC EXERCISES: CPT | Mod: GP | Performed by: PHYSICAL THERAPIST

## 2020-04-02 ENCOUNTER — TELEPHONE (OUTPATIENT)
Dept: PHYSICAL THERAPY | Facility: CLINIC | Age: 55
End: 2020-04-02

## 2020-04-02 DIAGNOSIS — Z47.89 AFTERCARE FOLLOWING SURGERY OF THE MUSCULOSKELETAL SYSTEM: ICD-10-CM

## 2020-04-02 DIAGNOSIS — M25.511 PAIN IN JOINT OF RIGHT SHOULDER: ICD-10-CM

## 2020-04-02 NOTE — TELEPHONE ENCOUNTER
Informed that [facility] is currently closed. Patient declined telephone or video appointment. Stated he will continue with his HEP and would like to be called to schedule when the clinic reopens.

## 2020-04-06 DIAGNOSIS — F41.9 ANXIETY: ICD-10-CM

## 2020-04-07 RX ORDER — ALPRAZOLAM 1 MG
TABLET ORAL
Qty: 30 TABLET | Refills: 0 | Status: SHIPPED | OUTPATIENT
Start: 2020-04-07 | End: 2020-05-08

## 2020-04-07 NOTE — TELEPHONE ENCOUNTER
Routing refill request to provider to review approval because:  Drug not on the [facility], P or [facility] refill protocol or controlled substance    RX [place] reviewed:  not reviewed/not due - last done on 3/2/20  [facility] profile:  [email or web address]    Controlled Substance Refill Request for Xanax  Problem List Complete:  Yes    Medication(s): Xanax 1 mg.   Maximum quantity per month: 30  Clinic visit frequency required: Q 6  months     Controlled substance agreement on file: Yes       Date(s): 2/12/19  Benzodiazepine use reviewed by psychiatry:  No    Last [facility] website verification:  3/2/20   [email or web address]    Last Written Prescription Date:  3/2/20  Last Fill Quantity: 30,  # refills: 0   Last office visit: 11/22/2019 with prescribing provider:  preop   Future Office Visit:      Jayda Salazar RN, BSN  Message handled by Nurse Triage.

## 2020-04-22 ENCOUNTER — VIRTUAL VISIT (OUTPATIENT)
Dept: PHYSICAL THERAPY | Facility: CLINIC | Age: 55
End: 2020-04-22
Payer: COMMERCIAL

## 2020-04-22 ENCOUNTER — TELEPHONE (OUTPATIENT)
Dept: ORTHOPEDICS | Facility: CLINIC | Age: 55
End: 2020-04-22

## 2020-04-22 DIAGNOSIS — M25.511 PAIN IN JOINT OF RIGHT SHOULDER: ICD-10-CM

## 2020-04-22 DIAGNOSIS — Z47.89 AFTERCARE FOLLOWING SURGERY OF THE MUSCULOSKELETAL SYSTEM: ICD-10-CM

## 2020-04-22 PROCEDURE — 97112 NEUROMUSCULAR REEDUCATION: CPT | Mod: GT | Performed by: PHYSICAL THERAPIST

## 2020-04-22 PROCEDURE — 97110 THERAPEUTIC EXERCISES: CPT | Mod: GT | Performed by: PHYSICAL THERAPIST

## 2020-04-22 NOTE — PROGRESS NOTES
"Physical Therapy Virtual Follow Up Visit      The patient has been notified of following:     \"This virtual visit will be conducted between you and your provider. We have found that certain health care needs can be provided without the need for physical presence.  This service lets us provide the care you need with a virtual visit.\"    Due to external, as well as internal [facility] management of the COVID-19 Virus, Venkata Lynn was not seen in our clinic.  As a substitution, we implemented a virtual visit to manage this patient's condition utilizing the [employer] virtual visit platform via the patient s existing code.  The provider, Sahara Haines, reviewed the patient's chart, PTRx prescription, and spoke with the patient to determine the following telemedicine visit is appropriate and effective for the patient's care.    The following type of visit was completed:   Video Visit:  The [employer] platform uses a synchronous HIPAA compliant video stream for this patient encounter.        S: Venkata Lynn is a 54 year old male. Connected virtually on the PTRx platform to discuss their condition/progress. They noted improvements in pain, motion and strength.  They noted ongoing pain or limitations with motion and strenght.     Current pain level: 0/10 at rest, 3/10 at worst after exercises or when fatigued.      O: Patient demonstrated R shoulder AROM fleixon 165 (175 L) abduction 170 (180 L), ER 80 (90 L) extension 70 and equal to L, IR/ext to L1 (T9 L).  Good form with PRE exercises and usig 2 1/2# with all except horizontal abduction 1 1/2#.     PTRx Content from today's visit:  Exercise Name: Virtual Visit Tips for Patients  Exercise Name: Wand Shoulder Flexion Supine, Sets: 1 - Reps: 10, hold for 10 seconds - Sessions: 1-2, Notes: Breathe and stay relaxed  Push with your other hand directly on the right upper arm to get it even with the other side.  Exercise Name: Wand Shoulder Abduction Standing, Sets: 1 - " Reps: 10 with 10 second hold - Sessions: As needed or until arm touches ear  Exercise Name: Shoulder Towel Stretch Internal Rotation, Sets: 1 - Reps: 10, hold 10 seconds - Sessions: 1-2  Exercise Name: Wand Shoulder External Rotation at 90 degrees Abduction, Sets: 1 - Reps: 10, hold 10 seconds - Sessions: 1-2, Notes: Reach back into the motion first, then use stick to press further.  Breathe and stay relaxed  Exercise Name: Shoulder Scaption Full Can, Sets: 1 - Reps: 30 as able - Sessions: every other day, Notes: Keep shoulder blades down and back.  Use 21/2 pound weight and increased by 1 pound up to 5 pounds as able.  Exercise Name: Shoulder Extension in Standing, Sets: 1 - Reps: 30 as able - Sessions: every other day, Notes: Squeeze shoulder blade down and back first, then lift arm with palm forward. Make sure shoulder does not drop down.  Use 4 pound weight and progress by 1 pound up to 5 pounds.  Exercise Name: Shoulder External Rotation Sidelying, Sets: 1 - Reps: up to 30 - Sessions: every other day, Notes: Squeeze shoulder blade down and back first, then lift hand. Progress by 21/2# up to 5# as able.  Exercise Name: Shoulder Internal Rotation Sidelying - Reps: 30 reps - Sessions: every other day, Notes: Use 21/2 pounds and increase by 1 pound as able to 5 pound.  Exercise Name: Shoulder Horizontal Abduction Standing - Reps: 30 reps - Sessions: every other day, Notes: Start with 11/2 pounds and increase as by 1 pound as able to 5 pound.  Make sure thumb is pointing up.  Exercise Name: Education Sheet Shoulder, Notes: To schedule future virtual appointments call [phone]    A:   Patient's symptoms are resolving.  Good AROM increase since 3/18/20  Response to therapy has shown an improvement in  pain level, ROM , muscle control and function      P: Patient will continue with the exercise program assigned on their PTRx code and will add the following measures to manage their pain/condition: revised exercises      Current treatment program is being advanced to more complex exercises.      Virtual visit contact time    Time of service began: 1:10 PM  Time of service ended: 2:00 PM  Total Time for set up, visit, and documentation: 50 minutes    Payor: PREFERREDONE / Plan: PREFERREDONE HMO / Product Type: PPO /   .  Procedure Code/s   Therapeutic Exercise (98443): 30 minutes  Neuromuscular Re-education (19022): 10 minutes    I have reviewed the note as documented above.  This accurately captures the substance of my conversation with the patient.  Provider location: [place], [place] ([facility])  Patient location: Home

## 2020-04-22 NOTE — TELEPHONE ENCOUNTER
[facility] Call Center    Phone Message    May a detailed message be left on voicemail: yes     Reason for Call: Patient questioning when to follow-up with Dr. Milligan after DOS 12/12/19. Patient last seen 1/27/2020 and has been continuing PT. Caller reports he is doing well with PT, but is wondering when Dr. Milligan would like to see him. 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 Please advise.  Phone: [phone]    Thank you, Shelley Garcia on 4/22/2020 at 2:39 PM       Action Taken: Message routed to:  [facility] ([facility]TH[facility]    Travel Screening: Negative

## 2020-05-01 ENCOUNTER — VIRTUAL VISIT (OUTPATIENT)
Dept: ORTHOPEDICS | Facility: CLINIC | Age: 55
End: 2020-05-01
Payer: COMMERCIAL

## 2020-05-01 ENCOUNTER — VIRTUAL VISIT (OUTPATIENT)
Dept: PHYSICAL THERAPY | Facility: CLINIC | Age: 55
End: 2020-05-01
Payer: COMMERCIAL

## 2020-05-01 DIAGNOSIS — M75.121 NONTRAUMATIC COMPLETE TEAR OF RIGHT ROTATOR CUFF: Primary | ICD-10-CM

## 2020-05-01 DIAGNOSIS — M25.511 PAIN IN JOINT OF RIGHT SHOULDER: ICD-10-CM

## 2020-05-01 DIAGNOSIS — Z47.89 AFTERCARE FOLLOWING SURGERY OF THE MUSCULOSKELETAL SYSTEM: ICD-10-CM

## 2020-05-01 PROCEDURE — 97110 THERAPEUTIC EXERCISES: CPT | Mod: GT | Performed by: PHYSICAL THERAPIST

## 2020-05-01 PROCEDURE — 97112 NEUROMUSCULAR REEDUCATION: CPT | Mod: GT | Performed by: PHYSICAL THERAPIST

## 2020-05-01 NOTE — NURSING NOTE
Reason For Visit:   Chief Complaint   Patient presents with     Surgical Followup     Arthroscopic rotator cuff repair, arthroscopic subacromial decompression, arthroscopic biceps tenodesis, arthroscopic distal clavicle excision  DOS 12/12/19        PCP: Lyndon Stoll     ? 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  Date of injury: Many injuries over the years but no specific      Date of surgery: 1/2017  Type of surgery: Left rotator cuff repair By erik.  Date of surgery: 12/12/19  Type of surgery:   1.  Right shoulder arthroscopic rotator cuff repair of a 2 cm anterior to posterior supraspinatus tear with infraspinatus involvement.   2.  Right shoulder arthroscopic biceps tenodesis.   3.  Right shoulder arthroscopic distal clavicle excision.   4.  Right shoulder arthroscopic subacromial decompression with bony acromioplasty.  Smoker: No      Right hand dominant         SANE score  Affected shoulder: Right   Right shoulder SANE: 90  Left shoulder SANE: 100    There were no vitals taken for this visit.      Pain Assessment  Patient Currently in Pain: Corina Gentile LPN

## 2020-05-01 NOTE — PROGRESS NOTES
"Venkata Lynn is a 54 year old male who is being evaluated via a billable video visit.      The patient has been notified of following:     \"This video visit will be conducted via a call between you and your physician/provider. We have found that certain health care needs can be provided without the need for an in-person physical exam.  This service lets us provide the care you need with a video conversation.  If a prescription is necessary we can send it directly to your pharmacy.  If lab work is needed we can place an order for that and you can then stop by our lab to have the test done at a later time.    Video visits are billed at different rates depending on your insurance coverage.  Please reach out to your insurance provider with any questions.    If during the course of the call the physician/provider feels a video visit is not appropriate, you will not be charged for this service.\"    Patient has given verbal consent for Video visit? Yes    How would you like to obtain your AVS? [place]    Patient would like the video invitation sent by: Send to e-mail at: [email or web address]    Will anyone else be joining your video visit? No        Video-Visit Details    Type of service:  Video Visit    Start: 05/01/2020 09:34 am   Stop: 05/01/2020 09:39 am    Discussed. Doing well.     Demonstrated 145 degrees AFE, 30 ERs, SI IRside.    Plan:  Return to work unrestricted at 6 months.   Continue with strengthening.  F/U at 12 month post-op margarita with XR.    Originating Location (pt. Location): Other in clinic    Distant Location (provider location):  [facility]     Platform used for Video Visit: [facility]    Nils Milligan MD    TT; 20 mins    "

## 2020-05-01 NOTE — PROGRESS NOTES
"Physical Therapy Virtual Follow Up Visit      The patient has been notified of following:     \"This virtual visit will be conducted between you and your provider. We have found that certain health care needs can be provided without the need for physical presence.  This service lets us provide the care you need with a virtual visit.\"    Due to external, as well as internal [facility] management of the COVID-19 Virus, Venkata Lynn was not seen in our clinic.  As a substitution, we implemented a virtual visit to manage this patient's condition utilizing the [employer] virtual visit platform via the patient s existing code.  The provider, Lucia Espana, reviewed the patient's chart, PTRx prescription, and spoke with the patient to determine the following telemedicine visit is appropriate and effective for the patient's care.    The following type of visit was completed:   Video Visit:  The [employer] platform uses a synchronous HIPAA compliant video stream for this patient encounter.        S: Venkata Lynn is a 54 year old male. Connected virtually on the DJZx platform to discuss their condition/progress.     States his shoulder has been feeling great. Does get a little achy toward the end of the day with activity and exercises. No pain while sleeping. Had a video follow up with Dr. Milligan this morning, has ok'd use of resistance band for HEP.     They noted improvements in pain.      They noted ongoing pain or limitations with reaching behind back.       Current pain level: 0/10      O: Patient demonstrated AROM R Flx: 165, Abd: 175, Ext/IR: L1; hikes R shoulder with repeated scaption; R scapular dyskinesis noted     PTRx Content from today's visit:    Exercise Name: Four Corner Stretch Flexion - Reps: 5-10 - Sessions: 1-2, Notes: Hold 10 seconds. Don't push into too much pain.    Exercise Name: Four Corner Stretch External Rotation With Abduction - Reps: 5-10 - Sessions: 1-2, Notes: Hold 10 seconds. Don't push " into too much pain.  Thumb facing back.    Exercise Name: Shoulder Towel Stretch Internal Rotation, Sets: 1 - Reps: 10, hold 10 seconds - Sessions: 1-2    Exercise Name: Wand Shoulder External Rotation at 90 degrees Abduction, Sets: 1 - Reps: 10, hold 10 seconds - Sessions: 1-2, Notes: Reach back into the motion first, then use stick to press further.  Breathe and stay relaxed    Exercise Name: Push-Up Plus At Counter, Sets: 2-3 - Reps: 10-15 - Sessions: every other day, Notes: the lower the counter top the harder this will feel  if this hurts, only do the plus portion    Exercise Name: Shoulder Scaption Full Can, Sets: 2 - Reps: 30 as able - Sessions: every other day, Notes: Don't hike your shoulder  Keep shoulder blades down and back.  Use 21/2 pound weight and increased by 1 pound up to 5 pounds as able.    Exercise Name: Shoulder Theraband Rows, Sets: 2 - Reps: 20-30 - Sessions: every other day, Notes: relax your upper shoulders    Exercise Name: Shoulder Theraband Low Row/Pulldown, Sets: 2 - Reps: 20-30 - Sessions: every other day    Exercise Name: Shoulder External Rotation Sidelying, Sets: 2 - Reps: up to 30 - Sessions: every other day, Notes: Squeeze shoulder blade down and back first, then lift hand. Progress by 21/2# up to 5# as able.      A:   Patient is progressing as expected.  Response to therapy has shown an improvement in  pain level, ROM  and function      P: Patient will continue with the exercise program assigned on their PTRx code and will add the following measures to manage their pain/condition: added rows, low rows, four corner stretching, push up plus from counter     Current treatment program is being advanced to more complex exercises.      Virtual visit contact time    Time of service began: 12:00 PM  Time of service ended: 12:44 PM  Total Time for set up, visit, and documentation: 50 minutes    Payor: PREFERREDONE / Plan: PREFERREDONE HMO / Product Type: PPO /   .  Procedure Code/s    Therapeutic Exercise (67923): 30 minutes  Neuromuscular Re-education (85013): 14 minutes    I have reviewed the note as documented above.  This accurately captures the substance of my conversation with the patient.  Provider location: [place], [place] ([facility])  Patient location: home

## 2020-05-08 DIAGNOSIS — F41.9 ANXIETY: ICD-10-CM

## 2020-05-08 RX ORDER — ALPRAZOLAM 1 MG
TABLET ORAL
Qty: 30 TABLET | Refills: 0 | Status: SHIPPED | OUTPATIENT
Start: 2020-05-08 | End: 2020-06-12

## 2020-05-08 NOTE — TELEPHONE ENCOUNTER
Routing refill request to provider for review/approval because:  Drug not on the [facility] refill protocol   Blanca Schultz RN on 5/8/2020 at 10:57 AM

## 2020-05-15 ENCOUNTER — VIRTUAL VISIT (OUTPATIENT)
Dept: PHYSICAL THERAPY | Facility: CLINIC | Age: 55
End: 2020-05-15
Payer: COMMERCIAL

## 2020-05-15 DIAGNOSIS — M25.511 PAIN IN JOINT OF RIGHT SHOULDER: ICD-10-CM

## 2020-05-15 DIAGNOSIS — Z47.89 AFTERCARE FOLLOWING SURGERY OF THE MUSCULOSKELETAL SYSTEM: ICD-10-CM

## 2020-05-15 PROCEDURE — 97110 THERAPEUTIC EXERCISES: CPT | Mod: GT | Performed by: PHYSICAL THERAPIST

## 2020-05-15 PROCEDURE — 97112 NEUROMUSCULAR REEDUCATION: CPT | Mod: GT | Performed by: PHYSICAL THERAPIST

## 2020-05-15 NOTE — PROGRESS NOTES
"Physical Therapy Virtual Follow Up Visit      The patient has been notified of following:     \"This virtual visit will be conducted between you and your provider. We have found that certain health care needs can be provided without the need for physical presence.  This service lets us provide the care you need with a virtual visit.\"    Due to external, as well as internal [facility] management of the COVID-19 Virus, Venkata Lynn was not seen in our clinic.  As a substitution, we implemented a virtual visit to manage this patient's condition utilizing the PTRx virtual visit platform via the patient s existing code.  The provider, Lucia Espana, reviewed the patient's chart, PTRx prescription, and spoke with the patient to determine the following telemedicine visit is appropriate and effective for the patient's care.    The following type of visit was completed:   Video Visit:  The PTRx platform uses a synchronous HIPAA compliant video stream for this patient encounter.        S: Venkata Lynn is a 54 year old male. Connected virtually on the PTRx platform to discuss their condition/progress.     They noted improvements in R shoulder feels a little bit better each .      They noted ongoing pain or limitations with lifting.       Current pain level: 0/10      O: Patient demonstrated AROM R Flx: 175, Abd: 180, Ext/IR: L1     PTRx Content from today's visit:    Exercise Name: Four Corner Stretch Flexion - Reps: 5-10 - Sessions: 1-2, Notes: Hold 10 seconds. Don't push into too much pain.  Exercise Name: Four Corner Stretch External Rotation With Abduction - Reps: 5-10 - Sessions: 1-2, Notes: Hold 10 seconds. Don't push into too much pain.  Thumb facing back.    Exercise Name: Shoulder Towel Stretch Internal Rotation - Reps: 10, hold 10 seconds - Sessions: 2-3    Exercise Name: Wand Shoulder External Rotation at 90 degrees Abduction, Sets: 1 - Reps: 10, hold 10 seconds - Sessions: 1-2, Notes: Reach back into " the motion first, then use stick to press further.  Breathe and stay relaxed    Exercise Name: Push-Up Plus At Counter, Sets: 2-3 - Reps: 10-15 - Sessions: every other day, Notes: the lower the counter top the harder this will feel  if this hurts, only do the plus portion    Exercise Name: Shoulder Theraband Rows, Sets: 2 - Reps: 20-30 - Sessions: every other day, Notes: relax your upper shoulders    Exercise Name: Shoulder Theraband Low Row/Pulldown, Sets: 2 - Reps: 20-30 - Sessions: every other day    Exercise Name: PNF Diagonal 2, Sets: 2 - Reps: 20-30 - Sessions: every other day, Notes: green band under opposite foot  keep elbow straight    Exercise Name: Bilateral Rotation With Theraband, Sets: 2 - Reps: 20-30 - Sessions: every other day, Notes: we used a green band - progress as able    Exercise Name: Shoulder Theraband Internal Rotation, Sets: 2 - Reps: 20-30 - Sessions: every other day, Notes: blue band, progress as able    Exercise Name: Shoulder Horizontal Abduction Standing, Sets: 2 - Reps: 30 reps - Sessions: every other day, Notes: Start with 11/2 pounds and increase as by 1 pound as able to 5 pound.    Make sure thumb is pointing up.  Exercise Name: Education Sheet Shoulder, Notes: To schedule future virtual appointments call [phone]    A:   Patient is progressing as expected.  Response to therapy has shown an improvement in  pain level, ROM  and function      P: Patient will continue with the exercise program assigned on their PTRx code and will add the following measures to manage their pain/condition: progressed to TB ER and IR, added TB PNF2     Current treatment program is being advanced to more complex exercises.      Virtual visit contact time    Time of service began: 3:51 PM  Time of service ended: 4:31 PM  Total Time for set up, visit, and documentation: 45 minutes    Payor: PREFERREDONE / Plan: PREFERREDONE HMO / Product Type: PPO /   .  Procedure Code/s   Therapeutic Exercise (05201):  30 minutes  Neuromuscular Re-education (64036): 10 minutes    I have reviewed the note as documented above.  This accurately captures the substance of my conversation with the patient.  Provider location: [place], [place] ([facility])  Patient location: home

## 2020-05-29 ENCOUNTER — VIRTUAL VISIT (OUTPATIENT)
Dept: PHYSICAL THERAPY | Facility: CLINIC | Age: 55
End: 2020-05-29
Payer: COMMERCIAL

## 2020-05-29 DIAGNOSIS — Z47.89 AFTERCARE FOLLOWING SURGERY OF THE MUSCULOSKELETAL SYSTEM: ICD-10-CM

## 2020-05-29 DIAGNOSIS — M25.511 PAIN IN JOINT OF RIGHT SHOULDER: ICD-10-CM

## 2020-05-29 PROCEDURE — 97110 THERAPEUTIC EXERCISES: CPT | Mod: GT | Performed by: PHYSICAL THERAPIST

## 2020-05-29 PROCEDURE — 97112 NEUROMUSCULAR REEDUCATION: CPT | Mod: GT | Performed by: PHYSICAL THERAPIST

## 2020-05-29 NOTE — PROGRESS NOTES
"DISCHARGE REPORT    Physical Therapy Virtual Follow Up Visit      Progress reporting period is from 1/28/2020 to 5/29/2020.       The patient has been notified of following:     \"This virtual visit will be conducted between you and your provider. We have found that certain health care needs can be provided without the need for physical presence.  This service lets us provide the care you need with a virtual visit.\"    Due to external, as well as internal [facility] management of the COVID-19 Virus, Venkata Lynn was not seen in our clinic.  As a substitution, we implemented a virtual visit to manage this patient's condition utilizing the PTRx virtual visit platform via the patient s existing code.  The provider, Lucia Espana, reviewed the patient's chart, PTRx prescription, and spoke with the patient to determine the following telemedicine visit is appropriate and effective for the patient's care.    The following type of visit was completed:   Video Visit:  The [employer] platform uses a synchronous HIPAA compliant video stream for this patient encounter.      SUBJECTIVE  Subjective changes noted by patient:  Venkata Lynn is a 54 year old male. Connected virtually on the PTRx platform to discuss their condition/progress.     They noted improvements in strength, feeling stronger and stronger every day. Able to lift heavier objects at work. Exercises are going well, feels they are getting easier      They noted ongoing pain or limitations with none.         Current pain level is 0/10  .       Previous pain level was  4/10  .     Changes in function:  Yes (See Goal flowsheet attached for changes in current functional level)    Adverse reaction to treatment or activity: None    OBJECTIVE  Changes noted in objective findings:  Yes, ROM and functional strength improved      System                 Shoulder Evaluation:  ROM:  AROM:    Flexion:  Right:  180    Abduction:  Right:  " 180                Flexion/External Rotation:  Right:  T3  Extension/Internal Rotation:  Right:  T10                                                   General   ROS    PTRx Content from today's visit:    Exercise Name: Push-Up Plus At Counter, Sets: 2-3 - Reps: 8-12 - Sessions: every other day, Notes: the lower the counter top the harder this will feel  if this hurts, only do the plus portion    Exercise Name: Shoulder Theraband Rows, Sets: 2 - Reps: 20-30 - Sessions: every other day, Notes: relax your upper shoulders    Exercise Name: Shoulder Theraband Low Row/Pulldown, Sets: 2 - Reps: 20-30 - Sessions: every other day  Exercise Name: PNF Diagonal 2, Sets: 2 - Reps: 20-30 - Sessions: every other day, Notes: green band under opposite foot  keep elbow straight    Exercise Name: Theraband Shoulder External Rotation at 90/90, Sets: 2 - Reps: 20-30 - Sessions: every other day, Notes: green band    Exercise Name: Theraband Shoulder IR at 90/90, Sets: 2 - Reps: 20-30 - Sessions: every other day, Notes: green band    Exercise Name: Shoulder Horizontal Abduction Standing, Sets: 2 - Reps: 30 reps - Sessions: every other day, Notes: Start with 11/2 pounds and increase as by 1 pound as able to 5 pound.  Make sure thumb is pointing up.      ASSESSMENT/PLAN  Updated problem list and treatment plan: Diagnosis 1:  S/p R RCR  Impaired muscle performance - home program  STG/LTGs have been met or progress has been made towards goals:  Yes (See Goal flow sheet completed today.)  Assessment of Progress: The patient has met all of their long term goals.  Self Management Plans:  Patient is independent in a home treatment program.  I have re-evaluated this patient and find that the nature, scope, duration and intensity of the therapy is appropriate for the medical condition of the patient.  Venkata continues to require the following intervention to meet STG and LTG's:  PT intervention is no longer required to meet  STG/LTG.    Recommendations:  Patient will continue with the exercise program assigned on their PTRx code.  This patient is ready to be discharged from therapy and continue their home treatment program.      Virtual visit contact time    Time of service began: 1:10 PM  Time of service ended: 1:47 PM  Total Time for set up, visit, and documentation: 45 minutes    Payor: PREFERREDONE / Plan: PREFERREDONE HMO / Product Type: PPO /     Procedure Code/s   Therapeutic Exercise (04179): 25 minutes  Neuromuscular Re-education (24784): 12 minutes    I have reviewed the note as documented above.  This accurately captures the substance of my conversation with the patient.  Provider location: [place], [place] ([facility])  Patient location: home

## 2020-06-11 DIAGNOSIS — F41.9 ANXIETY: ICD-10-CM

## 2020-06-11 NOTE — TELEPHONE ENCOUNTER
Routing refill request to provider for review/approval because:  Drug not on the [facility] refill protocol     Blanca Schultz RN on 6/11/2020 at 3:53 PM

## 2020-06-12 RX ORDER — ALPRAZOLAM 1 MG
TABLET ORAL
Qty: 30 TABLET | Refills: 0 | Status: SHIPPED | OUTPATIENT
Start: 2020-06-12 | End: 2020-07-15

## 2020-06-12 NOTE — PROGRESS NOTES
Pre-Visit Planning     Future Appointments   Date Time Provider Department Center   6/15/2020  8:50 AM Lyndon Stoll MD [facility]     Arrival Time for this Appointment:  8:50 AM   Appointment Notes for this encounter:   follow up    Questionnaires Reviewed/Assigned  No additional questionnaires are needed      Patient preferred phone number: [phone]    Patient contact not needed.

## 2020-06-15 ENCOUNTER — TELEPHONE (OUTPATIENT)
Dept: FAMILY MEDICINE | Facility: CLINIC | Age: 55
End: 2020-06-15

## 2020-06-15 ENCOUNTER — VIRTUAL VISIT (OUTPATIENT)
Dept: FAMILY MEDICINE | Facility: CLINIC | Age: 55
End: 2020-06-15
Payer: COMMERCIAL

## 2020-06-15 DIAGNOSIS — R03.0 ELEVATED BLOOD PRESSURE READING WITHOUT DIAGNOSIS OF HYPERTENSION: ICD-10-CM

## 2020-06-15 DIAGNOSIS — L71.9 ROSACEA: ICD-10-CM

## 2020-06-15 DIAGNOSIS — J30.2 SEASONAL ALLERGIC RHINITIS, UNSPECIFIED TRIGGER: Chronic | ICD-10-CM

## 2020-06-15 DIAGNOSIS — F41.9 ANXIETY: Primary | ICD-10-CM

## 2020-06-15 DIAGNOSIS — J45.30 MILD PERSISTENT ASTHMA WITHOUT COMPLICATION: ICD-10-CM

## 2020-06-15 DIAGNOSIS — M75.101 ROTATOR CUFF SYNDROME, RIGHT: ICD-10-CM

## 2020-06-15 DIAGNOSIS — Z72.0 TOBACCO ABUSE: ICD-10-CM

## 2020-06-15 DIAGNOSIS — G47.62 NOCTURNAL LEG CRAMPS: ICD-10-CM

## 2020-06-15 DIAGNOSIS — F51.01 PRIMARY INSOMNIA: ICD-10-CM

## 2020-06-15 PROBLEM — M75.100 ROTATOR CUFF TEAR: Status: RESOLVED | Noted: 2019-10-16 | Resolved: 2020-06-15

## 2020-06-15 PROCEDURE — 96127 BRIEF EMOTIONAL/BEHAV ASSMT: CPT | Performed by: FAMILY MEDICINE

## 2020-06-15 PROCEDURE — 99214 OFFICE O/P EST MOD 30 MIN: CPT | Mod: GT | Performed by: FAMILY MEDICINE

## 2020-06-15 RX ORDER — ROPINIROLE 0.25 MG/1
0.25 TABLET, FILM COATED ORAL 3 TIMES DAILY
Qty: 90 TABLET | Refills: 1 | Status: SHIPPED | OUTPATIENT
Start: 2020-06-15 | End: 2020-09-29

## 2020-06-15 RX ORDER — METRONIDAZOLE 7.5 MG/G
GEL TOPICAL 2 TIMES DAILY
COMMUNITY
Start: 2020-06-15 | End: 2021-03-08

## 2020-06-15 RX ORDER — ZOLPIDEM TARTRATE 10 MG/1
10 TABLET ORAL
Qty: 30 TABLET | Refills: 5 | Status: SHIPPED | OUTPATIENT
Start: 2020-06-15 | End: 2021-02-21

## 2020-06-15 RX ORDER — MONTELUKAST SODIUM 10 MG/1
1 TABLET ORAL AT BEDTIME
Qty: 90 TABLET | Refills: 3 | Status: SHIPPED | OUTPATIENT
Start: 2020-06-15 | End: 2021-03-08

## 2020-06-15 RX ORDER — ALBUTEROL SULFATE 90 UG/1
2 AEROSOL, METERED RESPIRATORY (INHALATION) EVERY 6 HOURS PRN
Qty: 1 INHALER | Refills: 11 | Status: SHIPPED | OUTPATIENT
Start: 2020-06-15 | End: 2021-07-14

## 2020-06-15 ASSESSMENT — ENCOUNTER SYMPTOMS
NERVOUS/ANXIOUS: 1
RHINORRHEA: 1
COUGH: 0
SHORTNESS OF BREATH: 0

## 2020-06-15 ASSESSMENT — ANXIETY QUESTIONNAIRES
2. NOT BEING ABLE TO STOP OR CONTROL WORRYING: NOT AT ALL
3. WORRYING TOO MUCH ABOUT DIFFERENT THINGS: NOT AT ALL
5. BEING SO RESTLESS THAT IT IS HARD TO SIT STILL: NOT AT ALL
7. FEELING AFRAID AS IF SOMETHING AWFUL MIGHT HAPPEN: NOT AT ALL
IF YOU CHECKED OFF ANY PROBLEMS ON THIS QUESTIONNAIRE, HOW DIFFICULT HAVE THESE PROBLEMS MADE IT FOR YOU TO DO YOUR WORK, TAKE CARE OF THINGS AT HOME, OR GET ALONG WITH OTHER PEOPLE: NOT DIFFICULT AT ALL
GAD7 TOTAL SCORE: 1
6. BECOMING EASILY ANNOYED OR IRRITABLE: NOT AT ALL
1. FEELING NERVOUS, ANXIOUS, OR ON EDGE: SEVERAL DAYS

## 2020-06-15 ASSESSMENT — PATIENT HEALTH QUESTIONNAIRE - PHQ9
5. POOR APPETITE OR OVEREATING: NOT AT ALL
SUM OF ALL RESPONSES TO PHQ QUESTIONS 1-9: 2

## 2020-06-15 NOTE — PROGRESS NOTES
"Venkata Lynn is a 54 year old male who is being evaluated via a billable video visit.      The patient has been notified of following:     \"This video visit will be conducted via a call between you and your physician/provider. We have found that certain health care needs can be provided without the need for an in-person physical exam.  This service lets us provide the care you need with a video conversation.  If a prescription is necessary we can send it directly to your pharmacy.  If lab work is needed we can place an order for that and you can then stop by our lab to have the test done at a later time.    Video visits are billed at different rates depending on your insurance coverage.  Please reach out to your insurance provider with any questions.    If during the course of the call the physician/provider feels a video visit is not appropriate, you will not be charged for this service.\"    Patient has given verbal consent for Video visit? Yes    [phone]    Will anyone else be joining your video visit? No      Subjective     Venkata Lynn is a 54 year old male who presents today via video visit for the following health issues:    HPI  Anxiety Follow-Up    How are you doing with your anxiety since your last visit? No change    Are you having other symptoms that might be associated with anxiety? No    Have you had a significant life event? No     Are you feeling depressed? No    Do you have any concerns with your use of alcohol or other drugs? No    Social History     Tobacco Use     Smoking status: Never Smoker     Smokeless tobacco: Current User     Types: Chew     Tobacco comment: occ   Substance Use Topics     Alcohol use: Yes     Frequency: 2-3 times a week     Drinks per session: 3 or 4     Binge frequency: Monthly     Comment: few beers per week     Drug use: No     CARMEN-7 SCORE 9/10/2018 5/14/2019 6/15/2020   Total Score - - -   Total Score - 1 (minimal anxiety) -   Total Score 2 1 1     PHQ " 9/10/2018 5/14/2019 6/15/2020   PHQ-9 Total Score 0 0 2   Q9: Thoughts of better off dead/self-harm past 2 weeks Not at all Not at all Not at all     Last PHQ-9 6/15/2020   1.  Little interest or pleasure in doing things 0   2.  Feeling down, depressed, or hopeless 0   3.  Trouble falling or staying asleep, or sleeping too much 1   4.  Feeling tired or having little energy 0   5.  Poor appetite or overeating 0   6.  Feeling bad about yourself 0   7.  Trouble concentrating 1   8.  Moving slowly or restless 0   Q9: Thoughts of better off dead/self-harm past 2 weeks 0   PHQ-9 Total Score 2   Difficulty at work, home, or with people Not difficult at all     CARMEN-7  6/15/2020   1. Feeling nervous, anxious, or on edge 1   2. Not being able to stop or control worrying 0   3. Worrying too much about different things 0   4. Trouble relaxing 0   5. Being so restless that it is hard to sit still 0   6. Becoming easily annoyed or irritable 0   7. Feeling afraid, as if something awful might happen 0   CARMEN-7 Total Score 1   If you checked any problems, how difficult have they made it for you to do your work, take care of things at home, or get along with other people? Not difficult at all              Video dysfunctional.  Converted to telephone visit        Past Medical History:   Diagnosis Date     Acne vulgaris      Anxiety 3/28/2011     Anxiety disorder      Arthritis     both knees     CARDIOVASCULAR SCREENING; LDL GOAL LESS THAN 160 10/31/2010     Controlled substance agreement signed 1/11/2016     Coughing     at night     Dyspepsia 8/16/2016     Elevated blood pressure reading without diagnosis of hypertension 1/11/2016     Familial tremor 8/16/2016     Family history of rhinophyma 1/18/2012     Insomnia      Intermittent asthma 3/28/2011     Knee pain 1/18/2012     Lateral epicondylitis of left elbow 9/14/2018     LBP (low back pain) 1/18/2012     Low back pains      Mild persistent asthma without complication 8/16/2016      Nocturnal leg cramps 6/15/2020     Rhinitis 1/18/2012     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     Rosacea 1/18/2012     Rotator cuff syndrome, right 2/12/2019     Sebaceous hyperplasia 1/18/2012     Shortness of breath     due to asthma     Tobacco abuse 1/18/2012     Trigger finger, acquired 11/22/2019       Past Surgical History:   Procedure Laterality Date     ARTHROSCOPY KNEE WITH MEDIAL MENISCECTOMY Left 2/22/2016    Procedure: ARTHROSCOPY KNEE WITH MEDIAL MENISCECTOMY;  Surgeon: Chaz Moe MD;  Location:  OR     ARTHROSCOPY SHOULDER ROTATOR CUFF REPAIR, BICEP TENODESIS REPAIR Right 12/12/2019    Procedure: Right Arthroscopic Rotator Cuff Repair, Arthroscopic Subacromial Decompression, Arthroscopic Biceps Tenodesis, Arthroscopic Distal Clavicle Excision;  Surgeon: Nils Milligan MD;  Location:  OR     ENT SURGERY      lipoma removed from neck     ESOPHAGOSCOPY, GASTROSCOPY, DUODENOSCOPY (EGD), COMBINED N/A 9/20/2019    Procedure: ESOPHAGOGASTRODUODENOSCOPY (EGD);  Surgeon: Ethan Davidson MD;  Location:  GI     HEAD & NECK SURGERY      fx root of tooth     ROTATOR CUFF REPAIR RT/LT Left 01/2017     VASECTOMY         Family History   Problem Relation Age of Onset     Prostate Cancer Father      Other - See Comments Other         tremor     Colon Cancer No family hx of        Social History     Tobacco Use     Smoking status: Never Smoker     Smokeless tobacco: Current User     Types: Chew     Tobacco comment: occ   Substance Use Topics     Alcohol use: Yes     Frequency: 2-3 times a week     Drinks per session: 3 or 4     Binge frequency: Monthly     Comment: few beers per week           Reviewed and updated as needed this visit by Provider  Allergies  Meds  Med Hx  Surg Hx         Review of Systems   HENT: Positive for rhinorrhea. Negative for mouth sores.    Respiratory: Negative for cough and shortness of breath.    Psychiatric/Behavioral: Negative for mood changes. The patient is  "nervous/anxious.             Objective    There were no vitals taken for this visit.  Estimated body mass index is 28.59 kg/m  as calculated from the following:    Height as of 12/12/19: 1.803 m (5' 11\").    Weight as of 12/12/19: 93 kg (205 lb).  Physical Exam     Cheerful. Cogent  No respiratory sounds              Assessment & Plan   Problem List Items Addressed This Visit        Respiratory    Rhinitis (Chronic)     Non-alcohol Nasal steroid, antihistamines         Relevant Medications    montelukast (SINGULAIR) 10 MG tablet    fluticasone-vilanterol (BREO ELLIPTA) 200-25 MCG/INH inhaler    albuterol (PROAIR HFA/PROVENTIL HFA/VENTOLIN HFA) 108 (90 Base) MCG/ACT inhaler    Mild persistent asthma without complication     controlled         Relevant Medications    montelukast (SINGULAIR) 10 MG tablet    fluticasone-vilanterol (BREO ELLIPTA) 200-25 MCG/INH inhaler    albuterol (PROAIR HFA/PROVENTIL HFA/VENTOLIN HFA) 108 (90 Base) MCG/ACT inhaler       Musculoskeletal and Integumentary    Rosacea    Relevant Medications    montelukast (SINGULAIR) 10 MG tablet    fluticasone-vilanterol (BREO ELLIPTA) 200-25 MCG/INH inhaler    albuterol (PROAIR HFA/PROVENTIL HFA/VENTOLIN HFA) 108 (90 Base) MCG/ACT inhaler    metroNIDAZOLE (METROGEL) 0.75 % external gel    Rotator cuff syndrome, right     S/p repair, PT         Relevant Medications    metroNIDAZOLE (METROGEL) 0.75 % external gel    Nocturnal leg cramps    Relevant Medications    rOPINIRole (REQUIP) 0.25 MG tablet       Behavioral    Tobacco abuse     Twice weekly. Cessation urged            Other    Anxiety - Primary     Stable           Elevated blood pressure reading without diagnosis of hypertension     Periodic assessment           Other Visit Diagnoses     Primary insomnia        Relevant Medications    zolpidem (AMBIEN) 10 MG tablet             Tobacco Cessation:   reports that he has never smoked. His smokeless tobacco use includes chew.  Tobacco Cessation Action " "Plan: Self help information given to patient      BMI:   Estimated body mass index is 28.59 kg/m  as calculated from the following:    Height as of 12/12/19: 1.803 m (5' 11\").    Weight as of 12/12/19: 93 kg (205 lb).   Weight management plan: exercise            Return in about 5 weeks (around 7/20/2020) for ACT lab Shingles vacc, 6 mos visit.    Lyndon Stoll MD  [facility]      Video-Visit Details    Type of service:  Telephone Visit    39 minutes      Return in about 5 weeks (around 7/20/2020) for ACT lab Shingles vacc, 6 mos visit.       Lyndon Stoll MD      "

## 2020-06-16 ASSESSMENT — ANXIETY QUESTIONNAIRES: GAD7 TOTAL SCORE: 1

## 2020-06-16 ASSESSMENT — ASTHMA QUESTIONNAIRES: ACT_TOTALSCORE: 22

## 2020-06-17 NOTE — TELEPHONE ENCOUNTER
Central Prior Authorization Team   Phone: [phone]      PA Initiation    Medication: Ropinirole-Initiated  Insurance Company: [employer] - Phone [phone] Fax [phone]  Pharmacy Filling the Rx: [place], [place] - [address]  Filling Pharmacy Phone: [phone]  Filling Pharmacy Fax:    Start Date: 6/17/2020

## 2020-06-18 NOTE — TELEPHONE ENCOUNTER
Prior Authorization Not Needed per Insurance    Medication: Ropinirole-PA NOT NEEDED  Insurance Company: [employer] - Phone [phone] Fax [phone]  Expected CoPay:      Pharmacy Filling the Rx: [facility] [place], [place] - [address]  Pharmacy Notified: Yes  Patient Notified: No    Pharmacy will notify patient when medication is ready.

## 2020-07-14 DIAGNOSIS — F41.9 ANXIETY: ICD-10-CM

## 2020-07-15 RX ORDER — ALPRAZOLAM 1 MG
TABLET ORAL
Qty: 30 TABLET | Refills: 0 | Status: SHIPPED | OUTPATIENT
Start: 2020-07-15 | End: 2020-08-19

## 2020-07-22 DIAGNOSIS — F41.9 ANXIETY: ICD-10-CM

## 2020-07-22 PROCEDURE — 80307 DRUG TEST PRSMV CHEM ANLYZR: CPT | Mod: 90 | Performed by: FAMILY MEDICINE

## 2020-07-22 PROCEDURE — 99000 SPECIMEN HANDLING OFFICE-LAB: CPT | Performed by: FAMILY MEDICINE

## 2020-07-28 LAB — COMPREHEN DRUG ANALYSIS UR: NORMAL

## 2020-08-18 DIAGNOSIS — F41.9 ANXIETY: ICD-10-CM

## 2020-08-18 NOTE — TELEPHONE ENCOUNTER
Routing refill request to provider for review/approval because:  Drug not on the [facility] refill protocol     Monique Morrissey RN   [facility] -- Triage Nurse

## 2020-08-19 RX ORDER — ALPRAZOLAM 1 MG
TABLET ORAL
Qty: 30 TABLET | Refills: 0 | Status: SHIPPED | OUTPATIENT
Start: 2020-08-19 | End: 2020-09-21

## 2020-08-24 ENCOUNTER — TELEPHONE (OUTPATIENT)
Dept: FAMILY MEDICINE | Facility: CLINIC | Age: 55
End: 2020-08-24

## 2020-08-24 NOTE — TELEPHONE ENCOUNTER
Panel Management Review            Composite cancer screening  Chart review shows that this patient is due/due soon for the following PX  Summary:    Patient is due/failing the following:   Px    Action needed:   Px    Type of outreach:    Phone, spoke to patient.  schedule appt     Questions for provider review:    None                                                                                                                                    Mini GAY

## 2020-09-21 DIAGNOSIS — F41.9 ANXIETY: ICD-10-CM

## 2020-09-21 RX ORDER — ALPRAZOLAM 1 MG
TABLET ORAL
Qty: 30 TABLET | Refills: 0 | Status: SHIPPED | OUTPATIENT
Start: 2020-09-21 | End: 2020-10-22

## 2020-09-21 NOTE — TELEPHONE ENCOUNTER
Routing refill request to provider for review/approval because:  Drug not on the [facility] refill protocol     Ita Woodard RN

## 2020-09-29 ENCOUNTER — OFFICE VISIT (OUTPATIENT)
Dept: FAMILY MEDICINE | Facility: CLINIC | Age: 55
End: 2020-09-29
Payer: COMMERCIAL

## 2020-09-29 VITALS
DIASTOLIC BLOOD PRESSURE: 80 MMHG | TEMPERATURE: 98.2 F | SYSTOLIC BLOOD PRESSURE: 120 MMHG | WEIGHT: 211.4 LBS | HEIGHT: 71 IN | RESPIRATION RATE: 19 BRPM | HEART RATE: 68 BPM | BODY MASS INDEX: 29.6 KG/M2 | OXYGEN SATURATION: 96 %

## 2020-09-29 DIAGNOSIS — J30.9 ALLERGIC RHINITIS, UNSPECIFIED SEASONALITY, UNSPECIFIED TRIGGER: Chronic | ICD-10-CM

## 2020-09-29 DIAGNOSIS — S76.311A STRAIN OF RIGHT HAMSTRING MUSCLE, INITIAL ENCOUNTER: ICD-10-CM

## 2020-09-29 DIAGNOSIS — J45.30 MILD PERSISTENT ASTHMA WITHOUT COMPLICATION: ICD-10-CM

## 2020-09-29 DIAGNOSIS — R06.83 SNORING: ICD-10-CM

## 2020-09-29 DIAGNOSIS — G47.62 NOCTURNAL LEG CRAMPS: ICD-10-CM

## 2020-09-29 DIAGNOSIS — R35.1 BENIGN PROSTATIC HYPERPLASIA WITH NOCTURIA: ICD-10-CM

## 2020-09-29 DIAGNOSIS — N40.1 BENIGN PROSTATIC HYPERPLASIA WITH NOCTURIA: ICD-10-CM

## 2020-09-29 DIAGNOSIS — Z00.00 ENCOUNTER FOR PREVENTIVE HEALTH EXAMINATION: Primary | ICD-10-CM

## 2020-09-29 DIAGNOSIS — G25.0 FAMILIAL TREMOR: ICD-10-CM

## 2020-09-29 PROCEDURE — 99396 PREV VISIT EST AGE 40-64: CPT | Performed by: FAMILY MEDICINE

## 2020-09-29 PROCEDURE — 99213 OFFICE O/P EST LOW 20 MIN: CPT | Mod: 25 | Performed by: FAMILY MEDICINE

## 2020-09-29 RX ORDER — PROPRANOLOL HYDROCHLORIDE 80 MG/1
CAPSULE, EXTENDED RELEASE ORAL
Qty: 90 CAPSULE | Refills: 3 | Status: SHIPPED | OUTPATIENT
Start: 2020-09-29 | End: 2021-07-14

## 2020-09-29 RX ORDER — IPRATROPIUM BROMIDE 42 UG/1
2 SPRAY, METERED NASAL 4 TIMES DAILY
Qty: 1 BOX | Refills: 1 | Status: SHIPPED | OUTPATIENT
Start: 2020-09-29 | End: 2020-12-22

## 2020-09-29 RX ORDER — TAMSULOSIN HYDROCHLORIDE 0.4 MG/1
0.4 CAPSULE ORAL DAILY
Qty: 90 CAPSULE | Refills: 1 | Status: SHIPPED | OUTPATIENT
Start: 2020-09-29 | End: 2021-04-15

## 2020-09-29 ASSESSMENT — ENCOUNTER SYMPTOMS
HEMATOCHEZIA: 0
PALPITATIONS: 0
ABDOMINAL PAIN: 0
SHORTNESS OF BREATH: 0
EYE PAIN: 0
CONSTIPATION: 0
DYSURIA: 0
MYALGIAS: 1
JOINT SWELLING: 0
CHILLS: 0
NAUSEA: 0
SORE THROAT: 0
WEAKNESS: 0
FREQUENCY: 0
HEMATURIA: 0
HEADACHES: 0
PARESTHESIAS: 0
NERVOUS/ANXIOUS: 0
COUGH: 0
DIZZINESS: 0
DIARRHEA: 0
HEARTBURN: 0
ARTHRALGIAS: 1
FEVER: 0

## 2020-09-29 ASSESSMENT — PAIN SCALES - GENERAL: PAINLEVEL: NO PAIN (1)

## 2020-09-29 ASSESSMENT — MIFFLIN-ST. JEOR: SCORE: 1826.03

## 2020-09-29 NOTE — ASSESSMENT & PLAN NOTE
Spouse no longer sleeps with him.  Discussed sleep apnea evaluation ENT.  Trial of Atrovent nasal at bedtime

## 2020-09-29 NOTE — ASSESSMENT & PLAN NOTE
Controlled.  Discussed using inhaled steroids as rescue agent.  Trial of therapy continue Breo Montelukast

## 2020-09-29 NOTE — PROGRESS NOTES
"  Strain of right hamstring muscle, initial encounter pain in right hamstring since early summer.  He thinks he did this working on a deck, but recalls no specific injury.  He has been treating it with self-directed stretching with some benefit.  ROS no rash no bruise  /80 (BP Location: Right arm, Patient Position: Sitting, Cuff Size: Adult Large)   Pulse 68   Temp 98.2  F (36.8  C) (Oral)   Resp 19   Ht 1.803 m (5' 11\")   Wt 95.9 kg (211 lb 6.4 oz)   SpO2 96%   BMI 29.48 kg/m    BACK: Able to flex within*0 inches of the floor, normal heel toe, negative SLR.  Hamstring unremarkable to palpation    ASSESSMENT / PLAN:      (S76.311A) Strain of right hamstring muscle, initial encounter  Comment: Postulated.  Referred from back possible as well  Plan: [facility]       Discussed the natural history of hamstring strain          Lyndon Stoll MD          Answers for HPI/ROS submitted by the patient on 9/29/2020   Annual Exam:  Frequency of exercise:: 4-5 days/week  Getting at least 3 servings of Calcium per day:: Yes  Diet:: Regular (no restrictions)  Taking medications regularly:: Yes  Medication side effects:: Not applicable  Bi-annual eye exam:: NO  Dental care twice a year:: Yes  Sleep apnea or symptoms of sleep apnea:: Excessive snoring  abdominal pain: No  Blood in stool: No  Blood in urine: No  chest pain: No  chills: No  congestion: No  constipation: No  cough: No  diarrhea: No  dizziness: No  ear pain: No  eye pain: No  nervous/anxious: No  fever: No  frequency: No  genital sores: No  headaches: No  hearing loss: No  heartburn: No  arthralgias: Yes  joint swelling: No  peripheral edema: No  mood changes: No  myalgias: Yes  nausea: No  dysuria: No  palpitations: No  Skin sensation changes: No  sore throat: No  urgency: No  rash: No  shortness of breath: No  visual disturbance: No  weakness: No  impotence: No  penile discharge: No  Additional concerns today:: No  Duration " of exercise:: 30-45 minutes

## 2020-09-29 NOTE — PROGRESS NOTES
SUBJECTIVE:   CC: Venkata Lynn is an 55 year old male who presents for preventative health visit.       Patient has been advised of split billing requirements and indicates understanding: Yes  Healthy Habits:     Getting at least 3 servings of Calcium per day:  Yes    Bi-annual eye exam:  NO    Dental care twice a year:  Yes    Sleep apnea or symptoms of sleep apnea:  Excessive snoring    Diet:  Regular (no restrictions)    Frequency of exercise:  4-5 days/week    Duration of exercise:  30-45 minutes    Taking medications regularly:  Yes    Medication side effects:  Not applicable    PHQ-2 Total Score: 0    Additional concerns today:  No            Allergic rhinitis, unspecified seasonality, unspecified trigger nasal steroids.  Flonase  bothered his nose  Familial tremor responsive to beta blockade  Nocturnal leg cramps he stopped his Requip months ago and thinks he is improved  Mild persistent asthma without complication doing well.  Proventil twice weekly  BPH he has been developing urinary urgency and nocturia  Snoring no symptoms in the day of fatigue or tiredness.  He feels well rested.  However his wife no longer sleeps in the same room    Today's PHQ-2 Score:   PHQ-2 ( 1999 [employer]) 9/29/2020   Q1: Little interest or pleasure in doing things 0   Q2: Feeling down, depressed or hopeless 0   PHQ-2 Score 0   Q1: Little interest or pleasure in doing things Not at all   Q2: Feeling down, depressed or hopeless Not at all   PHQ-2 Score 0       Abuse: Current or Past(Physical, Sexual or Emotional)- No  Do you feel safe in your environment? Yes    Past Medical History:   Diagnosis Date     Acne vulgaris      Anxiety 3/28/2011     Anxiety disorder      Arthritis     both knees     CARDIOVASCULAR SCREENING; LDL GOAL LESS THAN 160 10/31/2010     Controlled substance agreement signed 1/11/2016     Coughing     at night     Dyspepsia 8/16/2016     Elevated blood pressure reading without diagnosis of hypertension  1/11/2016     Familial tremor 8/16/2016     Family history of rhinophyma 1/18/2012     Insomnia      Intermittent asthma 3/28/2011     Knee pain 1/18/2012     Lateral epicondylitis of left elbow 9/14/2018     LBP (low back pain) 1/18/2012     Low back pains      Mild persistent asthma without complication 8/16/2016     Nocturnal leg cramps 6/15/2020     Rhinitis 1/18/2012     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     Rosacea 1/18/2012     Rotator cuff syndrome, right 2/12/2019     Sebaceous hyperplasia 1/18/2012     Shortness of breath     due to asthma     Snoring 9/29/2020     Tobacco abuse 1/18/2012     Trigger finger, acquired 11/22/2019       Past Surgical History:   Procedure Laterality Date     ARTHROSCOPY KNEE WITH MEDIAL MENISCECTOMY Left 2/22/2016    Procedure: ARTHROSCOPY KNEE WITH MEDIAL MENISCECTOMY;  Surgeon: Chaz Moe MD;  Location:  OR     ARTHROSCOPY SHOULDER ROTATOR CUFF REPAIR, BICEP TENODESIS REPAIR Right 12/12/2019    Procedure: Right Arthroscopic Rotator Cuff Repair, Arthroscopic Subacromial Decompression, Arthroscopic Biceps Tenodesis, Arthroscopic Distal Clavicle Excision;  Surgeon: Nils Milligan MD;  Location:  OR     ENT SURGERY      lipoma removed from neck     ESOPHAGOSCOPY, GASTROSCOPY, DUODENOSCOPY (EGD), COMBINED N/A 9/20/2019    Procedure: ESOPHAGOGASTRODUODENOSCOPY (EGD);  Surgeon: Ethan Davidson MD;  Location:  GI     HEAD & NECK SURGERY      fx root of tooth     ROTATOR CUFF REPAIR RT/LT Left 01/2017     VASECTOMY         Family History   Problem Relation Age of Onset     Prostate Cancer Father      Other - See Comments Other         tremor     Colon Cancer No family hx of        Social History     Tobacco Use     Smoking status: Never Smoker     Smokeless tobacco: Current User     Types: Chew     Tobacco comment: occ   Substance Use Topics     Alcohol use: Yes     Frequency: 2-3 times a week     Drinks per session: 3 or 4     Binge frequency: Monthly      Comment: few beers per week         Social History     Tobacco Use     Smoking status: Never Smoker     Smokeless tobacco: Current User     Types: Chew     Tobacco comment: occ   Substance Use Topics     Alcohol use: Yes     Frequency: 2-3 times a week     Drinks per session: 3 or 4     Binge frequency: Monthly     Comment: few beers per week     If you drink alcohol do you typically have >3 drinks per day or >7 drinks per week? Not applicable    Alcohol Use 9/29/2020   Prescreen: >3 drinks/day or >7 drinks/week? Yes   Prescreen: >3 drinks/day or >7 drinks/week? -   AUDIT SCORE  7     AUDIT - Alcohol Use Disorders Identification Test - Reproduced from the World Health Organization Audit 2001 (Second Edition) 9/29/2020   1.  How often do you have a drink containing alcohol? 2 to 3 times a week   2.  How many drinks containing alcohol do you have on a typical day when you are drinking? 3 or 4   3.  How often do you have five or more drinks on one occasion? Monthly   4.  How often during the last year have you found that you were not able to stop drinking once you had started? Never   5.  How often during the last year have you failed to do what was normally expected of you because of drinking? Never   6.  How often during the last year have you needed a first drink in the morning to get yourself going after a heavy drinking session? Never   7.  How often during the last year have you had a feeling of guilt or remorse after drinking? Less than monthly   8.  How often during the last year have you been unable to remember what happened the night before because of your drinking? Never   9.  Have you or someone else been injured because of your drinking? No   10. Has a relative, friend, doctor or other health care worker been concerned about your drinking or suggested you cut down? No   TOTAL SCORE 7       Last PSA: No results found for: PSA    Reviewed orders with patient. Reviewed health maintenance and updated orders  accordingly - Yes      Reviewed and updated as needed this visit by clinical staff         Reviewed and updated as needed this visit by Provider        Past Medical History:   Diagnosis Date     Acne vulgaris      Anxiety 3/28/2011     Anxiety disorder      Arthritis     both knees     CARDIOVASCULAR SCREENING; LDL GOAL LESS THAN 160 10/31/2010     Controlled substance agreement signed 1/11/2016     Coughing     at night     Dyspepsia 8/16/2016     Elevated blood pressure reading without diagnosis of hypertension 1/11/2016     Familial tremor 8/16/2016     Family history of rhinophyma 1/18/2012     Insomnia      Intermittent asthma 3/28/2011     Knee pain 1/18/2012     Lateral epicondylitis of left elbow 9/14/2018     LBP (low back pain) 1/18/2012     Low back pains      Mild persistent asthma without complication 8/16/2016     Nocturnal leg cramps 6/15/2020     Rhinitis 1/18/2012     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     Rosacea 1/18/2012     Rotator cuff syndrome, right 2/12/2019     Sebaceous hyperplasia 1/18/2012     Shortness of breath     due to asthma     Snoring 9/29/2020     Tobacco abuse 1/18/2012     Trigger finger, acquired 11/22/2019       Past Surgical History:   Procedure Laterality Date     ARTHROSCOPY KNEE WITH MEDIAL MENISCECTOMY Left 2/22/2016    Procedure: ARTHROSCOPY KNEE WITH MEDIAL MENISCECTOMY;  Surgeon: Chaz Meo MD;  Location:  OR     ARTHROSCOPY SHOULDER ROTATOR CUFF REPAIR, BICEP TENODESIS REPAIR Right 12/12/2019    Procedure: Right Arthroscopic Rotator Cuff Repair, Arthroscopic Subacromial Decompression, Arthroscopic Biceps Tenodesis, Arthroscopic Distal Clavicle Excision;  Surgeon: Nils Milligan MD;  Location:  OR     ENT SURGERY      lipoma removed from neck     ESOPHAGOSCOPY, GASTROSCOPY, DUODENOSCOPY (EGD), COMBINED N/A 9/20/2019    Procedure: ESOPHAGOGASTRODUODENOSCOPY (EGD);  Surgeon: Ethan Davidson MD;  Location:  GI     HEAD & NECK SURGERY      fx root of  "tooth     ROTATOR CUFF REPAIR RT/LT Left 01/2017     VASECTOMY         Family History   Problem Relation Age of Onset     Prostate Cancer Father      Other - See Comments Other         tremor     Colon Cancer No family hx of        Social History     Tobacco Use     Smoking status: Never Smoker     Smokeless tobacco: Current User     Types: Chew     Tobacco comment: occ   Substance Use Topics     Alcohol use: Yes     Frequency: 2-3 times a week     Drinks per session: 3 or 4     Binge frequency: Monthly     Comment: few beers per week         Review of Systems   Constitutional: Negative for chills and fever.   HENT: Negative for congestion, ear pain, hearing loss and sore throat.    Eyes: Negative for pain and visual disturbance.   Respiratory: Negative for cough and shortness of breath.    Cardiovascular: Negative for chest pain, palpitations and peripheral edema.   Gastrointestinal: Negative for abdominal pain, constipation, diarrhea, heartburn, hematochezia and nausea.   Genitourinary: Negative for discharge, dysuria, frequency, genital sores, hematuria, impotence and urgency.   Musculoskeletal:        See additional note   Skin: Negative for rash.   Neurological: Negative for dizziness, weakness, headaches and paresthesias.   Psychiatric/Behavioral: Negative for mood changes. The patient is not nervous/anxious.          OBJECTIVE:   There were no vitals taken for this visit.   /80 (BP Location: Right arm, Patient Position: Sitting, Cuff Size: Adult Large)   Pulse 68   Temp 98.2  F (36.8  C) (Oral)   Resp 19   Ht 1.803 m (5' 11\")   Wt 95.9 kg (211 lb 6.4 oz)   SpO2 96%   BMI 29.48 kg/m        Physical Exam  Constitutional:       Appearance: Normal appearance.   HENT:      Head: Atraumatic.      Right Ear: Tympanic membrane normal.      Left Ear: Tympanic membrane normal.      Nose: Nose normal.      Mouth/Throat:      Mouth: Mucous membranes are moist.   Eyes:      Pupils: Pupils are equal, round, " and reactive to light.   Neck:      Musculoskeletal: Neck supple.   Cardiovascular:      Rate and Rhythm: Normal rate and regular rhythm.      Heart sounds: Normal heart sounds.   Pulmonary:      Effort: Pulmonary effort is normal.      Breath sounds: Normal breath sounds.   Abdominal:      General: Abdomen is flat. Bowel sounds are normal.   Musculoskeletal:      Comments: See additional note   Skin:     General: Skin is warm and dry.   Neurological:      General: No focal deficit present.      Mental Status: He is alert.   Psychiatric:         Mood and Affect: Mood normal.               ASSESSMENT/PLAN:     Problem List Items Addressed This Visit        Nervous and Auditory    Familial tremor     Controlled in general.  Permission to use additional propranolol PRN given.  Discussed fatigue         Relevant Medications    propranolol ER (INDERAL LA) 80 MG 24 hr capsule       Respiratory    Rhinitis (Chronic)     Responsive to nasal steroids continue         Relevant Medications    ipratropium (ATROVENT) 0.06 % nasal spray    beclomethasone HFA (QVAR REDIHALER) 40 MCG/ACT inhaler    Mild persistent asthma without complication     Controlled.  Discussed using inhaled steroids as rescue agent.  Trial of therapy continue Breo Montelukast         Relevant Medications    ipratropium (ATROVENT) 0.06 % nasal spray    beclomethasone HFA (QVAR REDIHALER) 40 MCG/ACT inhaler    Other Relevant Orders    Asthma Action Plan (AAP) (Completed)    Snoring     Spouse no longer sleeps with him.  Discussed sleep apnea evaluation ENT.  Trial of Atrovent nasal at bedtime            Musculoskeletal and Integumentary    Nocturnal leg cramps     He stopped his Requip.  He feels this is improved         Strain of right hamstring muscle, initial encounter     Versus sciatica.  Exam reassuring.  Physical therapy         Relevant Orders    [facility]       Urinary    Benign prostatic hyperplasia with nocturia      "Variable nocturia, urinary urgency.  [facility] database.  Trial of therapy         Relevant Medications    tamsulosin (FLOMAX) 0.4 MG capsule      Other Visit Diagnoses     Encounter for preventive health examination    -  Primary          Patient has been advised of split billing requirements and indicates understanding: Yes  COUNSELING:   Reviewed preventive health counseling, as reflected in patient instructions    Estimated body mass index is 28.59 kg/m  as calculated from the following:    Height as of 12/12/19: 1.803 m (5' 11\").    Weight as of 12/12/19: 93 kg (205 lb).     Weight management plan: maintain    He reports that he has never smoked. His smokeless tobacco use includes chew.  Tobacco Cessation Action Plan:   Information offered: Patient not interested at this time      Counseling Resources:  ATP IV Guidelines  Pooled Cohorts Equation Calculator  FRAX Risk Assessment  ICSI Preventive Guidelines  Dietary Guidelines for Americans, 2010  USDA's MyPlate  ASA Prophylaxis  Lung CA Screening    Lyndon Stoll MD  [facility]"

## 2020-09-30 ASSESSMENT — ASTHMA QUESTIONNAIRES: ACT_TOTALSCORE: 22

## 2020-10-08 ENCOUNTER — THERAPY VISIT (OUTPATIENT)
Dept: PHYSICAL THERAPY | Facility: CLINIC | Age: 55
End: 2020-10-08
Payer: COMMERCIAL

## 2020-10-08 DIAGNOSIS — M54.41 ACUTE RIGHT-SIDED LOW BACK PAIN WITH RIGHT-SIDED SCIATICA: ICD-10-CM

## 2020-10-08 DIAGNOSIS — S76.311A STRAIN OF RIGHT HAMSTRING MUSCLE, INITIAL ENCOUNTER: ICD-10-CM

## 2020-10-08 PROCEDURE — 97161 PT EVAL LOW COMPLEX 20 MIN: CPT | Mod: GP | Performed by: PHYSICAL THERAPIST

## 2020-10-08 PROCEDURE — 97110 THERAPEUTIC EXERCISES: CPT | Mod: GP | Performed by: PHYSICAL THERAPIST

## 2020-10-08 PROCEDURE — 97112 NEUROMUSCULAR REEDUCATION: CPT | Mod: GP | Performed by: PHYSICAL THERAPIST

## 2020-10-09 NOTE — PROGRESS NOTES
[facility] Initial Evaluation  Subjective:  HPI                    Objective:  System    Physical Exam    General     ROS    Assessment/Plan:

## 2020-10-09 NOTE — PROGRESS NOTES
[facility] Initial Evaluation  Subjective:    Patient Health History             Pertinent medical history includes: asthma.     Medical allergies: pennicilllen.   Surgeries include:  Other (knee, shoulder).    Current medications:  Pain medication and sleep medication.    Current occupation is contractor.   Primary job tasks include:  Repetitive tasks.                                    Objective:  System    Physical Exam    General     ROS    Assessment/Plan:

## 2020-10-22 DIAGNOSIS — F41.9 ANXIETY: ICD-10-CM

## 2020-10-22 RX ORDER — ALPRAZOLAM 1 MG
TABLET ORAL
Qty: 30 TABLET | Refills: 0 | Status: SHIPPED | OUTPATIENT
Start: 2020-10-22 | End: 2020-11-23

## 2020-10-22 NOTE — TELEPHONE ENCOUNTER
Routing refill request to provider for review/approval because:  Drug not on the [facility] refill protocol   ALPRAZolam (XANAX) 1 MG tablet 30 tablet 0 9/21/2020  No   Sig: TAKE ONE TABLET BY MOUTH ONCE DAILY AS NEEDED FOR ANXIETY   Sent to pharmacy as: ALPRAZolam 1 MG Oral Tablet (XANAX)   Class: E-Prescribe   Order: [id number]   E-Prescribing Status: Receipt confirmed by pharmacy (9/21/2020  3:03 PM CDT)     Donna Caballero RN Flex

## 2020-10-29 ENCOUNTER — OFFICE VISIT (OUTPATIENT)
Dept: URGENT CARE | Facility: URGENT CARE | Age: 55
End: 2020-10-29
Payer: COMMERCIAL

## 2020-10-29 VITALS
OXYGEN SATURATION: 100 % | SYSTOLIC BLOOD PRESSURE: 144 MMHG | BODY MASS INDEX: 29.43 KG/M2 | WEIGHT: 211 LBS | TEMPERATURE: 96.7 F | DIASTOLIC BLOOD PRESSURE: 101 MMHG | HEART RATE: 63 BPM

## 2020-10-29 DIAGNOSIS — R07.9 ACUTE CHEST PAIN: Primary | ICD-10-CM

## 2020-10-29 PROCEDURE — 99214 OFFICE O/P EST MOD 30 MIN: CPT | Performed by: FAMILY MEDICINE

## 2020-10-29 PROCEDURE — 93000 ELECTROCARDIOGRAM COMPLETE: CPT | Performed by: FAMILY MEDICINE

## 2020-10-29 NOTE — PROGRESS NOTES
SUBJECTIVE:  Venkata Lynn is a 55 year old male who presents to the office with the CC of chest pain.    Developed chest pain in sternal and left sided.  Was drinking coffee and stood up and developed chest pain.  Will be worse with movement.  Denies any recent URI or cough.  Took aspirin this morning afterwards and is feeling better.  Worried as he had a friend who  suddenly from a heart attack.    Denies stomach upset but does endorse heartburn.  Used to take zantac, is now taking famotidine OTC but not helping.  Has taken omeprazole for several weeks to a month 3 times a year due to heartburn problems.  Endorsed foods that worsens symptoms for GERD.    Exercises daily, was doing push up but did not recall any trauma.  Works out regularly.    Past Medical History:   Diagnosis Date     Acne vulgaris      Anxiety 3/28/2011     Anxiety disorder      Arthritis     both knees     CARDIOVASCULAR SCREENING; LDL GOAL LESS THAN 160 10/31/2010     Controlled substance agreement signed 2016     Coughing     at night     Dyspepsia 2016     Elevated blood pressure reading without diagnosis of hypertension 2016     Familial tremor 2016     Family history of rhinophyma 2012     Insomnia      Intermittent asthma 3/28/2011     Knee pain 2012     Lateral epicondylitis of left elbow 2018     LBP (low back pain) 2012     Low back pains      Mild persistent asthma without complication 2016     Nocturnal leg cramps 6/15/2020     Rhinitis 2012     Rhinophyma 2012     Rosacea 2012     Rotator cuff syndrome, right 2019     Sebaceous hyperplasia 2012     Shortness of breath     due to asthma     Snoring 2020     Tobacco abuse 2012     Trigger finger, acquired 2019         Current Outpatient Medications:      albuterol (PROAIR HFA/PROVENTIL HFA/VENTOLIN HFA) 108 (90 Base) MCG/ACT inhaler, Inhale 2 puffs into the lungs every 6 hours as needed for  shortness of breath / dyspnea, Disp: 1 Inhaler, Rfl: 11     ALPRAZolam (XANAX) 1 MG tablet, TAKE ONE TABLET BY MOUTH ONCE DAILY AS NEEDED FOR ANXIETY, Disp: 30 tablet, Rfl: 0     beclomethasone HFA (QVAR REDIHALER) 40 MCG/ACT inhaler, Inhale 1 puff into the lungs 2 times daily as needed (dyspnea), Disp: 1 Inhaler, Rfl: 1     Calcium Carbonate-Vit D-Min (CALCIUM 1200 PO), , Disp: , Rfl:      doxycycline hyclate (PERIOSTAT) 20 MG tablet, Take 1 tablet (20 mg) by mouth 2 times daily, Disp: , Rfl:      fluticasone-vilanterol (BREO ELLIPTA) 200-25 MCG/INH inhaler, INHALE ONE PUFF BY MOUTH ONCE DAILY, Disp: 3 Inhaler, Rfl: 3     ipratropium (ATROVENT) 0.06 % nasal spray, Spray 2 sprays into both nostrils 4 times daily, Disp: 1 Box, Rfl: 1     metroNIDAZOLE (METROGEL) 0.75 % external gel, Apply topically 2 times daily, Disp:  , Rfl:      montelukast (SINGULAIR) 10 MG tablet, Take 1 tablet (10 mg) by mouth At Bedtime, Disp: 90 tablet, Rfl: 3     Multiple Vitamins-Minerals (MULTIVITAMIN ADULT PO), Take 1 tablet by mouth, Disp: , Rfl:      propranolol ER (INDERAL LA) 80 MG 24 hr capsule, TAKE ONE CAPSULE BY MOUTH ONCE DAILY, Disp: 90 capsule, Rfl: 3     tamsulosin (FLOMAX) 0.4 MG capsule, Take 1 capsule (0.4 mg) by mouth daily, Disp: 90 capsule, Rfl: 1     vitamin  B complex with vitamin C (VITAMIN  B COMPLEX) TABS, Take 1 tablet by mouth daily, Disp: , Rfl:      VITAMIN D, CHOLECALCIFEROL, PO, Take 1,000 Units by mouth daily, Disp: , Rfl:      zolpidem (AMBIEN) 10 MG tablet, Take 1 tablet (10 mg) by mouth nightly as needed for sleep, Disp: 30 tablet, Rfl: 5    Social History     Tobacco Use     Smoking status: Never Smoker     Smokeless tobacco: Current User     Types: Chew     Tobacco comment: occ   Substance Use Topics     Alcohol use: Yes     Frequency: 2-3 times a week     Drinks per session: 3 or 4     Binge frequency: Monthly     Comment: few beers per week       ROS:Review of systems negative except as stated  above.    EXAM:  BP (!) 144/101   Pulse 63   Temp 96.7  F (35.9  C) (Tympanic)   Wt 95.7 kg (211 lb)   SpO2 100%   BMI 29.43 kg/m    GENERAL APPEARANCE: healthy, alert and no distress  EYES: EOMI,  PERRL, conjunctiva clear  CHEST: tenderness sternal area  PSYCH: mentation appears normal and affect normal/bright     Office EKG demonstrates:  Sinus angeline, rate 58, no acute ST/T changes c/w ischemia    ASSESSMENT/PLAN:  (R07.9) Acute chest pain  (primary encounter diagnosis)  Plan: EKG 12-lead complete w/read - Clinics            Patient appears well, symptoms improving and no acute distress, non-toxic appearing.  Discussed chest pain and possible etiology - cardiac, pulmonary, musculoskeletal and GI.  Reviewed that full cardiac work up to exclude coronary source will need to be thru primary provider.  Due to more discomfort with movement that suspect chest pain most likely due to musculoskeletal etiology and encourage to stop exercise for a few days, ice to area and take tylenol.  Patient also has underlying dyspepsia and symptoms started after coffee,  recommend to take PPI, he will  medication as prior RX for omeprazole costs that same.  Discussed obtaining labs but did declined.    Patient overall felt much more relieved after discussion and he will follow up with primary provider for further evaluation.    Preet Youssef MD  October 29, 2020 3:57 PM

## 2020-11-23 DIAGNOSIS — F41.9 ANXIETY: ICD-10-CM

## 2020-11-23 RX ORDER — ALPRAZOLAM 1 MG
TABLET ORAL
Qty: 30 TABLET | Refills: 3 | Status: SHIPPED | OUTPATIENT
Start: 2020-11-23 | End: 2021-03-08

## 2020-11-23 NOTE — TELEPHONE ENCOUNTER
Routing refill request to provider for review/approval because:  Drug not on the [facility] refill protocol     Monique Morrissey RN   [facility] -- Triage Nurse

## 2020-12-21 DIAGNOSIS — J30.9 ALLERGIC RHINITIS, UNSPECIFIED SEASONALITY, UNSPECIFIED TRIGGER: Chronic | ICD-10-CM

## 2020-12-22 RX ORDER — IPRATROPIUM BROMIDE 42 UG/1
SPRAY, METERED NASAL
Qty: 15 ML | Refills: 1 | Status: SHIPPED | OUTPATIENT
Start: 2020-12-22 | End: 2021-07-14

## 2020-12-30 ENCOUNTER — THERAPY VISIT (OUTPATIENT)
Dept: PHYSICAL THERAPY | Facility: CLINIC | Age: 55
End: 2020-12-30
Payer: COMMERCIAL

## 2020-12-30 DIAGNOSIS — M54.41 ACUTE RIGHT-SIDED LOW BACK PAIN WITH RIGHT-SIDED SCIATICA: ICD-10-CM

## 2020-12-30 PROCEDURE — 97112 NEUROMUSCULAR REEDUCATION: CPT | Mod: GP | Performed by: PHYSICAL THERAPIST

## 2020-12-30 PROCEDURE — 97110 THERAPEUTIC EXERCISES: CPT | Mod: GP | Performed by: PHYSICAL THERAPIST

## 2021-02-10 NOTE — PROGRESS NOTES
Discharge Note    Progress reporting period is from initial eval to Dec 30, 2020.     Venkata failed to return for next follow up visit and current status is unknown.  Please see information below for last relevant information on current status.  Patient seen for Rxs Used: 2 visits.  SUBJECTIVE  Subjective changes noted by patient:  Subjective: Doing better, but also noting some L L/E and buttocks symptoms occ.  Pain with rotational activities (in/out bed and truck)  .  Current pain level is Current Pain level: 1/10.     Previous pain level was  Initial Pain level: 2/10.   Changes in function:  Yes (See Goal flowsheet attached for changes in current functional level)  Adverse reaction to treatment or activity: None    OBJECTIVE  Changes noted in objective findings: Objective: Lx ROM: ext ERT ANNA NE during decreased pain after, flex ERT      ASSESSMENT/PLAN  Diagnosis: Lx radiculoapthy   DIAGP:  The encounter diagnosis was Acute right-sided low back pain with right-sided sciatica.  Updated problem list and treatment plan:   Pain - HEP  Decreased ROM/flexibility - HEP  Decreased function - HEP  Decreased strength - HEP  Impaired muscle performance - HEP  Impaired posture - HEP  STG/LTGs have been met or progress has been made towards goals:  Yes, please see goal flowsheet for most current information  Assessment of Progress: current status is unknown.  Last current status:     Self Management Plans:  HEP  I have re-evaluated this patient and find that the nature, scope, duration and intensity of the therapy is appropriate for the medical condition of the patient.  Venkata continues to require the following intervention to meet STG and LTG's:  HEP.    Recommendations:  Discharge with current home program.  Patient to follow up with MD as needed.    Please refer to the daily flowsheet for treatment today, total treatment time and time spent performing 1:1 timed codes.

## 2021-02-19 DIAGNOSIS — F51.01 PRIMARY INSOMNIA: ICD-10-CM

## 2021-02-19 NOTE — TELEPHONE ENCOUNTER
Routing refill request to provider for review/approval because:  Drug not on the [facility] refill protocol     Monique Morrissey RN   [facility] -- Triage Nurse

## 2021-02-21 RX ORDER — ZOLPIDEM TARTRATE 10 MG/1
TABLET ORAL
Qty: 30 TABLET | Refills: 5 | Status: SHIPPED | OUTPATIENT
Start: 2021-02-21 | End: 2021-07-14

## 2021-03-08 ENCOUNTER — VIRTUAL VISIT (OUTPATIENT)
Dept: FAMILY MEDICINE | Facility: CLINIC | Age: 56
End: 2021-03-08
Payer: COMMERCIAL

## 2021-03-08 VITALS — BODY MASS INDEX: 28.56 KG/M2 | WEIGHT: 204 LBS | RESPIRATION RATE: 18 BRPM | HEIGHT: 71 IN

## 2021-03-08 DIAGNOSIS — R03.0 ELEVATED BLOOD PRESSURE READING WITHOUT DIAGNOSIS OF HYPERTENSION: ICD-10-CM

## 2021-03-08 DIAGNOSIS — F41.9 ANXIETY: Primary | ICD-10-CM

## 2021-03-08 DIAGNOSIS — G44.219 EPISODIC TENSION-TYPE HEADACHE, NOT INTRACTABLE: ICD-10-CM

## 2021-03-08 DIAGNOSIS — L71.9 ROSACEA: ICD-10-CM

## 2021-03-08 DIAGNOSIS — J45.30 MILD PERSISTENT ASTHMA WITHOUT COMPLICATION: ICD-10-CM

## 2021-03-08 DIAGNOSIS — G25.0 FAMILIAL TREMOR: ICD-10-CM

## 2021-03-08 PROCEDURE — 99214 OFFICE O/P EST MOD 30 MIN: CPT | Mod: 95 | Performed by: FAMILY MEDICINE

## 2021-03-08 RX ORDER — ALPRAZOLAM 1 MG
TABLET ORAL
Qty: 30 TABLET | Refills: 3 | Status: SHIPPED | OUTPATIENT
Start: 2021-03-08 | End: 2021-07-14

## 2021-03-08 RX ORDER — METRONIDAZOLE 7.5 MG/G
GEL TOPICAL 2 TIMES DAILY
Qty: 45 G | Refills: 1 | Status: SHIPPED | OUTPATIENT
Start: 2021-03-08 | End: 2023-11-21

## 2021-03-08 RX ORDER — MONTELUKAST SODIUM 10 MG/1
1 TABLET ORAL AT BEDTIME
Qty: 90 TABLET | Refills: 3 | Status: SHIPPED | OUTPATIENT
Start: 2021-03-08 | End: 2022-06-29

## 2021-03-08 ASSESSMENT — MIFFLIN-ST. JEOR: SCORE: 1792.47

## 2021-03-08 ASSESSMENT — ANXIETY QUESTIONNAIRES
GAD7 TOTAL SCORE: 0
IF YOU CHECKED OFF ANY PROBLEMS ON THIS QUESTIONNAIRE, HOW DIFFICULT HAVE THESE PROBLEMS MADE IT FOR YOU TO DO YOUR WORK, TAKE CARE OF THINGS AT HOME, OR GET ALONG WITH OTHER PEOPLE: SOMEWHAT DIFFICULT
6. BECOMING EASILY ANNOYED OR IRRITABLE: NOT AT ALL
2. NOT BEING ABLE TO STOP OR CONTROL WORRYING: NOT AT ALL
5. BEING SO RESTLESS THAT IT IS HARD TO SIT STILL: NOT AT ALL
7. FEELING AFRAID AS IF SOMETHING AWFUL MIGHT HAPPEN: NOT AT ALL
3. WORRYING TOO MUCH ABOUT DIFFERENT THINGS: NOT AT ALL
1. FEELING NERVOUS, ANXIOUS, OR ON EDGE: NOT AT ALL

## 2021-03-08 ASSESSMENT — PATIENT HEALTH QUESTIONNAIRE - PHQ9
SUM OF ALL RESPONSES TO PHQ QUESTIONS 1-9: 2
5. POOR APPETITE OR OVEREATING: NOT AT ALL

## 2021-03-08 ASSESSMENT — PAIN SCALES - GENERAL: PAINLEVEL: NO PAIN (0)

## 2021-03-08 NOTE — ASSESSMENT & PLAN NOTE
He attributes this to anxiety.  We presented him the possibility of prophylactic therapy with may be helpful for his anxiety as well.

## 2021-03-08 NOTE — PROGRESS NOTES
Champ is a 55 year old who is being evaluated via a billable video visit.      How would you like to obtain your AVS? MyChart  If the video visit is dropped, the invitation should be resent by: Text to cell phone: [phone]  Will anyone else be joining your video visit? No  Video Start Time: 10:04    Assessment & Plan   Problem List Items Addressed This Visit     Anxiety - Primary     Stable.  Benzodiazepine.  Consider SNRI         Relevant Medications    ALPRAZolam (XANAX) 1 MG tablet    Elevated blood pressure reading without diagnosis of hypertension     Needs reassessment         Episodic tension-type headache, not intractable     He attributes this to anxiety.  We presented him the possibility of prophylactic therapy with may be helpful for his anxiety as well.         Familial tremor     Not evident by video.  Continue propranolol         Mild persistent asthma without complication     Needs ACT.  Quiescent.         Relevant Medications    beclomethasone HFA (QVAR REDIHALER) 40 MCG/ACT inhaler    fluticasone-vilanterol (BREO ELLIPTA) 200-25 MCG/INH inhaler    montelukast (SINGULAIR) 10 MG tablet    Rosacea     Appears well controlled by video         Relevant Medications    beclomethasone HFA (QVAR REDIHALER) 40 MCG/ACT inhaler    fluticasone-vilanterol (BREO ELLIPTA) 200-25 MCG/INH inhaler    metroNIDAZOLE (METROGEL) 0.75 % external gel    montelukast (SINGULAIR) 10 MG tablet                        Return in about 4 months (around 7/8/2021) for Lab Work BP , followed by, virtual visit.    Lyndon Stoll MD  [facility]   Champ is a 55 year old who presents for the following health issues     HPI       Anxiety Follow-Up    How are you doing with your anxiety since your last visit? No change    Are you having other symptoms that might be associated with anxiety? Yes:  headaches    Have you had a significant life event? Job Concerns     Are you feeling depressed?  No    Do you have any concerns with your use of alcohol or other drugs? No    Social History     Tobacco Use     Smoking status: Never Smoker     Smokeless tobacco: Current User     Types: Chew     Tobacco comment: occ   Substance Use Topics     Alcohol use: Yes     Frequency: 2-3 times a week     Drinks per session: 3 or 4     Binge frequency: Monthly     Comment: few beers per week     Drug use: No     CARMEN-7 SCORE 5/14/2019 6/15/2020 3/8/2021   Total Score - - -   Total Score 1 (minimal anxiety) - -   Total Score 1 1 0     PHQ 5/14/2019 6/15/2020 3/8/2021   PHQ-9 Total Score 0 2 2   Q9: Thoughts of better off dead/self-harm past 2 weeks Not at all Not at all Not at all     Last PHQ-9 3/8/2021   1.  Little interest or pleasure in doing things 0   2.  Feeling down, depressed, or hopeless 0   3.  Trouble falling or staying asleep, or sleeping too much 1   4.  Feeling tired or having little energy 1   5.  Poor appetite or overeating 0   6.  Feeling bad about yourself 0   7.  Trouble concentrating 0   8.  Moving slowly or restless 0   Q9: Thoughts of better off dead/self-harm past 2 weeks 0   PHQ-9 Total Score 2   Difficulty at work, home, or with people Somewhat difficult     CARMEN-7  3/8/2021   1. Feeling nervous, anxious, or on edge 0   2. Not being able to stop or control worrying 0   3. Worrying too much about different things 0   4. Trouble relaxing 0   5. Being so restless that it is hard to sit still 0   6. Becoming easily annoyed or irritable 0   7. Feeling afraid, as if something awful might happen 0   CARMEN-7 Total Score 0   If you checked any problems, how difficult have they made it for you to do your work, take care of things at home, or get along with other people? Somewhat difficult         How many servings of fruits and vegetables do you eat daily?  2-3    On average, how many sweetened beverages do you drink each day (Examples: soda, juice, sweet tea, etc.  Do NOT count diet or artificially sweetened  beverages)?   2    How many days per week do you exercise enough to make your heart beat faster? 5    How many minutes a day do you exercise enough to make your heart beat faster? 60 or more  How many days per week do you miss taking your medication? 1    What makes it hard for you to take your medications?  remembering to take    Work is slow.  He has been taking additional courses and has become certified in real state.    Review of Systems     No intolerance good mood no systemic symptoms        Objective    Vitals - Patient Reported  Pain Score: No Pain (0)      Vitals:  No vitals were obtained today due to virtual visit.    Physical Exam   GENERAL: Healthy, alert and no distress  EYES: Eyes grossly normal to inspection.  No discharge or erythema, or obvious scleral/conjunctival abnormalities.  RESP: No audible wheeze, cough, or visible cyanosis.  No visible retractions or increased work of breathing.    SKIN: Visible skin clear. No significant rash, abnormal pigmentation or lesions.  NEURO: Cranial nerves grossly intact.  Mentation and speech appropriate for age.  PSYCH: Mentation appears normal, affect normal/bright, judgement and insight intact, normal speech and appearance well-groomed.                Video-Visit Details    Type of service:  Video Visit    Video End Time:10:32 AM    Originating Location (pt. Location): Home    Distant Location (provider location):  [facility] [employer]     Platform used for Video Visit: [employer]

## 2021-03-08 NOTE — PATIENT INSTRUCTIONS
Https://[place].gov[place]vaccine[place]connector/connector.jsp    Would you like to try an additional medicine for your headaches?    Are you still taking the doxycycline?    How does the Qvar as a rescue inhaler work for you?    Are you still chewing tobacco?    Looks like we need to visit in July

## 2021-03-09 ASSESSMENT — ANXIETY QUESTIONNAIRES: GAD7 TOTAL SCORE: 0

## 2021-04-14 DIAGNOSIS — N40.1 BENIGN PROSTATIC HYPERPLASIA WITH NOCTURIA: ICD-10-CM

## 2021-04-14 DIAGNOSIS — R35.1 BENIGN PROSTATIC HYPERPLASIA WITH NOCTURIA: ICD-10-CM

## 2021-04-15 RX ORDER — TAMSULOSIN HYDROCHLORIDE 0.4 MG/1
0.4 CAPSULE ORAL DAILY
Qty: 90 CAPSULE | Refills: 0 | Status: SHIPPED | OUTPATIENT
Start: 2021-04-15 | End: 2021-07-14

## 2021-04-15 NOTE — TELEPHONE ENCOUNTER
Prescription approved per [facility] Refill Protocol.  Jayda aSlazar RN, BSN  Message handled by CLINIC NURSE.

## 2021-05-25 ENCOUNTER — RECORDS - HEALTHEAST (OUTPATIENT)
Dept: ADMINISTRATIVE | Facility: CLINIC | Age: 56
End: 2021-05-25

## 2021-06-01 ENCOUNTER — OFFICE VISIT (OUTPATIENT)
Dept: URGENT CARE | Facility: URGENT CARE | Age: 56
End: 2021-06-01
Payer: COMMERCIAL

## 2021-06-01 ENCOUNTER — ANCILLARY PROCEDURE (OUTPATIENT)
Dept: GENERAL RADIOLOGY | Facility: CLINIC | Age: 56
End: 2021-06-01
Attending: PHYSICIAN ASSISTANT
Payer: COMMERCIAL

## 2021-06-01 VITALS
RESPIRATION RATE: 18 BRPM | HEIGHT: 71 IN | OXYGEN SATURATION: 100 % | WEIGHT: 205 LBS | HEART RATE: 70 BPM | BODY MASS INDEX: 28.7 KG/M2 | DIASTOLIC BLOOD PRESSURE: 69 MMHG | TEMPERATURE: 98.4 F | SYSTOLIC BLOOD PRESSURE: 100 MMHG

## 2021-06-01 DIAGNOSIS — S99.911A ANKLE INJURY, RIGHT, INITIAL ENCOUNTER: ICD-10-CM

## 2021-06-01 DIAGNOSIS — S99.911A ANKLE INJURY, RIGHT, INITIAL ENCOUNTER: Primary | ICD-10-CM

## 2021-06-01 PROCEDURE — 99213 OFFICE O/P EST LOW 20 MIN: CPT | Performed by: PHYSICIAN ASSISTANT

## 2021-06-01 PROCEDURE — 73610 X-RAY EXAM OF ANKLE: CPT | Mod: RT | Performed by: RADIOLOGY

## 2021-06-01 ASSESSMENT — MIFFLIN-ST. JEOR: SCORE: 1797

## 2021-06-01 NOTE — PATIENT INSTRUCTIONS
Patient Education     Self-Care for Strains and Sprains  Most minor strains and sprains can be treated with self-care. Recovering from a strain or sprain may take up to 4 weeks. Your self-care goal is to reduce pain and immobilize the injury to speed healing.  Support the injured area  Wrapping the injured area provides support for short, necessary activities. Be careful not to wrap the area too tightly. This could cut off the blood supply.    Support a wrist, elbow, or shoulder with a sling.    Wrap an ankle or knee with an elastic bandage.    Tape a finger or toe to the one next to it.  Use cold and heat  Cold reduces swelling. Both cold and heat reduce pain. Heat should not be used in the initial treatment of the injury. When using cold or heat, always place a thin towel between the pack and your skin.    Apply ice or a cold pack 10 to 15 minutes every hour you re awake for the first 2 days.    After the swelling goes down, use cold or heat to control pain. Don t use heat late in the day, since it can cause swelling when you re not active.  Rest and elevate  Rest and elevation help your injury heal faster.    Raise the injured area above your heart level.    Keep the injured area from moving.    Limit the use of the joint or limb.  Use medicine    Aspirin reduces pain and swelling. (Note: Don t give aspirin to a child 18 or younger unless prescribed by the doctor.)    Non-steroidal anti-inflammatory medicines, such as ibuprofen, may reduce pain and swelling, as well. Ask your healthcare provider for advice.    When to call your healthcare provider  Call your healthcare provider if:    The injured joint won t move, or bones make a grating sound when they move    You can t put weight on the injured area, even after 24 hours    The injured body part is cold, blue, tingling, or numb    The joint or limb appears bent or crooked.    Pain increases or doesn t improve in 4 days    When pressing along the injured area,  you notice a spot that is especially painful  Blanca last reviewed this educational content on 5/1/2018 2000-2021 [employer]. All rights reserved. This information is not intended as a substitute for professional medical care. Always follow your healthcare professional's instructions.

## 2021-06-01 NOTE — PROGRESS NOTES
"Assessment & Plan     Ankle injury, right, initial encounter  X-ray today is negative for acute fracture or dislocation.  Discussed most likely right ankle sprain.  Recommended rest, ice, compression elevation.  Weightbearing as tolerated.  Tylenol or ibuprofen as needed for pain.  Would anticipate gradual improvement.  Follow-up if any worsening symptoms.  Patient agrees with the plan  - XR Ankle Right G/E 3 Views         Return in about 2 weeks (around 6/15/2021) for Symptoms failing to improve.    Neelima Muñoz PA-C  [facility] [place] is a 55 year old male who presents to clinic today for the following health issues:  Chief Complaint   Patient presents with     Urgent Care     Musculoskeletal Problem     right ankle injury yesterday playing soccer     HPI      MS Injury/Pain    Onset of symptoms was 1 day(s) ago.  Location: right ankle  Context:       The injury happened while at home      Mechanism: sports related injury      Patient experienced immediate pain  Course of symptoms is improving.    Severity moderate  Current and Associated symptoms: Pain, Tenderness and Decreased range of motion  Denies  Swelling, Bruising, Warmth and Redness  Aggravating Factors: walking and weight-bearing  Therapies to improve symptoms include: ice, Tylenol, rest and elevation  This is the first time this type of problem has occurred for this patient.       Review of Systems  Constitutional, HEENT, cardiovascular, pulmonary, gi and gu systems are negative, except as otherwise noted.      Objective    /69   Pulse 70   Temp 98.4  F (36.9  C) (Oral)   Resp 18   Ht 1.803 m (5' 11\")   Wt 93 kg (205 lb)   SpO2 100%   BMI 28.59 kg/m    Physical Exam   GENERAL: healthy, alert and no distress  MS: Right ankle exam: No gross deformities, swelling or ecchymosis noted.  No tenderness over lateral or medial malleolus.   No tenderness over achilles tendon. Tenderness over midfoot " on the cuneiform bones and cuboid bone.  Range of motion is fairly normal.    Xray right ankle - Reviewed and interpreted by me.  Negative for acute fracture or dislocation. No abnormal bony lesions.

## 2021-07-08 NOTE — PROGRESS NOTES
Pre-Visit Planning   Next 5 appointments (look out 90 days)    Jul 14, 2021  4:20 PM  (Arrive by 4:00 PM)  Office Visit with Lyndon Stoll MD  [facility] ([facility] ) [address] [place] [id number] [phone]        Appointment Notes for this encounter:   f/u labs bp check check mole on head    Questionnaires Reviewed/Assigned  No additional questionnaires are needed      Patient preferred phone number: [phone]    Unable to reach. Left voicemail. Advised patient to call clinic back at [phone].

## 2021-07-14 ENCOUNTER — OFFICE VISIT (OUTPATIENT)
Dept: FAMILY MEDICINE | Facility: CLINIC | Age: 56
End: 2021-07-14
Payer: COMMERCIAL

## 2021-07-14 VITALS
DIASTOLIC BLOOD PRESSURE: 76 MMHG | WEIGHT: 211.5 LBS | HEART RATE: 64 BPM | BODY MASS INDEX: 29.61 KG/M2 | SYSTOLIC BLOOD PRESSURE: 116 MMHG | OXYGEN SATURATION: 97 % | HEIGHT: 71 IN | TEMPERATURE: 98.2 F

## 2021-07-14 DIAGNOSIS — N40.1 BENIGN PROSTATIC HYPERPLASIA WITH NOCTURIA: ICD-10-CM

## 2021-07-14 DIAGNOSIS — Z29.9 ENCOUNTER FOR PREVENTIVE MEASURE: ICD-10-CM

## 2021-07-14 DIAGNOSIS — Z79.899 CONTROLLED SUBSTANCE AGREEMENT SIGNED: ICD-10-CM

## 2021-07-14 DIAGNOSIS — F51.01 PRIMARY INSOMNIA: ICD-10-CM

## 2021-07-14 DIAGNOSIS — L82.1 SEBORRHEIC KERATOSIS: ICD-10-CM

## 2021-07-14 DIAGNOSIS — G25.0 FAMILIAL TREMOR: ICD-10-CM

## 2021-07-14 DIAGNOSIS — R03.0 ELEVATED BLOOD PRESSURE READING WITHOUT DIAGNOSIS OF HYPERTENSION: ICD-10-CM

## 2021-07-14 DIAGNOSIS — J30.9 ALLERGIC RHINITIS, UNSPECIFIED SEASONALITY, UNSPECIFIED TRIGGER: Chronic | ICD-10-CM

## 2021-07-14 DIAGNOSIS — R35.1 BENIGN PROSTATIC HYPERPLASIA WITH NOCTURIA: ICD-10-CM

## 2021-07-14 DIAGNOSIS — F41.9 ANXIETY: ICD-10-CM

## 2021-07-14 DIAGNOSIS — J45.30 MILD PERSISTENT ASTHMA WITHOUT COMPLICATION: Primary | ICD-10-CM

## 2021-07-14 LAB
BASOPHILS # BLD AUTO: 0 10E3/UL (ref 0–0.2)
BASOPHILS NFR BLD AUTO: 1 %
EOSINOPHIL # BLD AUTO: 0.3 10E3/UL (ref 0–0.7)
EOSINOPHIL NFR BLD AUTO: 3 %
ERYTHROCYTE [DISTWIDTH] IN BLOOD BY AUTOMATED COUNT: 12.8 % (ref 10–15)
HCT VFR BLD AUTO: 46.9 % (ref 40–53)
HGB BLD-MCNC: 15.8 G/DL (ref 13.3–17.7)
LYMPHOCYTES # BLD AUTO: 2.3 10E3/UL (ref 0.8–5.3)
LYMPHOCYTES NFR BLD AUTO: 27 %
MCH RBC QN AUTO: 29.7 PG (ref 26.5–33)
MCHC RBC AUTO-ENTMCNC: 33.7 G/DL (ref 31.5–36.5)
MCV RBC AUTO: 88 FL (ref 78–100)
MONOCYTES # BLD AUTO: 1.1 10E3/UL (ref 0–1.3)
MONOCYTES NFR BLD AUTO: 13 %
NEUTROPHILS # BLD AUTO: 4.9 10E3/UL (ref 1.6–8.3)
NEUTROPHILS NFR BLD AUTO: 57 %
PLATELET # BLD AUTO: 336 10E3/UL (ref 150–450)
RBC # BLD AUTO: 5.32 10E6/UL (ref 4.4–5.9)
WBC # BLD AUTO: 8.7 10E3/UL (ref 4–11)

## 2021-07-14 PROCEDURE — 36415 COLL VENOUS BLD VENIPUNCTURE: CPT | Performed by: FAMILY MEDICINE

## 2021-07-14 PROCEDURE — 80061 LIPID PANEL: CPT | Performed by: FAMILY MEDICINE

## 2021-07-14 PROCEDURE — 80050 GENERAL HEALTH PANEL: CPT | Performed by: FAMILY MEDICINE

## 2021-07-14 PROCEDURE — 82570 ASSAY OF URINE CREATININE: CPT | Performed by: FAMILY MEDICINE

## 2021-07-14 PROCEDURE — 17110 DESTRUCTION B9 LES UP TO 14: CPT | Performed by: FAMILY MEDICINE

## 2021-07-14 PROCEDURE — 80307 DRUG TEST PRSMV CHEM ANLYZR: CPT | Performed by: FAMILY MEDICINE

## 2021-07-14 PROCEDURE — 99214 OFFICE O/P EST MOD 30 MIN: CPT | Mod: 25 | Performed by: FAMILY MEDICINE

## 2021-07-14 RX ORDER — ALBUTEROL SULFATE 90 UG/1
2 AEROSOL, METERED RESPIRATORY (INHALATION) EVERY 6 HOURS PRN
Qty: 18 G | Refills: 3 | Status: SHIPPED | OUTPATIENT
Start: 2021-07-14 | End: 2022-11-09

## 2021-07-14 RX ORDER — TAMSULOSIN HYDROCHLORIDE 0.4 MG/1
0.4 CAPSULE ORAL DAILY
Qty: 90 CAPSULE | Refills: 3 | Status: SHIPPED | OUTPATIENT
Start: 2021-07-14 | End: 2022-08-05

## 2021-07-14 RX ORDER — IPRATROPIUM BROMIDE 42 UG/1
SPRAY, METERED NASAL
Qty: 15 ML | Refills: 1 | Status: SHIPPED | OUTPATIENT
Start: 2021-07-14 | End: 2021-09-03

## 2021-07-14 RX ORDER — ZOLPIDEM TARTRATE 10 MG/1
TABLET ORAL
Qty: 30 TABLET | Refills: 5 | Status: SHIPPED | OUTPATIENT
Start: 2021-07-14 | End: 2022-04-07

## 2021-07-14 RX ORDER — HYDROXYZINE HYDROCHLORIDE 25 MG/1
25 TABLET, FILM COATED ORAL 3 TIMES DAILY PRN
Qty: 90 TABLET | Refills: 5 | Status: SHIPPED | OUTPATIENT
Start: 2021-07-14 | End: 2022-05-02

## 2021-07-14 RX ORDER — PROPRANOLOL HYDROCHLORIDE 80 MG/1
CAPSULE, EXTENDED RELEASE ORAL
Qty: 90 CAPSULE | Refills: 3 | Status: SHIPPED | OUTPATIENT
Start: 2021-07-14 | End: 2022-08-05

## 2021-07-14 RX ORDER — ALPRAZOLAM 1 MG
TABLET ORAL
Qty: 30 TABLET | Refills: 5 | Status: SHIPPED | OUTPATIENT
Start: 2021-07-14 | End: 2022-01-13

## 2021-07-14 ASSESSMENT — ASTHMA QUESTIONNAIRES
ACT_TOTALSCORE: 19
QUESTION_1 LAST FOUR WEEKS HOW MUCH OF THE TIME DID YOUR ASTHMA KEEP YOU FROM GETTING AS MUCH DONE AT WORK, SCHOOL OR AT HOME: NONE OF THE TIME
QUESTION_3 LAST FOUR WEEKS HOW OFTEN DID YOUR ASTHMA SYMPTOMS (WHEEZING, COUGHING, SHORTNESS OF BREATH, CHEST TIGHTNESS OR PAIN) WAKE YOU UP AT NIGHT OR EARLIER THAN USUAL IN THE MORNING: ONCE OR TWICE
QUESTION_4 LAST FOUR WEEKS HOW OFTEN HAVE YOU USED YOUR RESCUE INHALER OR NEBULIZER MEDICATION (SUCH AS ALBUTEROL): TWO OR THREE TIMES PER WEEK
QUESTION_5 LAST FOUR WEEKS HOW WOULD YOU RATE YOUR ASTHMA CONTROL: WELL CONTROLLED
QUESTION_2 LAST FOUR WEEKS HOW OFTEN HAVE YOU HAD SHORTNESS OF BREATH: THREE TO SIX TIMES A WEEK

## 2021-07-14 ASSESSMENT — MIFFLIN-ST. JEOR: SCORE: 1821.49

## 2021-07-14 NOTE — PROGRESS NOTES
"    Assessment & Plan   Problem List Items Addressed This Visit     Anxiety     Having once or twice a week episodes of \"panic\" or higher anxiety associated with interpersonal interactions at work.  He wonders if he can take something different than his current regimen.  In general effective.  Trial of hydroxyzine         Relevant Medications    ALPRAZolam (XANAX) 1 MG tablet    hydrOXYzine (ATARAX) 25 MG tablet    Benign prostatic hyperplasia with nocturia     Therapy effective.  Refill         Relevant Medications    tamsulosin (FLOMAX) 0.4 MG capsule    Controlled substance agreement signed    Relevant Orders    Drug Confirmation Panel Urine with Creat - lab collect    Elevated blood pressure reading without diagnosis of hypertension     Not HTN monitor         Encounter for preventive measure     Routine         Relevant Orders    Lipid panel reflex to direct LDL Non-fasting    Comprehensive metabolic panel (BMP + Alb, Alk Phos, ALT, AST, Total. Bili, TP)    TSH with free T4 reflex    CBC with platelets and differential (Completed)    Familial tremor    Relevant Medications    propranolol ER (INDERAL LA) 80 MG 24 hr capsule    Mild persistent asthma without complication - Primary     He found the inhaled steroid as a rescue inhaler ineffective.  Continue with controller albuterol as rescue.         Relevant Medications    albuterol (PROAIR HFA/PROVENTIL HFA/VENTOLIN HFA) 108 (90 Base) MCG/ACT inhaler    fluticasone-vilanterol (BREO ELLIPTA) 200-25 MCG/INH inhaler    ipratropium (ATROVENT) 0.06 % nasal spray    Primary insomnia     He is sleeping well with current regimen         Relevant Medications    zolpidem (AMBIEN) 10 MG tablet    Rhinitis (Chronic)    Relevant Medications    albuterol (PROAIR HFA/PROVENTIL HFA/VENTOLIN HFA) 108 (90 Base) MCG/ACT inhaler    fluticasone-vilanterol (BREO ELLIPTA) 200-25 MCG/INH inhaler    ipratropium (ATROVENT) 0.06 % nasal spray    Seborrheic keratosis     Lesion left " "hairline his wife thinks is growing.  Longstanding.  Exam shows large keratosis with keratin plugs.  Cryotherapy applied after discussion.  Anticipate this will require repeat treatments         Relevant Orders    DESTRUCT BENIGN LESION, UP TO 14 (Completed)                    BMI:   Estimated body mass index is 29.5 kg/m  as calculated from the following:    Height as of this encounter: 1.803 m (5' 11\").    Weight as of this encounter: 95.9 kg (211 lb 8 oz).   Weight management plan: He proposes more exercise        Return in about 4 weeks (around 8/11/2021) for recheck.    Lyndon Stoll MD  [facility]    Med Oakes is a 56 year old who presents for the following health issues     HPI   Pt would like to discuss recent lab results  He is concerned about his creatinine.  Discussed his robust function  Hypertension Follow-up      Do you check your blood pressure regularly outside of the clinic? No     Are you following a low salt diet? Yes    Are your blood pressures ever more than 140 on the top number (systolic) OR more   than 90 on the bottom number (diastolic), for example 140/90? No      How many servings of fruits and vegetables do you eat daily?  4 or more    On average, how many sweetened beverages do you drink each day (Examples: soda, juice, sweet tea, etc.  Do NOT count diet or artificially sweetened beverages)?   0    How many days per week do you exercise enough to make your heart beat faster? 4    How many minutes a day do you exercise enough to make your heart beat faster? 30 - 60    How many days per week do you miss taking your medication? 0    Concern - Mole Check  Onset: uncertain when mole appeared.   Description: dry, crusty , discolored  Intensity: none  Progression of Symptoms:  worsening  Accompanying Signs & Symptoms: none  Previous history of similar problem:   Precipitating factors:        Worsened by: none  Alleviating factors:        Improved by: " "none  Therapies tried and outcome: none  Review of Systems   Constitutional, HEENT, cardiovascular, pulmonary, gi and gu systems are negative, except as otherwise noted.      Objective    /76 (BP Location: Right arm, Patient Position: Sitting, Cuff Size: Adult Regular)   Pulse 64   Temp 98.2  F (36.8  C) (Oral)   Ht 1.803 m (5' 11\")   Wt 95.9 kg (211 lb 8 oz)   SpO2 97%   BMI 29.50 kg/m    Body mass index is 29.5 kg/m .  Physical Exam   Skin as described. Mild facial erythema    Lyndon Stoll MD              "

## 2021-07-14 NOTE — LETTER
[facility]  07/14/21  Patient: Venkata Lynn  YOB: 1965  Medical Record Number: [id number]                                                                                  Non-Opioid Controlled Substance Agreement    This is an agreement between you and your provider regarding safe and appropriate use of controlled substances prescribed by your care team. Controlled substances are?medicines that can cause physical and mental dependence (abuse).     There are strict laws about having and using these medicines. We here at [facility] are  committed to working with you in your efforts to get better. To support you in this work, we'll help you schedule regular office appointments for medicine refills. If we must cancel or change your appointment for any reason, we'll make sure you have enough medicine to last until your next appointment.     As a Provider, I will:     Listen carefully to your concerns while treating you with respect.     Recommend a treatment plan that I believe is in your best interest and may involve therapies other than medicine.      Talk with you often about the possible benefits and the risk of harm of any medicine that we prescribe for you.    Assess the safety of this medicine and check how well it works.      Provide a plan on how to taper (discontinue or go off) using this medicine if the decision is made to stop its use.      ::  As a Patient, I understand controlled substances:       Are prescribed by my care provider to help me function or work and manage my condition(s).?    Are strong medicines and can cause serious side effects.       Need to be taken exactly as prescribed.?Combining controlled substances with certain medicines or chemicals (such as illegal drugs, alcohol, sedatives, sleeping pills, and benzodiazepines) can be dangerous or even fatal.? If I stop taking my medicines suddenly, I may have severe withdrawal symptoms.     The  risks, benefits, and side effects of these medicine(s) were explained to me. I agree that:    1. I will take part in other treatments as advised by my care team. This may be psychiatry or counseling, physical therapy, behavioral therapy, group treatment or a referral to specialist.    2. I will keep all my appointments and understand this is part of the monitoring of controlled substances.?My care team may require an office visit for EVERY controlled substance refill. If I miss appointments or don t follow instructions, my care team may stop my medicine    3. I will take my medicines as prescribed. I will not change the dose or schedule unless my care team tells me to. There will be no refills if I run out early.      4. I may be asked to come to the clinic and complete a urine drug test or complete a pill count. If I don t give a urine sample or participate in a pill count, the care team may stop my medicine.    5. I will only receive controlled substance prescriptions from this clinic. If I am treated by another provider, I will tell them that I am taking controlled substances and that I have a treatment agreement with this provider. I will inform my [facility] care team within one business day if I am given a prescription for any controlled substance by another healthcare provider. My [facility] care team can contact other providers and pharmacists about my use of any medicines.    6. It is up to me to make sure that I don't run out of my medicines on weekends or holidays.?If my care team is willing to refill my prescription without a visit, I must request refills only during office hours. Refills may take up to 3 business days to process. I will use one pharmacy to fill all my controlled substance prescriptions. I will notify the clinic about any changes to my insurance or medicine availability.    7. I am responsible for my prescriptions. If the medicine/prescription is lost, stolen or destroyed,  it will not be replaced.?I also agree not to share controlled substance medicines with anyone.     8. I am aware I should not use any illegal or recreational drugs. I agree not to drink alcohol unless my care team says I can.     9. If I enroll in the [place] Medical Cannabis program, I will tell my care team before my next refill.    10. I will tell my care team right away if I become pregnant, have a new medical problem treated outside of my regular clinic, or have a change in my medicines.     11. I understand that this medicine can affect my thinking, judgment and reaction time.? Alcohol and drugs affect the brain and body, which can affect the safety of my driving. Being under the influence of alcohol or drugs can affect my decision-making, behaviors, personal safety and the safety of others. Driving while impaired (DWI) can occur if a person is driving, operating or in physical control of a car, motorcycle, boat, snowmobile, ATV, motorbike, off-road vehicle or any other motor vehicle ([place] Statute 169A.20). I understand the risk if I choose to drive or operate any vehicle or machinery.    I understand that if I do not follow any of the conditions above, my prescriptions or treatment may be stopped or changed.   I agree that my provider, clinic care team and pharmacy may work with any city, state or federal law enforcement agency that investigates the misuse, sale or other diversion of my controlled medicine. I will allow my provider to discuss my care with, or share a copy of, this agreement with any other treating provider, pharmacy or emergency room where I receive care.     I have read this agreement and have asked questions about anything I did not understand.    ________________________________________________________  Patient Signature - Venkata Lynn     ___________________                   Date     ________________________________________________________  Provider Signature - Lyndon Stoll MD        ___________________                   Date     ________________________________________________________  Witness Signature (required if provider not present while patient signing)          ___________________                   Date

## 2021-07-15 PROBLEM — F51.01 PRIMARY INSOMNIA: Status: ACTIVE | Noted: 2021-07-15

## 2021-07-15 PROBLEM — Z29.9 ENCOUNTER FOR PREVENTIVE MEASURE: Status: ACTIVE | Noted: 2021-07-15

## 2021-07-15 LAB
ALBUMIN SERPL-MCNC: 4.2 G/DL (ref 3.4–5)
ALP SERPL-CCNC: 85 U/L (ref 40–150)
ALT SERPL W P-5'-P-CCNC: 38 U/L (ref 0–70)
ANION GAP SERPL CALCULATED.3IONS-SCNC: 5 MMOL/L (ref 3–14)
AST SERPL W P-5'-P-CCNC: 22 U/L (ref 0–45)
BILIRUB SERPL-MCNC: 0.4 MG/DL (ref 0.2–1.3)
BUN SERPL-MCNC: 14 MG/DL (ref 7–30)
CALCIUM SERPL-MCNC: 9.5 MG/DL (ref 8.5–10.1)
CHLORIDE BLD-SCNC: 103 MMOL/L (ref 94–109)
CHOLEST SERPL-MCNC: 252 MG/DL
CO2 SERPL-SCNC: 28 MMOL/L (ref 20–32)
CREAT SERPL-MCNC: 0.89 MG/DL (ref 0.66–1.25)
CREAT UR-MCNC: 20 MG/DL
FASTING STATUS PATIENT QL REPORTED: NO
GFR SERPL CREATININE-BSD FRML MDRD: >90 ML/MIN/1.73M2
GLUCOSE BLD-MCNC: 91 MG/DL (ref 70–99)
HDLC SERPL-MCNC: 58 MG/DL
LDLC SERPL CALC-MCNC: 144 MG/DL
NONHDLC SERPL-MCNC: 194 MG/DL
POTASSIUM BLD-SCNC: 4.5 MMOL/L (ref 3.4–5.3)
PROT SERPL-MCNC: 8.1 G/DL (ref 6.8–8.8)
SODIUM SERPL-SCNC: 136 MMOL/L (ref 133–144)
TRIGL SERPL-MCNC: 250 MG/DL
TSH SERPL DL<=0.005 MIU/L-ACNC: 2.22 MU/L (ref 0.4–4)

## 2021-07-15 ASSESSMENT — ASTHMA QUESTIONNAIRES: ACT_TOTALSCORE: 19

## 2021-07-15 NOTE — ASSESSMENT & PLAN NOTE
He found the inhaled steroid as a rescue inhaler ineffective.  Continue with controller albuterol as rescue.

## 2021-07-15 NOTE — ASSESSMENT & PLAN NOTE
"Having once or twice a week episodes of \"panic\" or higher anxiety associated with interpersonal interactions at work.  He wonders if he can take something different than his current regimen.  In general effective.  Trial of hydroxyzine  "

## 2021-07-17 LAB
A-OH ALPRAZ UR QL CFM: PRESENT
CREATININE URINE MG/DL  (SYNCED VALUE): 20 MG/DL

## 2021-07-19 NOTE — RESULT ENCOUNTER NOTE
Cholesterol is climbing, which it does.  It goes up your whole life.  You are now in the range where I would recommend a medicine for you.  May I prescribe it?  Lyndon Stoll MD

## 2021-07-21 ENCOUNTER — MYC MEDICAL ADVICE (OUTPATIENT)
Dept: FAMILY MEDICINE | Facility: CLINIC | Age: 56
End: 2021-07-21

## 2021-07-21 DIAGNOSIS — E78.5 HYPERLIPIDEMIA LDL GOAL <160: Primary | ICD-10-CM

## 2021-07-21 RX ORDER — ROSUVASTATIN CALCIUM 40 MG/1
40 TABLET, COATED ORAL DAILY
Qty: 90 TABLET | Refills: 3 | Status: SHIPPED | OUTPATIENT
Start: 2021-07-21 | End: 2021-09-22

## 2021-08-11 ENCOUNTER — OFFICE VISIT (OUTPATIENT)
Dept: FAMILY MEDICINE | Facility: CLINIC | Age: 56
End: 2021-08-11
Payer: COMMERCIAL

## 2021-08-11 VITALS
WEIGHT: 208.8 LBS | TEMPERATURE: 98.9 F | HEIGHT: 71 IN | HEART RATE: 81 BPM | DIASTOLIC BLOOD PRESSURE: 80 MMHG | BODY MASS INDEX: 29.23 KG/M2 | SYSTOLIC BLOOD PRESSURE: 117 MMHG | OXYGEN SATURATION: 96 %

## 2021-08-11 DIAGNOSIS — R03.0 ELEVATED BLOOD PRESSURE READING WITHOUT DIAGNOSIS OF HYPERTENSION: Primary | ICD-10-CM

## 2021-08-11 DIAGNOSIS — L82.1 SEBORRHEIC KERATOSIS: ICD-10-CM

## 2021-08-11 PROCEDURE — 99207 PR NO BILLABLE SERVICE THIS VISIT: CPT | Mod: 25 | Performed by: FAMILY MEDICINE

## 2021-08-11 PROCEDURE — 17110 DESTRUCTION B9 LES UP TO 14: CPT | Performed by: FAMILY MEDICINE

## 2021-08-11 ASSESSMENT — MIFFLIN-ST. JEOR: SCORE: 1809.24

## 2021-08-11 NOTE — ASSESSMENT & PLAN NOTE
Excellent response to previous cryotherapy.  Freeze thaw refreeze technique reapplied.  Recheck every 3 weeks

## 2021-08-11 NOTE — PROGRESS NOTES
"        Assessment & Plan   Problem List Items Addressed This Visit     Elevated blood pressure reading without diagnosis of hypertension - Primary     Not today.  Monitor         Seborrheic keratosis     Excellent response to previous cryotherapy.  Freeze thaw refreeze technique reapplied.  Recheck every 3 weeks         Relevant Orders    DESTRUCT BENIGN LESION, UP TO 14 (Completed)                        Return in about 3 weeks (around 9/1/2021) for recheck.    Lyndon Stoll MD  [facility] LEENA Oakes is a 56 year old who presents for the following health issues     HPI         How many servings of fruits and vegetables do you eat daily?  4 or more    On average, how many sweetened beverages do you drink each day (Examples: soda, juice, sweet tea, etc.  Do NOT count diet or artificially sweetened beverages)?   0    How many days per week do you exercise enough to make your heart beat faster? 5    How many minutes a day do you exercise enough to make your heart beat faster? 30 - 60    How many days per week do you miss taking your medication? 0    Skin Lesion >  cryotherapy          Review of Systems   No intolerance      Objective    /80 (BP Location: Right arm, Patient Position: Sitting, Cuff Size: Adult Regular)   Pulse 81   Temp 98.9  F (37.2  C) (Oral)   Ht 1.803 m (5' 11\")   Wt 94.7 kg (208 lb 12.8 oz)   SpO2 96%   BMI 29.12 kg/m    Body mass index is 29.12 kg/m .  Physical Exam     Seborrheic keratosis smaller in height less pigmented     Cryotherapy applied as previously after discussion.  Lyndon Stoll MD              "

## 2021-09-01 ENCOUNTER — OFFICE VISIT (OUTPATIENT)
Dept: FAMILY MEDICINE | Facility: CLINIC | Age: 56
End: 2021-09-01
Payer: COMMERCIAL

## 2021-09-01 VITALS
BODY MASS INDEX: 29.1 KG/M2 | DIASTOLIC BLOOD PRESSURE: 76 MMHG | HEIGHT: 71 IN | WEIGHT: 207.9 LBS | HEART RATE: 74 BPM | OXYGEN SATURATION: 97 % | TEMPERATURE: 98.6 F | SYSTOLIC BLOOD PRESSURE: 109 MMHG

## 2021-09-01 DIAGNOSIS — L82.1 SEBORRHEIC KERATOSIS: Primary | ICD-10-CM

## 2021-09-01 PROCEDURE — 17110 DESTRUCTION B9 LES UP TO 14: CPT | Performed by: FAMILY MEDICINE

## 2021-09-01 ASSESSMENT — MIFFLIN-ST. JEOR: SCORE: 1805.16

## 2021-09-01 NOTE — PROGRESS NOTES
"    Assessment & Plan   Problem List Items Addressed This Visit     Seborrheic keratosis - Primary     Central portion of left temple lesion has resolved leaving bilateral remnants.  Cryotherapy applied to both                         Tobacco Cessation:   reports that he has never smoked. His smokeless tobacco use includes chew.          Return in about 3 weeks (around 9/22/2021) for recheck.    Lyndon Stoll MD  [facility] LEENA Oakes is a 56 year old who presents for the following health issues     HPI     Warts  Onset/Duration: 3 months  Description (location/number): forehead, left side > 1  Accompanying signs and symptoms (pain, redness): no  History: prior warts: YES  Therapies tried and outcome: liquid nitrogen              Review of Systems         Objective    /76 (BP Location: Right arm, Patient Position: Sitting, Cuff Size: Adult Large)   Pulse 74   Temp 98.6  F (37  C) (Oral)   Ht 1.803 m (5' 11\")   Wt 94.3 kg (207 lb 14.4 oz)   SpO2 97%   BMI 29.00 kg/m    Body mass index is 29 kg/m .  Physical Exam           Lyndon Stoll MD          "

## 2021-09-03 NOTE — ASSESSMENT & PLAN NOTE
Central portion of left temple lesion has resolved leaving bilateral remnants.  Cryotherapy applied to both

## 2021-09-22 ENCOUNTER — OFFICE VISIT (OUTPATIENT)
Dept: FAMILY MEDICINE | Facility: CLINIC | Age: 56
End: 2021-09-22
Payer: COMMERCIAL

## 2021-09-22 VITALS
TEMPERATURE: 98.4 F | HEIGHT: 71 IN | WEIGHT: 214.1 LBS | BODY MASS INDEX: 29.97 KG/M2 | HEART RATE: 72 BPM | DIASTOLIC BLOOD PRESSURE: 66 MMHG | OXYGEN SATURATION: 97 % | SYSTOLIC BLOOD PRESSURE: 104 MMHG

## 2021-09-22 DIAGNOSIS — J45.30 MILD PERSISTENT ASTHMA WITHOUT COMPLICATION: Primary | ICD-10-CM

## 2021-09-22 DIAGNOSIS — L82.1 SEBORRHEIC KERATOSIS: ICD-10-CM

## 2021-09-22 DIAGNOSIS — Z23 NEED FOR PROPHYLACTIC VACCINATION AND INOCULATION AGAINST INFLUENZA: ICD-10-CM

## 2021-09-22 DIAGNOSIS — J30.9 ALLERGIC RHINITIS, UNSPECIFIED SEASONALITY, UNSPECIFIED TRIGGER: ICD-10-CM

## 2021-09-22 DIAGNOSIS — E78.5 HYPERLIPIDEMIA LDL GOAL <160: ICD-10-CM

## 2021-09-22 DIAGNOSIS — R03.0 ELEVATED BLOOD PRESSURE READING WITHOUT DIAGNOSIS OF HYPERTENSION: ICD-10-CM

## 2021-09-22 PROCEDURE — 90682 RIV4 VACC RECOMBINANT DNA IM: CPT | Performed by: FAMILY MEDICINE

## 2021-09-22 PROCEDURE — 90471 IMMUNIZATION ADMIN: CPT | Performed by: FAMILY MEDICINE

## 2021-09-22 PROCEDURE — 17110 DESTRUCTION B9 LES UP TO 14: CPT | Performed by: FAMILY MEDICINE

## 2021-09-22 PROCEDURE — 99214 OFFICE O/P EST MOD 30 MIN: CPT | Mod: 25 | Performed by: FAMILY MEDICINE

## 2021-09-22 RX ORDER — AZELASTINE 1 MG/ML
1 SPRAY, METERED NASAL 2 TIMES DAILY
Qty: 30 ML | Refills: 3 | Status: SHIPPED | OUTPATIENT
Start: 2021-09-22 | End: 2022-05-02

## 2021-09-22 RX ORDER — PRAVASTATIN SODIUM 40 MG
40 TABLET ORAL DAILY
Qty: 90 TABLET | Refills: 3 | Status: SHIPPED | OUTPATIENT
Start: 2021-09-22 | End: 2022-05-02

## 2021-09-22 ASSESSMENT — MIFFLIN-ST. JEOR: SCORE: 1833.28

## 2021-09-22 NOTE — ASSESSMENT & PLAN NOTE
He coughs at night for about 20 minutes before he goes to sleep.  Otherwise he thinks his asthma is doing well.  Currently on Breo.  Discussed trials of therapy.  Trial of Trelegy

## 2021-09-22 NOTE — ASSESSMENT & PLAN NOTE
"His nose is quite stuffed in spite of current therapies.  He has been using saline after the nasal sprays to \"push\" into his nose.  Recommend instead mucous membrane rinse with saline prior to nasal sprays, trial of Astelin.  "

## 2021-09-22 NOTE — PROGRESS NOTES
"    Assessment & Plan   Problem List Items Addressed This Visit     Elevated blood pressure reading without diagnosis of hypertension     Not today.  Monitor         Hyperlipidemia LDL goal <160     Rosuvastatin not well tolerated.  Risk is not high.  Alternate statin         Relevant Medications    pravastatin (PRAVACHOL) 40 MG tablet    Mild persistent asthma without complication - Primary     He coughs at night for about 20 minutes before he goes to sleep.  Otherwise he thinks his asthma is doing well.  Currently on Breo.  Discussed trials of therapy.  Trial of Trelegy         Relevant Medications    Fluticasone-Umeclidin-Vilanterol (TRELEGY ELLIPTA) 100-62.5-25 MCG/INH oral inhaler    azelastine (ASTELIN) 0.1 % nasal spray    Rhinitis (Chronic)     His nose is quite stuffed in spite of current therapies.  He has been using saline after the nasal sprays to \"push\" into his nose.  Recommend instead mucous membrane rinse with saline prior to nasal sprays, trial of Astelin.         Relevant Medications    Fluticasone-Umeclidin-Vilanterol (TRELEGY ELLIPTA) 100-62.5-25 MCG/INH oral inhaler    azelastine (ASTELIN) 0.1 % nasal spray    Seborrheic keratosis     2 mm remnant of his previous large lesion left forehead at the scalp line.  Cryotherapy applied         Relevant Orders    DESTRUCT BENIGN LESION, UP TO 14 (Completed)      Other Visit Diagnoses     Need for prophylactic vaccination and inoculation against influenza        Relevant Orders    INFLUENZA QUAD, RECOMBINANT, P-FREE (RIV4) (FLUBLOK) (Completed)                    Tobacco Cessation:   reports that he has never smoked. His smokeless tobacco use includes chew.  Tobacco Cessation Action Plan: Discussed briefly        Return in about 5 months (around 2/10/2022) for flu shot asthma control test appt go, Physical Exam.    Lyndon Stoll MD  [facility]    Med Oakes is a 56 year old who presents for the following health issues " "    HPI     Warts  Onset/Duration: 4 months  Description (location/number): Hairline  Accompanying signs and symptoms (pain, redness): no  History: prior warts: YES  Therapies tried and outcome: none              Review of Systems   As described        Objective    /66 (BP Location: Right arm, Patient Position: Sitting, Cuff Size: Adult Large)   Pulse 72   Temp 98.4  F (36.9  C) (Oral)   Ht 1.803 m (5' 11\")   Wt 97.1 kg (214 lb 1.6 oz)   SpO2 97%   BMI 29.86 kg/m    Body mass index is 29.86 kg/m .  Physical Exam   Skin as described    No respiratory findings    Cryotherapy applied to his remnant seborrheic keratosis.  We will consider this cured and regarding this he will return as needed        Lyndon Stoll MD          "

## 2021-09-22 NOTE — NURSING NOTE
Future Appointments   Date Time Provider Department Center   9/22/2021  4:20 PM Lyndon Stoll MD [facility]     Appointment Notes for this encounter:   wart    Health Maintenance Due   Topic Date Due     ADVANCE CARE PLANNING  Never done     INFLUENZA VACCINE (1) 09/01/2021     PREVENTIVE CARE VISIT  09/29/2021     ASTHMA ACTION PLAN  09/29/2021     Health Maintenance addressed:  Flu Vaccine and ACT    PHQ9 / CARMEN / ACT ACT Completed and Immunizations Flu Shot    MyChart Status:  Active and Using

## 2021-09-23 ASSESSMENT — ASTHMA QUESTIONNAIRES: ACT_TOTALSCORE: 19

## 2021-12-18 ENCOUNTER — HEALTH MAINTENANCE LETTER (OUTPATIENT)
Age: 56
End: 2021-12-18

## 2021-12-27 ENCOUNTER — OFFICE VISIT (OUTPATIENT)
Dept: URGENT CARE | Facility: URGENT CARE | Age: 56
End: 2021-12-27
Payer: COMMERCIAL

## 2021-12-27 VITALS
BODY MASS INDEX: 28.59 KG/M2 | TEMPERATURE: 98.6 F | SYSTOLIC BLOOD PRESSURE: 131 MMHG | WEIGHT: 205 LBS | RESPIRATION RATE: 16 BRPM | OXYGEN SATURATION: 98 % | DIASTOLIC BLOOD PRESSURE: 83 MMHG | HEART RATE: 78 BPM

## 2021-12-27 DIAGNOSIS — J40 SINOBRONCHITIS: Primary | ICD-10-CM

## 2021-12-27 DIAGNOSIS — J32.9 SINOBRONCHITIS: Primary | ICD-10-CM

## 2021-12-27 PROCEDURE — 99213 OFFICE O/P EST LOW 20 MIN: CPT | Performed by: NURSE PRACTITIONER

## 2021-12-27 RX ORDER — BENZONATATE 100 MG/1
100 CAPSULE ORAL 3 TIMES DAILY PRN
Qty: 21 CAPSULE | Refills: 0 | Status: SHIPPED | OUTPATIENT
Start: 2021-12-27 | End: 2022-01-03

## 2021-12-27 RX ORDER — AZITHROMYCIN 250 MG/1
TABLET, FILM COATED ORAL
Qty: 6 TABLET | Refills: 0 | Status: SHIPPED | OUTPATIENT
Start: 2021-12-27 | End: 2022-01-01

## 2021-12-27 NOTE — PATIENT INSTRUCTIONS
Zpak for sinobronchitis given worsening and day 8  Rest! Your body needs more rest to heal.  Drink plenty of fluids (warm fluids like tea or soup are soothing and reduce cough)  Sit in the bathroom with a hot shower running and breathe in the steam.  Honey may soothe your sore throat and help manage your cough- may take straight or in warm water with lemon juice.  Avoid smoke (cigarettes, bonfires, fireplace, wood burning stoves).  Take Tylenol or an NSAID such as ibuprofen or naproxen as needed for pain.  Delsym (dextromethorphan polistirex) is an over the counter cough medication that lasts 12 hours.   Mucinex or Robitussin (guiafenesin) thin mucus and may help it to loosen more quickly  Good handwashing is the best way to prevent spread of germs  Present to emergency room if you develop trouble breathing, swallowing or cough-up blood.  Follow up with your primary care provider if symptoms worsen or fail to improve as expected.

## 2021-12-27 NOTE — PROGRESS NOTES
Chief Complaint   Patient presents with     URI     8 days ago, woke up with a sore throat, headache, sneezing, coughing(phlegm), Nasal congestion/runny with green mucus. Using day quil and advil for symptoms.       SUBJECTIVE:  Venkata Lynn is a 56 year old male presenting with sinus pressure, pain, headache, congestion, thick green mucus, deep cough, sore throat, occasional sharp chest pains, shortness of breath for 8 days. Had 2 negative covid tests at home. Has an asthma history with albuterol at home. Does not tolerate prednisone well. Declines fever, hemoptysis.    Past Medical History:   Diagnosis Date     Acne vulgaris      Anxiety 3/28/2011     Anxiety disorder      Arthritis     both knees     CARDIOVASCULAR SCREENING; LDL GOAL LESS THAN 160 10/31/2010     Controlled substance agreement signed 1/11/2016     Coughing     at night     Dyspepsia 8/16/2016     Elevated blood pressure reading without diagnosis of hypertension 1/11/2016     Episodic tension-type headache, not intractable 3/8/2021     Familial tremor 8/16/2016     Family history of rhinophyma 1/18/2012     Hyperlipidemia LDL goal <160 10/31/2010    The 10-year ASCVD risk score ([place] EISENBERG Jr., et al., 2013) is: 9.5%   Values used to calculate the score:     Age: 56 years     Sex: Male     Is Non- : No     Diabetic: No     Tobacco smoker: Yes     Systolic Blood Pressure: 116 mmHg     Is BP treated: No     HDL Cholesterol: 58 mg/dL     Total Cholesterol: 252 mg/dL      Insomnia      Intermittent asthma 3/28/2011     Knee pain 1/18/2012     Lateral epicondylitis of left elbow 9/14/2018     LBP (low back pain) 1/18/2012     Low back pains      Mild persistent asthma without complication 8/16/2016     Nocturnal leg cramps 6/15/2020     Rhinitis 1/18/2012     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     Rosacea 1/18/2012     Rotator cuff syndrome, right 2/12/2019     Sebaceous hyperplasia 1/18/2012     Seborrheic keratosis 7/14/2021     Shortness  of breath     due to asthma     Snoring 9/29/2020     Tobacco abuse 1/18/2012     Trigger finger, acquired 11/22/2019     albuterol (PROAIR HFA/PROVENTIL HFA/VENTOLIN HFA) 108 (90 Base) MCG/ACT inhaler, Inhale 2 puffs into the lungs every 6 hours as needed for shortness of breath / dyspnea  ALPRAZolam (XANAX) 1 MG tablet, TAKE ONE TABLET BY MOUTH ONCE DAILY AS NEEDED FOR ANXIETY  azelastine (ASTELIN) 0.1 % nasal spray, Spray 1 spray into both nostrils 2 times daily  Calcium Carbonate-Vit D-Min (CALCIUM 1200 PO),   doxycycline hyclate (PERIOSTAT) 20 MG tablet, Take 1 tablet (20 mg) by mouth 2 times daily  Fluticasone-Umeclidin-Vilanterol (TRELEGY ELLIPTA) 100-62.5-25 MCG/INH oral inhaler, Inhale 1 puff into the lungs daily  hydrOXYzine (ATARAX) 25 MG tablet, Take 1 tablet (25 mg) by mouth 3 times daily as needed for anxiety  ipratropium (ATROVENT) 0.06 % nasal spray, INHALE TWO SPRAYS IN EACH NOSTRIL FOUR TIMES A DAY  metroNIDAZOLE (METROGEL) 0.75 % external gel, Apply topically 2 times daily  montelukast (SINGULAIR) 10 MG tablet, Take 1 tablet (10 mg) by mouth At Bedtime  Multiple Vitamins-Minerals (MULTIVITAMIN ADULT PO), Take 1 tablet by mouth  propranolol ER (INDERAL LA) 80 MG 24 hr capsule, TAKE ONE CAPSULE BY MOUTH ONCE DAILY  tamsulosin (FLOMAX) 0.4 MG capsule, Take 1 capsule (0.4 mg) by mouth daily  vitamin  B complex with vitamin C (VITAMIN  B COMPLEX) TABS, Take 1 tablet by mouth daily  VITAMIN D, CHOLECALCIFEROL, PO, Take 1,000 Units by mouth daily  zolpidem (AMBIEN) 10 MG tablet, TAKE ONE TABLET BY MOUTH NIGHTLY AS NEEDED FOR SLEEP  pravastatin (PRAVACHOL) 40 MG tablet, Take 1 tablet (40 mg) by mouth daily (Patient not taking: Reported on 12/27/2021)  ZINC GLUCONATE PO, Take 50 mg by mouth daily (Patient not taking: Reported on 12/27/2021)    No current facility-administered medications on file prior to visit.    Social History     Tobacco Use     Smoking status: Never Smoker     Smokeless tobacco:  Current User     Types: Chew     Tobacco comment: occ   Substance Use Topics     Alcohol use: Yes     Comment: 3-6 beers per week     Allergies   Allergen Reactions     Pcn [Penicillins] Hives and Rash     Amoxicillin     Review of Systems   All systems negative except for those listed above in HPI.    OBJECTIVE:   /83   Pulse 78   Temp 98.6  F (37  C) (Oral)   Resp 16   Wt 93 kg (205 lb)   SpO2 98%   BMI 28.59 kg/m       Physical Exam  Vitals reviewed.   Constitutional:       Appearance: Normal appearance. He is ill-appearing.   HENT:      Head: Normocephalic and atraumatic.      Right Ear: Tympanic membrane and ear canal normal.      Left Ear: Tympanic membrane and ear canal normal.      Nose: Congestion and rhinorrhea present.      Mouth/Throat:      Mouth: Mucous membranes are moist.      Pharynx: Oropharynx is clear. No oropharyngeal exudate or posterior oropharyngeal erythema.   Cardiovascular:      Rate and Rhythm: Normal rate.      Pulses: Normal pulses.   Pulmonary:      Effort: Respiratory distress (cough) present.      Breath sounds: No stridor. No wheezing, rhonchi or rales.   Musculoskeletal:         General: Normal range of motion.   Lymphadenopathy:      Cervical: No cervical adenopathy.   Skin:     General: Skin is warm and dry.   Neurological:      General: No focal deficit present.      Mental Status: He is alert and oriented to person, place, and time.   Psychiatric:         Mood and Affect: Mood normal.         Behavior: Behavior normal.       ASSESSMENT:    ICD-10-CM    1. Sinobronchitis  J32.9 azithromycin (ZITHROMAX) 250 MG tablet    J40 benzonatate (TESSALON) 100 MG capsule     PLAN:     Zpak for sinobronchitis given worsening and day 8  Shared decision making on this since he has been on doxy for years for rosacea  Lungs clear, vitals stable, would hold on XR  Rest! Your body needs more rest to heal.  Drink plenty of fluids (warm fluids like tea or soup are soothing and reduce  cough)  Sit in the bathroom with a hot shower running and breathe in the steam.  Honey may soothe your sore throat and help manage your cough- may take straight or in warm water with lemon juice.  Avoid smoke (cigarettes, bonfires, fireplace, wood burning stoves).  Take Tylenol or an NSAID such as ibuprofen or naproxen as needed for pain.  Delsym (dextromethorphan polistirex) is an over the counter cough medication that lasts 12 hours.   Mucinex or Robitussin (guiafenesin) thin mucus and may help it to loosen more quickly  Good handwashing is the best way to prevent spread of germs  Present to emergency room if you develop trouble breathing, swallowing or cough-up blood.  Follow up with your primary care provider if symptoms worsen or fail to improve as expected.    Follow up with primary care provider with any problems, questions or concerns or if symptoms worsen or fail to improve. Patient agreed to plan and verbalized understanding.    Chiquis An, [facility]

## 2022-01-13 DIAGNOSIS — F41.9 ANXIETY: ICD-10-CM

## 2022-01-13 RX ORDER — ALPRAZOLAM 1 MG
TABLET ORAL
Qty: 30 TABLET | Refills: 3 | Status: SHIPPED | OUTPATIENT
Start: 2022-01-13 | End: 2022-05-02

## 2022-04-07 DIAGNOSIS — F51.01 PRIMARY INSOMNIA: ICD-10-CM

## 2022-04-07 RX ORDER — ZOLPIDEM TARTRATE 10 MG/1
TABLET ORAL
Qty: 30 TABLET | Refills: 0 | Status: SHIPPED | OUTPATIENT
Start: 2022-04-07 | End: 2022-09-11

## 2022-04-07 NOTE — TELEPHONE ENCOUNTER
Please call patient. He is due for physical with primary care provider. Refilled 30 days. Please help schedule.    Domenico Baker PA-C on 4/7/2022 at 1:03 PM

## 2022-04-26 RX ORDER — BETAMETHASONE DIPROPIONATE 0.5 MG/G
CREAM TOPICAL
COMMUNITY
Start: 2022-02-17

## 2022-04-26 RX ORDER — AZELAIC ACID 0.15 G/G
GEL TOPICAL
COMMUNITY
Start: 2021-11-18

## 2022-05-02 ENCOUNTER — VIRTUAL VISIT (OUTPATIENT)
Dept: FAMILY MEDICINE | Facility: CLINIC | Age: 57
End: 2022-05-02
Payer: COMMERCIAL

## 2022-05-02 DIAGNOSIS — M54.41 RIGHT-SIDED LOW BACK PAIN WITH RIGHT-SIDED SCIATICA, UNSPECIFIED CHRONICITY: ICD-10-CM

## 2022-05-02 DIAGNOSIS — Z23 ENCOUNTER FOR IMMUNIZATION: ICD-10-CM

## 2022-05-02 DIAGNOSIS — F41.9 ANXIETY: ICD-10-CM

## 2022-05-02 DIAGNOSIS — E78.5 HYPERLIPIDEMIA LDL GOAL <160: ICD-10-CM

## 2022-05-02 DIAGNOSIS — J30.2 SEASONAL ALLERGIC RHINITIS, UNSPECIFIED TRIGGER: ICD-10-CM

## 2022-05-02 DIAGNOSIS — J45.30 MILD PERSISTENT ASTHMA WITHOUT COMPLICATION: Primary | ICD-10-CM

## 2022-05-02 DIAGNOSIS — Z72.0 TOBACCO ABUSE: ICD-10-CM

## 2022-05-02 PROCEDURE — 99214 OFFICE O/P EST MOD 30 MIN: CPT | Mod: 95 | Performed by: FAMILY MEDICINE

## 2022-05-02 RX ORDER — IPRATROPIUM BROMIDE 42 UG/1
SPRAY, METERED NASAL
Qty: 15 ML | Refills: 5 | Status: SHIPPED | OUTPATIENT
Start: 2022-05-02 | End: 2022-11-09

## 2022-05-02 RX ORDER — DESVENLAFAXINE 50 MG/1
50 TABLET, FILM COATED, EXTENDED RELEASE ORAL DAILY
Qty: 30 TABLET | Refills: 1 | Status: SHIPPED | OUTPATIENT
Start: 2022-05-02 | End: 2022-05-17

## 2022-05-02 RX ORDER — FLUTICASONE PROPIONATE 50 MCG
1 SPRAY, SUSPENSION (ML) NASAL DAILY
Qty: 16 G | Refills: 5 | Status: SHIPPED | OUTPATIENT
Start: 2022-05-02 | End: 2022-11-09

## 2022-05-02 RX ORDER — HYDROXYZINE HYDROCHLORIDE 25 MG/1
25 TABLET, FILM COATED ORAL 3 TIMES DAILY PRN
Qty: 90 TABLET | Refills: 5 | Status: SHIPPED | OUTPATIENT
Start: 2022-05-02 | End: 2022-11-09

## 2022-05-02 RX ORDER — AZELASTINE 1 MG/ML
1 SPRAY, METERED NASAL 2 TIMES DAILY
Qty: 30 ML | Refills: 5 | Status: SHIPPED | OUTPATIENT
Start: 2022-05-02 | End: 2024-03-26

## 2022-05-02 RX ORDER — ALPRAZOLAM 1 MG
TABLET ORAL
Qty: 30 TABLET | Refills: 5 | Status: SHIPPED | OUTPATIENT
Start: 2022-05-02 | End: 2022-11-09

## 2022-05-02 ASSESSMENT — ASTHMA QUESTIONNAIRES: ACT_TOTALSCORE: 19

## 2022-05-02 NOTE — PROGRESS NOTES
The 10-year ASCVD risk score ([place] EISENBERG Jr., et al., 2013) is: 6.1%    Values used to calculate the score:      Age: 56 years      Sex: Male      Is Non- : No      Diabetic: No      Tobacco smoker: No      Systolic Blood Pressure: 131 mmHg      Is BP treated: No      HDL Cholesterol: 58 mg/dL      Total Cholesterol: 252 mg/dL  Lyndon Stoll MD

## 2022-05-02 NOTE — ASSESSMENT & PLAN NOTE
He has tried Lexapro years ago, otherwise  On hydroxyzine and Atarax.  Discussed.  Especially as he approaches senior status, an alternate agent may be more appropriate.  He is willing.  Trial of Pristiq

## 2022-05-02 NOTE — ASSESSMENT & PLAN NOTE
Wife wonders if he should see a pulmonologist.  Symptoms are well controlled with occasional exacerbation and rare rescue use.  Discussed he elects to stay the course

## 2022-05-02 NOTE — ASSESSMENT & PLAN NOTE
He stopped his statin, concerned that he is taking 2 new medicines.  Discussed risk and its changes over time..  He will consider. we will be testing and recalculated at his next PE

## 2022-05-02 NOTE — PROGRESS NOTES
Champ is a 56 year old who is being evaluated via a billable telephone visit.      What phone number would you like to be contacted at? [phone]   How would you like to obtain your AVS? [facility]    Assessment & Plan   Problem List Items Addressed This Visit     Anxiety     He has tried Lexapro years ago, otherwise  On hydroxyzine and Atarax.  Discussed.  Especially as he approaches senior status, an alternate agent may be more appropriate.  He is willing.  Trial of Pristiq           Relevant Medications    ALPRAZolam (XANAX) 1 MG tablet    hydrOXYzine (ATARAX) 25 MG tablet    desvenlafaxine (PRISTIQ) 50 MG 24 hr tablet    Encounter for immunization     Due and willing.  We shall arrange           Relevant Orders    TDAP VACCINE (Adacel, Boostrix)  [[id number]]    COVID-19,PF,MODERNA (18+ YRS BOOSTER .25ML)    Hyperlipidemia LDL goal <160     He stopped his statin, concerned that he is taking 2 new medicines.  Discussed risk and its changes over time..  He will consider. we will be testing and recalculated at his next PE           Mild persistent asthma without complication - Primary     Wife wonders if he should see a pulmonologist.  Symptoms are well controlled with occasional exacerbation and rare rescue use.  Discussed he elects to stay the course           Relevant Medications    ipratropium (ATROVENT) 0.06 % nasal spray    azelastine (ASTELIN) 0.1 % nasal spray    fluticasone (FLONASE) 50 MCG/ACT nasal spray    Right-sided low back pain with right-sided sciatica     This is bothering him especially when he is sleeping.  He has tried a new mattress as well as some rolls under his legs.  He has been to PT previously.  Suggest he return           Relevant Medications    ALPRAZolam (XANAX) 1 MG tablet    hydrOXYzine (ATARAX) 25 MG tablet    desvenlafaxine (PRISTIQ) 50 MG 24 hr tablet    Other Relevant Orders    Physical Therapy Referral    Seasonal allergic rhinitis     His season is starting.  Discussed.  His wife  "would like him to see an allergist.  Discussed.  He has forgotten his nasal sprays will resume his first           Relevant Medications    ipratropium (ATROVENT) 0.06 % nasal spray    azelastine (ASTELIN) 0.1 % nasal spray    fluticasone (FLONASE) 50 MCG/ACT nasal spray    Tobacco abuse     He does not smoke.  He does chew.  Discussed health effects                           BMI:   Estimated body mass index is 28.59 kg/m  as calculated from the following:    Height as of 9/22/21: 1.803 m (5' 11\").    Weight as of 12/27/21: 93 kg (205 lb).   Weight management plan: maintain        Return in about 6 weeks (around 6/13/2022) for appt, immunizations, recheck 6 weeks virtual OK.    Lyndon Stoll MD  [facility] CRISTO Oakes is a 56 year old who presents for the following health issues     HPI       Medication Followup of Multiple    Taking Medication as prescribed: yes    Side Effects:  None    Medication Helping Symptoms:  yes     Asthma Follow-Up    Was ACT completed today?    Yes    ACT Total Scores 5/2/2022   ACT TOTAL SCORE -   ASTHMA ER VISITS -   ASTHMA HOSPITALIZATIONS -   ACT TOTAL SCORE (Goal Greater than or Equal to 20) 19   In the past 12 months, how many times did you visit the emergency room for your asthma without being admitted to the hospital? 0   In the past 12 months, how many times were you hospitalized overnight because of your asthma? 0       How many days per week do you miss taking your asthma controller medication?  1    Please describe any recent triggers for your asthma: smoke, dust mites, pollens, animal dander and cold air    Have you had any Emergency Room Visits, Urgent Care Visits, or Hospital Admissions since your last office visit?  No      How many servings of fruits and vegetables do you eat daily?  2-3    On average, how many sweetened beverages do you drink each day (Examples: soda, juice, sweet tea, etc.  Do NOT count diet or artificially " "sweetened beverages)?   0    How many days per week do you exercise enough to make your heart beat faster? 5    How many minutes a day do you exercise enough to make your heart beat faster? 60 or more  How many days per week do you miss taking your medication? 1    What makes it hard for you to take your medications?  remembering to take          Review of Systems   Anxiety increasing with increased workload      Objective    Vitals - Patient Reported  Weight (Patient Reported): 93 kg (205 lb)  Height (Patient Reported): 180.3 cm (5' 11\")  BMI (Based on Pt Reported Ht/Wt): 28.59      Vitals:  No vitals were obtained today due to virtual visit.    Physical Exam   healthy, alert and no distress  PSYCH: Alert and oriented times 3; coherent speech, normal   rate and volume, able to articulate logical thoughts, able   to abstract reason, no tangential thoughts, no hallucinations   or delusions  His affect is normal  RESP: No cough, no audible wheezing, able to talk in full sentences  Remainder of exam unable to be completed due to telephone visits                Phone call duration: 28 minutes    Lyndon Stoll MD    "

## 2022-05-02 NOTE — ASSESSMENT & PLAN NOTE
This is bothering him especially when he is sleeping.  He has tried a new mattress as well as some rolls under his legs.  He has been to PT previously.  Suggest he return

## 2022-05-02 NOTE — ASSESSMENT & PLAN NOTE
His season is starting.  Discussed.  His wife would like him to see an allergist.  Discussed.  He has forgotten his nasal sprays will resume his first

## 2022-05-16 ENCOUNTER — MYC MEDICAL ADVICE (OUTPATIENT)
Dept: FAMILY MEDICINE | Facility: CLINIC | Age: 57
End: 2022-05-16

## 2022-05-16 ENCOUNTER — THERAPY VISIT (OUTPATIENT)
Dept: PHYSICAL THERAPY | Facility: CLINIC | Age: 57
End: 2022-05-16
Payer: COMMERCIAL

## 2022-05-16 DIAGNOSIS — M54.41 RIGHT-SIDED LOW BACK PAIN WITH RIGHT-SIDED SCIATICA, UNSPECIFIED CHRONICITY: ICD-10-CM

## 2022-05-16 DIAGNOSIS — M54.50 LOW BACK PAIN: ICD-10-CM

## 2022-05-16 PROCEDURE — 97110 THERAPEUTIC EXERCISES: CPT | Mod: GP | Performed by: PHYSICAL THERAPIST

## 2022-05-16 PROCEDURE — 97161 PT EVAL LOW COMPLEX 20 MIN: CPT | Mod: GP | Performed by: PHYSICAL THERAPIST

## 2022-05-16 NOTE — PROGRESS NOTES
Physical Therapy Initial Evaluation  Subjective:  The history is provided by the patient.   Patient Health History  Venkata Lynn being seen for LBP.     Problem began: 5/16/2021 (pain for years).   Problem occurred: Insidious     General health as reported by patient is good.  Pertinent medical history includes: asthma.   Red flags:  None as reported by patient.                                  Therapist Generated HPI Evaluation  Problem details: Pt has LBP and it has been worsening for on year.  Pt has increased soreness in the morning.  Wakes up 2-3/night.  Patient does some stretching and treadmill in the am for one hour.  This helps to decrease his LBP.  .                                                        Objective:  Standing Alignment:          Pelvic:  Iliac crest high R (in standing, level prone)                         Lumbar/SI Evaluation  ROM:    AROM Lumbar:   Flexion:            100  Ext:                    25+   Side Bend:        Left:  50+    Right:  50+  Rotation:           Left:  100    Right:  80  Side Glide:        Left:     Right:           Lumbar Myotomes:  normal            Lumbar DTR's:  not assessed      Cord Signs:  not assessed    Lumbar Dermtomes:  not assessed                Neural Tension/Mobility:      Left side:SLR  negative.   Right side:   Femoral Nerve positive.  Right side:   SLR w/DF or SLR  negative.         Spinal Segmental Conclusions: Unilateral lt>rt    Level: Hypo noted at L2, L3, L4 and L5                                        Hip Evaluation  Hip PROM:    Flexion: Left: wnl   Right: wnl        Internal Rotation: Left: wnl    Right: wnl  External Rotation: Left: 50    Right: 40                               General     ROS    Assessment/Plan:    Patient is a 56 year old male with lumbar complaints.    Patient has the following significant findings with corresponding treatment plan.                Diagnosis 1:  LBP, extension dysfunction  Pain -  manual therapy,  self management, education, directional preference exercise and home program  Decreased ROM/flexibility - manual therapy, therapeutic exercise and home program  Decreased joint mobility - manual therapy, therapeutic exercise and home program  Decreased proprioception - neuro re-education, therapeutic activities and home program  Decreased function - therapeutic activities and home program    Therapy Evaluation Codes:     1) Clinical presentation characteristics are:   Stable/Uncomplicated.  2) Decision-Making    Low complexity using standardized patient assessment instrument and/or measureable assessment of functional outcome.  Cumulative Therapy Evaluation is: Low complexity.    Previous and current functional limitations:  (See Goal Flow Sheet for this information)    Short term and Long term goals: (See Goal Flow Sheet for this information)     Communication ability:  Patient appears to be able to clearly communicate and understand verbal and written communication and follow directions correctly.  Treatment Explanation - The following has been discussed with the patient:   RX ordered/plan of care  Anticipated outcomes  Possible risks and side effects  This patient would benefit from PT intervention to resume normal activities.   Rehab potential is good.    Frequency:  1 X week, once daily  Duration:  for 4 weeks  Discharge Plan:  Achieve all LTG.  Independent in home treatment program.  Reach maximal therapeutic benefit.    Please refer to the daily flowsheet for treatment today, total treatment time and time spent performing 1:1 timed codes.

## 2022-05-17 NOTE — TELEPHONE ENCOUNTER
See [employer] message, routed to BW, ok discontinue Desvenlafaxine, please review, advise and confirm [employer] message, close when final  Jayda Salazar RN, BSN  [facility]

## 2022-05-23 ENCOUNTER — THERAPY VISIT (OUTPATIENT)
Dept: PHYSICAL THERAPY | Facility: CLINIC | Age: 57
End: 2022-05-23
Payer: COMMERCIAL

## 2022-05-23 DIAGNOSIS — M54.50 LOW BACK PAIN: Primary | ICD-10-CM

## 2022-05-23 PROCEDURE — 97110 THERAPEUTIC EXERCISES: CPT | Mod: GP | Performed by: PHYSICAL THERAPIST

## 2022-05-23 PROCEDURE — 97112 NEUROMUSCULAR REEDUCATION: CPT | Mod: GP | Performed by: PHYSICAL THERAPIST

## 2022-06-01 ENCOUNTER — THERAPY VISIT (OUTPATIENT)
Dept: PHYSICAL THERAPY | Facility: CLINIC | Age: 57
End: 2022-06-01
Payer: COMMERCIAL

## 2022-06-01 DIAGNOSIS — M54.50 LOW BACK PAIN: Primary | ICD-10-CM

## 2022-06-01 PROCEDURE — 97112 NEUROMUSCULAR REEDUCATION: CPT | Mod: GP | Performed by: PHYSICAL THERAPIST

## 2022-06-01 PROCEDURE — 97110 THERAPEUTIC EXERCISES: CPT | Mod: GP | Performed by: PHYSICAL THERAPIST

## 2022-06-14 ENCOUNTER — THERAPY VISIT (OUTPATIENT)
Dept: PHYSICAL THERAPY | Facility: CLINIC | Age: 57
End: 2022-06-14
Payer: COMMERCIAL

## 2022-06-14 DIAGNOSIS — M54.50 LOW BACK PAIN: Primary | ICD-10-CM

## 2022-06-14 PROCEDURE — 97140 MANUAL THERAPY 1/> REGIONS: CPT | Mod: GP | Performed by: PHYSICAL THERAPIST

## 2022-06-14 PROCEDURE — 97112 NEUROMUSCULAR REEDUCATION: CPT | Mod: GP | Performed by: PHYSICAL THERAPIST

## 2022-06-14 PROCEDURE — 97110 THERAPEUTIC EXERCISES: CPT | Mod: GP | Performed by: PHYSICAL THERAPIST

## 2022-06-27 ENCOUNTER — THERAPY VISIT (OUTPATIENT)
Dept: PHYSICAL THERAPY | Facility: CLINIC | Age: 57
End: 2022-06-27
Payer: COMMERCIAL

## 2022-06-27 DIAGNOSIS — M54.50 LOW BACK PAIN: Primary | ICD-10-CM

## 2022-06-27 PROCEDURE — 97110 THERAPEUTIC EXERCISES: CPT | Mod: GP | Performed by: PHYSICAL THERAPIST

## 2022-06-27 PROCEDURE — 97530 THERAPEUTIC ACTIVITIES: CPT | Mod: GP | Performed by: PHYSICAL THERAPIST

## 2022-06-27 PROCEDURE — 97112 NEUROMUSCULAR REEDUCATION: CPT | Mod: GP | Performed by: PHYSICAL THERAPIST

## 2022-08-02 DIAGNOSIS — R35.1 BENIGN PROSTATIC HYPERPLASIA WITH NOCTURIA: ICD-10-CM

## 2022-08-02 DIAGNOSIS — N40.1 BENIGN PROSTATIC HYPERPLASIA WITH NOCTURIA: ICD-10-CM

## 2022-08-02 DIAGNOSIS — G25.0 FAMILIAL TREMOR: ICD-10-CM

## 2022-08-02 PROBLEM — M54.50 LOW BACK PAIN: Status: RESOLVED | Noted: 2022-05-16 | Resolved: 2022-08-02

## 2022-08-02 NOTE — PROGRESS NOTES
Discharge Note    Progress reporting period is from initial evaluation date (please see noted date below) to Jun 27, 2022.  Linked Episodes   Type: Episode: Status: Noted: Resolved: Last update: Updated by:   PHYSICAL THERAPY LBP5/16/22 Active 5/16/2022 6/27/2022  9:26 AM Finn Britt, PT      Comments:       Please see information below for last relevant information on current status.  Patient seen for 5 visits.    SUBJECTIVE  Subjective changes noted by patient:  Better, getting better all the time.  HEP later in the day harder.  Less pain in the am, not waking up.  .  Current pain level is  .     Previous pain level was   .   Changes in function:  Yes (See Goal flowsheet attached for changes in current functional level)  Adverse reaction to treatment or activity: None    OBJECTIVE  Changes noted in objective findings: Lx arom wnls.  NMR for knee bending, squats with correct technique.  PNB heel to buttock rt and lt.     ASSESSMENT/PLAN  Diagnosis: LBP, right sided   Updated problem list and treatment plan:   Decreased ROM/flexibility - HEP  Decreased function - HEP  Decreased strength - HEP  STG/LTGs have been met or progress has been made towards goals:  Yes, please see goal flowsheet for most current information  Assessment of Progress: current status is unknown.    Last current status: Pt is progressing well   Self Management Plans:  HEP  I have re-evaluated this patient and find that the nature, scope, duration and intensity of the therapy is appropriate for the medical condition of the patient.  Venkata continues to require the following intervention to meet STG and LTG's:  HEP.    Recommendations:  Discharge with current home program.  Patient to follow up with MD as needed.    Please refer to the daily flowsheet for treatment today, total treatment time and time spent performing 1:1 timed codes.

## 2022-08-05 RX ORDER — PROPRANOLOL HYDROCHLORIDE 80 MG/1
CAPSULE, EXTENDED RELEASE ORAL
Qty: 90 CAPSULE | Refills: 0 | Status: SHIPPED | OUTPATIENT
Start: 2022-08-05 | End: 2022-10-25

## 2022-08-05 RX ORDER — TAMSULOSIN HYDROCHLORIDE 0.4 MG/1
CAPSULE ORAL
Qty: 90 CAPSULE | Refills: 0 | Status: SHIPPED | OUTPATIENT
Start: 2022-08-05 | End: 2022-11-09

## 2022-08-05 NOTE — TELEPHONE ENCOUNTER
Medication is being filled for 1 time refill only due to:  Patient needs to be seen because due for an f/u appt per 5/2/22 appt.  This is for flomax and propranolol.    Will forward to the station, please try to help the pt schedule per above.

## 2022-08-18 ENCOUNTER — TRANSFERRED RECORDS (OUTPATIENT)
Dept: HEALTH INFORMATION MANAGEMENT | Facility: CLINIC | Age: 57
End: 2022-08-18

## 2022-09-09 DIAGNOSIS — F51.01 PRIMARY INSOMNIA: ICD-10-CM

## 2022-09-09 NOTE — TELEPHONE ENCOUNTER
Pt calling to request refill of zolpidem.    T'd up med and pharmacy and routing refill request to provider for review/approval because:  Drug not on the [facility] refill protocol     Flory Washington RN

## 2022-09-11 RX ORDER — ZOLPIDEM TARTRATE 10 MG/1
TABLET ORAL
Qty: 90 TABLET | Refills: 0 | Status: SHIPPED | OUTPATIENT
Start: 2022-09-11 | End: 2023-03-14

## 2022-10-10 ENCOUNTER — HEALTH MAINTENANCE LETTER (OUTPATIENT)
Age: 57
End: 2022-10-10

## 2022-10-24 DIAGNOSIS — G25.0 FAMILIAL TREMOR: ICD-10-CM

## 2022-10-25 ENCOUNTER — MYC MEDICAL ADVICE (OUTPATIENT)
Dept: FAMILY MEDICINE | Facility: CLINIC | Age: 57
End: 2022-10-25

## 2022-10-25 RX ORDER — PROPRANOLOL HYDROCHLORIDE 80 MG/1
CAPSULE, EXTENDED RELEASE ORAL
Qty: 90 CAPSULE | Refills: 0 | Status: SHIPPED | OUTPATIENT
Start: 2022-10-25 | End: 2023-01-24

## 2022-10-25 NOTE — TELEPHONE ENCOUNTER
Spoke with patient. Instructed regarding current CDC guidelines for quarantine. Symptomatic treatment.  5 days quarantine then 5 days wearing a mask. Disinfect commonly used areas.     Ok for Covid booster and influenza vaccination 15d after testing positive for Covid. May consider waiting 3 months for Covid booster if he wishes.    Fatoumata Pearson RN

## 2022-11-01 NOTE — ASSESSMENT & PLAN NOTE
Lesion left hairline his wife thinks is growing.  Longstanding.  Exam shows large keratosis with keratin plugs.  Cryotherapy applied after discussion.  Anticipate this will require repeat treatments   negative

## 2022-11-09 ENCOUNTER — ANCILLARY PROCEDURE (OUTPATIENT)
Dept: GENERAL RADIOLOGY | Facility: CLINIC | Age: 57
End: 2022-11-09
Attending: FAMILY MEDICINE
Payer: COMMERCIAL

## 2022-11-09 ENCOUNTER — OFFICE VISIT (OUTPATIENT)
Dept: FAMILY MEDICINE | Facility: CLINIC | Age: 57
End: 2022-11-09
Payer: COMMERCIAL

## 2022-11-09 VITALS
HEART RATE: 69 BPM | WEIGHT: 207.2 LBS | TEMPERATURE: 98.1 F | RESPIRATION RATE: 12 BRPM | HEIGHT: 71 IN | OXYGEN SATURATION: 99 % | DIASTOLIC BLOOD PRESSURE: 72 MMHG | BODY MASS INDEX: 29.01 KG/M2 | SYSTOLIC BLOOD PRESSURE: 104 MMHG

## 2022-11-09 DIAGNOSIS — J45.30 MILD PERSISTENT ASTHMA WITHOUT COMPLICATION: ICD-10-CM

## 2022-11-09 DIAGNOSIS — F51.01 PRIMARY INSOMNIA: ICD-10-CM

## 2022-11-09 DIAGNOSIS — U07.1 INFECTION DUE TO 2019 NOVEL CORONAVIRUS: ICD-10-CM

## 2022-11-09 DIAGNOSIS — F41.9 ANXIETY: ICD-10-CM

## 2022-11-09 DIAGNOSIS — J30.2 SEASONAL ALLERGIC RHINITIS, UNSPECIFIED TRIGGER: ICD-10-CM

## 2022-11-09 DIAGNOSIS — M54.31 SCIATICA WITHOUT BACK PAIN, RIGHT: ICD-10-CM

## 2022-11-09 DIAGNOSIS — R35.1 BENIGN PROSTATIC HYPERPLASIA WITH NOCTURIA: ICD-10-CM

## 2022-11-09 DIAGNOSIS — Z12.5 SPECIAL SCREENING FOR MALIGNANT NEOPLASM OF PROSTATE: ICD-10-CM

## 2022-11-09 DIAGNOSIS — N40.1 BENIGN PROSTATIC HYPERPLASIA WITH NOCTURIA: ICD-10-CM

## 2022-11-09 DIAGNOSIS — Z00.00 ENCOUNTER FOR PREVENTIVE HEALTH EXAMINATION: Primary | ICD-10-CM

## 2022-11-09 DIAGNOSIS — Z79.899 CONTROLLED SUBSTANCE AGREEMENT SIGNED: ICD-10-CM

## 2022-11-09 DIAGNOSIS — Z23 ENCOUNTER FOR IMMUNIZATION: ICD-10-CM

## 2022-11-09 PROBLEM — S76.311A STRAIN OF RIGHT HAMSTRING MUSCLE, INITIAL ENCOUNTER: Status: RESOLVED | Noted: 2020-09-29 | Resolved: 2022-11-09

## 2022-11-09 PROCEDURE — 99396 PREV VISIT EST AGE 40-64: CPT | Mod: 25 | Performed by: FAMILY MEDICINE

## 2022-11-09 PROCEDURE — 99214 OFFICE O/P EST MOD 30 MIN: CPT | Mod: 25 | Performed by: FAMILY MEDICINE

## 2022-11-09 PROCEDURE — 71046 X-RAY EXAM CHEST 2 VIEWS: CPT | Mod: TC | Performed by: RADIOLOGY

## 2022-11-09 PROCEDURE — 90682 RIV4 VACC RECOMBINANT DNA IM: CPT | Performed by: FAMILY MEDICINE

## 2022-11-09 PROCEDURE — 90471 IMMUNIZATION ADMIN: CPT | Performed by: FAMILY MEDICINE

## 2022-11-09 RX ORDER — ALPRAZOLAM 1 MG
TABLET ORAL
Qty: 30 TABLET | Refills: 5 | Status: SHIPPED | OUTPATIENT
Start: 2022-11-09 | End: 2023-06-09

## 2022-11-09 RX ORDER — MONTELUKAST SODIUM 10 MG/1
TABLET ORAL
Qty: 90 TABLET | Refills: 3 | Status: SHIPPED | OUTPATIENT
Start: 2022-11-09 | End: 2023-11-21

## 2022-11-09 RX ORDER — FLUTICASONE PROPIONATE 110 UG/1
1 AEROSOL, METERED RESPIRATORY (INHALATION) 2 TIMES DAILY PRN
Qty: 12 G | Refills: 1 | Status: SHIPPED | OUTPATIENT
Start: 2022-11-09 | End: 2023-11-21

## 2022-11-09 RX ORDER — HYDROXYZINE HYDROCHLORIDE 25 MG/1
25 TABLET, FILM COATED ORAL 3 TIMES DAILY PRN
Qty: 90 TABLET | Refills: 5 | Status: SHIPPED | OUTPATIENT
Start: 2022-11-09 | End: 2024-02-05

## 2022-11-09 RX ORDER — IPRATROPIUM BROMIDE 42 UG/1
SPRAY, METERED NASAL
Qty: 15 ML | Refills: 5 | Status: SHIPPED | OUTPATIENT
Start: 2022-11-09 | End: 2024-03-26

## 2022-11-09 RX ORDER — ALBUTEROL SULFATE 90 UG/1
2 AEROSOL, METERED RESPIRATORY (INHALATION) EVERY 6 HOURS PRN
Qty: 18 G | Refills: 3 | Status: SHIPPED | OUTPATIENT
Start: 2022-11-09 | End: 2024-03-26

## 2022-11-09 RX ORDER — FLUTICASONE PROPIONATE 50 MCG
1 SPRAY, SUSPENSION (ML) NASAL DAILY
Qty: 16 G | Refills: 5 | Status: SHIPPED | OUTPATIENT
Start: 2022-11-09 | End: 2024-02-15

## 2022-11-09 RX ORDER — TAMSULOSIN HYDROCHLORIDE 0.4 MG/1
0.4 CAPSULE ORAL DAILY
Qty: 90 CAPSULE | Refills: 3 | Status: SHIPPED | OUTPATIENT
Start: 2022-11-09 | End: 2023-11-21

## 2022-11-09 SDOH — ECONOMIC STABILITY: INCOME INSECURITY: HOW HARD IS IT FOR YOU TO PAY FOR THE VERY BASICS LIKE FOOD, HOUSING, MEDICAL CARE, AND HEATING?: NOT VERY HARD

## 2022-11-09 SDOH — ECONOMIC STABILITY: FOOD INSECURITY: WITHIN THE PAST 12 MONTHS, THE FOOD YOU BOUGHT JUST DIDN'T LAST AND YOU DIDN'T HAVE MONEY TO GET MORE.: NEVER TRUE

## 2022-11-09 SDOH — ECONOMIC STABILITY: INCOME INSECURITY: IN THE LAST 12 MONTHS, WAS THERE A TIME WHEN YOU WERE NOT ABLE TO PAY THE MORTGAGE OR RENT ON TIME?: NO

## 2022-11-09 SDOH — ECONOMIC STABILITY: FOOD INSECURITY: WITHIN THE PAST 12 MONTHS, YOU WORRIED THAT YOUR FOOD WOULD RUN OUT BEFORE YOU GOT MONEY TO BUY MORE.: NEVER TRUE

## 2022-11-09 SDOH — HEALTH STABILITY: PHYSICAL HEALTH: ON AVERAGE, HOW MANY MINUTES DO YOU ENGAGE IN EXERCISE AT THIS LEVEL?: 60 MIN

## 2022-11-09 SDOH — HEALTH STABILITY: PHYSICAL HEALTH: ON AVERAGE, HOW MANY DAYS PER WEEK DO YOU ENGAGE IN MODERATE TO STRENUOUS EXERCISE (LIKE A BRISK WALK)?: 5 DAYS

## 2022-11-09 ASSESSMENT — ENCOUNTER SYMPTOMS
FREQUENCY: 0
MYALGIAS: 0
HEARTBURN: 0
DIARRHEA: 0
DYSURIA: 0
WEAKNESS: 0
HEMATOCHEZIA: 1
NAUSEA: 0
EYE PAIN: 0
SORE THROAT: 0
CONSTIPATION: 0
HEADACHES: 0
FEVER: 0
PARESTHESIAS: 0
ARTHRALGIAS: 1
HEMATURIA: 0
DIZZINESS: 0
NERVOUS/ANXIOUS: 0
CHILLS: 0
COUGH: 1
ABDOMINAL PAIN: 0
SHORTNESS OF BREATH: 0
JOINT SWELLING: 0
PALPITATIONS: 0

## 2022-11-09 ASSESSMENT — SOCIAL DETERMINANTS OF HEALTH (SDOH)
IN A TYPICAL WEEK, HOW MANY TIMES DO YOU TALK ON THE PHONE WITH FAMILY, FRIENDS, OR NEIGHBORS?: THREE TIMES A WEEK
DO YOU BELONG TO ANY CLUBS OR ORGANIZATIONS SUCH AS CHURCH GROUPS UNIONS, FRATERNAL OR ATHLETIC GROUPS, OR SCHOOL GROUPS?: NO
HOW OFTEN DO YOU ATTEND CHURCH OR RELIGIOUS SERVICES?: NEVER
HOW OFTEN DO YOU GET TOGETHER WITH FRIENDS OR RELATIVES?: THREE TIMES A WEEK

## 2022-11-09 ASSESSMENT — ASTHMA QUESTIONNAIRES
ACT_TOTALSCORE: 20
ACT_TOTALSCORE: 20
QUESTION_3 LAST FOUR WEEKS HOW OFTEN DID YOUR ASTHMA SYMPTOMS (WHEEZING, COUGHING, SHORTNESS OF BREATH, CHEST TIGHTNESS OR PAIN) WAKE YOU UP AT NIGHT OR EARLIER THAN USUAL IN THE MORNING: NOT AT ALL
QUESTION_5 LAST FOUR WEEKS HOW WOULD YOU RATE YOUR ASTHMA CONTROL: SOMEWHAT CONTROLLED
QUESTION_4 LAST FOUR WEEKS HOW OFTEN HAVE YOU USED YOUR RESCUE INHALER OR NEBULIZER MEDICATION (SUCH AS ALBUTEROL): TWO OR THREE TIMES PER WEEK
QUESTION_2 LAST FOUR WEEKS HOW OFTEN HAVE YOU HAD SHORTNESS OF BREATH: ONCE OR TWICE A WEEK
QUESTION_1 LAST FOUR WEEKS HOW MUCH OF THE TIME DID YOUR ASTHMA KEEP YOU FROM GETTING AS MUCH DONE AT WORK, SCHOOL OR AT HOME: NONE OF THE TIME

## 2022-11-09 ASSESSMENT — LIFESTYLE VARIABLES
HOW OFTEN DO YOU HAVE SIX OR MORE DRINKS ON ONE OCCASION: MONTHLY
HOW MANY STANDARD DRINKS CONTAINING ALCOHOL DO YOU HAVE ON A TYPICAL DAY: 1 OR 2
HOW OFTEN DO YOU HAVE A DRINK CONTAINING ALCOHOL: 2-3 TIMES A WEEK
SKIP TO QUESTIONS 9-10: 0
AUDIT-C TOTAL SCORE: 5

## 2022-11-09 NOTE — PROGRESS NOTES
The 10-year ASCVD risk score (Maria E SAMUELS, et al., 2019) is: 4.5%    Values used to calculate the score:      Age: 57 years      Sex: Male      Is Non- : No      Diabetic: No      Tobacco smoker: No      Systolic Blood Pressure: 104 mmHg      Is BP treated: No      HDL Cholesterol: 58 mg/dL      Total Cholesterol: 252 mg/dL  Lyndon Stoll MD    Answers for HPI/ROS submitted by the patient on 11/9/2022  Frequency of exercise:: 4-5 days/week  Getting at least 3 servings of Calcium per day:: Yes  Diet:: Regular (no restrictions)  Taking medications regularly:: Yes  Medication side effects:: None  Bi-annual eye exam:: Yes  Dental care twice a year:: Yes  Sleep apnea or symptoms of sleep apnea:: None  abdominal pain: No  Blood in stool: Yes  Blood in urine: No  chest pain: Yes  chills: No  congestion: Yes  constipation: No  cough: Yes  diarrhea: No  dizziness: No  ear pain: No  eye pain: No  nervous/anxious: No  fever: No  frequency: No  genital sores: No  headaches: No  hearing loss: No  heartburn: No  arthralgias: Yes  joint swelling: No  peripheral edema: No  mood changes: No  myalgias: No  nausea: No  dysuria: No  palpitations: No  Skin sensation changes: No  sore throat: No  urgency: No  rash: No  shortness of breath: No  visual disturbance: No  weakness: No  Additional concerns today:: No  Duration of exercise:: 45-60 minutes    Lyndon Stoll MD

## 2022-11-09 NOTE — LETTER
[facility]  11/09/22  Patient: Venkata Lynn  YOB: 1965  Medical Record Number: [id number]                                                                                  Non-Opioid Controlled Substance Agreement    This is an agreement between you and your provider regarding safe and appropriate use of controlled substances prescribed by your care team. Controlled substances are?medicines that can cause physical and mental dependence (abuse).     There are strict laws about having and using these medicines. We here at [facility] are  committed to working with you in your efforts to get better. To support you in this work, we'll help you schedule regular office appointments for medicine refills. If we must cancel or change your appointment for any reason, we'll make sure you have enough medicine to last until your next appointment.     As a Provider, I will:     Listen carefully to your concerns while treating you with respect.     Recommend a treatment plan that I believe is in your best interest and may involve therapies other than medicine.      Talk with you often about the possible benefits and the risk of harm of any medicine that we prescribe for you.    Assess the safety of this medicine and check how well it works.      Provide a plan on how to taper (discontinue or go off) using this medicine if the decision is made to stop its use.      ::  As a Patient, I understand controlled substances:       Are prescribed by my care provider to help me function or work and manage my condition(s).?    Are strong medicines and can cause serious side effects.       Need to be taken exactly as prescribed.?Combining controlled substances with certain medicines or chemicals (such as illegal drugs, alcohol, sedatives, sleeping pills, and benzodiazepines) can be dangerous or even fatal.? If I stop taking my medicines suddenly, I may have severe withdrawal symptoms.     The  risks, benefits, and side effects of these medicine(s) were explained to me. I agree that:    1. I will take part in other treatments as advised by my care team. This may be psychiatry or counseling, physical therapy, behavioral therapy, group treatment or a referral to specialist.    2. I will keep all my appointments and understand this is part of the monitoring of controlled substances.?My care team may require an office visit for EVERY controlled substance refill. If I miss appointments or don t follow instructions, my care team may stop my medicine    3. I will take my medicines as prescribed. I will not change the dose or schedule unless my care team tells me to. There will be no refills if I run out early.      4. I may be asked to come to the clinic and complete a urine drug test or complete a pill count. If I don t give a urine sample or participate in a pill count, the care team may stop my medicine.    5. I will only receive controlled substance prescriptions from this clinic. If I am treated by another provider, I will tell them that I am taking controlled substances and that I have a treatment agreement with this provider. I will inform my [facility] care team within one business day if I am given a prescription for any controlled substance by another healthcare provider. My [facility] care team can contact other providers and pharmacists about my use of any medicines.    6. It is up to me to make sure that I don't run out of my medicines on weekends or holidays.?If my care team is willing to refill my prescription without a visit, I must request refills only during office hours. Refills may take up to 3 business days to process. I will use one pharmacy to fill all my controlled substance prescriptions. I will notify the clinic about any changes to my insurance or medicine availability.    7. I am responsible for my prescriptions. If the medicine/prescription is lost, stolen or destroyed,  it will not be replaced.?I also agree not to share controlled substance medicines with anyone.     8. I am aware I should not use any illegal or recreational drugs. I agree not to drink alcohol unless my care team says I can.     9. If I enroll in the [place] Medical Cannabis program, I will tell my care team before my next refill.    10. I will tell my care team right away if I become pregnant, have a new medical problem treated outside of my regular clinic, or have a change in my medicines.     11. I understand that this medicine can affect my thinking, judgment and reaction time.? Alcohol and drugs affect the brain and body, which can affect the safety of my driving. Being under the influence of alcohol or drugs can affect my decision-making, behaviors, personal safety and the safety of others. Driving while impaired (DWI) can occur if a person is driving, operating or in physical control of a car, motorcycle, boat, snowmobile, ATV, motorbike, off-road vehicle or any other motor vehicle ([place] Statute 169A.20). I understand the risk if I choose to drive or operate any vehicle or machinery.    I understand that if I do not follow any of the conditions above, my prescriptions or treatment may be stopped or changed.   I agree that my provider, clinic care team and pharmacy may work with any city, state or federal law enforcement agency that investigates the misuse, sale or other diversion of my controlled medicine. I will allow my provider to discuss my care with, or share a copy of, this agreement with any other treating provider, pharmacy or emergency room where I receive care.     I have read this agreement and have asked questions about anything I did not understand.    ________________________________________________________  Patient Signature - Venkata Lynn     ___________________                   Date     ________________________________________________________  Provider Signature - Lyndon Stoll MD        ___________________                   Date     ________________________________________________________  Witness Signature (required if provider not present while patient signing)          ___________________                   Date

## 2022-11-09 NOTE — LETTER
My Asthma Action Plan    Name: Venkata Lynn   YOB: 1965  Date: 11/9/2022   My doctor: Lyndon Stoll MD   My clinic: [facility]        My Control Medicine: trelegy  My Rescue Medicine: albuterol   My Asthma Severity:   Mild Persistent  Know your asthma triggers:   smoke  dust mites  pollens  animal dander  cold air            GREEN ZONE   Good Control    I feel good    No cough or wheeze    Can work, sleep and play without asthma symptoms       Take your asthma control medicine every day.     1. If exercise triggers your asthma, take your rescue medication    15 minutes before exercise or sports, and    During exercise if you have asthma symptoms  2. Spacer to use with inhaler: If you have a spacer, make sure to use it with your inhaler             YELLOW ZONE Getting Worse  I have ANY of these:    I do not feel good    Cough or wheeze    Chest feels tight    Wake up at night   1. Keep taking your Green Zone medications  2. Start taking your rescue medicine:    every 20 minutes for up to 1 hour. Then every 4 hours for 24-48 hours.  3. If you stay in the Yellow Zone for more than 12-24 hours, contact your doctor.  4. If you do not return to the Green Zone in 12-24 hours or you get worse, start taking your oral steroid medicine if prescribed by your provider.           RED ZONE Medical Alert - Get Help  I have ANY of these:    I feel awful    Medicine is not helping    Breathing getting harder    Trouble walking or talking    Nose opens wide to breathe       1. Take your rescue medicine NOW  2. If your provider has prescribed an oral steroid medicine, start taking it NOW  3. Call your doctor NOW  4. If you are still in the Red Zone after 20 minutes and you have not reached your doctor:    Take your rescue medicine again and    Call 911 or go to the emergency room right away    See your regular doctor within 2 weeks of an Emergency Room or Urgent Care visit for follow-up  treatment.          Annual Reminders:  Meet with Asthma Educator,  Flu Shot in the Fall, consider Pneumonia Vaccination for patients with asthma (aged 19 and older).    Pharmacy: [facility] [place] [phone]    Electronically signed by Lyndon Stoll MD   Date: 11/09/22                      Asthma Triggers  How To Control Things That Make Your Asthma Worse    Triggers are things that make your asthma worse.  Look at the list below to help you find your triggers and what you can do about them.  You can help prevent asthma flare-ups by staying away from your triggers.      Trigger                                                          What you can do   Cigarette Smoke  Tobacco smoke can make asthma worse. Do not allow smoking in your home, car or around you.  Be sure no one smokes at a child s day care or school.  If you smoke, ask your health care provider for ways to help you quit.  Ask family members to quit too.  Ask your health care provider for a referral to Quit Plan to help you quit smoking, or call 1-730-585-PLAN.     Colds, Flu, Bronchitis  These are common triggers of asthma. Wash your hands often.  Don t touch your eyes, nose or mouth.  Get a flu shot every year.     Dust Mites  These are tiny bugs that live in cloth or carpet. They are too small to see. Wash sheets and blankets in hot water every week.   Encase pillows and mattress in dust mite proof covers.  Avoid having carpet if you can. If you have carpet, vacuum weekly.   Use a dust mask and HEPA vacuum.   Pollen and Outdoor Mold  Some people are allergic to trees, grass, or weed pollen, or molds. Try to keep your windows closed.  Limit time out doors when pollen count is high.   Ask you health care provider about taking medicine during allergy season.     Animal Dander  Some people are allergic to skin flakes, urine or saliva from pets with fur or feathers. Keep pets with fur or feathers out of your home.     If you can t keep the pet outdoors, then keep the pet out of your bedroom.  Keep the bedroom door closed.  Keep pets off cloth furniture and away from stuffed toys.     Mice, Rats, and Cockroaches   Some people are allergic to the waste from these pests.   Cover food and garbage.  Clean up spills and food crumbs.  Store grease in the refrigerator.   Keep food out of the bedroom.   Indoor Mold  This can be a trigger if your home has high moisture. Fix leaking faucets, pipes, or other sources of water.   Clean moldy surfaces.  Dehumidify basement if it is damp and smelly.   Smoke, Strong Odors, and Sprays  These can reduce air quality. Stay away from strong odors and sprays, such as perfume, powder, hair spray, paints, smoke incense, paint, cleaning products, candles and new carpet.   Exercise or Sports  Some people with asthma have this trigger. Be active!  Ask your doctor about taking medicine before sports or exercise to prevent symptoms.    Warm up for 5-10 minutes before and after sports or exercise.     Other Triggers of Asthma  Cold air:  Cover your nose and mouth with a scarf.  Sometimes laughing or crying can be a trigger.  Some medicines and food can trigger asthma.

## 2022-11-09 NOTE — PROGRESS NOTES
SUBJECTIVE:   CC: Champ is an 57 year old who presents for preventative health visit.       Patient has been advised of split billing requirements and indicates understanding: Yes  Healthy Habits:     Getting at least 3 servings of Calcium per day:  Yes    Bi-annual eye exam:  Yes    Dental care twice a year:  Yes    Sleep apnea or symptoms of sleep apnea:  None    Diet:  Regular (no restrictions)    Frequency of exercise:  4-5 days/week    Duration of exercise:  45-60 minutes    Taking medications regularly:  Yes    Medication side effects:  None    PHQ-2 Total Score: 0    Additional concerns today:  No              Today's PHQ-2 Score:   PHQ-2 ( 1999 Pf[employer]) 11/9/2022   Q1: Little interest or pleasure in doing things 0   Q2: Feeling down, depressed or hopeless 0   PHQ-2 Score 0   PHQ-2 Total Score (12-17 Years)- Positive if 3 or more points; Administer PHQ-A if positive -   Q1: Little interest or pleasure in doing things Not at all   Q2: Feeling down, depressed or hopeless Not at all   PHQ-2 Score 0       Abuse: Current or Past(Physical, Sexual or Emotional)- No  Do you feel safe in your environment? Yes    Have you ever done Advance Care Planning? (For example, a Health Directive, POLST, or a discussion with a medical provider or your loved ones about your wishes): No, advance care planning information given to patient to review.  Patient declined advance care planning discussion at this time.    Social History     Tobacco Use     Smoking status: Never     Smokeless tobacco: Current     Types: Chew     Tobacco comments:     occ   Substance Use Topics     Alcohol use: Yes     Comment: 6-8 beers per week     If you drink alcohol do you typically have >3 drinks per day or >7 drinks per week? Yes      Alcohol Use 11/9/2022   Prescreen: >3 drinks/day or >7 drinks/week? No   Prescreen: >3 drinks/day or >7 drinks/week? -   AUDIT SCORE  -     AUDIT - Alcohol Use Disorders Identification Test - Reproduced from the World  Health Organization Audit 2001 (Second Edition) 9/29/2020   1.  How often do you have a drink containing alcohol? 2 to 3 times a week   2.  How many drinks containing alcohol do you have on a typical day when you are drinking? 3 or 4   3.  How often do you have five or more drinks on one occasion? Monthly   4.  How often during the last year have you found that you were not able to stop drinking once you had started? Never   5.  How often during the last year have you failed to do what was normally expected of you because of drinking? Never   6.  How often during the last year have you needed a first drink in the morning to get yourself going after a heavy drinking session? Never   7.  How often during the last year have you had a feeling of guilt or remorse after drinking? Less than monthly   8.  How often during the last year have you been unable to remember what happened the night before because of your drinking? Never   9.  Have you or someone else been injured because of your drinking? No   10. Has a relative, friend, doctor or other health care worker been concerned about your drinking or suggested you cut down? No   TOTAL SCORE 7       Last PSA: No results found for: PSA    Reviewed orders with patient. Reviewed health maintenance and updated orders accordingly - Yes      Reviewed and updated as needed this visit by clinical staff   Tobacco  Allergies  Meds   Med Hx  Surg Hx  Fam Hx  Soc Hx        Reviewed and updated as needed this visit by Provider     Meds                 Review of Systems   Constitutional: Negative for chills and fever.   HENT: Positive for congestion. Negative for ear pain, hearing loss and sore throat.    Eyes: Negative for pain and visual disturbance.   Respiratory: Positive for cough. Negative for shortness of breath.    Cardiovascular: Positive for chest pain. Negative for palpitations and peripheral edema.        1 episode of midline chest pain in the midst of COVID while  "feeling short winded, duration brief.  Discussed   Gastrointestinal: Positive for hematochezia. Negative for abdominal pain, constipation, diarrhea, heartburn and nausea.   Genitourinary: Negative for dysuria, frequency, genital sores, hematuria and urgency.   Musculoskeletal: Positive for arthralgias. Negative for joint swelling and myalgias.   Skin: Negative for rash.   Neurological: Negative for dizziness, weakness, headaches and paresthesias.        Tremor well controlled   Psychiatric/Behavioral: Negative for mood changes. The patient is not nervous/anxious.    Except for right hip pain, all of these problems was longstanding and stable.       OBJECTIVE:   /72 (BP Location: Right arm, Patient Position: Sitting, Cuff Size: Adult Regular)   Pulse 69   Temp 98.1  F (36.7  C) (Oral)   Resp 12   Ht 1.803 m (5' 11\")   Wt 94 kg (207 lb 3.2 oz)   SpO2 99%   BMI 28.90 kg/m      Physical Exam  Constitutional:       Appearance: Normal appearance.   HENT:      Head: Atraumatic.      Right Ear: Tympanic membrane normal.      Left Ear: Tympanic membrane normal.      Nose: Nose normal.      Mouth/Throat:      Mouth: Mucous membranes are moist.   Eyes:      Pupils: Pupils are equal, round, and reactive to light.   Cardiovascular:      Rate and Rhythm: Normal rate and regular rhythm.      Heart sounds: Normal heart sounds.   Pulmonary:      Effort: Pulmonary effort is normal.      Breath sounds: Normal breath sounds.   Abdominal:      General: Abdomen is flat. Bowel sounds are normal.   Musculoskeletal:      Cervical back: Neck supple.      Comments: Intermittent right buttocks pain.  Exam is unrevealing, no evidence of neurotension nor muscular pain to palpation and manipulation.  Refer   Skin:     General: Skin is warm and dry.   Neurological:      General: No focal deficit present.      Mental Status: He is alert.      Comments: No tremor   Psychiatric:         Mood and Affect: Mood normal.       Chest x-ray " "reassuring    ASSESSMENT/PLAN:     Problem List Items Addressed This Visit     Anxiety     Stable benzodiazepine use.  Occasional \"panic attacks\" in the past, associated with COVID.  No changes today.  Update control controlled substance contract testing             Relevant Medications    ALPRAZolam (XANAX) 1 MG tablet    hydrOXYzine (ATARAX) 25 MG tablet    Other Relevant Orders    [id number] - Urine Drug Confirmation Panel (Comprehensive)    Benign prostatic hyperplasia with nocturia     Treatments effective.  Continue         Relevant Medications    tamsulosin (FLOMAX) 0.4 MG capsule    Controlled substance agreement signed     Anew         Relevant Orders    [id number] - Urine Drug Confirmation Panel (Comprehensive)    Encounter for immunization     Offered         Relevant Orders    INFLUENZA QUAD, RECOMBINANT, P-FREE (RIV4) (FLUBLOK) AGE 50-64 [[id number]] (Completed)    Infection due to 2019 novel coronavirus     Intermittent fatigue cough is residua.  Otherwise all symptoms resolved.  Discussed 2-month waiting for booster.  He wonders about antibiotics.  Chest x-ray is reassuring.  No indication for antibiotics discussed.  Discussed long COVID clinic         Relevant Orders    XR Chest 2 Views (Completed)    Mild persistent asthma without complication     Recent COVID infection has affected ACT.  Update in future.  He continues to improve.  He is concerned about possible need for antibiotics.  Chest x-ray is reassuring.  Exam is reassuring         Relevant Medications    Fluticasone-Umeclidin-Vilant (TRELEGY ELLIPTA) 100-62.5-25 MCG/ACT oral inhaler    fluticasone (FLOVENT HFA) 110 MCG/ACT inhaler    albuterol (PROAIR HFA/PROVENTIL HFA/VENTOLIN HFA) 108 (90 Base) MCG/ACT inhaler    fluticasone (FLONASE) 50 MCG/ACT nasal spray    ipratropium (ATROVENT) 0.06 % nasal spray    montelukast (SINGULAIR) 10 MG tablet    Other Relevant Orders    Asthma Action Plan (AAP) (Completed)    XR Chest 2 Views (Completed)    Primary " "insomnia      Continue effective therapy         Sciatica without back pain, right     Right buttocks, called his \"hip\".  Exam unrevealing.  Suspect lumbar source versus pelvic musculature.  He blames compensation for his other arthritic  Concerns.  Discussed.  Refer         Relevant Medications    ALPRAZolam (XANAX) 1 MG tablet    hydrOXYzine (ATARAX) 25 MG tablet    Other Relevant Orders    Physical Therapy Referral    Seasonal allergic rhinitis     All available therapies plus nasal saline used with good benefit but persistent problems.  Management discussed         Relevant Medications    Fluticasone-Umeclidin-Vilant (TRELEGY ELLIPTA) 100-62.5-25 MCG/ACT oral inhaler    fluticasone (FLOVENT HFA) 110 MCG/ACT inhaler    albuterol (PROAIR HFA/PROVENTIL HFA/VENTOLIN HFA) 108 (90 Base) MCG/ACT inhaler    fluticasone (FLONASE) 50 MCG/ACT nasal spray    ipratropium (ATROVENT) 0.06 % nasal spray    montelukast (SINGULAIR) 10 MG tablet    Special screening for malignant neoplasm of prostate     Treated for BPH.  He has reached an age for screening.         Relevant Orders    PSA, screen   Other Visit Diagnoses     Encounter for long-term (current) use of medications    -  Primary          Patient has been advised of split billing requirements and indicates understanding: Yes      COUNSELING:       Estimated body mass index is 28.9 kg/m  as calculated from the following:    Height as of this encounter: 1.803 m (5' 11\").    Weight as of this encounter: 94 kg (207 lb 3.2 oz).     Weight management plan: Maintain    He reports that he has never smoked. His smokeless tobacco use includes chew.  Nicotine/Tobacco Cessation Plan:         Counseling Resources:  ATP IV Guidelines  Pooled Cohorts Equation Calculator  FRAX Risk Assessment  ICSI Preventive Guidelines  Dietary Guidelines for Americans, 2010  USDA's MyPlate  ASA Prophylaxis  Lung CA Screening    Lyndon Stoll MD  [facility]"

## 2022-11-10 LAB
CREAT UR-MCNC: 11 MG/DL
PSA SERPL-MCNC: 2.09 UG/L (ref 0–4)

## 2022-11-10 PROCEDURE — 36415 COLL VENOUS BLD VENIPUNCTURE: CPT | Performed by: FAMILY MEDICINE

## 2022-11-10 PROCEDURE — G0103 PSA SCREENING: HCPCS | Performed by: FAMILY MEDICINE

## 2022-11-10 PROCEDURE — 80307 DRUG TEST PRSMV CHEM ANLYZR: CPT | Performed by: FAMILY MEDICINE

## 2022-11-10 NOTE — ASSESSMENT & PLAN NOTE
Intermittent fatigue cough is residua.  Otherwise all symptoms resolved.  Discussed 2-month waiting for booster.  He wonders about antibiotics.  Chest x-ray is reassuring.  No indication for antibiotics discussed.  Discussed long [facility] clinic

## 2022-11-10 NOTE — ASSESSMENT & PLAN NOTE
Recent COVID infection has affected ACT.  Update in future.  He continues to improve.  He is concerned about possible need for antibiotics.  Chest x-ray is reassuring.  Exam is reassuring

## 2022-11-10 NOTE — ASSESSMENT & PLAN NOTE
"Right buttocks, called his \"hip\".  Exam unrevealing.  Suspect lumbar source versus pelvic musculature.  He blames compensation for his other arthritic  Concerns.  Discussed.  Refer  "

## 2022-11-10 NOTE — ASSESSMENT & PLAN NOTE
"Stable benzodiazepine use.  Occasional \"panic attacks\" in the past, associated with COVID.  No changes today.  Update control controlled substance contract testing      "

## 2022-11-10 NOTE — ASSESSMENT & PLAN NOTE
All available therapies plus nasal saline used with good benefit but persistent problems.  Management discussed

## 2022-11-21 ENCOUNTER — THERAPY VISIT (OUTPATIENT)
Dept: PHYSICAL THERAPY | Facility: CLINIC | Age: 57
End: 2022-11-21
Attending: FAMILY MEDICINE
Payer: COMMERCIAL

## 2022-11-21 DIAGNOSIS — M54.31 SCIATICA WITHOUT BACK PAIN, RIGHT: ICD-10-CM

## 2022-11-21 DIAGNOSIS — M76.31 ILIOTIBIAL BAND SYNDROME, RIGHT: Primary | ICD-10-CM

## 2022-11-21 DIAGNOSIS — G57.01 LESION OF SCIATIC NERVE, RIGHT: ICD-10-CM

## 2022-11-21 PROCEDURE — 97110 THERAPEUTIC EXERCISES: CPT | Mod: GP | Performed by: PHYSICAL THERAPIST

## 2022-11-21 PROCEDURE — 97161 PT EVAL LOW COMPLEX 20 MIN: CPT | Mod: GP | Performed by: PHYSICAL THERAPIST

## 2022-11-21 NOTE — PROGRESS NOTES
Physical Therapy Initial Evaluation  Subjective:  The history is provided by the patient.   Patient Health History  Venkata Lynn being seen for back pain.     Problem began: 5/21/2022.   Problem occurred: overuse   Pain is reported as 1/10 on pain scale.  General health as reported by patient is good.     Red flags:  Pain at rest/night (wakes up sometimes with rt sided hip pain).     Surgeries include:  Orthopedic surgery (knee, shoulder).    Current medications:  Sleep medication.    Current occupation is Construction.   Primary job tasks include:  Lifting/carrying, driving and repetitive tasks.                  Therapist Generated HPI Evaluation  Problem details: Main concern is right sided hip and buttock pain.  This started to get worse over the past 6 months.  Worse with sitting, driving greater than 45 minutes.  Denies any  Lower back pain, weakness.  Cont to work on the core, back exercises he learned in PT this summer. .         Type of problem:  Lumbar.    This is a recurrent condition.  Condition occurred with:  Insidious onset and repetition/overuse.  Where condition occurred: at home.    Pain is described as aching and shooting and is intermittent.  Pain radiates to:  Gluteals right and thigh right. Pain is worse during the day, worse in the P.M. and worse during the night.  Since onset symptoms are gradually worsening.  Associated symptoms:  Loss of motion/stiffness. Symptoms are exacerbated by certain positions and sitting  and relieved by rest and activity/movement.      Restrictions due to condition include:  Working in normal job without restrictions.  Barriers include:  None as reported by patient.                        Objective:  System         Lumbar/SI Evaluation  ROM:  Arom wnl lumbar: reports mild rt sided LBP with arom, but not his sxs.    AROM Lumbar:   Flexion:          100  Ext:                    50   Side Bend:        Left:  75    Right:  75  Rotation:           Left:     Right:    Side Glide:        Left:     Right:           Lumbar Myotomes:  normal            Lumbar DTR's:  not assessed      Cord Signs:  not assessed    Lumbar Dermtomes:  not assessed                Neural Tension/Mobility:  Neural tension wnl lumbar: slight + rt sided LBP with PNB.     Left side:  Femoral Nerve positive.  Left side:SLR  negative.   Right side:   Femoral Nerve positive.  Right side:   SLR w/DF or SLR  negative.   Lumbar Palpation:      Tenderness present at Right: Erector Spinae; Piriformis; PSIS and Iliac Crest  Functional Tests:  Core strength and proprioception lumbar: B, SL bridge pain-free.            SI joint/Sacrum:    Rt iliac crest, PSIS, ASIS elevated, standing and supine.  Left l/e longer supine.                                              Hip Evaluation  Hip PROM:    Flexion: Left: 132   Right: 135        Internal Rotation: Left: 20    Right: 25  External Rotation: Left: 70    Right: 35                Hip Special Testing:       Right hip positive for the following special tests:  Ham and Femoral NerveRight hip negative for the following special tests:  SLR    Hip Palpation:  Palpations normal left hip: see lumbar.                 General     ROS    Assessment/Plan:    Patient is a 57 year old male with lumbar and right side hip complaints.    Patient has the following significant findings with corresponding treatment plan.                Diagnosis 1:  Right sided lumbar ext dysfunction, lateral hip pain  Pain -  manual therapy  Decreased ROM/flexibility - manual therapy, therapeutic exercise, therapeutic activity and home program  Decreased joint mobility - manual therapy, therapeutic exercise, therapeutic activity and home program  Decreased proprioception - neuro re-education, therapeutic activities and home program  Decreased function - therapeutic activities and home program    Therapy Evaluation Codes:   1) Clinical presentation characteristics  are:   Stable/Uncomplicated.  2) Decision-Making    Low complexity using standardized patient assessment instrument and/or measureable assessment of functional outcome.  Cumulative Therapy Evaluation is: Low complexity.    Previous and current functional limitations:  (See Goal Flow Sheet for this information)    Short term and Long term goals: (See Goal Flow Sheet for this information)     Communication ability:  Patient appears to be able to clearly communicate and understand verbal and written communication and follow directions correctly.  Treatment Explanation - The following has been discussed with the patient:   RX ordered/plan of care  Anticipated outcomes  Possible risks and side effects  This patient would benefit from PT intervention to resume normal activities.   Rehab potential is good.    Frequency:  1 X week for two weeks, once daily  Duration:  Then 1x/eo weekfor 6 weeks  Discharge Plan:  Achieve all LTG.  Independent in home treatment program.  Reach maximal therapeutic benefit.    Please refer to the daily flowsheet for treatment today, total treatment time and time spent performing 1:1 timed codes.

## 2022-12-12 ENCOUNTER — THERAPY VISIT (OUTPATIENT)
Dept: PHYSICAL THERAPY | Facility: CLINIC | Age: 57
End: 2022-12-12
Payer: COMMERCIAL

## 2022-12-12 DIAGNOSIS — G57.01 LESION OF SCIATIC NERVE, RIGHT: Primary | ICD-10-CM

## 2022-12-12 DIAGNOSIS — M25.551 HIP PAIN, RIGHT: ICD-10-CM

## 2022-12-12 PROCEDURE — 97140 MANUAL THERAPY 1/> REGIONS: CPT | Mod: GP | Performed by: PHYSICAL THERAPIST

## 2022-12-12 PROCEDURE — 97110 THERAPEUTIC EXERCISES: CPT | Mod: GP | Performed by: PHYSICAL THERAPIST

## 2022-12-26 ENCOUNTER — THERAPY VISIT (OUTPATIENT)
Dept: PHYSICAL THERAPY | Facility: CLINIC | Age: 57
End: 2022-12-26
Payer: COMMERCIAL

## 2022-12-26 DIAGNOSIS — G57.01 LESION OF SCIATIC NERVE, RIGHT: Primary | ICD-10-CM

## 2022-12-26 PROCEDURE — 97110 THERAPEUTIC EXERCISES: CPT | Mod: GP | Performed by: PHYSICAL THERAPIST

## 2022-12-26 PROCEDURE — 97140 MANUAL THERAPY 1/> REGIONS: CPT | Mod: GP | Performed by: PHYSICAL THERAPIST

## 2022-12-26 PROCEDURE — 97112 NEUROMUSCULAR REEDUCATION: CPT | Mod: GP | Performed by: PHYSICAL THERAPIST

## 2023-01-09 ENCOUNTER — THERAPY VISIT (OUTPATIENT)
Dept: PHYSICAL THERAPY | Facility: CLINIC | Age: 58
End: 2023-01-09
Payer: COMMERCIAL

## 2023-01-09 ENCOUNTER — ALLIED HEALTH/NURSE VISIT (OUTPATIENT)
Dept: FAMILY MEDICINE | Facility: CLINIC | Age: 58
End: 2023-01-09
Payer: COMMERCIAL

## 2023-01-09 VITALS — SYSTOLIC BLOOD PRESSURE: 112 MMHG | DIASTOLIC BLOOD PRESSURE: 74 MMHG

## 2023-01-09 DIAGNOSIS — G57.01 LESION OF SCIATIC NERVE, RIGHT: Primary | ICD-10-CM

## 2023-01-09 DIAGNOSIS — Z01.30 BP CHECK: Primary | ICD-10-CM

## 2023-01-09 PROCEDURE — 97110 THERAPEUTIC EXERCISES: CPT | Mod: GP | Performed by: PHYSICAL THERAPIST

## 2023-01-09 PROCEDURE — 99207 PR NO CHARGE NURSE ONLY: CPT | Performed by: FAMILY MEDICINE

## 2023-01-09 PROCEDURE — 97112 NEUROMUSCULAR REEDUCATION: CPT | Mod: GP | Performed by: PHYSICAL THERAPIST

## 2023-01-09 NOTE — PROGRESS NOTES
Venkata Lynn was evaluated at [facility] on January 9, 2023 at which time his blood pressure was:    BP Readings from Last 3 Encounters:   01/09/23 112/74   11/09/22 104/72   12/27/21 131/83     Pulse Readings from Last 3 Encounters:   11/09/22 69   12/27/21 78   09/22/21 72       Reviewed lifestyle modifications for blood pressure control and reduction: including making healthy food choices, managing weight, getting regular exercise, smoking cessation, reducing alcohol consumption, monitoring blood pressure regularly.     Symptoms: None    BP Goal:< 140/90 mmHg    BP Assessment:  BP at goal    Potential Reasons for BP too high: NA - Not applicable    BP Follow-Up Plan: Recheck BP in 6 months at pharmacy    Recommendation to Provider: n/a    Note completed by:   Thank you,   Mikayla Henley, PharmD  [facility] [place]

## 2023-01-23 ENCOUNTER — THERAPY VISIT (OUTPATIENT)
Dept: PHYSICAL THERAPY | Facility: CLINIC | Age: 58
End: 2023-01-23
Payer: COMMERCIAL

## 2023-01-23 DIAGNOSIS — G57.01 LESION OF SCIATIC NERVE, RIGHT: Primary | ICD-10-CM

## 2023-01-23 DIAGNOSIS — G25.0 FAMILIAL TREMOR: ICD-10-CM

## 2023-01-23 DIAGNOSIS — M25.551 HIP PAIN, RIGHT: ICD-10-CM

## 2023-01-23 PROCEDURE — 97530 THERAPEUTIC ACTIVITIES: CPT | Mod: GP | Performed by: PHYSICAL THERAPIST

## 2023-01-23 PROCEDURE — 97110 THERAPEUTIC EXERCISES: CPT | Mod: GP | Performed by: PHYSICAL THERAPIST

## 2023-01-23 PROCEDURE — 97112 NEUROMUSCULAR REEDUCATION: CPT | Mod: GP | Performed by: PHYSICAL THERAPIST

## 2023-01-24 RX ORDER — PROPRANOLOL HYDROCHLORIDE 80 MG/1
CAPSULE, EXTENDED RELEASE ORAL
Qty: 90 CAPSULE | Refills: 3 | Status: SHIPPED | OUTPATIENT
Start: 2023-01-24 | End: 2023-11-21

## 2023-01-24 NOTE — TELEPHONE ENCOUNTER
Prescription approved per [facility] Refill Protocol.    Tanika DIEZ RN, BSN, PHN - PAL (patient advocate liaison)  [facility]  [phone]

## 2023-02-16 ENCOUNTER — TRANSFERRED RECORDS (OUTPATIENT)
Dept: HEALTH INFORMATION MANAGEMENT | Facility: CLINIC | Age: 58
End: 2023-02-16

## 2023-02-28 PROBLEM — G57.01 LESION OF SCIATIC NERVE, RIGHT: Status: RESOLVED | Noted: 2022-11-21 | Resolved: 2023-02-28

## 2023-02-28 NOTE — PROGRESS NOTES
Discharge Note    Progress reporting period is from initial evaluation date (please see noted date below) to Jan 23, 2023.  Linked Episodes   Type: Episode: Status: Noted: Resolved: Last update: Updated by:   PHYSICAL THERAPY [id number] Active 11/21/2022 1/23/2023 11:18 AM Finn Britt PT      Comments:       Please see information below for last relevant information on current status.  Patient seen for 5 visits.    SUBJECTIVE  Subjective changes noted by patient:  Doing well.  occassional lateral hip pain.  no problems driving, sitting  .  Current pain level is  .     Previous pain level was  1/10.   Changes in function:  Yes (See Goal flowsheet attached for changes in current functional level)  Adverse reaction to treatment or activity: None    OBJECTIVE  Changes noted in objective findings: See PTRx. started balance, SLS EO > 30 sec rt and lt.  less than 3-5 sec EO.  Trial of partial lunge but added single leg squat, 45 degrees, B u/e support  LX arom wnls.     ASSESSMENT/PLAN  Diagnosis: right hip, ITB, LB   Updated problem list and treatment plan:   Decreased ROM/flexibility - HEP  Decreased function - HEP  Decreased strength - HEP  STG/LTGs have been met or progress has been made towards goals:  Yes, please see goal flowsheet for most current information  Assessment of Progress: current status is unknown.    Last current status:     Self Management Plans:  HEP  I have re-evaluated this patient and find that the nature, scope, duration and intensity of the therapy is appropriate for the medical condition of the patient.  Venkata continues to require the following intervention to meet STG and LTG's:  HEP.    Recommendations:  Discharge with current home program.  Patient to follow up with MD as needed.    Please refer to the daily flowsheet for treatment today, total treatment time and time spent performing 1:1 timed codes.

## 2023-06-08 DIAGNOSIS — F41.9 ANXIETY: ICD-10-CM

## 2023-06-09 RX ORDER — ALPRAZOLAM 1 MG
TABLET ORAL
Qty: 30 TABLET | Refills: 0 | Status: SHIPPED | OUTPATIENT
Start: 2023-06-09 | End: 2023-07-20

## 2023-07-12 ENCOUNTER — ALLIED HEALTH/NURSE VISIT (OUTPATIENT)
Dept: FAMILY MEDICINE | Facility: CLINIC | Age: 58
End: 2023-07-12
Payer: COMMERCIAL

## 2023-07-12 VITALS — DIASTOLIC BLOOD PRESSURE: 82 MMHG | SYSTOLIC BLOOD PRESSURE: 128 MMHG

## 2023-07-12 DIAGNOSIS — Z01.30 BP CHECK: Primary | ICD-10-CM

## 2023-07-12 PROCEDURE — 99207 PR NO CHARGE NURSE ONLY: CPT | Performed by: FAMILY MEDICINE

## 2023-07-12 NOTE — PROGRESS NOTES
Venkata Lynn was evaluated at [facility] on July 12, 2023 at which time his blood pressure was:    BP Readings from Last 3 Encounters:   07/12/23 128/82   01/09/23 112/74   11/09/22 104/72     Pulse Readings from Last 3 Encounters:   11/09/22 69   12/27/21 78   09/22/21 72       Reviewed lifestyle modifications for blood pressure control and reduction: including making healthy food choices, managing weight, getting regular exercise, smoking cessation, reducing alcohol consumption, monitoring blood pressure regularly.     Symptoms: None    BP Goal:< 140/90 mmHg    BP Assessment:  BP at goal    Potential Reasons for BP too high: NA - Not applicable    BP Follow-Up Plan: Recheck BP in 6 months at pharmacy    Recommendation to Provider: n/a    Note completed by:   Thank you,   Mikayla Henley, PharmD  [facility] [place]

## 2023-07-20 DIAGNOSIS — F41.9 ANXIETY: ICD-10-CM

## 2023-07-20 RX ORDER — ALPRAZOLAM 1 MG
TABLET ORAL
Qty: 30 TABLET | Refills: 0 | Status: SHIPPED | OUTPATIENT
Start: 2023-07-20 | End: 2023-07-24

## 2023-07-24 ENCOUNTER — OFFICE VISIT (OUTPATIENT)
Dept: FAMILY MEDICINE | Facility: CLINIC | Age: 58
End: 2023-07-24
Payer: COMMERCIAL

## 2023-07-24 VITALS
DIASTOLIC BLOOD PRESSURE: 74 MMHG | BODY MASS INDEX: 28.56 KG/M2 | SYSTOLIC BLOOD PRESSURE: 111 MMHG | HEART RATE: 62 BPM | RESPIRATION RATE: 23 BRPM | WEIGHT: 204 LBS | OXYGEN SATURATION: 97 % | HEIGHT: 71 IN | TEMPERATURE: 97.8 F

## 2023-07-24 DIAGNOSIS — J45.30 MILD PERSISTENT ASTHMA WITHOUT COMPLICATION: Primary | ICD-10-CM

## 2023-07-24 DIAGNOSIS — M65.30 TRIGGER FINGER, ACQUIRED: ICD-10-CM

## 2023-07-24 DIAGNOSIS — K92.1 HEMATOCHEZIA: ICD-10-CM

## 2023-07-24 DIAGNOSIS — F51.01 PRIMARY INSOMNIA: ICD-10-CM

## 2023-07-24 DIAGNOSIS — F41.9 ANXIETY: ICD-10-CM

## 2023-07-24 DIAGNOSIS — Z23 ENCOUNTER FOR IMMUNIZATION: ICD-10-CM

## 2023-07-24 PROCEDURE — 90471 IMMUNIZATION ADMIN: CPT | Performed by: FAMILY MEDICINE

## 2023-07-24 PROCEDURE — G0480 DRUG TEST DEF 1-7 CLASSES: HCPCS | Performed by: FAMILY MEDICINE

## 2023-07-24 PROCEDURE — 90746 HEPB VACCINE 3 DOSE ADULT IM: CPT | Performed by: FAMILY MEDICINE

## 2023-07-24 PROCEDURE — 99214 OFFICE O/P EST MOD 30 MIN: CPT | Mod: 25 | Performed by: FAMILY MEDICINE

## 2023-07-24 PROCEDURE — 2894A URINE DRUG CONFIRMATION PANEL: CPT | Performed by: FAMILY MEDICINE

## 2023-07-24 RX ORDER — ZOLPIDEM TARTRATE 10 MG/1
10 TABLET ORAL
Qty: 90 TABLET | Refills: 1 | Status: SHIPPED | OUTPATIENT
Start: 2023-07-24 | End: 2024-03-26

## 2023-07-24 RX ORDER — ALPRAZOLAM 1 MG
1 TABLET ORAL DAILY PRN
Qty: 30 TABLET | Refills: 5 | Status: SHIPPED | OUTPATIENT
Start: 2023-07-24 | End: 2024-03-04

## 2023-07-24 ASSESSMENT — ANXIETY QUESTIONNAIRES
7. FEELING AFRAID AS IF SOMETHING AWFUL MIGHT HAPPEN: NOT AT ALL
IF YOU CHECKED OFF ANY PROBLEMS ON THIS QUESTIONNAIRE, HOW DIFFICULT HAVE THESE PROBLEMS MADE IT FOR YOU TO DO YOUR WORK, TAKE CARE OF THINGS AT HOME, OR GET ALONG WITH OTHER PEOPLE: SOMEWHAT DIFFICULT
4. TROUBLE RELAXING: SEVERAL DAYS
3. WORRYING TOO MUCH ABOUT DIFFERENT THINGS: SEVERAL DAYS
GAD7 TOTAL SCORE: 4
GAD7 TOTAL SCORE: 4
2. NOT BEING ABLE TO STOP OR CONTROL WORRYING: SEVERAL DAYS
6. BECOMING EASILY ANNOYED OR IRRITABLE: NOT AT ALL
1. FEELING NERVOUS, ANXIOUS, OR ON EDGE: SEVERAL DAYS
5. BEING SO RESTLESS THAT IT IS HARD TO SIT STILL: NOT AT ALL

## 2023-07-24 ASSESSMENT — ASTHMA QUESTIONNAIRES: ACT_TOTALSCORE: 21

## 2023-07-24 NOTE — PROGRESS NOTES
"  Assessment & Plan   Problem List Items Addressed This Visit       Anxiety     He believes his anxiety is mostly related to work and interpersonal interactions.  He also believes hhis anxiety is well controlled.  No changes         Relevant Medications    ALPRAZolam (XANAX) 1 MG tablet    Other Relevant Orders    Drug Confirmation Panel Urine with Creat - lab collect    Encounter for immunization     Discussed rationale, offered         Hematochezia     He has episodes of painless bright red blood per rectum.  These are likely hemorrhoids, although not described on his remote colonoscopy.  Colonoscopy is due 6 months hence.  Discussed.  At this time he would like to pursue evaluation, banding, and deferfor his colonoscopy until his slow season.  We shall refer         Relevant Orders    Adult Colorectal Surgery  Referral    Mild persistent asthma without complication - Primary     ACT is good, he does have occasional symptoms well managed with current regimen.  Chest is clear no change         Primary insomnia     Continue current effective therapy         Relevant Medications    zolpidem (AMBIEN) 10 MG tablet    Trigger finger, acquired     He finds his middle finger left middle 2 or 3 fingers right trigger hampering his work.  Unable to demonstrate phenomena today.  Discussed therapeutic interventions.  He will initiate directed massage, we shall refer         Relevant Orders    Orthopedic  Referral                 BMI:   Estimated body mass index is 28.45 kg/m  as calculated from the following:    Height as of this encounter: 1.803 m (5' 11\").    Weight as of this encounter: 92.5 kg (204 lb).   Weight management plan: Maintain        Lyndon Stoll MD  [facility]    Med Oakes is a 58 year old, presenting for the following health issues:  Recheck Medication        7/24/2023     4:21 PM   Additional Questions   Roomed by Mary REYNOLDS CMA          Asthma " "Follow-Up    Was ACT completed today?  Yes        11/9/2022     4:55 PM   ACT Total Scores   ACT TOTAL SCORE (Goal Greater than or Equal to 20) 20   In the past 12 months, how many times did you visit the emergency room for your asthma without being admitted to the hospital? 0   In the past 12 months, how many times were you hospitalized overnight because of your asthma? 0        How many days per week do you miss taking your asthma controller medication?  1  Please describe any recent triggers for your asthma: smoke and pollens  Have you had any Emergency Room Visits, Urgent Care Visits, or Hospital Admissions since your last office visit?  No        Review of Systems   As described      Objective    /74 (BP Location: Right arm, Patient Position: Sitting, Cuff Size: Adult Regular)   Pulse 62   Temp 97.8  F (36.6  C) (Oral)   Resp 23   Ht 1.803 m (5' 11\")   Wt 92.5 kg (204 lb)   SpO2 97%   BMI 28.45 kg/m    Body mass index is 28.45 kg/m .  Physical Exam   As described        Lyndon Stoll MD              Answers submitted by the patient for this visit:  CARMEN-7 (Submitted on 7/24/2023)  CARMEN 7 TOTAL SCORE: 4  Depression / Anxiety Questionnaire (Submitted on 7/24/2023)  Chief Complaint: Chronic problems general questions HPI Form  Depression/Anxiety: Anxiety  Asthma Visit Questionnaire (Submitted on 7/24/2023)  Chief Complaint: Chronic problems general questions HPI Form  Do you have a cough?: Yes  Are you experiencing any wheezing in your chest?: Yes  Do you have any shortness of breath?: Yes  Use of rescue inhaler:: a few times a week  Taking Asthma medication as prescribed:: 0  Asthma triggers:: dust mites, humidity, mold, pollens, strong odors and fumes, upper respiratory infections  Have you had any Emergency Room visits, Urgent Care visits, or Hospital Admissions since your last office visit?: No  Anxiety only (Submitted on 7/24/2023)  Chief Complaint: Chronic problems general questions HPI " Form  Anxiety since last: : medium  Other associated symotome: : No  Significant life event: : job concerns  Anxious:: Yes  Current substance use:: No  General Questionnaire (Submitted on 7/24/2023)  Chief Complaint: Chronic problems general questions HPI Form  How many servings of fruits and vegetables do you eat daily?: 2-3  On average, how many sweetened beverages do you drink each day (Examples: soda, juice, sweet tea, etc.  Do NOT count diet or artificially sweetened beverages)?: 0  How many minutes a day do you exercise enough to make your heart beat faster?: 30 to 60  How many days a week do you exercise enough to make your heart beat faster?: 5  How many days per week do you miss taking your medication?: 1

## 2023-07-25 LAB — CREAT UR-MCNC: 35 MG/DL

## 2023-07-25 NOTE — ASSESSMENT & PLAN NOTE
ACT is good, he does have occasional symptoms well managed with current regimen.  Chest is clear no change

## 2023-07-25 NOTE — ASSESSMENT & PLAN NOTE
He finds his middle finger left middle 2 or 3 fingers right trigger hampering his work.  Unable to demonstrate phenomena today.  Discussed therapeutic interventions.  He will initiate directed massage, we shall refer

## 2023-07-25 NOTE — ASSESSMENT & PLAN NOTE
He has episodes of painless bright red blood per rectum.  These are likely hemorrhoids, although not described on his remote colonoscopy.  Colonoscopy is due 6 months hence.  Discussed.  At this time he would like to pursue evaluation, banding, and deferfor his colonoscopy until his slow season.  We shall refer

## 2023-07-25 NOTE — ASSESSMENT & PLAN NOTE
He believes his anxiety is mostly related to work and interpersonal interactions.  He also believes hhis anxiety is well controlled.  No changes

## 2023-07-27 LAB — A-OH ALPRAZ UR QL CFM: PRESENT

## 2023-07-28 NOTE — TELEPHONE ENCOUNTER
Diagnosis, Referred by & from: Hemorrhoids   Appt date: 10/11/2023   NOTES STATUS DETAILS   OFFICE NOTE from referring provider Internal [facility]:  7/24/23 - [facility] OV with Dr. Stoll   OFFICE NOTE from other specialist N/A    DISCHARGE SUMMARY from hospital N/A    DISCHARGE REPORT from the ER N/A    OPERATIVE REPORT N/A    MEDICATION LIST Internal    LABS     ANAL PAP/CEA N/A    BIOPSIES/PATHOLOGY RELATED TO DIAGNOSIS Internal [facility]:  1/15/14 - Colon Biopsy (Case: )   DIAGNOSTIC PROCEDURES     PFC TESTING (from the Pelvic floor center includes Manometry, PDNL, EMG, etc.) N/A    COLONOSCOPY Internal [facility]:  1/15/14 - Colonoscopy   UPPER ENDOSCOPY (EGD) Internal [facility]:  9/20/19 - EGD   FLEX SIGMOIDOSCOPY N/A    ERCP N/A    IMAGING (DISC & REPORT)      CT N/A    MRI N/A    XRAY N/A    ULTRASOUND  (ENDOANAL/ENDORECTAL) N/A

## 2023-08-22 ENCOUNTER — OFFICE VISIT (OUTPATIENT)
Dept: ORTHOPEDICS | Facility: CLINIC | Age: 58
End: 2023-08-22
Attending: FAMILY MEDICINE
Payer: COMMERCIAL

## 2023-08-22 VITALS
WEIGHT: 205.6 LBS | SYSTOLIC BLOOD PRESSURE: 88 MMHG | DIASTOLIC BLOOD PRESSURE: 66 MMHG | BODY MASS INDEX: 28.78 KG/M2 | HEIGHT: 71 IN

## 2023-08-22 DIAGNOSIS — M65.30 TRIGGER FINGER, ACQUIRED: ICD-10-CM

## 2023-08-22 PROCEDURE — 20550 NJX 1 TENDON SHEATH/LIGAMENT: CPT | Mod: F7 | Performed by: STUDENT IN AN ORGANIZED HEALTH CARE EDUCATION/TRAINING PROGRAM

## 2023-08-22 PROCEDURE — 20550 NJX 1 TENDON SHEATH/LIGAMENT: CPT | Mod: F3 | Performed by: STUDENT IN AN ORGANIZED HEALTH CARE EDUCATION/TRAINING PROGRAM

## 2023-08-22 PROCEDURE — 99204 OFFICE O/P NEW MOD 45 MIN: CPT | Mod: 25 | Performed by: STUDENT IN AN ORGANIZED HEALTH CARE EDUCATION/TRAINING PROGRAM

## 2023-08-22 RX ORDER — TESTOSTERONE CYPIONATE 200 MG/ML
1 INJECTION INTRAMUSCULAR
Status: SHIPPED | OUTPATIENT
Start: 2023-08-22

## 2023-08-22 RX ORDER — LIDOCAINE HYDROCHLORIDE 10 MG/ML
1 INJECTION, SOLUTION INFILTRATION; PERINEURAL
Status: SHIPPED | OUTPATIENT
Start: 2023-08-22

## 2023-08-22 RX ADMIN — LIDOCAINE HYDROCHLORIDE 1 ML: 10 INJECTION, SOLUTION INFILTRATION; PERINEURAL at 11:36

## 2023-08-22 RX ADMIN — TESTOSTERONE CYPIONATE 1 ML: 200 INJECTION INTRAMUSCULAR at 11:37

## 2023-08-22 RX ADMIN — LIDOCAINE HYDROCHLORIDE 1 ML: 10 INJECTION, SOLUTION INFILTRATION; PERINEURAL at 11:37

## 2023-08-22 RX ADMIN — TESTOSTERONE CYPIONATE 1 ML: 200 INJECTION INTRAMUSCULAR at 11:36

## 2023-08-22 NOTE — LETTER
8/22/2023         RE: Venkata Lynn  [address] [address] [id number]        Dear Colleague,    Thank you for referring your patient, Venkata Lynn, to the [facility] [place]. Please see a copy of my visit note below.    Orthopaedic Surgery Hand and Upper Extremity Clinic H&P Note:  Date: Aug 22, 2023    Patient Name: Venkata Lynn  MRN: [id number]    Consult requested by: Lyndon Stoll    CHIEF COMPLAINT: trigger finger, left ring finger    Dominant Hand:right  Occupation: construction      HPI:  Mr. Venkata Lynn is a 58 year old male right hand dominant who presents with trigger finger of left ring finger. This has been ongoing for a couple of years. He describes intermittent locking that can take hours to fully extend finger. Right middle finger also occasionally triggers but not nearly as bad as the left. He also notes history of arthritis in bilateral hands.       PMH  Diabetes: no  Thyroid Problems: no  Smoking: no      PAST MEDICAL HISTORY:  Past Medical History:   Diagnosis Date     Acne vulgaris      Anxiety 3/28/2011     Anxiety disorder      Arthritis     both knees     CARDIOVASCULAR SCREENING; LDL GOAL LESS THAN 160 10/31/2010     Controlled substance agreement signed 1/11/2016     Coughing     at night     Dyspepsia 8/16/2016     Elevated blood pressure reading without diagnosis of hypertension 1/11/2016     Episodic tension-type headache, not intractable 3/8/2021     Familial tremor 8/16/2016     Family history of rhinophyma 1/18/2012     Hyperlipidemia LDL goal <160 10/31/2010    The 10-year ASCVD risk score (Lex EISENBERG Jr., et al., 2013) is: 9.5%   Values used to calculate the score:     Age: 56 years     Sex: Male     Is Non- : No     Diabetic: No     Tobacco smoker: Yes     Systolic Blood Pressure: 116 mmHg     Is BP treated: No     HDL Cholesterol: 58 mg/dL     Total Cholesterol: 252 mg/dL      Infection due to 2019  novel coronavirus 11/9/2022     Insomnia      Intermittent asthma 3/28/2011     Knee pain 1/18/2012     Lateral epicondylitis of left elbow 9/14/2018     LBP (low back pain) 1/18/2012     Low back pains      Mild persistent asthma without complication 8/16/2016     Nocturnal leg cramps 6/15/2020     Rhinitis 1/18/2012     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     Right-sided low back pain with right-sided sciatica 10/8/2020     Rosacea 1/18/2012     Rotator cuff syndrome, right 2/12/2019     Seasonal allergic rhinitis 5/2/2022     Sebaceous hyperplasia 1/18/2012     Seborrheic keratosis 7/14/2021     Shortness of breath     due to asthma     Snoring 9/29/2020     Tobacco abuse 1/18/2012     Trigger finger, acquired 11/22/2019       PAST SURGICAL HISTORY:  Past Surgical History:   Procedure Laterality Date     ARTHROSCOPY KNEE WITH MEDIAL MENISCECTOMY Left 2/22/2016    Procedure: ARTHROSCOPY KNEE WITH MEDIAL MENISCECTOMY;  Surgeon: Chaz Moe MD;  Location:  OR     ARTHROSCOPY SHOULDER ROTATOR CUFF REPAIR, BICEP TENODESIS REPAIR Right 12/12/2019    Procedure: Right Arthroscopic Rotator Cuff Repair, Arthroscopic Subacromial Decompression, Arthroscopic Biceps Tenodesis, Arthroscopic Distal Clavicle Excision;  Surgeon: Nils Milligan MD;  Location:  OR     ENT SURGERY      lipoma removed from neck     ESOPHAGOSCOPY, GASTROSCOPY, DUODENOSCOPY (EGD), COMBINED N/A 9/20/2019    Procedure: ESOPHAGOGASTRODUODENOSCOPY (EGD);  Surgeon: Ethan Davidson MD;  Location:  GI     HEAD & NECK SURGERY      fx root of tooth     ROTATOR CUFF REPAIR RT/LT Left 01/2017     VASECTOMY         MEDICATIONS:  Current Outpatient Medications   Medication     albuterol (PROAIR HFA/PROVENTIL HFA/VENTOLIN HFA) 108 (90 Base) MCG/ACT inhaler     ALPRAZolam (XANAX) 1 MG tablet     augmented betamethasone dipropionate (DIPROLENE-AF) 0.05 % external cream     azelaic acid (FINACIA) 15 % external gel     azelastine (ASTELIN) 0.1 % nasal  spray     Calcium Carbonate-Vit D-Min (CALCIUM 1200 PO)     doxycycline hyclate (PERIOSTAT) 20 MG tablet     fluticasone (FLONASE) 50 MCG/ACT nasal spray     fluticasone (FLOVENT HFA) 110 MCG/ACT inhaler     Fluticasone-Umeclidin-Vilant (TRELEGY ELLIPTA) 100-62.5-25 MCG/ACT oral inhaler     hydrOXYzine (ATARAX) 25 MG tablet     ipratropium (ATROVENT) 0.06 % nasal spray     metroNIDAZOLE (METROGEL) 0.75 % external gel     montelukast (SINGULAIR) 10 MG tablet     Multiple Vitamins-Minerals (MULTIVITAMIN ADULT PO)     propranolol ER (INDERAL LA) 80 MG 24 hr capsule     tamsulosin (FLOMAX) 0.4 MG capsule     vitamin  B complex with vitamin C (VITAMIN  B COMPLEX) TABS     VITAMIN D, CHOLECALCIFEROL, PO     zolpidem (AMBIEN) 10 MG tablet     No current facility-administered medications for this visit.       ALLERGIES:     Allergies   Allergen Reactions     Desvenlafaxine Unknown     Pcn [Penicillins] Hives and Rash     Amoxicillin       FAMILY HISTORY:  No pertinent family history    SOCIAL HISTORY:  Social History     Tobacco Use     Smoking status: Never     Smokeless tobacco: Current     Types: Chew     Tobacco comments:     occ   Vaping Use     Vaping Use: Never used   Substance Use Topics     Alcohol use: Yes     Comment: 6-8 beers per week     Drug use: No       The patient's past medical, family, and social history was reviewed and confirmed.    REVIEW OF SYMPTOMS:      General: Negative   Eyes: Negative   Ear, Nose and Throat: Negative   Respiratory: Negative   Cardiovascular: Negative   Gastrointestinal: Negative   Genito-urinary: Negative   Musculoskeletal: Negative  Neurological: Negative   Psychological: Negative  HEME: Negative   ENDO: Negative   SKIN: Negative    VITALS:  There were no vitals filed for this visit.    EXAM:  General: NAD, A&Ox3  HEENT: NC/AT  CV: RRR by peripheral pulse  Pulmonary: Non-labored breathing on RA  LUE:  Skin intact, no deformity  Mild tenderness to palpation with palpable nodule  at the A1 pulley of the left ring finger.  No visible triggering today.  Intact FDP, FDS, EDC  Sensation intact to light touch median, radial, ulnar nerve distributions  Warm well-perfused, capillary fill less than 2 seconds    RUE:  Skin intact, no deformity  Mild tenderness to palpation with palpable nodule at the A1 pulley of the right middle finger.  No visible triggering today.  Intact FDP, FDS, EDC  Sensation intact to light touch median, radial, ulnar nerve distributions  Warm well-perfused, capillary fill less than 2 seconds    IMAGING:    None    I have personally reviewed the above images and labs.         IMPRESSION AND RECOMMENDATIONS:  Mr. Venkata Lynn is a 58 year old male right hand dominant with left ring and right middle trigger fingers.    We discussed the patient's diagnosis and treatment options in detail today. This included a description of the associated pathology and non-operative/operative treatment options with the use of illustrations and diagrams.    Symptoms relatively mild on exam. Recommended starting with injections.  Patient is agreeable.    After obtaining written consent, I cleansed the skin over the left ring finger A1 pulley with chlorhexidine.  I then anesthetized skin with ethyl chloride freeze spray after which injected 1 cc of 1% lidocaine and 1 cc of dexamethasone 4 mg/mL into the left ring finger A1 pulley and flexor tendon sheath.  I then repeated this and injected the same cocktail into the right middle finger A1 pulley and flexor tendon sheath.  Patient tolerated this well without complication.    He was provided with oval 8 splints to be worn at night.    If in 6 weeks, the patient has noticed improvement but has residual symptoms, he may return for consideration of repeat injections.    All questions answered.  The patient voiced understanding agreement.  Follow-up with me as needed.    Jeevan Solis MD    Hand, Upper Extremity & Microvascular  Surgery  Department of Orthopaedic Surgery  [facility]    Hand / Upper Extremity Injection/Arthrocentesis: L ring A1    Date/Time: 8/22/2023 11:36 AM    Performed by: Jeevan Solis MD  Authorized by: Jeevan Solis MD    Needle Size:  25 G  Condition: trigger finger    Location:  Ring finger    Site:  L ring A1  Medications:  1 mL dexAMETHasone 120 MG/30ML; 1 mL lidocaine 1 %  Hand / Upper Extremity Injection/Arthrocentesis: R long A1    Date/Time: 8/22/2023 11:37 AM    Performed by: Jeevan Solis MD  Authorized by: Jeevan Solis MD    Needle Size:  25 G  Condition: trigger finger    Location:  Long finger    Site:  R long A1  Medications:  1 mL dexAMETHasone 120 MG/30ML; 1 mL lidocaine 1 %  Outcome:  Tolerated well, no immediate complications  Procedure discussed: discussed risks, benefits, and alternatives    Consent Given by:  Patient  Timeout: timeout called immediately prior to procedure               Again, thank you for allowing me to participate in the care of your patient.        Sincerely,        Jeevan Solis MD

## 2023-08-22 NOTE — PROGRESS NOTES
Orthopaedic Surgery Hand and Upper Extremity Clinic H&P Note:  Date: Aug 22, 2023    Patient Name: Venkata Lynn  MRN: [id number]    Consult requested by: Lyndon Stoll    CHIEF COMPLAINT: trigger finger, left ring finger    Dominant Hand:right  Occupation: construction      HPI:  Mr. Venkata Lynn is a 58 year old male right hand dominant who presents with trigger finger of left ring finger. This has been ongoing for a couple of years. He describes intermittent locking that can take hours to fully extend finger. Right middle finger also occasionally triggers but not nearly as bad as the left. He also notes history of arthritis in bilateral hands.       PMH  Diabetes: no  Thyroid Problems: no  Smoking: no      PAST MEDICAL HISTORY:  Past Medical History:   Diagnosis Date    Acne vulgaris     Anxiety 3/28/2011    Anxiety disorder     Arthritis     both knees    CARDIOVASCULAR SCREENING; LDL GOAL LESS THAN 160 10/31/2010    Controlled substance agreement signed 1/11/2016    Coughing     at night    Dyspepsia 8/16/2016    Elevated blood pressure reading without diagnosis of hypertension 1/11/2016    Episodic tension-type headache, not intractable 3/8/2021    Familial tremor 8/16/2016    Family history of rhinophyma 1/18/2012    Hyperlipidemia LDL goal <160 10/31/2010    The 10-year ASCVD risk score (Lexmireya EISENBERG Jr., et al., 2013) is: 9.5%   Values used to calculate the score:     Age: 56 years     Sex: Male     Is Non- : No     Diabetic: No     Tobacco smoker: Yes     Systolic Blood Pressure: 116 mmHg     Is BP treated: No     HDL Cholesterol: 58 mg/dL     Total Cholesterol: 252 mg/dL     Infection due to 2019 novel coronavirus 11/9/2022    Insomnia     Intermittent asthma 3/28/2011    Knee pain 1/18/2012    Lateral epicondylitis of left elbow 9/14/2018    LBP (low back pain) 1/18/2012    Low back pains     Mild persistent asthma without complication 8/16/2016    Nocturnal leg cramps  6/15/2020    Rhinitis 1/18/2012    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    Right-sided low back pain with right-sided sciatica 10/8/2020    Rosacea 1/18/2012    Rotator cuff syndrome, right 2/12/2019    Seasonal allergic rhinitis 5/2/2022    Sebaceous hyperplasia 1/18/2012    Seborrheic keratosis 7/14/2021    Shortness of breath     due to asthma    Snoring 9/29/2020    Tobacco abuse 1/18/2012    Trigger finger, acquired 11/22/2019       PAST SURGICAL HISTORY:  Past Surgical History:   Procedure Laterality Date    ARTHROSCOPY KNEE WITH MEDIAL MENISCECTOMY Left 2/22/2016    Procedure: ARTHROSCOPY KNEE WITH MEDIAL MENISCECTOMY;  Surgeon: Chaz Moe MD;  Location:  OR    ARTHROSCOPY SHOULDER ROTATOR CUFF REPAIR, BICEP TENODESIS REPAIR Right 12/12/2019    Procedure: Right Arthroscopic Rotator Cuff Repair, Arthroscopic Subacromial Decompression, Arthroscopic Biceps Tenodesis, Arthroscopic Distal Clavicle Excision;  Surgeon: Nils Milligan MD;  Location:  OR    ENT SURGERY      lipoma removed from neck    ESOPHAGOSCOPY, GASTROSCOPY, DUODENOSCOPY (EGD), COMBINED N/A 9/20/2019    Procedure: ESOPHAGOGASTRODUODENOSCOPY (EGD);  Surgeon: Ethan Davidson MD;  Location:  GI    HEAD & NECK SURGERY      fx root of tooth    ROTATOR CUFF REPAIR RT/LT Left 01/2017    VASECTOMY         MEDICATIONS:  Current Outpatient Medications   Medication    albuterol (PROAIR HFA/PROVENTIL HFA/VENTOLIN HFA) 108 (90 Base) MCG/ACT inhaler    ALPRAZolam (XANAX) 1 MG tablet    augmented betamethasone dipropionate (DIPROLENE-AF) 0.05 % external cream    azelaic acid (FINACIA) 15 % external gel    azelastine (ASTELIN) 0.1 % nasal spray    Calcium Carbonate-Vit D-Min (CALCIUM 1200 PO)    doxycycline hyclate (PERIOSTAT) 20 MG tablet    fluticasone (FLONASE) 50 MCG/ACT nasal spray    fluticasone (FLOVENT HFA) 110 MCG/ACT inhaler    Fluticasone-Umeclidin-Vilant (TRELEGY ELLIPTA) 100-62.5-25 MCG/ACT oral inhaler    hydrOXYzine (ATARAX) 25 MG  tablet    ipratropium (ATROVENT) 0.06 % nasal spray    metroNIDAZOLE (METROGEL) 0.75 % external gel    montelukast (SINGULAIR) 10 MG tablet    Multiple Vitamins-Minerals (MULTIVITAMIN ADULT PO)    propranolol ER (INDERAL LA) 80 MG 24 hr capsule    tamsulosin (FLOMAX) 0.4 MG capsule    vitamin  B complex with vitamin C (VITAMIN  B COMPLEX) TABS    VITAMIN D, CHOLECALCIFEROL, PO    zolpidem (AMBIEN) 10 MG tablet     No current facility-administered medications for this visit.       ALLERGIES:     Allergies   Allergen Reactions    Desvenlafaxine Unknown    Pcn [Penicillins] Hives and Rash     Amoxicillin       FAMILY HISTORY:  No pertinent family history    SOCIAL HISTORY:  Social History     Tobacco Use    Smoking status: Never    Smokeless tobacco: Current     Types: Chew    Tobacco comments:     occ   Vaping Use    Vaping Use: Never used   Substance Use Topics    Alcohol use: Yes     Comment: 6-8 beers per week    Drug use: No       The patient's past medical, family, and social history was reviewed and confirmed.    REVIEW OF SYMPTOMS:      General: Negative   Eyes: Negative   Ear, Nose and Throat: Negative   Respiratory: Negative   Cardiovascular: Negative   Gastrointestinal: Negative   Genito-urinary: Negative   Musculoskeletal: Negative  Neurological: Negative   Psychological: Negative  HEME: Negative   ENDO: Negative   SKIN: Negative    VITALS:  There were no vitals filed for this visit.    EXAM:  General: NAD, A&Ox3  HEENT: NC/AT  CV: RRR by peripheral pulse  Pulmonary: Non-labored breathing on RA  LUE:  Skin intact, no deformity  Mild tenderness to palpation with palpable nodule at the A1 pulley of the left ring finger.  No visible triggering today.  Intact FDP, FDS, EDC  Sensation intact to light touch median, radial, ulnar nerve distributions  Warm well-perfused, capillary fill less than 2 seconds    RUE:  Skin intact, no deformity  Mild tenderness to palpation with palpable nodule at the A1 pulley of the  right middle finger.  No visible triggering today.  Intact FDP, FDS, EDC  Sensation intact to light touch median, radial, ulnar nerve distributions  Warm well-perfused, capillary fill less than 2 seconds    IMAGING:    None    I have personally reviewed the above images and labs.         IMPRESSION AND RECOMMENDATIONS:  Mr. Venkata Lynn is a 58 year old male right hand dominant with left ring and right middle trigger fingers.    We discussed the patient's diagnosis and treatment options in detail today. This included a description of the associated pathology and non-operative/operative treatment options with the use of illustrations and diagrams.    Symptoms relatively mild on exam. Recommended starting with injections.  Patient is agreeable.    After obtaining written consent, I cleansed the skin over the left ring finger A1 pulley with chlorhexidine.  I then anesthetized skin with ethyl chloride freeze spray after which injected 1 cc of 1% lidocaine and 1 cc of dexamethasone 4 mg/mL into the left ring finger A1 pulley and flexor tendon sheath.  I then repeated this and injected the same cocktail into the right middle finger A1 pulley and flexor tendon sheath.  Patient tolerated this well without complication.    He was provided with oval 8 splints to be worn at night.    If in 6 weeks, the patient has noticed improvement but has residual symptoms, he may return for consideration of repeat injections.    All questions answered.  The patient voiced understanding agreement.  Follow-up with me as needed.    Jeevan Solis MD    Hand, Upper Extremity & Microvascular Surgery  Department of Orthopaedic Surgery  [facility]    Hand / Upper Extremity Injection/Arthrocentesis: L ring A1    Date/Time: 8/22/2023 11:36 AM    Performed by: Jeevan Solis MD  Authorized by: Jeevan Solis MD    Needle Size:  25 G  Condition: trigger finger    Location:  Ring finger    Site:  L ring A1  Medications:   1 mL dexAMETHasone 120 MG/30ML; 1 mL lidocaine 1 %  Hand / Upper Extremity Injection/Arthrocentesis: R long A1    Date/Time: 8/22/2023 11:37 AM    Performed by: Jeevan Solis MD  Authorized by: Jeevan Solis MD    Needle Size:  25 G  Condition: trigger finger    Location:  Long finger    Site:  R long A1  Medications:  1 mL dexAMETHasone 120 MG/30ML; 1 mL lidocaine 1 %  Outcome:  Tolerated well, no immediate complications  Procedure discussed: discussed risks, benefits, and alternatives    Consent Given by:  Patient  Timeout: timeout called immediately prior to procedure

## 2023-09-06 ENCOUNTER — MYC MEDICAL ADVICE (OUTPATIENT)
Dept: FAMILY MEDICINE | Facility: CLINIC | Age: 58
End: 2023-09-06
Payer: COMMERCIAL

## 2023-10-11 ENCOUNTER — OFFICE VISIT (OUTPATIENT)
Dept: SURGERY | Facility: CLINIC | Age: 58
End: 2023-10-11
Attending: FAMILY MEDICINE
Payer: COMMERCIAL

## 2023-10-11 ENCOUNTER — PRE VISIT (OUTPATIENT)
Dept: SURGERY | Facility: CLINIC | Age: 58
End: 2023-10-11

## 2023-10-11 VITALS — HEIGHT: 71 IN | BODY MASS INDEX: 28.68 KG/M2

## 2023-10-11 DIAGNOSIS — K62.5 RECTAL BLEEDING: Primary | ICD-10-CM

## 2023-10-11 DIAGNOSIS — K64.8 INTERNAL HEMORRHOIDS: ICD-10-CM

## 2023-10-11 DIAGNOSIS — K92.1 HEMATOCHEZIA: ICD-10-CM

## 2023-10-11 NOTE — LETTER
"10/11/2023       RE: Venkata Lynn  [address]  [facility] [id number]     Dear Colleague,    Thank you for referring your patient, Venkata Lynn, to the [facility] [place] at [facility]. Please see a copy of my visit note below.    Colon and Rectal Surgery Consult Clinic Note    Date: 10/11/2023     Referring provider:  Lyndon Stoll MD  [address] [place] [address]     RE: Venkata Lynn  : 1965  LAUREN: 10/11/2023    Venkata Lynn is a very pleasant 58 year old male here for hemorrhoids.    HPI:  Having rectal bleeding intermittently. This is worse with hard stools. Takes Metamucil daily. Is very physically active and thinks this might aggrivate things. Can feel an external hemorrhoid intermittently. No prolapsing tissue. Blood is in the toilet bowl. No blood thinners. No anorectal surgeries in the past. No family history of colon cancer. Not on any blood thinners.    Colonoscopy  for rectal bleeding was normal.    Physical Examination: Exam was chaperoned by Travon Mcmahan, EMT-P   Ht 5' 11\"   BMI 28.68 kg/m    General: Alert, oriented, in no acute distress, sitting comfortably  HEENT: Mucous membranes moist    Perianal external examination:  Perianal skin: Intact with no excoriation or lichenification.  Lesions: No evidence of an external lesion, nodularity, or induration in the perianal region.  Eversion of buttocks: There was not evidence of an anal fissure. Details: N/A.  Skin tags or external hemorrhoids: None.  Digital rectal examination: Was performed.   Sphincter tone: Good.  Palpable lesions: No.  Prostate: Normal.  Other: None.    Anoscopy: Was performed.   Hemorrhoids: Yes.  Grade 2-3 internal hemorrhoids without active bleeding  Lesions: No    Procedures:  After discussing the risks and benefits, the patient agreed to proceed with internal hemorrhoidal banding.    Prior " to the start of the procedure and with procedural staff participation, I verbally confirmed the patient s identity using two indicators, relevant allergies, that the procedure was appropriate and matched the consent or emergent situation, and that the correct equipment/implants were available. Immediately prior to starting the procedure I conducted the Time Out with the procedural staff and re-confirmed the patient s name, procedure, and site/side. (The Joint Commission universal protocol was followed.)  Yes    Sedation (Moderate or Deep): None    A suction hemorrhoidal  was used to place a total of 1 band(s) in the right lateral position(s).    There was no significant bleeding. The patient tolerated the procedure well.    This procedure was performed under a collaborative privileging agreement with Dr. Jerad Canales, Chief Division of Colon and Rectal Surgery    Assessment/Plan: 58 year old male with internal hemorrhoids, constipation, and rectal bleeding. Discussed managing these with hemorrhoidal banding. Discussed that several banding procedures may be needed and that this will not necessarily resolve the external hemorrhoidal skin tags. Patient states an understanding of this and states an understanding that there is a small risk of bleeding today and when the band falls off in 1-2 weeks. Also advised patient that there is a remote chance of infection. Recommended avoiding heavy lifting and remain off aspirin for two weeks. Patient verbalized an understanding of these risks and wished to proceed today.  Recommended increasing the fiber supplement to twice a day.  We will have him follow-up anytime after 4 to 6 weeks if he has any persistent bleeding.  If bleeding persist despite treatment of hemorrhoids, would recommend a repeat colonoscopy. Patient's questions were answered to his stated satisfaction and he is in agreement with this plan.       Medical history:  Past Medical History:   Diagnosis  Date    Acne vulgaris     Anxiety 3/28/2011    Anxiety disorder     Arthritis     both knees    CARDIOVASCULAR SCREENING; LDL GOAL LESS THAN 160 10/31/2010    Controlled substance agreement signed 1/11/2016    Coughing     at night    Dyspepsia 8/16/2016    Elevated blood pressure reading without diagnosis of hypertension 1/11/2016    Episodic tension-type headache, not intractable 3/8/2021    Familial tremor 8/16/2016    Family history of rhinophyma 1/18/2012    Hyperlipidemia LDL goal <160 10/31/2010    The 10-year ASCVD risk score (Lexmireya EISENBERG Jr., et al., 2013) is: 9.5%   Values used to calculate the score:     Age: 56 years     Sex: Male     Is Non- : No     Diabetic: No     Tobacco smoker: Yes     Systolic Blood Pressure: 116 mmHg     Is BP treated: No     HDL Cholesterol: 58 mg/dL     Total Cholesterol: 252 mg/dL     Infection due to 2019 novel coronavirus 11/9/2022    Insomnia     Intermittent asthma 3/28/2011    Knee pain 1/18/2012    Lateral epicondylitis of left elbow 9/14/2018    LBP (low back pain) 1/18/2012    Low back pains     Mild persistent asthma without complication 8/16/2016    Nocturnal leg cramps 6/15/2020    Rhinitis 1/18/2012    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    Right-sided low back pain with right-sided sciatica 10/8/2020    Rosacea 1/18/2012    Rotator cuff syndrome, right 2/12/2019    Seasonal allergic rhinitis 5/2/2022    Sebaceous hyperplasia 1/18/2012    Seborrheic keratosis 7/14/2021    Shortness of breath     due to asthma    Snoring 9/29/2020    Tobacco abuse 1/18/2012    Trigger finger, acquired 11/22/2019       Surgical history:  Past Surgical History:   Procedure Laterality Date    ARTHROSCOPY KNEE WITH MEDIAL MENISCECTOMY Left 2/22/2016    Procedure: ARTHROSCOPY KNEE WITH MEDIAL MENISCECTOMY;  Surgeon: Chaz Moe MD;  Location: [facility] OR    ARTHROSCOPY SHOULDER ROTATOR CUFF REPAIR, BICEP TENODESIS REPAIR Right 12/12/2019    Procedure: Right Arthroscopic  Rotator Cuff Repair, Arthroscopic Subacromial Decompression, Arthroscopic Biceps Tenodesis, Arthroscopic Distal Clavicle Excision;  Surgeon: Nils Milligan MD;  Location: [facility] OR    ENT SURGERY      lipoma removed from neck    ESOPHAGOSCOPY, GASTROSCOPY, DUODENOSCOPY (EGD), COMBINED N/A 9/20/2019    Procedure: ESOPHAGOGASTRODUODENOSCOPY (EGD);  Surgeon: Ethan Davidson MD;  Location:  GI    HEAD & NECK SURGERY      fx root of tooth    ROTATOR CUFF REPAIR RT/LT Left 01/2017    VASECTOMY         Problem list:    Patient Active Problem List    Diagnosis Date Noted    Hematochezia 07/24/2023     Priority: Medium    Infection due to 2019 novel coronavirus 11/09/2022     Priority: Medium    Special screening for malignant neoplasm of prostate 11/09/2022     Priority: Medium    Sciatica without back pain, right 11/09/2022     Priority: Medium    Encounter for immunization 05/02/2022     Priority: Medium    Seasonal allergic rhinitis 05/02/2022     Priority: Medium    Primary insomnia 07/15/2021     Priority: Medium    Encounter for preventive measure 07/15/2021     Priority: Medium    Seborrheic keratosis 07/14/2021     Priority: Medium    Episodic tension-type headache, not intractable 03/08/2021     Priority: Medium    Right-sided low back pain with right-sided sciatica 10/08/2020     Priority: Medium    Snoring 09/29/2020     Priority: Medium    Benign prostatic hyperplasia with nocturia 09/29/2020     Priority: Medium    Nocturnal leg cramps 06/15/2020     Priority: Medium    Nontraumatic complete tear of right rotator cuff 12/30/2019     Priority: Medium    Trigger finger, acquired 11/22/2019     Priority: Medium    Rotator cuff syndrome, right 02/12/2019     Priority: Medium    Lateral epicondylitis of left elbow 09/14/2018     Priority: Medium    Sprain of left rotator cuff capsule, initial encounter 04/06/2017     Priority: Medium    Familial tremor 08/16/2016     Priority: Medium    Dyspepsia  08/16/2016     Priority: Medium    Mild persistent asthma without complication 08/16/2016     Priority: Medium    Pain in both knees 03/18/2016     Priority: Medium    Cyst of meniscus of left knee 01/28/2016     Priority: Medium    Cyst of meniscus of right knee 01/28/2016     Priority: Medium    Controlled substance agreement signed 01/11/2016     Priority: Medium     Patient is followed by SAMUEL BAEZA for ongoing prescription of benzodiazepines.  All refills should be approved by this provider, or covering partner.    Medication(s): alprazolam    Maximum quantity per month: 30 ea  Clinic visit frequency required: Q 3  months     Controlled substance agreement on file: Yes       Date(s):  2/12/19  Benzodiazepine use reviewed by psychiatry:      Last [employer] website verification:  Done 4.11.17 2/12/19   [email or web address]          Arthralgia of both knees 09/08/2015     Priority: Medium    Adjustment reaction 05/02/2014     Priority: Medium     Problem list name updated by automated process. Provider to review      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     Priority: Medium    Rosacea 01/18/2012     Priority: Medium    Sebaceous hyperplasia 01/18/2012     Priority: Medium    Tobacco abuse 01/18/2012     Priority: Medium     chew      Anxiety 03/28/2011     Priority: Medium     Patient is followed by SAMUEL BAEZA for ongoing prescription of benzodiazepines.  All refills should be approved by this provider, or covering partner.    Medication(s): Xanax 1 mg.   Maximum quantity per month: 30  Clinic visit frequency required: Q 6  months     Controlled substance agreement on file: Yes       Date(s): 2/12/19 11/9/22  Benzodiazepine use reviewed by psychiatry:  No    Last [employer] website verification:  11/9/22   [email or web address]          Hyperlipidemia LDL goal <160 10/31/2010     Priority: Medium     The 10-year ASCVD risk score (Lexmireya EISENBERG Jr., et al., 2013) is: 9.5%    Values used to calculate the score:      Age:  56 years      Sex: Male      Is Non- : No      Diabetic: No      Tobacco smoker: Yes      Systolic Blood Pressure: 116 mmHg      Is BP treated: No      HDL Cholesterol: 58 mg/dL      Total Cholesterol: 252 mg/dL           Medications:  Current Outpatient Medications   Medication Sig Dispense Refill    albuterol (PROAIR HFA/PROVENTIL HFA/VENTOLIN HFA) 108 (90 Base) MCG/ACT inhaler Inhale 2 puffs into the lungs every 6 hours as needed for shortness of breath / dyspnea 18 g 3    ALPRAZolam (XANAX) 1 MG tablet Take 1 tablet (1 mg) by mouth daily as needed for anxiety 30 tablet 5    augmented betamethasone dipropionate (DIPROLENE-AF) 0.05 % external cream       azelaic acid (FINACIA) 15 % external gel       azelastine (ASTELIN) 0.1 % nasal spray Spray 1 spray into both nostrils 2 times daily 30 mL 5    Calcium Carbonate-Vit D-Min (CALCIUM 1200 PO)       doxycycline hyclate (PERIOSTAT) 20 MG tablet Take 1 tablet (20 mg) by mouth 2 times daily (Patient not taking: Reported on 7/24/2023)      fluticasone (FLONASE) 50 MCG/ACT nasal spray Spray 1 spray into both nostrils daily 16 g 5    fluticasone (FLOVENT HFA) 110 MCG/ACT inhaler Inhale 1 puff into the lungs 2 times daily as needed (rescue) 12 g 1    Fluticasone-Umeclidin-Vilant (TRELEGY ELLIPTA) 100-62.5-25 MCG/ACT oral inhaler Inhale 1 puff into the lungs daily 3 each 3    hydrOXYzine (ATARAX) 25 MG tablet Take 1 tablet (25 mg) by mouth 3 times daily as needed for anxiety 90 tablet 5    ipratropium (ATROVENT) 0.06 % nasal spray INHALE TWO SPRAYS IN EACH NOSTRIL FOUR TIMES A DAY 15 mL 5    metroNIDAZOLE (METROGEL) 0.75 % external gel Apply topically 2 times daily (Patient not taking: Reported on 7/24/2023) 45 g 1    montelukast (SINGULAIR) 10 MG tablet TAKE ONE TABLET BY MOUTH AT BEDTIME Strength: 10 mg 90 tablet 3    Multiple Vitamins-Minerals (MULTIVITAMIN ADULT PO) Take 1 tablet by mouth      propranolol ER (INDERAL LA) 80 MG 24 hr capsule TAKE  ONE CAPSULE BY MOUTH ONCE DAILY 90 capsule 3    tamsulosin (FLOMAX) 0.4 MG capsule Take 1 capsule (0.4 mg) by mouth daily 90 capsule 3    vitamin  B complex with vitamin C (VITAMIN  B COMPLEX) TABS Take 1 tablet by mouth daily      VITAMIN D, CHOLECALCIFEROL, PO Take 1,000 Units by mouth daily      zolpidem (AMBIEN) 10 MG tablet Take 1 tablet (10 mg) by mouth nightly as needed for sleep 90 tablet 1       Allergies:  Allergies   Allergen Reactions    Desvenlafaxine Unknown    Pcn [Penicillins] Hives and Rash     Amoxicillin       Family history:  Family History   Problem Relation Age of Onset    Prostate Cancer Father     Other - See Comments Other         tremor    Colon Cancer No family hx of        Social history:  Social History     Tobacco Use    Smoking status: Never    Smokeless tobacco: Current     Types: Chew    Tobacco comments:     occ   Substance Use Topics    Alcohol use: Yes     Comment: 6-8 beers per week    Marital status: .  Occupation: [employer]    Nursing Notes:   Travon Mcmahan, EMT  10/11/2023  1:52 PM  Signed  Chief Complaint   Patient presents with    New Patient    Hemorrhoids     There were no vitals filed for this visit.    There is no height or weight on file to calculate BMI.     Travon Mcmahan, EMT- P    Travon Mcmahan, EMT  10/11/2023  1:59 PM  Signed  Vitals:  Blood pressure: Left arm, sitting, 101/62  Pulse oximetry: 98%o2  HR: 71  Patient denied pain, and endorsed rectal bleeding on bowel movements and frequent itchiness and irritation.     20 minutes spent on the date of encounter performing chart review, history and exam, documentation and further activities as noted above with an additional 5 minutes for anoscopy and banding     This note was created using speech recognition software and may contain unintended word substitutions.      Again, thank you for allowing me to participate in the care of your patient.      Sincerely,    Maria Elena Fernandes, ABIGAIL CNP

## 2023-10-11 NOTE — PROGRESS NOTES
"Colon and Rectal Surgery Consult Clinic Note    Date: 10/11/2023     Referring provider:  Lyndon Stoll MD  [address] [place] [address]     RE: Venkata Lynn  : 1965  LAUREN: 10/11/2023    Venkata Lynn is a very pleasant 58 year old male here for hemorrhoids.    HPI:  Having rectal bleeding intermittently. This is worse with hard stools. Takes Metamucil daily. Is very physically active and thinks this might aggrivate things. Can feel an external hemorrhoid intermittently. No prolapsing tissue. Blood is in the toilet bowl. No blood thinners. No anorectal surgeries in the past. No family history of colon cancer. Not on any blood thinners.    Colonoscopy  for rectal bleeding was normal.    Physical Examination: Exam was chaperoned by Travon Mcmahan, EMT-P   Ht 5' 11\"   BMI 28.68 kg/m    General: Alert, oriented, in no acute distress, sitting comfortably  HEENT: Mucous membranes moist    Perianal external examination:  Perianal skin: Intact with no excoriation or lichenification.  Lesions: No evidence of an external lesion, nodularity, or induration in the perianal region.  Eversion of buttocks: There was not evidence of an anal fissure. Details: N/A.  Skin tags or external hemorrhoids: None.  Digital rectal examination: Was performed.   Sphincter tone: Good.  Palpable lesions: No.  Prostate: Normal.  Other: None.    Anoscopy: Was performed.   Hemorrhoids: Yes.  Grade 2-3 internal hemorrhoids without active bleeding  Lesions: No    Procedures:  After discussing the risks and benefits, the patient agreed to proceed with internal hemorrhoidal banding.    Prior to the start of the procedure and with procedural staff participation, I verbally confirmed the patient s identity using two indicators, relevant allergies, that the procedure was appropriate and matched the consent or emergent situation, and that the correct equipment/implants were available. Immediately prior to starting the procedure " I conducted the Time Out with the procedural staff and re-confirmed the patient s name, procedure, and site/side. (The Joint Commission universal protocol was followed.)  Yes    Sedation (Moderate or Deep): None    A suction hemorrhoidal  was used to place a total of 1 band(s) in the right lateral position(s).    There was no significant bleeding. The patient tolerated the procedure well.    This procedure was performed under a collaborative privileging agreement with Dr. Jerad Canales, Chief Division of Colon and Rectal Surgery    Assessment/Plan: 58 year old male with internal hemorrhoids, constipation, and rectal bleeding. Discussed managing these with hemorrhoidal banding. Discussed that several banding procedures may be needed and that this will not necessarily resolve the external hemorrhoidal skin tags. Patient states an understanding of this and states an understanding that there is a small risk of bleeding today and when the band falls off in 1-2 weeks. Also advised patient that there is a remote chance of infection. Recommended avoiding heavy lifting and remain off aspirin for two weeks. Patient verbalized an understanding of these risks and wished to proceed today.  Recommended increasing the fiber supplement to twice a day.  We will have him follow-up anytime after 4 to 6 weeks if he has any persistent bleeding.  If bleeding persist despite treatment of hemorrhoids, would recommend a repeat colonoscopy. Patient's questions were answered to his stated satisfaction and he is in agreement with this plan.       Medical history:  Past Medical History:   Diagnosis Date    Acne vulgaris     Anxiety 3/28/2011    Anxiety disorder     Arthritis     both knees    CARDIOVASCULAR SCREENING; LDL GOAL LESS THAN 160 10/31/2010    Controlled substance agreement signed 1/11/2016    Coughing     at night    Dyspepsia 8/16/2016    Elevated blood pressure reading without diagnosis of hypertension 1/11/2016     Episodic tension-type headache, not intractable 3/8/2021    Familial tremor 8/16/2016    Family history of rhinophyma 1/18/2012    Hyperlipidemia LDL goal <160 10/31/2010    The 10-year ASCVD risk score (Lex EISENBERG Jr., et al., 2013) is: 9.5%   Values used to calculate the score:     Age: 56 years     Sex: Male     Is Non- : No     Diabetic: No     Tobacco smoker: Yes     Systolic Blood Pressure: 116 mmHg     Is BP treated: No     HDL Cholesterol: 58 mg/dL     Total Cholesterol: 252 mg/dL     Infection due to 2019 novel coronavirus 11/9/2022    Insomnia     Intermittent asthma 3/28/2011    Knee pain 1/18/2012    Lateral epicondylitis of left elbow 9/14/2018    LBP (low back pain) 1/18/2012    Low back pains     Mild persistent asthma without complication 8/16/2016    Nocturnal leg cramps 6/15/2020    Rhinitis 1/18/2012    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    Right-sided low back pain with right-sided sciatica 10/8/2020    Rosacea 1/18/2012    Rotator cuff syndrome, right 2/12/2019    Seasonal allergic rhinitis 5/2/2022    Sebaceous hyperplasia 1/18/2012    Seborrheic keratosis 7/14/2021    Shortness of breath     due to asthma    Snoring 9/29/2020    Tobacco abuse 1/18/2012    Trigger finger, acquired 11/22/2019       Surgical history:  Past Surgical History:   Procedure Laterality Date    ARTHROSCOPY KNEE WITH MEDIAL MENISCECTOMY Left 2/22/2016    Procedure: ARTHROSCOPY KNEE WITH MEDIAL MENISCECTOMY;  Surgeon: Chaz Moe MD;  Location: [facility] OR    ARTHROSCOPY SHOULDER ROTATOR CUFF REPAIR, BICEP TENODESIS REPAIR Right 12/12/2019    Procedure: Right Arthroscopic Rotator Cuff Repair, Arthroscopic Subacromial Decompression, Arthroscopic Biceps Tenodesis, Arthroscopic Distal Clavicle Excision;  Surgeon: Nils Milligan MD;  Location: [facility] OR    ENT SURGERY      lipoma removed from neck    ESOPHAGOSCOPY, GASTROSCOPY, DUODENOSCOPY (EGD), COMBINED N/A 9/20/2019    Procedure:  ESOPHAGOGASTRODUODENOSCOPY (EGD);  Surgeon: Ethan Davidson MD;  Location:  GI    HEAD & NECK SURGERY      fx root of tooth    ROTATOR CUFF REPAIR RT/LT Left 01/2017    VASECTOMY         Problem list:    Patient Active Problem List    Diagnosis Date Noted    Hematochezia 07/24/2023     Priority: Medium    Infection due to 2019 novel coronavirus 11/09/2022     Priority: Medium    Special screening for malignant neoplasm of prostate 11/09/2022     Priority: Medium    Sciatica without back pain, right 11/09/2022     Priority: Medium    Encounter for immunization 05/02/2022     Priority: Medium    Seasonal allergic rhinitis 05/02/2022     Priority: Medium    Primary insomnia 07/15/2021     Priority: Medium    Encounter for preventive measure 07/15/2021     Priority: Medium    Seborrheic keratosis 07/14/2021     Priority: Medium    Episodic tension-type headache, not intractable 03/08/2021     Priority: Medium    Right-sided low back pain with right-sided sciatica 10/08/2020     Priority: Medium    Snoring 09/29/2020     Priority: Medium    Benign prostatic hyperplasia with nocturia 09/29/2020     Priority: Medium    Nocturnal leg cramps 06/15/2020     Priority: Medium    Nontraumatic complete tear of right rotator cuff 12/30/2019     Priority: Medium    Trigger finger, acquired 11/22/2019     Priority: Medium    Rotator cuff syndrome, right 02/12/2019     Priority: Medium    Lateral epicondylitis of left elbow 09/14/2018     Priority: Medium    Sprain of left rotator cuff capsule, initial encounter 04/06/2017     Priority: Medium    Familial tremor 08/16/2016     Priority: Medium    Dyspepsia 08/16/2016     Priority: Medium    Mild persistent asthma without complication 08/16/2016     Priority: Medium    Pain in both knees 03/18/2016     Priority: Medium    Cyst of meniscus of left knee 01/28/2016     Priority: Medium    Cyst of meniscus of right knee 01/28/2016     Priority: Medium    Controlled substance  agreement signed 01/11/2016     Priority: Medium     Patient is followed by SAMUEL BAEZA for ongoing prescription of benzodiazepines.  All refills should be approved by this provider, or covering partner.    Medication(s): alprazolam    Maximum quantity per month: 30 ea  Clinic visit frequency required: Q 3  months     Controlled substance agreement on file: Yes       Date(s):  2/12/19  Benzodiazepine use reviewed by psychiatry:      Last [facility] website verification:  Done 4.11.17 2/12/19   [email or web address]          Arthralgia of both knees 09/08/2015     Priority: Medium    Adjustment reaction 05/02/2014     Priority: Medium     Problem list name updated by automated process. Provider to review      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     Priority: Medium    Rosacea 01/18/2012     Priority: Medium    Sebaceous hyperplasia 01/18/2012     Priority: Medium    Tobacco abuse 01/18/2012     Priority: Medium     chew      Anxiety 03/28/2011     Priority: Medium     Patient is followed by SAMUEL BAEZA for ongoing prescription of benzodiazepines.  All refills should be approved by this provider, or covering partner.    Medication(s): Xanax 1 mg.   Maximum quantity per month: 30  Clinic visit frequency required: Q 6  months     Controlled substance agreement on file: Yes       Date(s): 2/12/19 11/9/22  Benzodiazepine use reviewed by psychiatry:  No    Last [facility] website verification:  11/9/22   [email or web address]          Hyperlipidemia LDL goal <160 10/31/2010     Priority: Medium     The 10-year ASCVD risk score (Lex EISENBERG Jr., et al., 2013) is: 9.5%    Values used to calculate the score:      Age: 56 years      Sex: Male      Is Non- : No      Diabetic: No      Tobacco smoker: Yes      Systolic Blood Pressure: 116 mmHg      Is BP treated: No      HDL Cholesterol: 58 mg/dL      Total Cholesterol: 252 mg/dL           Medications:  Current Outpatient Medications   Medication Sig Dispense  Refill    albuterol (PROAIR HFA/PROVENTIL HFA/VENTOLIN HFA) 108 (90 Base) MCG/ACT inhaler Inhale 2 puffs into the lungs every 6 hours as needed for shortness of breath / dyspnea 18 g 3    ALPRAZolam (XANAX) 1 MG tablet Take 1 tablet (1 mg) by mouth daily as needed for anxiety 30 tablet 5    augmented betamethasone dipropionate (DIPROLENE-AF) 0.05 % external cream       azelaic acid (FINACIA) 15 % external gel       azelastine (ASTELIN) 0.1 % nasal spray Spray 1 spray into both nostrils 2 times daily 30 mL 5    Calcium Carbonate-Vit D-Min (CALCIUM 1200 PO)       doxycycline hyclate (PERIOSTAT) 20 MG tablet Take 1 tablet (20 mg) by mouth 2 times daily (Patient not taking: Reported on 7/24/2023)      fluticasone (FLONASE) 50 MCG/ACT nasal spray Spray 1 spray into both nostrils daily 16 g 5    fluticasone (FLOVENT HFA) 110 MCG/ACT inhaler Inhale 1 puff into the lungs 2 times daily as needed (rescue) 12 g 1    Fluticasone-Umeclidin-Vilant (TRELEGY ELLIPTA) 100-62.5-25 MCG/ACT oral inhaler Inhale 1 puff into the lungs daily 3 each 3    hydrOXYzine (ATARAX) 25 MG tablet Take 1 tablet (25 mg) by mouth 3 times daily as needed for anxiety 90 tablet 5    ipratropium (ATROVENT) 0.06 % nasal spray INHALE TWO SPRAYS IN EACH NOSTRIL FOUR TIMES A DAY 15 mL 5    metroNIDAZOLE (METROGEL) 0.75 % external gel Apply topically 2 times daily (Patient not taking: Reported on 7/24/2023) 45 g 1    montelukast (SINGULAIR) 10 MG tablet TAKE ONE TABLET BY MOUTH AT BEDTIME Strength: 10 mg 90 tablet 3    Multiple Vitamins-Minerals (MULTIVITAMIN ADULT PO) Take 1 tablet by mouth      propranolol ER (INDERAL LA) 80 MG 24 hr capsule TAKE ONE CAPSULE BY MOUTH ONCE DAILY 90 capsule 3    tamsulosin (FLOMAX) 0.4 MG capsule Take 1 capsule (0.4 mg) by mouth daily 90 capsule 3    vitamin  B complex with vitamin C (VITAMIN  B COMPLEX) TABS Take 1 tablet by mouth daily      VITAMIN D, CHOLECALCIFEROL, PO Take 1,000 Units by mouth daily      zolpidem (AMBIEN)  10 MG tablet Take 1 tablet (10 mg) by mouth nightly as needed for sleep 90 tablet 1       Allergies:  Allergies   Allergen Reactions    Desvenlafaxine Unknown    Pcn [Penicillins] Hives and Rash     Amoxicillin       Family history:  Family History   Problem Relation Age of Onset    Prostate Cancer Father     Other - See Comments Other         tremor    Colon Cancer No family hx of        Social history:  Social History     Tobacco Use    Smoking status: Never    Smokeless tobacco: Current     Types: Chew    Tobacco comments:     occ   Substance Use Topics    Alcohol use: Yes     Comment: 6-8 beers per week    Marital status: .  Occupation: [employer]    Nursing Notes:   Travon Mcmahan, EMT  10/11/2023  1:52 PM  Signed  Chief Complaint   Patient presents with    New Patient    Hemorrhoids       There were no vitals filed for this visit.    There is no height or weight on file to calculate BMI.     RENE Mendez- P      Travon Mcmahan, EMT  10/11/2023  1:59 PM  Signed  Vitals:  Blood pressure: Left arm, sitting, 101/62  Pulse oximetry: 98%o2  HR: 71  Patient denied pain, and endorsed rectal bleeding on bowel movements and frequent itchiness and irritation.     20 minutes spent on the date of encounter performing chart review, history and exam, documentation and further activities as noted above with an additional 5 minutes for anoscopy and banding     ABIGAIL Gaytan, NP-C  Colon and Rectal Surgery   [facility]    This note was created using speech recognition software and may contain unintended word substitutions.

## 2023-10-11 NOTE — NURSING NOTE
Vitals:  Blood pressure: Left arm, sitting, 101/62  Pulse oximetry: 98%o2  HR: 71  Patient denied pain, and endorsed rectal bleeding on bowel movements and frequent itchiness and irritation.

## 2023-10-11 NOTE — NURSING NOTE
Chief Complaint   Patient presents with    New Patient    Hemorrhoids       There were no vitals filed for this visit.    There is no height or weight on file to calculate BMI.     Travon Mcmahan, EMT- P

## 2023-10-17 ENCOUNTER — OFFICE VISIT (OUTPATIENT)
Dept: ORTHOPEDICS | Facility: CLINIC | Age: 58
End: 2023-10-17
Payer: COMMERCIAL

## 2023-10-17 DIAGNOSIS — M65.331 TRIGGER FINGER, RIGHT MIDDLE FINGER: ICD-10-CM

## 2023-10-17 DIAGNOSIS — M65.30 TRIGGER FINGER, ACQUIRED: Primary | ICD-10-CM

## 2023-10-17 DIAGNOSIS — M65.342 TRIGGER FINGER, LEFT RING FINGER: ICD-10-CM

## 2023-10-17 PROCEDURE — 99207 PR DROP WITH A PROCEDURE: CPT | Performed by: STUDENT IN AN ORGANIZED HEALTH CARE EDUCATION/TRAINING PROGRAM

## 2023-10-17 PROCEDURE — 20550 NJX 1 TENDON SHEATH/LIGAMENT: CPT | Mod: F7 | Performed by: STUDENT IN AN ORGANIZED HEALTH CARE EDUCATION/TRAINING PROGRAM

## 2023-10-17 PROCEDURE — 20550 NJX 1 TENDON SHEATH/LIGAMENT: CPT | Mod: F3 | Performed by: STUDENT IN AN ORGANIZED HEALTH CARE EDUCATION/TRAINING PROGRAM

## 2023-10-17 RX ORDER — TESTOSTERONE CYPIONATE 200 MG/ML
1 INJECTION INTRAMUSCULAR
Status: SHIPPED | OUTPATIENT
Start: 2023-10-17

## 2023-10-17 RX ORDER — LIDOCAINE HYDROCHLORIDE 10 MG/ML
1 INJECTION, SOLUTION INFILTRATION; PERINEURAL
Status: SHIPPED | OUTPATIENT
Start: 2023-10-17

## 2023-10-17 RX ADMIN — LIDOCAINE HYDROCHLORIDE 1 ML: 10 INJECTION, SOLUTION INFILTRATION; PERINEURAL at 15:34

## 2023-10-17 RX ADMIN — TESTOSTERONE CYPIONATE 1 ML: 200 INJECTION INTRAMUSCULAR at 15:34

## 2023-10-17 NOTE — PROGRESS NOTES
Orthopaedic Surgery Hand and Upper Extremity Clinic Progress Note  Date: Oct 17, 2023    Patient Name: Venkata Lynn  MRN: [id number]    Consult requested by: Lyndon Stoll    CHIEF COMPLAINT: trigger finger, left ring finger, right middle finger    Dominant Hand:right  Occupation: construction      HPI:  10/17/23 Patient is present for a follow up trigger finger injection on 8/22/23. Patient reports that he received partial relief. Patient states that the ring finger on his left hand started catching a couple days after and the long finger on the right hand has gotten much better but still catches from time to time. He continues to have difficulty fully flexing of the left ring finger.      Mr. Venkata Lynn is a 58 year old male right hand dominant who presents with trigger finger of left ring finger. This has been ongoing for a couple of years. He describes intermittent locking that can take hours to fully extend finger. Right middle finger also occasionally triggers but not nearly as bad as the left. He also notes history of arthritis in bilateral hands.       PMH  Diabetes: no  Thyroid Problems: no  Smoking: no      VITALS:  There were no vitals filed for this visit.    EXAM:  General: NAD, A&Ox3  HEENT: NC/AT  CV: RRR by peripheral pulse  Pulmonary: Non-labored breathing on RA  LUE:  Skin intact, no deformity  Mild tenderness to palpation with palpable nodule at the A1 pulley of the left ring finger.  No visible triggering today.  Intact FDP, FDS, EDC  Sensation intact to light touch median, radial, ulnar nerve distributions  Warm well-perfused, capillary fill less than 2 seconds    RUE:  Skin intact, no deformity  Mild tenderness to palpation with palpable nodule at the A1 pulley of the right middle finger.  No visible triggering today.  Intact FDP, FDS, EDC  Sensation intact to light touch median, radial, ulnar nerve distributions  Warm well-perfused, capillary fill less than 2  seconds    IMAGING:    None    I have personally reviewed the above images and labs.         IMPRESSION AND RECOMMENDATIONS:  Mr. Venkata Lynn is a 58 year old male right hand dominant with left ring and right middle trigger fingers.    Patient wishes to have repeat injections.    After obtaining written consent, I cleansed the skin over the left ring finger A1 pulley with chlorhexidine.  I then anesthetized skin with ethyl chloride freeze spray after which injected 1 cc of 1% lidocaine and 1 cc of dexamethasone 4 mg/mL into the left ring finger A1 pulley and flexor tendon sheath.  I then repeated this and injected the same cocktail into the right middle finger A1 pulley and flexor tendon sheath.  Patient tolerated this well without complication.    If in 6 weeks, the patient has noticed improvement but has residual symptoms, could consider one additional injection. If left ring finger particularly remains the same, he would benefit from trigger finger release.    All questions answered.  The patient voiced understanding agreement.  Follow-up with me as needed.  Hand / Upper Extremity Injection/Arthrocentesis: R long A1    Date/Time: 10/17/2023 3:34 PM    Performed by: Jeevan Solis MD  Authorized by: Jeevan Solis MD    Needle Size:  25 G  Guidance: landmark    Condition: trigger finger    Location:  Long finger    Site:  R long A1  Medications:  1 mL dexAMETHasone 120 MG/30ML; 1 mL lidocaine 1 %  Outcome:  Tolerated well, no immediate complications  Procedure discussed: discussed risks, benefits, and alternatives    Timeout: timeout called immediately prior to procedure    Prep: patient was prepped and draped in usual sterile fashion    Hand / Upper Extremity Injection/Arthrocentesis: L ring A1    Date/Time: 10/17/2023 3:34 PM    Performed by: Jeevan Solis MD  Authorized by: Jeevan Solis MD    Needle Size:  25 G  Guidance: landmark    Approach:  Volar  Condition: trigger finger    Location:  Ring finger    Site:  L  ring A1  Medications:  1 mL dexAMETHasone 120 MG/30ML; 1 mL lidocaine 1 %  Outcome:  Tolerated well, no immediate complications  Procedure discussed: discussed risks, benefits, and alternatives    Timeout: timeout called immediately prior to procedure    Prep: patient was prepped and draped in usual sterile fashion        Jeevan Solis MD    Hand, Upper Extremity & Microvascular Surgery  Department of Orthopaedic Surgery  [facility]

## 2023-11-15 ENCOUNTER — TELEPHONE (OUTPATIENT)
Dept: ORTHOPEDICS | Facility: CLINIC | Age: 58
End: 2023-11-15
Payer: COMMERCIAL

## 2023-11-15 ENCOUNTER — MYC MEDICAL ADVICE (OUTPATIENT)
Dept: FAMILY MEDICINE | Facility: CLINIC | Age: 58
End: 2023-11-15
Payer: COMMERCIAL

## 2023-11-15 DIAGNOSIS — M65.342 TRIGGER FINGER, LEFT RING FINGER: Primary | ICD-10-CM

## 2023-11-15 DIAGNOSIS — J45.30 MILD PERSISTENT ASTHMA WITHOUT COMPLICATION: ICD-10-CM

## 2023-11-15 RX ORDER — FLUTICASONE FUROATE, UMECLIDINIUM BROMIDE AND VILANTEROL TRIFENATATE 100; 62.5; 25 UG/1; UG/1; UG/1
1 POWDER RESPIRATORY (INHALATION) DAILY
Qty: 3 EACH | Refills: 3 | Status: SHIPPED | OUTPATIENT
Start: 2023-11-15

## 2023-11-15 NOTE — TELEPHONE ENCOUNTER
Received voicemail wanting to schedule surgery for trigger finger.    No case request.    Please advise.    Thank  you.

## 2023-11-17 PROBLEM — M65.342 TRIGGER FINGER, LEFT RING FINGER: Status: ACTIVE | Noted: 2023-11-15

## 2023-11-20 ENCOUNTER — TELEPHONE (OUTPATIENT)
Dept: ORTHOPEDICS | Facility: CLINIC | Age: 58
End: 2023-11-20

## 2023-11-20 NOTE — TELEPHONE ENCOUNTER
Patient has been scheduled for surgery. Details are below.    Date of Surgery: 01/04/24    Approximate Arrival Time: SURGERY CENTER WILL CALL 3/4 DAYS PRIOR TO CONFIRM A TIME   Surgeon:  DR. JABIER BUSTAMANTE     Procedure: RELEASE TRIGGER FINGER LEFT  Location: [facility]-[address] [address]  Surgery Consult: NA  PreOp Physical: NA  PostOp: 01/11/24  Packet Mailed/MyChart Sent: YES  Added to [place]: YES

## 2023-11-21 ENCOUNTER — OFFICE VISIT (OUTPATIENT)
Dept: FAMILY MEDICINE | Facility: CLINIC | Age: 58
End: 2023-11-21
Payer: COMMERCIAL

## 2023-11-21 VITALS
DIASTOLIC BLOOD PRESSURE: 89 MMHG | RESPIRATION RATE: 18 BRPM | OXYGEN SATURATION: 97 % | HEART RATE: 66 BPM | SYSTOLIC BLOOD PRESSURE: 137 MMHG | WEIGHT: 204 LBS | TEMPERATURE: 98 F | HEIGHT: 71 IN | BODY MASS INDEX: 28.56 KG/M2

## 2023-11-21 DIAGNOSIS — Z23 ENCOUNTER FOR IMMUNIZATION: ICD-10-CM

## 2023-11-21 DIAGNOSIS — M25.561 ARTHRALGIA OF BOTH KNEES: Primary | ICD-10-CM

## 2023-11-21 DIAGNOSIS — J45.30 MILD PERSISTENT ASTHMA WITHOUT COMPLICATION: ICD-10-CM

## 2023-11-21 DIAGNOSIS — M25.562 ARTHRALGIA OF BOTH KNEES: Primary | ICD-10-CM

## 2023-11-21 DIAGNOSIS — N40.1 BENIGN PROSTATIC HYPERPLASIA WITH NOCTURIA: ICD-10-CM

## 2023-11-21 DIAGNOSIS — R35.1 BENIGN PROSTATIC HYPERPLASIA WITH NOCTURIA: ICD-10-CM

## 2023-11-21 DIAGNOSIS — G25.0 FAMILIAL TREMOR: ICD-10-CM

## 2023-11-21 PROBLEM — Z12.11 SPECIAL SCREENING FOR MALIGNANT NEOPLASMS, COLON: Status: ACTIVE | Noted: 2023-11-21

## 2023-11-21 PROCEDURE — 90471 IMMUNIZATION ADMIN: CPT | Performed by: FAMILY MEDICINE

## 2023-11-21 PROCEDURE — 99214 OFFICE O/P EST MOD 30 MIN: CPT | Mod: 25 | Performed by: FAMILY MEDICINE

## 2023-11-21 PROCEDURE — 90746 HEPB VACCINE 3 DOSE ADULT IM: CPT | Performed by: FAMILY MEDICINE

## 2023-11-21 RX ORDER — MONTELUKAST SODIUM 10 MG/1
TABLET ORAL
Qty: 90 TABLET | Refills: 3 | Status: SHIPPED | OUTPATIENT
Start: 2023-11-21

## 2023-11-21 RX ORDER — FLUTICASONE PROPIONATE 110 UG/1
1 AEROSOL, METERED RESPIRATORY (INHALATION) 2 TIMES DAILY PRN
Qty: 12 G | Refills: 1 | Status: SHIPPED | OUTPATIENT
Start: 2023-11-21 | End: 2024-03-26

## 2023-11-21 RX ORDER — PROPRANOLOL HYDROCHLORIDE 80 MG/1
CAPSULE, EXTENDED RELEASE ORAL
Qty: 90 CAPSULE | Refills: 3 | Status: SHIPPED | OUTPATIENT
Start: 2023-11-21 | End: 2023-12-21

## 2023-11-21 RX ORDER — TAMSULOSIN HYDROCHLORIDE 0.4 MG/1
0.4 CAPSULE ORAL DAILY
Qty: 90 CAPSULE | Refills: 3 | Status: SHIPPED | OUTPATIENT
Start: 2023-11-21

## 2023-11-21 NOTE — PROGRESS NOTES
Prior to immunization administration, verified patients identity using patient s name and date of birth. Please see Immunization Activity for additional information.     Screening Questionnaire for Adult Immunization    Are you sick today?   No   Do you have allergies to medications, food, a vaccine component or latex?   Yes   Have you ever had a serious reaction after receiving a vaccination?   No   Do you have a long-term health problem with heart, lung, kidney, or metabolic disease (e.g., diabetes), asthma, a blood disorder, no spleen, complement component deficiency, a cochlear implant, or a spinal fluid leak?  Are you on long-term aspirin therapy?   No   Do you have cancer, leukemia, HIV/AIDS, or any other immune system problem?   No   Do you have a parent, brother, or sister with an immune system problem?   No   In the past 3 months, have you taken medications that affect  your immune system, such as prednisone, other steroids, or anticancer drugs; drugs for the treatment of rheumatoid arthritis, Crohn s disease, or psoriasis; or have you had radiation treatments?   No   Have you had a seizure, or a brain or other nervous system problem?   No   During the past year, have you received a transfusion of blood or blood    products, or been given immune (gamma) globulin or antiviral drug?   No   For women: Are you pregnant or is there a chance you could become       pregnant during the next month?   No   Have you received any vaccinations in the past 4 weeks?   No     Immunization questionnaire answers were all negative, but one. Provider notified.       Patient instructed to remain in clinic for 15 minutes afterwards, and to report any adverse reactions.     Screening performed by Janiya Alexandre CMA on 11/21/2023 at 5:30 PM.

## 2023-11-21 NOTE — PROGRESS NOTES
"      Med Oakes is a 58 year old, presenting for the following health issues:  Recheck Medication      History of Present Illness       Reason for visit:  I occasionally have trouble holding my urine    He eats 2-3 servings of fruits and vegetables daily.He consumes 0 sweetened beverage(s) daily.He exercises with enough effort to increase his heart rate 30 to 60 minutes per day.  He exercises with enough effort to increase his heart rate 4 days per week. He is missing 1 dose(s) of medications per week.         Medication Followup of Flomax   Taking Medication as prescribed: yes  Side Effects:  None  Medication Helping Symptoms:  unsure  Genitourinary - Male  Onset: years ago   Description:   Dysuria (painful urination): no   Hematuria (blood in urine): no   Frequency: YES  Are you urinating at night : YES  Hesitancy (delay in urine): no   Retention (unable to empty): no   Decrease in urinary flow: no   Incontinence: no   Progression of Symptoms:  unsure  Accompanying Signs & Symptoms:  Fever: no   Back/Flank pain: no   Urethral discharge: no   Testicle lumps/masses/pain: no   Nausea and/or vomiting: no   Abdominal pain: no   History:   History of frequent UTI's: no   History of kidney stones: no   History of hernias: no   Personal or Family history of Prostate problems: YES  Sexually active: YES  Precipitating factors:   Drinking more fluid   Alleviating factors:  Quit drinking fluid prior bed time            Review of Systems         Objective    /89 (BP Location: Right arm, Patient Position: Sitting, Cuff Size: Adult Large)   Pulse 66   Temp 98  F (36.7  C) (Oral)   Resp 18   Ht 1.803 m (5' 11\")   Wt 92.5 kg (204 lb)   SpO2 97%   BMI 28.45 kg/m    Body mass index is 28.45 kg/m .  Physical Exam                       "

## 2023-11-21 NOTE — LETTER
[facility]  11/21/23  Patient: Venkata Lynn  YOB: 1965  Medical Record Number: [id number]                                                                                  Non-Opioid Controlled Substance Agreement    This is an agreement between you and your provider regarding safe and appropriate use of controlled substances prescribed by your care team. Controlled substances are?medicines that can cause physical and mental dependence (abuse).     There are strict laws about having and using these medicines. We here at [facility] are  committed to working with you in your efforts to get better. To support you in this work, we'll help you schedule regular office appointments for medicine refills. If we must cancel or change your appointment for any reason, we'll make sure you have enough medicine to last until your next appointment.     As a Provider, I will:   Listen carefully to your concerns while treating you with respect.   Recommend a treatment plan that I believe is in your best interest and may involve therapies other than medicine.    Talk with you often about the possible benefits and the risk of harm of any medicine that we prescribe for you.  Assess the safety of this medicine and check how well it works.    Provide a plan on how to taper (discontinue or go off) using this medicine if the decision is made to stop its use.      ::  As a Patient, I understand controlled substances:     Are prescribed by my care provider to help me function or work and manage my condition(s).?  Are strong medicines and can cause serious side effects.     Need to be taken exactly as prescribed.?Combining controlled substances with certain medicines or chemicals (such as illegal drugs, alcohol, sedatives, sleeping pills, and benzodiazepines) can be dangerous or even fatal.? If I stop taking my medicines suddenly, I may have severe withdrawal symptoms.     The risks,  benefits, and side effects of these medicine(s) were explained to me. I agree that:    I will take part in other treatments as advised by my care team. This may be psychiatry or counseling, physical therapy, behavioral therapy, group treatment or a referral to specialist.    I will keep all my appointments and understand this is part of the monitoring of controlled substances.?My care team may require an office visit for EVERY controlled substance refill. If I miss appointments or don t follow instructions, my care team may stop my medicine    I will take my medicines as prescribed. I will not change the dose or schedule unless my care team tells me to. There will be no refills if I run out early.      I may be asked to come to the clinic and complete a urine drug test or complete a pill count. If I don t give a urine sample or participate in a pill count, the care team may stop my medicine.    I will only receive controlled substance prescriptions from this clinic. If I am treated by another provider, I will tell them that I am taking controlled substances and that I have a treatment agreement with this provider. I will inform my [facility] care team within one business day if I am given a prescription for any controlled substance by another healthcare provider. My [facility] care team can contact other providers and pharmacists about my use of any medicines.    It is up to me to make sure that I don't run out of my medicines on weekends or holidays.?If my care team is willing to refill my prescription without a visit, I must request refills only during office hours. Refills may take up to 3 business days to process. I will use one pharmacy to fill all my controlled substance prescriptions. I will notify the clinic about any changes to my insurance or medicine availability.    I am responsible for my prescriptions. If the medicine/prescription is lost, stolen or destroyed, it will not be replaced.?I  also agree not to share controlled substance medicines with anyone.     I am aware I should not use any illegal or recreational drugs. I agree not to drink alcohol unless my care team says I can.     If I enroll in the [place] Medical Cannabis program, I will tell my care team before my next refill.    I will tell my care team right away if I become pregnant, have a new medical problem treated outside of my regular clinic, or have a change in my medicines.     I understand that this medicine can affect my thinking, judgment and reaction time.? Alcohol and drugs affect the brain and body, which can affect the safety of my driving. Being under the influence of alcohol or drugs can affect my decision-making, behaviors, personal safety and the safety of others. Driving while impaired (DWI) can occur if a person is driving, operating or in physical control of a car, motorcycle, boat, snowmobile, ATV, motorbike, off-road vehicle or any other motor vehicle ([place] Statute 169A.20). I understand the risk if I choose to drive or operate any vehicle or machinery.    I understand that if I do not follow any of the conditions above, my prescriptions or treatment may be stopped or changed.   I agree that my provider, clinic care team and pharmacy may work with any city, state or federal law enforcement agency that investigates the misuse, sale or other diversion of my controlled medicine. I will allow my provider to discuss my care with, or share a copy of, this agreement with any other treating provider, pharmacy or emergency room where I receive care.     I have read this agreement and have asked questions about anything I did not understand.    ________________________________________________________  Patient Signature - Venkata Lynn     ___________________                   Date     ________________________________________________________  Provider Signature - Lyndon Stoll MD       ___________________                    Date     ________________________________________________________  Witness Signature (required if provider not present while patient signing)          ___________________                   Date

## 2023-11-22 NOTE — PROGRESS NOTES
"SUBJECTIVE:   Champ is a 58 year old, presenting for the following:  Recheck Medication        11/21/2023     4:36 PM   Additional Questions   Roomed by Janiya PARIKH       Problem List Items Addressed This Visit       Arthralgia of both knees - Primary     Discussed therapeutic interventions.  He reports cortisone injections in his youth which are only briefly effective.  Discussed arthroplasty.  Trial of Synvisc or similar         Benign prostatic hyperplasia with nocturia     On therapies, nocturia x2.  Discussed additional therapies.  He elects to\" investigate\" these.  Yearly PSA           Relevant Medications    tamsulosin (FLOMAX) 0.4 MG capsule    Encounter for immunization     Offered         Relevant Orders    HEPATITIS B, ADULT 20+ (ENGERIX-B/RECOMBIVAX HB) (Completed)    Familial tremor     Responsive to current beta-blockade.  Periodically increased dose based on symptoms.  No changes         Relevant Medications    propranolol ER (INDERAL LA) 80 MG 24 hr capsule    Mild persistent asthma without complication     Completely quiescent.  No changes         Relevant Medications    fluticasone (FLOVENT HFA) 110 MCG/ACT inhaler    montelukast (SINGULAIR) 10 MG tablet                         Social History     Tobacco Use    Smoking status: Never     Passive exposure: Never    Smokeless tobacco: Current     Types: Chew    Tobacco comments:     occ   Substance Use Topics    Alcohol use: Yes     Comment: 6-8 beers per week             11/9/2022     4:47 PM   Alcohol Use   Prescreen: >3 drinks/day or >7 drinks/week? No       Last PSA:   Prostate Specific Antigen Screen   Date Value Ref Range Status   11/10/2022 2.09 0.00 - 4.00 ug/L Final       Reviewed orders with patient. Reviewed health maintenance and updated orders accordingly - Yes      Reviewed and updated as needed this visit by clinical staff   Tobacco  Allergies  Meds              Reviewed and updated as needed this visit by Provider               " "      Knees are deteriorating mood is good he has trigger finger surgery scheduled January OUNSELING:   Reviewed preventive health counseling, as reflected in patient instructions        He reports that he has never smoked. He has never been exposed to tobacco smoke. His smokeless tobacco use includes chew.          Lyndon Stoll MD  [facility]   SUBJECTIVE:   Champ is a 58 year old, presenting for the following:  Recheck Medication        11/21/2023     4:36 PM   Additional Questions   Roomed by Janiya Stoll MD                Social History     Tobacco Use    Smoking status: Never     Passive exposure: Never    Smokeless tobacco: Current     Types: Chew    Tobacco comments:     occ   Substance Use Topics    Alcohol use: Yes     Comment: 6-8 beers per week             11/9/2022     4:47 PM   Alcohol Use   Prescreen: >3 drinks/day or >7 drinks/week? No     Reviewed orders with patient.  Reviewed health maintenance and updated orders accordingly -       Breast Cancer Screening:        History of abnormal Pap smear:      Reviewed and updated as needed this visit by clinical staff   Tobacco  Allergies  Meds              Reviewed and updated as needed this visit by Provider                     Review of Systems       OBJECTIVE:   /89 (BP Location: Right arm, Patient Position: Sitting, Cuff Size: Adult Large)   Pulse 66   Temp 98  F (36.7  C) (Oral)   Resp 18   Ht 1.803 m (5' 11\")   Wt 92.5 kg (204 lb)   SpO2 97%   BMI 28.45 kg/m    Physical Exam          ASSESSMENT/PLAN:             COUNSELING:          He reports that he has never smoked. He has never been exposed to tobacco smoke. His smokeless tobacco use includes chew.        Lyndon Stoll MD  [facility]  "

## 2023-11-22 NOTE — ASSESSMENT & PLAN NOTE
"On therapies, nocturia x2.  Discussed additional therapies.  He elects to\" investigate\" these.  Yearly PSA    "

## 2023-11-22 NOTE — ASSESSMENT & PLAN NOTE
Discussed therapeutic interventions.  He reports cortisone injections in his youth which are only briefly effective.  Discussed arthroplasty.  Trial of Synvisc or similar

## 2023-11-27 ENCOUNTER — ANCILLARY PROCEDURE (OUTPATIENT)
Dept: GENERAL RADIOLOGY | Facility: CLINIC | Age: 58
End: 2023-11-27
Attending: STUDENT IN AN ORGANIZED HEALTH CARE EDUCATION/TRAINING PROGRAM
Payer: COMMERCIAL

## 2023-11-27 ENCOUNTER — OFFICE VISIT (OUTPATIENT)
Dept: ORTHOPEDICS | Facility: CLINIC | Age: 58
End: 2023-11-27
Attending: FAMILY MEDICINE
Payer: COMMERCIAL

## 2023-11-27 VITALS — BODY MASS INDEX: 28.45 KG/M2 | WEIGHT: 204 LBS | DIASTOLIC BLOOD PRESSURE: 74 MMHG | SYSTOLIC BLOOD PRESSURE: 128 MMHG

## 2023-11-27 DIAGNOSIS — M17.0 PRIMARY OSTEOARTHRITIS OF BOTH KNEES: ICD-10-CM

## 2023-11-27 DIAGNOSIS — M22.42 CHONDROMALACIA OF BOTH PATELLAE: Primary | ICD-10-CM

## 2023-11-27 DIAGNOSIS — M25.562 ARTHRALGIA OF BOTH KNEES: ICD-10-CM

## 2023-11-27 DIAGNOSIS — M25.561 ARTHRALGIA OF BOTH KNEES: ICD-10-CM

## 2023-11-27 DIAGNOSIS — M22.41 CHONDROMALACIA OF BOTH PATELLAE: Primary | ICD-10-CM

## 2023-11-27 PROCEDURE — 99204 OFFICE O/P NEW MOD 45 MIN: CPT | Performed by: STUDENT IN AN ORGANIZED HEALTH CARE EDUCATION/TRAINING PROGRAM

## 2023-11-27 PROCEDURE — 73562 X-RAY EXAM OF KNEE 3: CPT | Mod: TC | Performed by: RADIOLOGY

## 2023-11-27 NOTE — PROGRESS NOTES
ASSESSMENT & PLAN    Champ was seen today for pain and pain.    Diagnoses and all orders for this visit:    Chondromalacia of both patellae  -     Orthopedic  Referral  -     XR Knee Bilateral 3 vw; Future  -     PRIOR AUTH REQUEST ORTHO INJECTION    Primary osteoarthritis of both knees  -     Orthopedic  Referral  -     XR Knee Bilateral 3 vw; Future  -     PRIOR AUTH REQUEST ORTHO INJECTION        58-year-old male presenting with bilateral knee pain.  He has a history of full-thickness articular cartilage and chondromalacia of bilateral knees.  X-rays today showing tricompartmental osteoarthritic changes suggesting worsening cartilage wear as previous MRI from 2016.    He has tried corticosteroid injections, NSAIDs, bracing, PT as needed without relief.      Discussed surgical and nonsurgical options.  Will try to hold off and see if we can get more pain relief at this time for surgical recommendations.  Will trial HA injections.  Prior Auth completed today.  Follow-up anticipate 2 to 3 weeks once medication in stock.    -Continue home exercises  --Tylenol 500-1000mg (up to three times per day) and ibuprofen 600mg (up to three times per day) as needed for pain and swelling. Always take ibuprofen with food.         [facility] [place]    -----  Chief Complaint   Patient presents with    Left Knee - Pain    Right Knee - Pain       SUBJECTIVE  Venkata Lynn is a/an 58 year old male who is seen in consultation at the request of  Lyndon Stoll M.D. for evaluation of bilateral knee pain. Left > Right     The patient is seen by themselves.      Onset: 10+ years(s) ago. Reports insidious onset without acute precipitating event.  Location of Pain: bilateral knees, Left medial knee. Right knee superior and lateral to the patella.   Worsened by: repetitive standing, stairs.   Better with: ice, home exercises.   Treatments tried: ice and home exercises,  injections (30 years ago, cortisone)   Associated symptoms: feeling of instability in left knee. Aches in both knees.     2000 had CSI without relief in pain.     Orthopedic/Surgical history: YES - Date: 2010 arthroscopy Left knee.   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 He climbs ladders, bends and kneels and stands every day.     Patient Active Problem List   Diagnosis    Hyperlipidemia LDL goal <160    Anxiety    Rhinophyma    Rosacea    Sebaceous hyperplasia    Tobacco abuse    Adjustment reaction    Arthralgia of both knees    Controlled substance agreement signed    Cyst of meniscus of left knee    Cyst of meniscus of right knee    Pain in both knees    Familial tremor    Dyspepsia    Mild persistent asthma without complication    Sprain of left rotator cuff capsule, initial encounter    Lateral epicondylitis of left elbow    Rotator cuff syndrome, right    Trigger finger, acquired    Nontraumatic complete tear of right rotator cuff    Nocturnal leg cramps    Snoring    Benign prostatic hyperplasia with nocturia    Right-sided low back pain with right-sided sciatica    Episodic tension-type headache, not intractable    Seborrheic keratosis    Primary insomnia    Encounter for preventive measure    Encounter for immunization    Seasonal allergic rhinitis    Infection due to 2019 novel coronavirus    Special screening for malignant neoplasm of prostate    Sciatica without back pain, right    Hematochezia    Trigger finger, left ring finger    Special screening for malignant neoplasms, colon       Current Outpatient Medications   Medication Sig Dispense Refill    albuterol (PROAIR HFA/PROVENTIL HFA/VENTOLIN HFA) 108 (90 Base) MCG/ACT inhaler Inhale 2 puffs into the lungs every 6 hours as needed for shortness of breath / dyspnea 18 g 3    ALPRAZolam (XANAX) 1 MG tablet Take 1 tablet (1 mg) by mouth daily as needed for anxiety 30 tablet 5    augmented betamethasone dipropionate  (DIPROLENE-AF) 0.05 % external cream       azelaic acid (FINACIA) 15 % external gel       azelastine (ASTELIN) 0.1 % nasal spray Spray 1 spray into both nostrils 2 times daily 30 mL 5    Calcium Carbonate-Vit D-Min (CALCIUM 1200 PO)       fluticasone (FLONASE) 50 MCG/ACT nasal spray Spray 1 spray into both nostrils daily 16 g 5    fluticasone (FLOVENT HFA) 110 MCG/ACT inhaler Inhale 1 puff into the lungs 2 times daily as needed (rescue) 12 g 1    Fluticasone-Umeclidin-Vilant (TRELEGY ELLIPTA) 100-62.5-25 MCG/ACT oral inhaler INHALE 1 PUFF INTO THE LUNGS DAILY 3 each 3    hydrOXYzine (ATARAX) 25 MG tablet Take 1 tablet (25 mg) by mouth 3 times daily as needed for anxiety 90 tablet 5    ipratropium (ATROVENT) 0.06 % nasal spray INHALE TWO SPRAYS IN EACH NOSTRIL FOUR TIMES A DAY 15 mL 5    montelukast (SINGULAIR) 10 MG tablet TAKE ONE TABLET BY MOUTH AT BEDTIME Strength: 10 mg 90 tablet 3    Multiple Vitamins-Minerals (MULTIVITAMIN ADULT PO) Take 1 tablet by mouth      propranolol ER (INDERAL LA) 80 MG 24 hr capsule TAKE ONE CAPSULE BY MOUTH ONCE DAILY 90 capsule 3    tamsulosin (FLOMAX) 0.4 MG capsule Take 1 capsule (0.4 mg) by mouth daily 90 capsule 3    vitamin  B complex with vitamin C (VITAMIN  B COMPLEX) TABS Take 1 tablet by mouth daily      VITAMIN D, CHOLECALCIFEROL, PO Take 1,000 Units by mouth daily      zolpidem (AMBIEN) 10 MG tablet Take 1 tablet (10 mg) by mouth nightly as needed for sleep 90 tablet 1       PMH, Medications and Allergies were reviewed and updated as needed.    REVIEW OF SYSTEMS:  10 point ROS is negative other than symptoms noted above in HPI, Past Medical History or as stated below  Constitutional: NEGATIVE for fever, chills, change in weight  Skin: NEGATIVE for worrisome rashes, moles or lesions  GI/: NEGATIVE for bowel or bladder changes  Neuro: NEGATIVE for weakness, dizziness or paresthesias      OBJECTIVE:  /74   Wt 92.5 kg (204 lb)   BMI 28.45 kg/m     General: healthy,  alert and in no distress  Skin: no suspicious lesions or rash.  CV: distal perfusion intact   Resp: normal respiratory effort without conversational dyspnea   Psych: normal mood and affect  Gait: NORMAL  Neuro: Normal light sensory exam of lower extremity bilaterally intact    BILATERAL KNEE  Inspection:    Normal alignment; no edema, erythema, or ecchymosis present  Palpation:    Tender about the medial joint line and superior pole of patella. Remainder of bony and ligamentous landmarks are nontender.    Trace effusion is present    Patellofemoral crepitus is Present bilaterally  Range of Motion:     00 extension to 1350 flexion  Strength:    Quadriceps 5/5    Extensor mechanism intact  Special Tests:    Positive: Patellar grind bilateral    Negative: MCL/valgus stress (0 & 30 deg), LCL/varus stress (0 & 30 deg)      RADIOLOGY:  Final results and radiologist's interpretation, available in the [facility] health record.  Images were reviewed with the patient in the office today.    My personal interpretation of the performed imaging:   Bilateral patellofemoral osteoarthritic changes left greater than right.  No effusion.  Left greater than right medial compartment joint space narrowing.  No acute fracture or dislocation.  Please await final radiology read.      Reviewed and agree with interpretation as below     Narrative & Impression   MR RIGHT KNEE 1/26/2016 5:15 PM     HISTORY:  Medial, lateral and anterior knee pain with weakness for  years. No specific injury.      TECHNIQUE: Sagittal proton density and T2-weighted images, coronal T1  and coronal and axial T2 fat-suppressed images were obtained.     COMPARISON: None.     FINDINGS:   Menisci:  Medial meniscus: Intrasubstance myxoid degenerative change at the  junction of the midbody and posterior horn. No evidence for tear.     Lateral meniscus: Normal.     Cruciate ligaments:  Anterior cruciate ligament: Normal.     Posterior cruciate ligament: Normal.      Collateral  supporting structures:  Medial collateral ligament: Normal.     Lateral supporting structures: The distal biceps tendon, fibular  collateral ligament, distal iliotibial tract and popliteus muscle and  tendon are normal.      Osseous structures and cartilaginous surfaces: No acute-appearing bony  abnormalities. There is a full-thickness articular cartilage fissure  at the posterior weightbearing medial femoral condyle associated with  a communicating small subchondral cyst (image 22 of series 5). No  associated bone marrow edema. Probable grade 2 chondromalacia  elsewhere in the weightbearing medial femoral condyle and medial  tibial plateau. Articular cartilages and the weightbearing lateral  compartment appear normal. However, there is articular cartilage  thinning and irregularity associated with subchondral microcystic  change at the far-anteromedial nonweightbearing lateral femoral  condyle (image 10 of series 6, image 11 of series 5 and images 18  through 20 of series 3). Patellofemoral articular cartilages appear  normal, although there is a small focus of subchondral bone marrow  edema or cystic change on the medial patellar facet (image 11 of  series 3) of uncertain significance. There is a small focus of  ill-defined decreased T1 and increased T2 signal in the central aspect  of the medullary cavity of the distal femoral metadiaphysis which is  likely a small benign enchondroma or potentially benign red bone  marrow.     Additional findings: No significant joint space effusion. Tiny  popliteal cyst.        IMPRESSION:   1. Full-thickness articular cartilage fissure associated with a  subchondral cystic change in the weightbearing medial femoral condyle.  Grade 2 chondromalacia elsewhere in the medial compartment.  2. Grade 3-4 chondromalacia associated with subchondral microcystic  changes in the far-anteromedial aspect of the nonweightbearing lateral  femoral condyle.  3. Small focus of subchondral bone  marrow edema or cystic change  without overlying articular cartilage abnormality at the medial  patellar facet.          Narrative & Impression   MR LEFT KNEE 1/26/2016 4:49 PM     HISTORY: Medial, lateral and anterior knee pain with weakness. The  symptoms are chronic, but increasing recently. No specific injury.     TECHNIQUE: Sagittal proton density and T2-weighted images, coronal T1  and coronal and axial T2 fat-suppressed images were obtained.     COMPARISON: None.     FINDINGS:   Menisci:  Medial meniscus: Abnormal intrasubstance increased signal is seen  throughout the midbody, extending into the adjacent anterior and  posterior horn. There is likely extension of the abnormal signal to  the meniscal surface at the superior aspect of the periphery (image 18  of series 5), consistent with a slightly complex longitudinal  peripheral tear. There is an associated small multiloculated  parameniscal cyst posteromedially, measuring about 1.2 x 0.4 x 0.4 cm  (images 27 and 28 of series 6 and image 24 of series 3). A smaller  multiloculated parameniscal cyst is seen medially (images 23 and 24 of  series 3 and image 27 of series 6). There are no displaced meniscal  fragments or flaps. The remainder of the anterior and posterior horn  are normal.     Lateral meniscus: Normal.     Cruciate ligaments:  Anterior cruciate ligament: Normal.     Posterior cruciate ligament: Normal.      Collateral supporting structures:  Medial collateral ligament: Normal.     Lateral supporting structures: The distal biceps tendon, fibular  collateral ligament, distal iliotibial tract and popliteus muscle and  tendon are normal.      Osseous structures and cartilaginous surfaces: No acute-appearing bony  abnormalities. Lateral compartment are normal. There is mild signal  heterogeneity in the articular cartilage of the weightbearing medial  femoral condyle, consistent with grade 1 chondromalacia. There is a  full-thickness articular cartilage  fissure at the median eminence of  the patella (images 11 through 13 of series 3). In addition, there is  grade 2 chondromalacia associated with subchondral bone marrow edema  at the superior aspect of the medial patellar facet. Trochlear groove  articular cartilage appears normal.     Additional findings: No significant joint space effusion.        IMPRESSION:   1. Complex tear involving the periphery of the midbody of the medial  meniscus with associated small parameniscal cysts.  2. Early chondromalacia weightbearing medial femoral condyle.  3. Articular cartilage fissure median eminence of the patella. Grade 2  chondromalacia medial patellar facet.     ERIN PA MD       Greater than 45 minutes spent by me on the date of the encounter doing chart review, review of outside records, review of test results, interpretation of tests, patient visit, and documentation

## 2023-11-27 NOTE — LETTER
11/27/2023         RE: Venkata Lynn  [address] [facility] [address]        Dear Colleague,    Thank you for referring your patient, Venkata Lynn, to the [facility]. Please see a copy of my visit note below.    ASSESSMENT & PLAN    Champ was seen today for pain and pain.    Diagnoses and all orders for this visit:    Chondromalacia of both patellae  -     Orthopedic  Referral  -     XR Knee Bilateral 3 vw; Future  -     PRIOR AUTH REQUEST ORTHO INJECTION    Primary osteoarthritis of both knees  -     Orthopedic  Referral  -     XR Knee Bilateral 3 vw; Future  -     PRIOR AUTH REQUEST ORTHO INJECTION        58-year-old male presenting with bilateral knee pain.  He has a history of full-thickness articular cartilage and chondromalacia of bilateral knees.  X-rays today showing tricompartmental osteoarthritic changes suggesting worsening cartilage wear as previous MRI from 2016.    He has tried corticosteroid injections, NSAIDs, bracing, PT as needed without relief.      Discussed surgical and nonsurgical options.  Will try to hold off and see if we can get more pain relief at this time for surgical recommendations.  Will trial HA injections.  Prior Auth completed today.  Follow-up anticipate 2 to 3 weeks once medication in stock.    -Continue home exercises  --Tylenol 500-1000mg (up to three times per day) and ibuprofen 600mg (up to three times per day) as needed for pain and swelling. Always take ibuprofen with food.         Alisha Gregory DO  [facility]    -----  Chief Complaint   Patient presents with     Left Knee - Pain     Right Knee - Pain       SUBJECTIVE  Venkata Lynn is a/an 58 year old male who is seen in consultation at the request of  Lyndon Stoll M.D. for evaluation of bilateral knee pain. Left > Right     The patient is seen by themselves.      Onset: 10+ years(s) ago.  Reports insidious onset without acute precipitating event.  Location of Pain: bilateral knees, Left medial knee. Right knee superior and lateral to the patella.   Worsened by: repetitive standing, stairs.   Better with: ice, home exercises.   Treatments tried: ice and home exercises, injections (30 years ago, cortisone)   Associated symptoms: feeling of instability in left knee. Aches in both knees.     2000 had CSI without relief in pain.     Orthopedic/Surgical history: YES - Date: 2010 arthroscopy Left knee.   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 He climbs ladders, bends and kneels and stands every day.     Patient Active Problem List   Diagnosis     Hyperlipidemia LDL goal <160     Anxiety     Rhinophyma     Rosacea     Sebaceous hyperplasia     Tobacco abuse     Adjustment reaction     Arthralgia of both knees     Controlled substance agreement signed     Cyst of meniscus of left knee     Cyst of meniscus of right knee     Pain in both knees     Familial tremor     Dyspepsia     Mild persistent asthma without complication     Sprain of left rotator cuff capsule, initial encounter     Lateral epicondylitis of left elbow     Rotator cuff syndrome, right     Trigger finger, acquired     Nontraumatic complete tear of right rotator cuff     Nocturnal leg cramps     Snoring     Benign prostatic hyperplasia with nocturia     Right-sided low back pain with right-sided sciatica     Episodic tension-type headache, not intractable     Seborrheic keratosis     Primary insomnia     Encounter for preventive measure     Encounter for immunization     Seasonal allergic rhinitis     Infection due to 2019 novel coronavirus     Special screening for malignant neoplasm of prostate     Sciatica without back pain, right     Hematochezia     Trigger finger, left ring finger     Special screening for malignant neoplasms, colon       Current Outpatient Medications   Medication Sig Dispense Refill     albuterol  (PROAIR HFA/PROVENTIL HFA/VENTOLIN HFA) 108 (90 Base) MCG/ACT inhaler Inhale 2 puffs into the lungs every 6 hours as needed for shortness of breath / dyspnea 18 g 3     ALPRAZolam (XANAX) 1 MG tablet Take 1 tablet (1 mg) by mouth daily as needed for anxiety 30 tablet 5     augmented betamethasone dipropionate (DIPROLENE-AF) 0.05 % external cream        azelaic acid (FINACIA) 15 % external gel        azelastine (ASTELIN) 0.1 % nasal spray Spray 1 spray into both nostrils 2 times daily 30 mL 5     Calcium Carbonate-Vit D-Min (CALCIUM 1200 PO)        fluticasone (FLONASE) 50 MCG/ACT nasal spray Spray 1 spray into both nostrils daily 16 g 5     fluticasone (FLOVENT HFA) 110 MCG/ACT inhaler Inhale 1 puff into the lungs 2 times daily as needed (rescue) 12 g 1     Fluticasone-Umeclidin-Vilant (TRELEGY ELLIPTA) 100-62.5-25 MCG/ACT oral inhaler INHALE 1 PUFF INTO THE LUNGS DAILY 3 each 3     hydrOXYzine (ATARAX) 25 MG tablet Take 1 tablet (25 mg) by mouth 3 times daily as needed for anxiety 90 tablet 5     ipratropium (ATROVENT) 0.06 % nasal spray INHALE TWO SPRAYS IN EACH NOSTRIL FOUR TIMES A DAY 15 mL 5     montelukast (SINGULAIR) 10 MG tablet TAKE ONE TABLET BY MOUTH AT BEDTIME Strength: 10 mg 90 tablet 3     Multiple Vitamins-Minerals (MULTIVITAMIN ADULT PO) Take 1 tablet by mouth       propranolol ER (INDERAL LA) 80 MG 24 hr capsule TAKE ONE CAPSULE BY MOUTH ONCE DAILY 90 capsule 3     tamsulosin (FLOMAX) 0.4 MG capsule Take 1 capsule (0.4 mg) by mouth daily 90 capsule 3     vitamin  B complex with vitamin C (VITAMIN  B COMPLEX) TABS Take 1 tablet by mouth daily       VITAMIN D, CHOLECALCIFEROL, PO Take 1,000 Units by mouth daily       zolpidem (AMBIEN) 10 MG tablet Take 1 tablet (10 mg) by mouth nightly as needed for sleep 90 tablet 1       PMH, Medications and Allergies were reviewed and updated as needed.    REVIEW OF SYSTEMS:  10 point ROS is negative other than symptoms noted above in HPI, Past Medical History or  as stated below  Constitutional: NEGATIVE for fever, chills, change in weight  Skin: NEGATIVE for worrisome rashes, moles or lesions  GI/: NEGATIVE for bowel or bladder changes  Neuro: NEGATIVE for weakness, dizziness or paresthesias      OBJECTIVE:  /74   Wt 92.5 kg (204 lb)   BMI 28.45 kg/m     General: healthy, alert and in no distress  Skin: no suspicious lesions or rash.  CV: distal perfusion intact   Resp: normal respiratory effort without conversational dyspnea   Psych: normal mood and affect  Gait: NORMAL  Neuro: Normal light sensory exam of lower extremity bilaterally intact    BILATERAL KNEE  Inspection:    Normal alignment; no edema, erythema, or ecchymosis present  Palpation:    Tender about the medial joint line and superior pole of patella. Remainder of bony and ligamentous landmarks are nontender.    Trace effusion is present    Patellofemoral crepitus is Present bilaterally  Range of Motion:     00 extension to 1350 flexion  Strength:    Quadriceps 5/5    Extensor mechanism intact  Special Tests:    Positive: Patellar grind bilateral    Negative: MCL/valgus stress (0 & 30 deg), LCL/varus stress (0 & 30 deg)      RADIOLOGY:  Final results and radiologist's interpretation, available in the [facility] health record.  Images were reviewed with the patient in the office today.    My personal interpretation of the performed imaging:   Bilateral patellofemoral osteoarthritic changes left greater than right.  No effusion.  Left greater than right medial compartment joint space narrowing.  No acute fracture or dislocation.  Please await final radiology read.      Reviewed and agree with interpretation as below     Narrative & Impression   MR RIGHT KNEE 1/26/2016 5:15 PM     HISTORY:  Medial, lateral and anterior knee pain with weakness for  years. No specific injury.      TECHNIQUE: Sagittal proton density and T2-weighted images, coronal T1  and coronal and axial T2 fat-suppressed images were obtained.      COMPARISON: None.     FINDINGS:   Menisci:  Medial meniscus: Intrasubstance myxoid degenerative change at the  junction of the midbody and posterior horn. No evidence for tear.     Lateral meniscus: Normal.     Cruciate ligaments:  Anterior cruciate ligament: Normal.     Posterior cruciate ligament: Normal.      Collateral supporting structures:  Medial collateral ligament: Normal.     Lateral supporting structures: The distal biceps tendon, fibular  collateral ligament, distal iliotibial tract and popliteus muscle and  tendon are normal.      Osseous structures and cartilaginous surfaces: No acute-appearing bony  abnormalities. There is a full-thickness articular cartilage fissure  at the posterior weightbearing medial femoral condyle associated with  a communicating small subchondral cyst (image 22 of series 5). No  associated bone marrow edema. Probable grade 2 chondromalacia  elsewhere in the weightbearing medial femoral condyle and medial  tibial plateau. Articular cartilages and the weightbearing lateral  compartment appear normal. However, there is articular cartilage  thinning and irregularity associated with subchondral microcystic  change at the far-anteromedial nonweightbearing lateral femoral  condyle (image 10 of series 6, image 11 of series 5 and images 18  through 20 of series 3). Patellofemoral articular cartilages appear  normal, although there is a small focus of subchondral bone marrow  edema or cystic change on the medial patellar facet (image 11 of  series 3) of uncertain significance. There is a small focus of  ill-defined decreased T1 and increased T2 signal in the central aspect  of the medullary cavity of the distal femoral metadiaphysis which is  likely a small benign enchondroma or potentially benign red bone  marrow.     Additional findings: No significant joint space effusion. Tiny  popliteal cyst.        IMPRESSION:   1. Full-thickness articular cartilage fissure associated with  a  subchondral cystic change in the weightbearing medial femoral condyle.  Grade 2 chondromalacia elsewhere in the medial compartment.  2. Grade 3-4 chondromalacia associated with subchondral microcystic  changes in the far-anteromedial aspect of the nonweightbearing lateral  femoral condyle.  3. Small focus of subchondral bone marrow edema or cystic change  without overlying articular cartilage abnormality at the medial  patellar facet.          Narrative & Impression   MR LEFT KNEE 1/26/2016 4:49 PM     HISTORY: Medial, lateral and anterior knee pain with weakness. The  symptoms are chronic, but increasing recently. No specific injury.     TECHNIQUE: Sagittal proton density and T2-weighted images, coronal T1  and coronal and axial T2 fat-suppressed images were obtained.     COMPARISON: None.     FINDINGS:   Menisci:  Medial meniscus: Abnormal intrasubstance increased signal is seen  throughout the midbody, extending into the adjacent anterior and  posterior horn. There is likely extension of the abnormal signal to  the meniscal surface at the superior aspect of the periphery (image 18  of series 5), consistent with a slightly complex longitudinal  peripheral tear. There is an associated small multiloculated  parameniscal cyst posteromedially, measuring about 1.2 x 0.4 x 0.4 cm  (images 27 and 28 of series 6 and image 24 of series 3). A smaller  multiloculated parameniscal cyst is seen medially (images 23 and 24 of  series 3 and image 27 of series 6). There are no displaced meniscal  fragments or flaps. The remainder of the anterior and posterior horn  are normal.     Lateral meniscus: Normal.     Cruciate ligaments:  Anterior cruciate ligament: Normal.     Posterior cruciate ligament: Normal.      Collateral supporting structures:  Medial collateral ligament: Normal.     Lateral supporting structures: The distal biceps tendon, fibular  collateral ligament, distal iliotibial tract and popliteus muscle and  tendon  are normal.      Osseous structures and cartilaginous surfaces: No acute-appearing bony  abnormalities. Lateral compartment are normal. There is mild signal  heterogeneity in the articular cartilage of the weightbearing medial  femoral condyle, consistent with grade 1 chondromalacia. There is a  full-thickness articular cartilage fissure at the median eminence of  the patella (images 11 through 13 of series 3). In addition, there is  grade 2 chondromalacia associated with subchondral bone marrow edema  at the superior aspect of the medial patellar facet. Trochlear groove  articular cartilage appears normal.     Additional findings: No significant joint space effusion.        IMPRESSION:   1. Complex tear involving the periphery of the midbody of the medial  meniscus with associated small parameniscal cysts.  2. Early chondromalacia weightbearing medial femoral condyle.  3. Articular cartilage fissure median eminence of the patella. Grade 2  chondromalacia medial patellar facet.     ERIN PA MD       Greater than 45 minutes spent by me on the date of the encounter doing chart review, review of outside records, review of test results, interpretation of tests, patient visit, and documentation            Again, thank you for allowing me to participate in the care of your patient.        Sincerely,        Alisha Gregory MD

## 2023-11-27 NOTE — PATIENT INSTRUCTIONS
1. Chondromalacia of both patellae    2. Primary osteoarthritis of both knees      Bilateral knee pain  - Follow-up three weeks for gel shots  -Tylenol 500-1000mg (up to three times per day) and ibuprofen 600mg (up to three times per day) as needed for pain and swelling. Always take ibuprofen with food.       Please call [phone]  Ask for my team if you have any questions or concerns    Alisha Greogry DO  [facility]      Thank you for choosing [facility]!    CLINIC LOCATIONS:     [place]  TRIAGE LINE: [phone]   [address] [place] [address] APPOINTMENTS: [phone]   [place] [address] RADIOLOGY: [phone]   (Thursday & Friday) PHYSICAL THERAPY: [phone]    BILLING QUESTIONS: [phone]   [place] FAX: [phone] [address]    [place] [address]    (Monday & Wednesday

## 2023-12-05 DIAGNOSIS — M17.0 PRIMARY OSTEOARTHRITIS OF BOTH KNEES: Primary | ICD-10-CM

## 2023-12-11 ENCOUNTER — TELEPHONE (OUTPATIENT)
Dept: GASTROENTEROLOGY | Facility: CLINIC | Age: 58
End: 2023-12-11
Payer: COMMERCIAL

## 2023-12-11 NOTE — TELEPHONE ENCOUNTER
"Endoscopy Scheduling Screen    Have you had a positive Covid test in the last 14 days?  No    Are you active on MyChart?   Yes    What insurance is in the chart?  Other:  [facility]    Ordering/Referring Provider: Lyndon Stoll MD     (If ordering provider performs procedure, schedule with ordering provider unless otherwise instructed. )    BMI: Estimated body mass index is 28.45 kg/m  as calculated from the following:    Height as of 11/21/23: 1.803 m (5' 11\").    Weight as of 11/27/23: 92.5 kg (204 lb).     Sedation Ordered  moderate sedation.   If patient BMI > 50 do not schedule in [facility].    If patient BMI > 45 do not schedule at [facility].    Are you taking methadone or Suboxone?  No    Are you taking any prescription medications for pain 3 or more times per week?   No    Do you have a history of malignant hyperthermia or adverse reaction to anesthesia?  No    (Females) Are you currently pregnant?   No     Have you been diagnosed or told you have pulmonary hypertension?   No    Do you have an LVAD?  No    Have you been told you have moderate to severe sleep apnea?  No    Have you been told you have COPD, asthma, or any other lung disease?  Yes     What breathing problems do you have?  COPD AND ASTHMA     Do you use home oxygen?  No    Have your breathing problems required an ED visit or hospitalization in the last year?  No    Do you have any heart conditions?  No     Have you ever had an organ transplant?   No    Have you ever had or are you awaiting a heart or lung transplant?   No    Have you had a stroke or transient ischemic attack (TIA aka \"mini stroke\" in the last 6 months?   No    Have you been diagnosed with or been told you have cirrhosis of the liver?   No    Are you currently on dialysis?   No    Do you need assistance transferring?   No    BMI: Estimated body mass index is 28.45 kg/m  as calculated from the following:    Height as of 11/21/23: 1.803 m (5' 11\").    Weight as of 11/27/23: 92.5 kg (204 lb). "     Is patients BMI > 40 and scheduling location [facility]?  No    Do you take an injectable medication for weight loss or diabetes (excluding insulin)?  No    Do you take the medication Naltrexone?  No    Do you take blood thinners?  No       Prep   Are you currently on dialysis or do you have chronic kidney disease?  No    Do you have a diagnosis of diabetes?  No    Do you have a diagnosis of cystic fibrosis (CF)?  No    On a regular basis do you go 3 -5 days between bowel movements?  No    BMI > 40?  No    Preferred Pharmacy:      [facility] [address] [facility]  [address] [facility] [address]  Phone: [phone] Fax: [phone]      Final Scheduling Details   Colonoscopy prep sent?  Standard MiraLAX    Procedure scheduled  Colonoscopy    Surgeon:       Date of procedure:  2/27     Pre-OP / PAC:   Yes - Patient informed of pre-op requirement.    Location  SH - Patient preference.    Sedation   MAC/Deep Sedation - Patient preference. States that previous colonoscopy with CS was uncomfortable.      Patient Reminders:   You will receive a call from a Nurse to review instructions and health history.  This assessment must be completed prior to your procedure.  Failure to complete the Nurse assessment may result in the procedure being cancelled.      On the day of your procedure, please designate an adult(s) who can drive you home stay with you for the next 24 hours. The medicines used in the exam will make you sleepy. You will not be able to drive.      You cannot take public transportation, ride share services, or non-medical taxi service without a responsible caregiver.  Medical transport services are allowed with the requirement that a responsible caregiver will receive you at your destination.  We require that drivers and caregivers are confirmed prior to your procedure.

## 2023-12-15 ENCOUNTER — TELEPHONE (OUTPATIENT)
Dept: FAMILY MEDICINE | Facility: CLINIC | Age: 58
End: 2023-12-15
Payer: COMMERCIAL

## 2023-12-15 DIAGNOSIS — G25.0 FAMILIAL TREMOR: ICD-10-CM

## 2023-12-15 NOTE — TELEPHONE ENCOUNTER
Hello,    Patient has contacted pharmacy to request refills on Propranolol ER 80mg. The Rx we have on file has directions of one capsule/day but patient stated he is taking one additional capsule daily as needed.     Thank You,    Amanda Bee, [facility] ] Department   [facility]  [email or web address]

## 2023-12-21 NOTE — TELEPHONE ENCOUNTER
Please see message below and advise on appropriate sig for propranolol medication.     Juan Luis CASTELLON RN 12/21/2023 at 11:45 AM

## 2023-12-22 RX ORDER — PROPRANOLOL HYDROCHLORIDE 80 MG/1
CAPSULE, EXTENDED RELEASE ORAL
Qty: 120 CAPSULE | Refills: 3 | Status: SHIPPED | OUTPATIENT
Start: 2023-12-22 | End: 2024-02-26

## 2023-12-22 NOTE — TELEPHONE ENCOUNTER
Updated script sent as BW aware per last note.    Domenico Baker PA-C on 12/22/2023 at 8:36 AM (covering for Dr. Stoll)

## 2024-01-04 ENCOUNTER — HOSPITAL ENCOUNTER (OUTPATIENT)
Facility: AMBULATORY SURGERY CENTER | Age: 59
Discharge: HOME | End: 2024-01-04
Attending: STUDENT IN AN ORGANIZED HEALTH CARE EDUCATION/TRAINING PROGRAM | Admitting: STUDENT IN AN ORGANIZED HEALTH CARE EDUCATION/TRAINING PROGRAM
Payer: COMMERCIAL

## 2024-01-04 VITALS
HEART RATE: 72 BPM | DIASTOLIC BLOOD PRESSURE: 67 MMHG | OXYGEN SATURATION: 98 % | TEMPERATURE: 97.5 F | HEIGHT: 71 IN | BODY MASS INDEX: 28.56 KG/M2 | SYSTOLIC BLOOD PRESSURE: 118 MMHG | WEIGHT: 204 LBS | RESPIRATION RATE: 16 BRPM

## 2024-01-04 DIAGNOSIS — M65.342 TRIGGER FINGER, LEFT RING FINGER: Primary | ICD-10-CM

## 2024-01-04 PROCEDURE — 26055 INCISE FINGER TENDON SHEATH: CPT | Mod: F3

## 2024-01-04 PROCEDURE — 26055 INCISE FINGER TENDON SHEATH: CPT | Mod: F3 | Performed by: STUDENT IN AN ORGANIZED HEALTH CARE EDUCATION/TRAINING PROGRAM

## 2024-01-04 RX ORDER — ACETAMINOPHEN 325 MG/1
650 TABLET ORAL EVERY 6 HOURS PRN
Qty: 30 TABLET | Refills: 0 | Status: SHIPPED | OUTPATIENT
Start: 2024-01-04 | End: 2024-01-11

## 2024-01-04 RX ORDER — NAPROXEN 500 MG/1
250 TABLET ORAL 2 TIMES DAILY WITH MEALS
Qty: 30 TABLET | Refills: 0 | Status: SHIPPED | OUTPATIENT
Start: 2024-01-04 | End: 2024-02-26

## 2024-01-04 RX ORDER — LIDOCAINE HYDROCHLORIDE AND EPINEPHRINE 10; 10 MG/ML; UG/ML
10 INJECTION, SOLUTION INFILTRATION; PERINEURAL ONCE
Status: COMPLETED | OUTPATIENT
Start: 2024-01-04 | End: 2024-01-04

## 2024-01-04 RX ORDER — OXYCODONE HYDROCHLORIDE 5 MG/1
5 TABLET ORAL EVERY 6 HOURS PRN
Qty: 3 TABLET | Refills: 0 | Status: SHIPPED | OUTPATIENT
Start: 2024-01-04 | End: 2024-01-05

## 2024-01-04 RX ADMIN — LIDOCAINE HYDROCHLORIDE AND EPINEPHRINE 10 ML: 10; 10 INJECTION, SOLUTION INFILTRATION; PERINEURAL at 12:18

## 2024-01-04 NOTE — BRIEF OP NOTE
[facility]    Brief Operative Note    Pre-operative diagnosis: Trigger finger, left ring finger [M65.342]  Post-operative diagnosis Same as pre-operative diagnosis    Procedure: RELEASE, LEFT RING TRIGGER FINGER, Left - Hand    Surgeon: Surgeon(s) and Role:     * Jeevan Solis MD - Primary     * Rashida Patrick MD - Resident - Assisting  Anesthesia: Local   Estimated Blood Loss: 1 ml     Drains: None  Specimens: * No specimens in log *  Findings:   See full op note .  Complications: None.  Implants: * No implants in log *    Discharge home today per SDS criteria.     Rashida Patrick MD, PGY4  Orthopedic Surgery

## 2024-01-04 NOTE — DISCHARGE INSTRUCTIONS
Procedure Performed: Left ring trigger finger release   Attending Surgeon: Dr. Solis   Date: 1/4/2024    DIAGNOSIS  1. Trigger finger, left ring finger        MEDICATIONS   Resume all home medications as directed unless otherwise instructed during this hospitalization. If there is any question, double check with your primary care provider.  Start new discharge medications as directed.    Take 1 tablet of 650 mg Tylenol (acetaminophen) Arthritis Strength (extended release) and 1 tablet of Aleve (naproxen) 220 mg in the morning with breakfast and in the evening with dinner.     For breakthrough pain use narcotic pain medication as prescribed.    Do not drive or operate machinery while taking narcotic pain medications.   If you are taking other Tylenol containing medicines at home, be sure NOT to exceed 4 gram's (4000 milligrams) of Tylenol per day.   If you are taking pain medications, be sure to take Colace (docusate sodium) as well to prevent constipation. If constipated, try adding another cathartic or enema.  If nausea and vomiting, call the hospital or seek medical attention.    ACTIVITY   Weight bearing: Non-weight bearing to arm.    DIET  Resume same diet prior to your hospital admission.    WOUND   Leave dressing on until you are seen in clinic for your follow up visit.   Watch for signs and symptoms of infection of your wounds including; pain, redness, swelling, drainage or fever.  If you notice any of these symptoms please call or seek medical attention.    Keep wound clean, dry, and intact.  Do not submerge wounds in water until they are healed. No baths, soaking, swimming, or prolonged water exposure for 4 weeks after surgery.    RETURN   Follow-up with Orthopedic Clinic as directed.     Future Appointments   Date Time Provider Department Center   1/11/2024  4:00 PM Kevon Kwong PA-C [facility]   2/6/2024  1:00 PM Lyndon Stoll MD [facility]       Call the [facility] at  [phone] during business hours for any symptoms such as:    * Fevers with Temperature greater than 101.5 degrees.   * Pus drainage from wound site.   * Severe pain, not controlled by medication.   * Persistent nausea, vomiting and inablility to tolerate fluids.    If you are receiving care in [place], you may call the Orthopedic clinic at [phone].    FOR URGENT PROBLEMS ONLY, after hours or on weekends call the hospital  at [phone] and ask to speak with the orthopedic resident on call.    [facility]  Home Care Following Your Procedure  Call a doctor if you have signs of infection (fever, growing tenderness at the surgery site, a large amount of drainage or bleeding, severe pain, foul-smelling drainage, redness, swelling).             Tylenol/Acetaminophen Consumption    If you feel your pain relief is insufficient, you may take Tylenol/Acetaminophen in addition to your narcotic pain medication.   Be careful not to exceed 4,000 mg of Tylenol/Acetaminophen in a 24 hour period from all sources.  If you are taking extra strength Tylenol/acetaminophen (500 mg), the maximum dose is 8 tablets in 24 hours.  If you are taking regular strength acetaminophen (325 mg), the maximum dose is 12 tablets in 24 hours.      Your doctor is:       Dr. Jeevan Solis, Orthopaedics: [phone]          Or dial [phone] and ask for the resident on call for:  Orthopaedics  For emergency care, call the:  [facility]: [phone] (TTY for hearing impaired: [phone])

## 2024-01-04 NOTE — OP NOTE
Patient: Venkata Lynn  : 1965  MRN: [id number]    DATE OF OPERATION: 2024      OPERATIVE REPORT       PREOPERATIVE DIAGNOSIS:  Left ring trigger finger     POSTOPERATIVE DIAGNOSIS:  Left ring trigger finger     PROCEDURE:  Open release of A1 pulley of the left ring finger    SURGEON:  Jeevan Solis MD     ASSISTANT(S):  Dr. Patrick, PGY4    ANESTHESIA:  Local 10cc 1% lidocaine 1:100,000 epinephrine     IMPLANTS:   None    ESTIMATED BLOOD LOSS:  1cc    DVT PROPHYLAXIS:  None    TOURNIQUET TIME:  None    SPECIMENS REMOVED:  None    INTRAOPERATIVE FINDINGS:  Tenosynovitis of the flexor tendons at the A1 pulley of left ring finger    COMPLICATIONS:  None    DISPOSITION:  Stable to PACU.     INDICATIONS:  Mr. Venkata Lynn is a 58 year old male with longstanding history of triggering of the left ring finger recalcitrant to non-operative measures. The patient was seen in the office. Indications for surgery were discussed with the patient in detail.  The patient wished to proceed with trigger finger release.    The indications for surgery were discussed with the patient. The benefits, risks, and alternatives of operative management were discussed in detail with the patient. The patient understands that the risks of surgery include, but are not limited to: infection, bleeding, injury to nearby structures (such as nerves, blood vessels, and tendons), recurrence, persistent triggering, need for additional surgery, pain, stiffness, scarring, need for rehabilitation, and anesthetic complications.  Patient expressed understanding and elected to proceed with surgery. All questions were answered to the patient's satisfaction.    Consent was obtained for the procedure.     DESCRIPTION OF PROCEDURE IN DETAIL:  The patient was seen  in the preoperative care unit. Patient identity, consent, procedure to be performed, and operative site were verified with the patient. The left ring finger was marked. The skin  was cleansed with alcohol and 10cc of local anesthetic mixture was injected into the subcutaneous tissues over the A1 pulley.    The patient was brought to the operating room and placed supine on the operating room table. The left upper extremity was placed on an armboard.     The left upper extremity was prepped and draped in the normal sterile fashion. A preoperative timeout was performed to identify the correct patient and procedure and all were in agreement.    Incision was first made on the volar aspect of left ring finger in a transverse fashion in the palmar crease over the A1 pulley. Blunt dissection was taken down to the level of the flexor sheath, identifying and protecting the neurovascular bundle on either side of the sheath.    The A1 pulley in its entirety was visualized. This was sharply incised with a [place] blade, protecting the neurovascular bundle on either side and the tendon below. Proximally the A0 pulley was also divided under direct visualization. A complete release was performed and visualized. The tenosynovitis on the tendons was gently debrided with tenotomy scissors.    The patient was asked to make a fist several times and no triggering was visualized of the finger. This wound was thoroughly irrigated. Careful hemostasis was assured with bipolar cautery. The surgical incision was closed with 4-0 monocryl in interrupted buried fashion.     Steri strips and a sterile soft dressing were applied.    All needle and sponge counts were correct at the end of the procedure. There were no complications.     The patient was taken to the postoperative recovery unit in stable condition.     Dr. Solis was present and scrubbed for the entire procedure.     POSTOPERATIVE PLAN:  The patient will maintain the postoperative dressing until the postoperative visit in 10-14 days. The dressing will be kept clean and dry. The patient will remain non-weightbearing on the operative extremity. We have discussed and  instructed the patient on range of motion exercises of the digits and wrist.     Rashida Patrick MD, PGY4  Orthopedic Surgery     I was present and scrubbed for the entire surgery. I agree with the operative note.    Jeevan Solis MD    Hand & Upper Extremity Surgery  Department of Orthopedic Surgery  [facility]

## 2024-01-06 ENCOUNTER — HEALTH MAINTENANCE LETTER (OUTPATIENT)
Age: 59
End: 2024-01-06

## 2024-01-11 ENCOUNTER — OFFICE VISIT (OUTPATIENT)
Dept: ORTHOPEDICS | Facility: CLINIC | Age: 59
End: 2024-01-11
Payer: COMMERCIAL

## 2024-01-11 DIAGNOSIS — M65.331 TRIGGER FINGER, RIGHT MIDDLE FINGER: Primary | ICD-10-CM

## 2024-01-11 PROCEDURE — 99024 POSTOP FOLLOW-UP VISIT: CPT | Performed by: PHYSICIAN ASSISTANT

## 2024-01-11 PROCEDURE — 20550 NJX 1 TENDON SHEATH/LIGAMENT: CPT | Mod: 79 | Performed by: PHYSICIAN ASSISTANT

## 2024-01-11 RX ADMIN — LIDOCAINE HYDROCHLORIDE 1 ML: 10 INJECTION, SOLUTION INFILTRATION; PERINEURAL at 18:40

## 2024-01-11 RX ADMIN — TESTOSTERONE CYPIONATE 1 ML: 200 INJECTION INTRAMUSCULAR at 18:40

## 2024-01-12 NOTE — PROGRESS NOTES
HISTORY OF PRESENT ILLNESS:    Venkata Lynn is a 58 year old male who is seen in follow up for Left ring trigger release, DOS 01/04/2024, Dr. Solis.  Present symptoms: Pt reports minimal discomfort.  Healing incision, keeping it clean and covered.  Finger motions returning without triggering.    NOTE:  Pt states has painful Right Long finger triggering issues.  Has had injection by Dr. Solis in the past with some relief, starting to bother him again.  WOuld like to discuss injection today.  Injection hx.  8/22/23 and 10/17/2023 Dr. Solis.  Denies Chest pain, Calve pain, Fever, Chills.    Current Treatment: postop dressing.    PHYSICAL EXAM:  There were no vitals taken for this visit.  There is no height or weight on file to calculate BMI.   GENERAL APPEARANCE: healthy, alert, and no distress   PSYCH:  mentation appears normal and affect normal/bright    MSK:  Left: Ring finger .  Ambulates: WNG.  Incision clean and dry, subcutaneous closure present, healing.  Appropriate incisional erythema.   No Ecchymosis.  Edema min at digit and hand.  CMS: tee incisional numbness, otherwise grossly intact.  AROM full.    Right Long finger.  Gross:  no significant issues.  Mild PIP flexion at rest.    AROM: Visible jog in flexion extension with motion.  No locking.  Palpation:  firm nodule at palm at long finger flexion tendon, moves with finger motions.  Sensation, motor, circulation intact.     ASSESSMENT:  Venkata Lynn is a 58 year old male S/P Trigger release..  Healing, motions restored.    Right Long finger A1  trigger, improved with injections in past.  Given 3 months since last injection and recent surgery we decided to proceed with injection today.      I recommended to Champ that if injection does not resolve symptoms or if they return in a couple months that he seek surgical treatment rather than a 4th injection.    PLAN:  - Surgery discussed, images reviewed if applicable, and all questions were answered at this  time.  - dressing removed with sterile technique, steri-strips applied in usual fashion, care instructions given and verbally acknowledged.  - Medications: none per ortho.  - Continue to motion.  - AAT  - Notify clinic if he would like to proceed with Right long finger trigger release.    Return to clinic PRN    Kevon Kwong PA-C    Dept. Orthopedic Surgery  [facility]   1/11/2024    Hand / Upper Extremity Injection/Arthrocentesis: R long A1    Date/Time: 1/11/2024 6:40 PM    Performed by: Kevon Kwong PA-C  Authorized by: Kevon Kwong PA-C    Indications:  Therapeutic  Needle Size:  25 G  Guidance: landmark    Approach:  Volar  Condition: trigger finger    Location:  Long finger    Site:  R long A1  Medications:  1 mL dexAMETHasone 120 MG/30ML; 1 mL lidocaine 1 %  Outcome:  Tolerated well, no immediate complications  Procedure discussed: discussed risks, benefits, and alternatives    Timeout: timeout called immediately prior to procedure    Prep: patient was prepped and draped in usual sterile fashion

## 2024-01-16 RX ORDER — LIDOCAINE HYDROCHLORIDE 10 MG/ML
1 INJECTION, SOLUTION INFILTRATION; PERINEURAL
Status: SHIPPED | OUTPATIENT
Start: 2024-01-11

## 2024-01-16 RX ORDER — TESTOSTERONE CYPIONATE 200 MG/ML
1 INJECTION INTRAMUSCULAR
Status: SHIPPED | OUTPATIENT
Start: 2024-01-11

## 2024-01-30 ENCOUNTER — TELEPHONE (OUTPATIENT)
Dept: FAMILY MEDICINE | Facility: CLINIC | Age: 59
End: 2024-01-30
Payer: COMMERCIAL

## 2024-01-30 NOTE — TELEPHONE ENCOUNTER
Patient Quality Outreach    Patient is due for the following:   Colon Cancer Screening  Physical Preventive Adult Physical    Next Steps:   Patient has upcoming appointment, these items will be addressed at that time.    Type of outreach:    Chart review performed, no outreach needed.      Questions for provider review:    None           Janiya Alexandre CMA

## 2024-02-05 DIAGNOSIS — F41.9 ANXIETY: ICD-10-CM

## 2024-02-05 RX ORDER — HYDROXYZINE HYDROCHLORIDE 25 MG/1
25 TABLET, FILM COATED ORAL 3 TIMES DAILY PRN
Qty: 90 TABLET | Refills: 5 | Status: SHIPPED | OUTPATIENT
Start: 2024-02-05

## 2024-02-08 ENCOUNTER — TELEPHONE (OUTPATIENT)
Dept: FAMILY MEDICINE | Facility: CLINIC | Age: 59
End: 2024-02-08
Payer: COMMERCIAL

## 2024-02-08 NOTE — TELEPHONE ENCOUNTER
Patient Quality Outreach    Patient is due for the following:   Colon Cancer Screening    Next Steps:   Patient is scheduled for their Colon Cancer screening.     Type of outreach:    Chart review performed, no outreach needed.      Questions for provider review:    None           Janiya Alexandre CMA

## 2024-02-13 ENCOUNTER — TELEPHONE (OUTPATIENT)
Dept: GASTROENTEROLOGY | Facility: CLINIC | Age: 59
End: 2024-02-13
Payer: COMMERCIAL

## 2024-02-13 NOTE — TELEPHONE ENCOUNTER
Pre visit planning completed.      Procedure details:    Patient scheduled for Colonoscopy  on 2/27/2024.     Arrival time: 1330. Procedure time 1430    Pre op exam needed? Yes. Scheduled 2/26/24 with Lyndon Stoll MD    Facility location: [facility]; [address] [place] [id number]    Sedation type: MAC    Indication for procedure: Screening       Chart review:     Electronic implanted devices? No    Recent diagnosis of diverticulitis within the last 6 weeks? No    Diabetic? No      Medication review:    Anticoagulants? No    NSAIDS? No NSAID medications per patient's medication list.  RN will verify with pre-assessment call.    Other medication HOLDING recommendations:  N/A      Prep for procedure:     Bowel prep recommendation: Standard Miralax   Due to:  standard bowel prep.    Prep instructions sent via [id number]         Ximena Saldivar RN  Endoscopy Procedure Pre Assessment RN  [phone] option 4

## 2024-02-13 NOTE — TELEPHONE ENCOUNTER
Pre assessment completed for upcoming procedure.   (Please see previous telephone encounter notes for complete details)    Patient  returned call.       Procedure details:    Arrival time and facility location reviewed.    Pre op exam needed? N/A    Designated  policy reviewed. Instructed to have someone stay 24  hours post procedure.     COVID policy reviewed.      Medication review:    Medications reviewed. Please see supporting documentation below. Holding recommendations discussed (if applicable).   NSAID medication(s): Ibuprofen (Advil, Motrin): HOLD 1 day before procedure.      Prep for procedure:     Procedure prep instructions reviewed.        Any additional information needed:  N/A      Patient  verbalized understanding and had no questions or concerns at this time.      Mecca Yen RN  Endoscopy Procedure Pre Assessment RN  [phone] option 4

## 2024-02-13 NOTE — TELEPHONE ENCOUNTER
Attempted to contact patient in order to complete pre assessment questions.     Patient answered but stated he was in a store and needed to call back. Instructed patient to return call to [phone] option 4    Missed call communication sent via [employer].      Zuly Fernandez RN  Endoscopy Procedure Pre Assessment RN

## 2024-02-15 DIAGNOSIS — J30.2 SEASONAL ALLERGIC RHINITIS, UNSPECIFIED TRIGGER: ICD-10-CM

## 2024-02-15 RX ORDER — FLUTICASONE PROPIONATE 50 MCG
1 SPRAY, SUSPENSION (ML) NASAL DAILY
Qty: 16 G | Refills: 5 | Status: SHIPPED | OUTPATIENT
Start: 2024-02-15

## 2024-02-19 ENCOUNTER — TELEPHONE (OUTPATIENT)
Dept: FAMILY MEDICINE | Facility: CLINIC | Age: 59
End: 2024-02-19
Payer: COMMERCIAL

## 2024-02-19 NOTE — TELEPHONE ENCOUNTER
Patient Quality Outreach    Patient is due for the following:   Colon Cancer Screening  Physical Annual Wellness Visit    Next Steps:   Patient was scheduled for Annual and Colon Cancer screening    Type of outreach:    Chart review performed, no outreach needed.      Questions for provider review:    None           Janiya Alexandre CMA

## 2024-02-20 ENCOUNTER — TRANSFERRED RECORDS (OUTPATIENT)
Dept: HEALTH INFORMATION MANAGEMENT | Facility: CLINIC | Age: 59
End: 2024-02-20
Payer: COMMERCIAL

## 2024-02-26 ENCOUNTER — OFFICE VISIT (OUTPATIENT)
Dept: FAMILY MEDICINE | Facility: CLINIC | Age: 59
End: 2024-02-26
Payer: COMMERCIAL

## 2024-02-26 VITALS
HEIGHT: 71 IN | OXYGEN SATURATION: 97 % | SYSTOLIC BLOOD PRESSURE: 96 MMHG | DIASTOLIC BLOOD PRESSURE: 61 MMHG | BODY MASS INDEX: 28.84 KG/M2 | HEART RATE: 83 BPM | WEIGHT: 206 LBS | TEMPERATURE: 98.4 F | RESPIRATION RATE: 14 BRPM

## 2024-02-26 DIAGNOSIS — Z12.11 SCREEN FOR COLON CANCER: ICD-10-CM

## 2024-02-26 DIAGNOSIS — M15.0 PRIMARY OSTEOARTHRITIS INVOLVING MULTIPLE JOINTS: ICD-10-CM

## 2024-02-26 DIAGNOSIS — L72.3 SEBACEOUS CYST: ICD-10-CM

## 2024-02-26 DIAGNOSIS — E78.5 HYPERLIPIDEMIA LDL GOAL <160: Primary | ICD-10-CM

## 2024-02-26 LAB
CHOLEST SERPL-MCNC: 199 MG/DL
ERYTHROCYTE [SEDIMENTATION RATE] IN BLOOD BY WESTERGREN METHOD: 4 MM/HR (ref 0–20)
FASTING STATUS PATIENT QL REPORTED: ABNORMAL
HDLC SERPL-MCNC: 56 MG/DL
LDLC SERPL CALC-MCNC: 101 MG/DL
NONHDLC SERPL-MCNC: 143 MG/DL
RHEUMATOID FACT SERPL-ACNC: <10 IU/ML
TRIGL SERPL-MCNC: 211 MG/DL
URATE SERPL-MCNC: 5.2 MG/DL (ref 3.4–7)

## 2024-02-26 PROCEDURE — 84550 ASSAY OF BLOOD/URIC ACID: CPT | Performed by: FAMILY MEDICINE

## 2024-02-26 PROCEDURE — 86038 ANTINUCLEAR ANTIBODIES: CPT | Performed by: FAMILY MEDICINE

## 2024-02-26 PROCEDURE — 86200 CCP ANTIBODY: CPT | Performed by: FAMILY MEDICINE

## 2024-02-26 PROCEDURE — 99214 OFFICE O/P EST MOD 30 MIN: CPT | Mod: 24 | Performed by: FAMILY MEDICINE

## 2024-02-26 PROCEDURE — 86431 RHEUMATOID FACTOR QUANT: CPT | Performed by: FAMILY MEDICINE

## 2024-02-26 PROCEDURE — 36415 COLL VENOUS BLD VENIPUNCTURE: CPT | Performed by: FAMILY MEDICINE

## 2024-02-26 PROCEDURE — 80061 LIPID PANEL: CPT | Performed by: FAMILY MEDICINE

## 2024-02-26 PROCEDURE — 85652 RBC SED RATE AUTOMATED: CPT | Performed by: FAMILY MEDICINE

## 2024-02-26 SDOH — HEALTH STABILITY: PHYSICAL HEALTH: ON AVERAGE, HOW MANY DAYS PER WEEK DO YOU ENGAGE IN MODERATE TO STRENUOUS EXERCISE (LIKE A BRISK WALK)?: 5 DAYS

## 2024-02-26 ASSESSMENT — SOCIAL DETERMINANTS OF HEALTH (SDOH)
IN A TYPICAL WEEK, HOW MANY TIMES DO YOU TALK ON THE PHONE WITH FAMILY, FRIENDS, OR NEIGHBORS?: MORE THAN THREE TIMES A WEEK
HOW OFTEN DO YOU ATTENT MEETINGS OF THE CLUB OR ORGANIZATION YOU BELONG TO?: 1 TO 4 TIMES PER YEAR
DO YOU BELONG TO ANY CLUBS OR ORGANIZATIONS SUCH AS CHURCH GROUPS UNIONS, FRATERNAL OR ATHLETIC GROUPS, OR SCHOOL GROUPS?: YES

## 2024-02-26 ASSESSMENT — ASTHMA QUESTIONNAIRES
QUESTION_4 LAST FOUR WEEKS HOW OFTEN HAVE YOU USED YOUR RESCUE INHALER OR NEBULIZER MEDICATION (SUCH AS ALBUTEROL): ONCE A WEEK OR LESS
QUESTION_1 LAST FOUR WEEKS HOW MUCH OF THE TIME DID YOUR ASTHMA KEEP YOU FROM GETTING AS MUCH DONE AT WORK, SCHOOL OR AT HOME: NONE OF THE TIME
QUESTION_3 LAST FOUR WEEKS HOW OFTEN DID YOUR ASTHMA SYMPTOMS (WHEEZING, COUGHING, SHORTNESS OF BREATH, CHEST TIGHTNESS OR PAIN) WAKE YOU UP AT NIGHT OR EARLIER THAN USUAL IN THE MORNING: NOT AT ALL
QUESTION_5 LAST FOUR WEEKS HOW WOULD YOU RATE YOUR ASTHMA CONTROL: WELL CONTROLLED
QUESTION_2 LAST FOUR WEEKS HOW OFTEN HAVE YOU HAD SHORTNESS OF BREATH: NOT AT ALL
ACT_TOTALSCORE: 23
ACT_TOTALSCORE: 23

## 2024-02-26 ASSESSMENT — LIFESTYLE VARIABLES
AUDIT-C TOTAL SCORE: 5
HOW OFTEN DO YOU HAVE SIX OR MORE DRINKS ON ONE OCCASION: MONTHLY
HOW MANY STANDARD DRINKS CONTAINING ALCOHOL DO YOU HAVE ON A TYPICAL DAY: 3 OR 4
HOW OFTEN DO YOU HAVE A DRINK CONTAINING ALCOHOL: 2-4 TIMES A MONTH
SKIP TO QUESTIONS 9-10: 0

## 2024-02-26 NOTE — PROGRESS NOTES
Preoperative Evaluation  [facility]  [address] [facility] [address]  Phone: [phone]  Primary Provider: Samuel Baeza  Pre-op Performing Provider: SAMUEL BAEZA  Feb 26, 2024       Champ is a 58 year old, presenting for the following:  Pre-Op Exam        2/26/2024     1:51 PM   Additional Questions   Roomed by Jyothi CARRINGTON     Surgical Information  Surgery/Procedure: Colonoscopy  Surgery Location:  [facility]  Surgeon: Diandra  Surgery Date: 2/27/24  Time of Surgery: 2:30pm  Where patient plans to recover: At home with family  Fax number for surgical facility: Note does not need to be faxed, will be available electronically in Epic.    Assessment & Plan     The proposed surgical procedure is considered LOW risk.    Problem List Items Addressed This Visit       Hyperlipidemia LDL goal <160 - Primary     No current statin.  Recalculate risk         Relevant Orders    Lipid panel reflex to direct LDL Non-fasting    Primary osteoarthritis involving multiple joints     His hands are becoming more troublesome as occasionally stiff in the morning.  He has not noted synovitis.  OA has been diagnosed and he is treating this with nonsteroidals.  Broaden database.  Refer to hand therapy he works with his hands         Relevant Orders    Occupational Therapy  Referral    Rheumatoid factor    ESR: Erythrocyte sedimentation rate (Completed)    Anti Nuclear Lissette IgG by IFA with Reflex    Cyclic Citrullinated Peptide Antibody IgG    Uric acid    Screen for colon cancer     No contraindications to proposed colonoscopy.  He wishes heavier sedation as his previous experience was quite onerous         Sebaceous cyst     Superficial sebaceous cyst at the left posterior hairline with an overlying comedone.  This is quite superficial.  He would like it removed.  We shall schedule time                    - No identified additional risk factors other than previously  addressed    Antiplatelet or Anticoagulation Medication Instructions    Patient has held his naproxen for over a week.  He will take all other medications a.m. of procedure  Additional Medication Instructions      Recommendation  APPROVAL GIVEN to proceed with proposed procedure, without further diagnostic evaluation.          Subjective       HPI related to upcoming procedure: Colon cancer screening        2/26/2024     1:32 PM   Preop Questions   1. Have you ever had a heart attack or stroke? No   2. Have you ever had surgery on your heart or blood vessels, such as a stent placement, a coronary artery bypass, or surgery on an artery in your head, neck, heart, or legs? No   3. Do you have chest pain with activity? No   4. Do you have a history of  heart failure? No   5. Do you currently have a cold, bronchitis or symptoms of other infection? No   6. Do you have a cough, shortness of breath, or wheezing? No   7. Do you or anyone in your family have previous history of blood clots? No   8. Do you or does anyone in your family have a serious bleeding problem such as prolonged bleeding following surgeries or cuts? No   9. Have you ever had problems with anemia or been told to take iron pills? No   10. Have you had any abnormal blood loss such as black, tarry or bloody stools? No   11. Have you ever had a blood transfusion? No   12. Are you willing to have a blood transfusion if it is medically needed before, during, or after your surgery? Yes   13. Have you or any of your relatives ever had problems with anesthesia? No   14. Do you have sleep apnea, excessive snoring or daytime drowsiness? No   15. Do you have any artifical heart valves or other implanted medical devices like a pacemaker, defibrillator, or continuous glucose monitor? No   16. Do you have artificial joints? No   17. Are you allergic to latex? No       Health Care Directive  Patient does not have a Health Care Directive or Living Will:     Preoperative  Review of    reviewed - controlled substances reflected in medication list.  There is a brief prescription for oxycodone from outside provider        Patient Active Problem List    Diagnosis Date Noted    Sebaceous cyst 02/26/2024     Priority: Medium    Screen for colon cancer 02/26/2024     Priority: Medium    Primary osteoarthritis involving multiple joints 02/26/2024     Priority: Medium    Special screening for malignant neoplasms, colon 11/21/2023     Priority: Medium    Trigger finger, left ring finger 11/15/2023     Priority: Medium    Hematochezia 07/24/2023     Priority: Medium    Infection due to 2019 novel coronavirus 11/09/2022     Priority: Medium    Special screening for malignant neoplasm of prostate 11/09/2022     Priority: Medium    Sciatica without back pain, right 11/09/2022     Priority: Medium    Encounter for immunization 05/02/2022     Priority: Medium    Seasonal allergic rhinitis 05/02/2022     Priority: Medium    Primary insomnia 07/15/2021     Priority: Medium    Encounter for preventive measure 07/15/2021     Priority: Medium    Seborrheic keratosis 07/14/2021     Priority: Medium    Episodic tension-type headache, not intractable 03/08/2021     Priority: Medium    Right-sided low back pain with right-sided sciatica 10/08/2020     Priority: Medium    Snoring 09/29/2020     Priority: Medium    Benign prostatic hyperplasia with nocturia 09/29/2020     Priority: Medium    Nocturnal leg cramps 06/15/2020     Priority: Medium    Nontraumatic complete tear of right rotator cuff 12/30/2019     Priority: Medium    Trigger finger, acquired 11/22/2019     Priority: Medium    Rotator cuff syndrome, right 02/12/2019     Priority: Medium    Lateral epicondylitis of left elbow 09/14/2018     Priority: Medium    Sprain of left rotator cuff capsule, initial encounter 04/06/2017     Priority: Medium    Familial tremor 08/16/2016     Priority: Medium    Dyspepsia 08/16/2016     Priority: Medium     Mild persistent asthma without complication 08/16/2016     Priority: Medium    Pain in both knees 03/18/2016     Priority: Medium    Cyst of meniscus of left knee 01/28/2016     Priority: Medium    Cyst of meniscus of right knee 01/28/2016     Priority: Medium    Controlled substance agreement signed 01/11/2016     Priority: Medium     Patient is followed by SAMUEL BAEZA for ongoing prescription of benzodiazepines.  All refills should be approved by this provider, or covering partner.    Medication(s): alprazolam    Maximum quantity per month: 30 ea  Clinic visit frequency required: Q 3  months     Controlled substance agreement on file: Yes       Date(s):  2/12/19  Benzodiazepine use reviewed by psychiatry:      Last [facility] website verification:  Done 4.11.17 2/12/19   [email or web address]          Arthralgia of both knees 09/08/2015     Priority: Medium    Adjustment reaction 05/02/2014     Priority: Medium     Problem list name updated by automated process. Provider to review      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     Priority: Medium    Rosacea 01/18/2012     Priority: Medium    Sebaceous hyperplasia 01/18/2012     Priority: Medium    Tobacco abuse 01/18/2012     Priority: Medium     chew      Anxiety 03/28/2011     Priority: Medium     Patient is followed by SAMUEL BAEZA for ongoing prescription of benzodiazepines.  All refills should be approved by this provider, or covering partner.    Medication(s): Xanax 1 mg.   Maximum quantity per month: 30  Clinic visit frequency required: Q 6  months     Controlled substance agreement on file: Yes       Date(s): 2/12/19 11/9/22  Benzodiazepine use reviewed by psychiatry:  No    Last [employer] website verification:  11/9/22   [email or web address]          Hyperlipidemia LDL goal <160 10/31/2010     Priority: Medium     The 10-year ASCVD risk score (Lexmireya EISENBERG Jr., et al., 2013) is: 9.5%    Values used to calculate the score:      Age: 56 years      Sex: Male      Is  Non- : No      Diabetic: No      Tobacco smoker: Yes      Systolic Blood Pressure: 116 mmHg      Is BP treated: No      HDL Cholesterol: 58 mg/dL      Total Cholesterol: 252 mg/dL          Past Medical History:   Diagnosis Date    Acne vulgaris     Anxiety 03/28/2011    Anxiety disorder     Arthritis     both knees    CARDIOVASCULAR SCREENING; LDL GOAL LESS THAN 160 10/31/2010    Controlled substance agreement signed 01/11/2016    Coughing     at night    Dyspepsia 08/16/2016    Elevated blood pressure reading without diagnosis of hypertension 01/11/2016    Episodic tension-type headache, not intractable 03/08/2021    Familial tremor 08/16/2016    Family history of rhinophyma 01/18/2012    Hyperlipidemia LDL goal <160 10/31/2010    The 10-year ASCVD risk score (Lex EISENBERG Jr., et al., 2013) is: 9.5%   Values used to calculate the score:     Age: 56 years     Sex: Male     Is Non- : No     Diabetic: No     Tobacco smoker: Yes     Systolic Blood Pressure: 116 mmHg     Is BP treated: No     HDL Cholesterol: 58 mg/dL     Total Cholesterol: 252 mg/dL     Infection due to 2019 novel coronavirus 11/09/2022    Insomnia     Intermittent asthma 03/28/2011    Knee pain 01/18/2012    Lateral epicondylitis of left elbow 09/14/2018    LBP (low back pain) 01/18/2012    Low back pains     Mild persistent asthma without complication 08/16/2016    Nocturnal leg cramps 06/15/2020    Rhinitis 01/18/2012    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    Right-sided low back pain with right-sided sciatica 10/08/2020    Rosacea 01/18/2012    Rotator cuff syndrome, right 02/12/2019    Seasonal allergic rhinitis 05/02/2022    Sebaceous cyst 02/26/2024    Sebaceous hyperplasia 01/18/2012    Seborrheic keratosis 07/14/2021    Shortness of breath     due to asthma    Snoring 09/29/2020    Tobacco abuse 01/18/2012    Trigger finger, acquired 11/22/2019     Past Surgical History:   Procedure Laterality Date    ARTHROSCOPY  KNEE WITH MEDIAL MENISCECTOMY Left 2/22/2016    Procedure: ARTHROSCOPY KNEE WITH MEDIAL MENISCECTOMY;  Surgeon: Chaz Moe MD;  Location: [facility] OR    ARTHROSCOPY SHOULDER ROTATOR CUFF REPAIR, BICEP TENODESIS REPAIR Right 12/12/2019    Procedure: Right Arthroscopic Rotator Cuff Repair, Arthroscopic Subacromial Decompression, Arthroscopic Biceps Tenodesis, Arthroscopic Distal Clavicle Excision;  Surgeon: Nils Milligan MD;  Location: [facility] OR    ENT SURGERY      lipoma removed from neck    ESOPHAGOSCOPY, GASTROSCOPY, DUODENOSCOPY (EGD), COMBINED N/A 9/20/2019    Procedure: ESOPHAGOGASTRODUODENOSCOPY (EGD);  Surgeon: Ethan Davidson MD;  Location: [facility] GI    HEAD & NECK SURGERY      fx root of tooth    RELEASE TRIGGER FINGER Left 1/4/2024    Procedure: RELEASE, LEFT RING TRIGGER FINGER;  Surgeon: Jeevan Solis MD;  Location: [facility] OR    ROTATOR CUFF REPAIR RT/LT Left 01/2017    VASECTOMY       Current Outpatient Medications   Medication Sig Dispense Refill    albuterol (PROAIR HFA/PROVENTIL HFA/VENTOLIN HFA) 108 (90 Base) MCG/ACT inhaler Inhale 2 puffs into the lungs every 6 hours as needed for shortness of breath / dyspnea 18 g 3    ALPRAZolam (XANAX) 1 MG tablet Take 1 tablet (1 mg) by mouth daily as needed for anxiety 30 tablet 5    augmented betamethasone dipropionate (DIPROLENE-AF) 0.05 % external cream       azelaic acid (FINACIA) 15 % external gel       azelastine (ASTELIN) 0.1 % nasal spray Spray 1 spray into both nostrils 2 times daily 30 mL 5    Calcium Carbonate-Vit D-Min (CALCIUM 1200 PO)       fluticasone (FLONASE) 50 MCG/ACT nasal spray Spray 1 spray into both nostrils daily 16 g 5    fluticasone (FLOVENT HFA) 110 MCG/ACT inhaler Inhale 1 puff into the lungs 2 times daily as needed (rescue) 12 g 1    Fluticasone-Umeclidin-Vilant (TRELEGY ELLIPTA) 100-62.5-25 MCG/ACT oral inhaler INHALE 1 PUFF INTO THE LUNGS DAILY 3 each 3    hydrOXYzine HCl (ATARAX) 25 MG tablet TAKE 1 TABLET (25 MG) BY  "MOUTH 3 TIMES DAILY AS NEEDED FOR ANXIETY 90 tablet 5    ipratropium (ATROVENT) 0.06 % nasal spray INHALE TWO SPRAYS IN EACH NOSTRIL FOUR TIMES A DAY 15 mL 5    montelukast (SINGULAIR) 10 MG tablet TAKE ONE TABLET BY MOUTH AT BEDTIME Strength: 10 mg 90 tablet 3    Multiple Vitamins-Minerals (MULTIVITAMIN ADULT PO) Take 1 tablet by mouth      tamsulosin (FLOMAX) 0.4 MG capsule Take 1 capsule (0.4 mg) by mouth daily 90 capsule 3    vitamin  B complex with vitamin C (VITAMIN  B COMPLEX) TABS Take 1 tablet by mouth daily      VITAMIN D, CHOLECALCIFEROL, PO Take 1,000 Units by mouth daily      zolpidem (AMBIEN) 10 MG tablet Take 1 tablet (10 mg) by mouth nightly as needed for sleep 90 tablet 1       Allergies   Allergen Reactions    Desvenlafaxine Unknown    Pcn [Penicillins] Hives and Rash     Amoxicillin        Social History     Tobacco Use    Smoking status: Never     Passive exposure: Never    Smokeless tobacco: Current     Types: Chew    Tobacco comments:     occ   Substance Use Topics    Alcohol use: Yes     Comment: 6-8 beers per week       History   Drug Use No         Review of Systems    Review of Systems  CONSTITUTIONAL: NEGATIVE for fever, chills, change in weight  ENT/MOUTH: NEGATIVE for ear, mouth and throat problems  RESP: NEGATIVE for significant cough or SOB  CV: NEGATIVE for chest pain, palpitations or peripheral edema  HEME/ALLERGY/IMMUNE: NEGATIVE for bleeding problems    Objective    BP 96/61 (BP Location: Right arm, Patient Position: Chair, Cuff Size: Adult Regular)   Pulse 83   Temp 98.4  F (36.9  C) (Oral)   Resp 14   Ht 1.797 m (5' 10.75\")   Wt 93.4 kg (206 lb)   SpO2 97%   BMI 28.93 kg/m     Estimated body mass index is 28.93 kg/m  as calculated from the following:    Height as of this encounter: 1.797 m (5' 10.75\").    Weight as of this encounter: 93.4 kg (206 lb).  Physical Exam  Constitutional:       Appearance: Normal appearance.   HENT:      Head: Atraumatic.      Right Ear: " "Tympanic membrane normal.      Left Ear: Tympanic membrane normal.      Nose: Nose normal.      Mouth/Throat:      Mouth: Mucous membranes are moist.   Eyes:      Pupils: Pupils are equal, round, and reactive to light.   Cardiovascular:      Rate and Rhythm: Normal rate and regular rhythm.      Heart sounds: Normal heart sounds.   Pulmonary:      Effort: Pulmonary effort is normal.      Breath sounds: Normal breath sounds.   Abdominal:      General: Bowel sounds are normal.      Palpations: Abdomen is soft.   Musculoskeletal:      Cervical back: Neck supple.      Right lower leg: No edema.      Left lower leg: No edema.   Skin:     General: Skin is warm and dry.   Neurological:      General: No focal deficit present.      Mental Status: He is alert.   Psychiatric:         Mood and Affect: Mood normal.           No results for input(s): \"HGB\", \"PLT\", \"INR\", \"NA\", \"POTASSIUM\", \"CR\", \"A1C\" in the last [id number] hours.     Diagnostics  No labs were ordered during this visit.   No EKG required for low risk surgery (cataract, skin procedure, breast biopsy, etc).    Revised Cardiac Risk Index (RCRI)  The patient has the following serious cardiovascular risks for perioperative complications:   - No serious cardiac risks = 0 points     RCRI Interpretation: 0 points: Class I (very low risk - 0.4% complication rate)         Signed Electronically by: Lynodn Stoll MD  Copy of this evaluation report is provided to requesting physician.        "

## 2024-02-27 ENCOUNTER — ANESTHESIA (OUTPATIENT)
Dept: GASTROENTEROLOGY | Facility: CLINIC | Age: 59
End: 2024-02-27
Payer: COMMERCIAL

## 2024-02-27 ENCOUNTER — ANESTHESIA EVENT (OUTPATIENT)
Dept: GASTROENTEROLOGY | Facility: CLINIC | Age: 59
End: 2024-02-27
Payer: COMMERCIAL

## 2024-02-27 ENCOUNTER — HOSPITAL ENCOUNTER (OUTPATIENT)
Facility: CLINIC | Age: 59
Discharge: HOME | End: 2024-02-27
Attending: SURGERY | Admitting: SURGERY
Payer: COMMERCIAL

## 2024-02-27 VITALS
DIASTOLIC BLOOD PRESSURE: 72 MMHG | HEART RATE: 59 BPM | OXYGEN SATURATION: 80 % | RESPIRATION RATE: 25 BRPM | SYSTOLIC BLOOD PRESSURE: 103 MMHG

## 2024-02-27 DIAGNOSIS — Z12.11 SCREEN FOR COLON CANCER: Primary | ICD-10-CM

## 2024-02-27 LAB
ANA SER QL IF: NEGATIVE
CCP AB SER IA-ACNC: 1.2 U/ML
COLONOSCOPY: NORMAL

## 2024-02-27 PROCEDURE — 370N000017 HC ANESTHESIA TECHNICAL FEE, PER MIN: Performed by: SURGERY

## 2024-02-27 PROCEDURE — 88305 TISSUE EXAM BY PATHOLOGIST: CPT | Mod: 26 | Performed by: PATHOLOGY

## 2024-02-27 PROCEDURE — 250N000011 HC RX IP 250 OP 636: Performed by: NURSE ANESTHETIST, CERTIFIED REGISTERED

## 2024-02-27 PROCEDURE — 258N000003 HC RX IP 258 OP 636: Performed by: ANESTHESIOLOGY

## 2024-02-27 PROCEDURE — 999N000010 HC STATISTIC ANES STAT CODE-CRNA PER MINUTE: Performed by: SURGERY

## 2024-02-27 PROCEDURE — 88305 TISSUE EXAM BY PATHOLOGIST: CPT | Mod: TC | Performed by: SURGERY

## 2024-02-27 PROCEDURE — 45385 COLONOSCOPY W/LESION REMOVAL: CPT | Mod: PT | Performed by: SURGERY

## 2024-02-27 PROCEDURE — 250N000009 HC RX 250: Performed by: NURSE ANESTHETIST, CERTIFIED REGISTERED

## 2024-02-27 PROCEDURE — 45385 COLONOSCOPY W/LESION REMOVAL: CPT | Performed by: NURSE ANESTHETIST, CERTIFIED REGISTERED

## 2024-02-27 PROCEDURE — 45385 COLONOSCOPY W/LESION REMOVAL: CPT | Performed by: ANESTHESIOLOGY

## 2024-02-27 RX ORDER — ONDANSETRON 2 MG/ML
4 INJECTION INTRAMUSCULAR; INTRAVENOUS EVERY 6 HOURS PRN
Status: CANCELLED | OUTPATIENT
Start: 2024-02-27

## 2024-02-27 RX ORDER — LIDOCAINE 40 MG/G
CREAM TOPICAL
Status: CANCELLED | OUTPATIENT
Start: 2024-02-27

## 2024-02-27 RX ORDER — LABETALOL 20 MG/4 ML (5 MG/ML) INTRAVENOUS SYRINGE
10
Status: DISCONTINUED | OUTPATIENT
Start: 2024-02-27 | End: 2024-02-27 | Stop reason: HOSPADM

## 2024-02-27 RX ORDER — PROCHLORPERAZINE MALEATE 10 MG
10 TABLET ORAL EVERY 6 HOURS PRN
Status: CANCELLED | OUTPATIENT
Start: 2024-02-27

## 2024-02-27 RX ORDER — ACETAMINOPHEN 325 MG/1
975 TABLET ORAL ONCE
Status: DISCONTINUED | OUTPATIENT
Start: 2024-02-27 | End: 2024-02-27 | Stop reason: HOSPADM

## 2024-02-27 RX ORDER — ONDANSETRON 2 MG/ML
INJECTION INTRAMUSCULAR; INTRAVENOUS PRN
Status: DISCONTINUED | OUTPATIENT
Start: 2024-02-27 | End: 2024-02-27

## 2024-02-27 RX ORDER — FENTANYL CITRATE 50 UG/ML
50 INJECTION, SOLUTION INTRAMUSCULAR; INTRAVENOUS EVERY 5 MIN PRN
Status: DISCONTINUED | OUTPATIENT
Start: 2024-02-27 | End: 2024-02-27 | Stop reason: HOSPADM

## 2024-02-27 RX ORDER — HYDROMORPHONE HCL IN WATER/PF 6 MG/30 ML
0.2 PATIENT CONTROLLED ANALGESIA SYRINGE INTRAVENOUS EVERY 5 MIN PRN
Status: DISCONTINUED | OUTPATIENT
Start: 2024-02-27 | End: 2024-02-27 | Stop reason: HOSPADM

## 2024-02-27 RX ORDER — HYDROMORPHONE HCL IN WATER/PF 6 MG/30 ML
0.4 PATIENT CONTROLLED ANALGESIA SYRINGE INTRAVENOUS EVERY 5 MIN PRN
Status: DISCONTINUED | OUTPATIENT
Start: 2024-02-27 | End: 2024-02-27 | Stop reason: HOSPADM

## 2024-02-27 RX ORDER — ONDANSETRON 2 MG/ML
4 INJECTION INTRAMUSCULAR; INTRAVENOUS EVERY 30 MIN PRN
Status: DISCONTINUED | OUTPATIENT
Start: 2024-02-27 | End: 2024-02-27 | Stop reason: HOSPADM

## 2024-02-27 RX ORDER — PROPOFOL 10 MG/ML
INJECTION, EMULSION INTRAVENOUS CONTINUOUS PRN
Status: DISCONTINUED | OUTPATIENT
Start: 2024-02-27 | End: 2024-02-27

## 2024-02-27 RX ORDER — ONDANSETRON 4 MG/1
4 TABLET, ORALLY DISINTEGRATING ORAL EVERY 30 MIN PRN
Status: DISCONTINUED | OUTPATIENT
Start: 2024-02-27 | End: 2024-02-27 | Stop reason: HOSPADM

## 2024-02-27 RX ORDER — LIDOCAINE HYDROCHLORIDE 20 MG/ML
INJECTION, SOLUTION INFILTRATION; PERINEURAL PRN
Status: DISCONTINUED | OUTPATIENT
Start: 2024-02-27 | End: 2024-02-27

## 2024-02-27 RX ORDER — SODIUM CHLORIDE, SODIUM LACTATE, POTASSIUM CHLORIDE, CALCIUM CHLORIDE 600; 310; 30; 20 MG/100ML; MG/100ML; MG/100ML; MG/100ML
INJECTION, SOLUTION INTRAVENOUS CONTINUOUS
Status: DISCONTINUED | OUTPATIENT
Start: 2024-02-27 | End: 2024-02-27 | Stop reason: HOSPADM

## 2024-02-27 RX ORDER — DIMENHYDRINATE 50 MG/ML
25 INJECTION, SOLUTION INTRAMUSCULAR; INTRAVENOUS
Status: DISCONTINUED | OUTPATIENT
Start: 2024-02-27 | End: 2024-02-27 | Stop reason: HOSPADM

## 2024-02-27 RX ORDER — NALOXONE HYDROCHLORIDE 0.4 MG/ML
0.1 INJECTION, SOLUTION INTRAMUSCULAR; INTRAVENOUS; SUBCUTANEOUS
Status: DISCONTINUED | OUTPATIENT
Start: 2024-02-27 | End: 2024-02-27 | Stop reason: HOSPADM

## 2024-02-27 RX ORDER — ONDANSETRON 4 MG/1
4 TABLET, ORALLY DISINTEGRATING ORAL EVERY 6 HOURS PRN
Status: CANCELLED | OUTPATIENT
Start: 2024-02-27

## 2024-02-27 RX ORDER — PROPOFOL 10 MG/ML
INJECTION, EMULSION INTRAVENOUS PRN
Status: DISCONTINUED | OUTPATIENT
Start: 2024-02-27 | End: 2024-02-27

## 2024-02-27 RX ORDER — FLUMAZENIL 0.1 MG/ML
0.2 INJECTION, SOLUTION INTRAVENOUS
Status: CANCELLED | OUTPATIENT
Start: 2024-02-27 | End: 2024-02-28

## 2024-02-27 RX ORDER — HYDROXYZINE HYDROCHLORIDE 25 MG/1
25 TABLET, FILM COATED ORAL EVERY 6 HOURS PRN
Status: DISCONTINUED | OUTPATIENT
Start: 2024-02-27 | End: 2024-02-27 | Stop reason: HOSPADM

## 2024-02-27 RX ORDER — ONDANSETRON 2 MG/ML
4 INJECTION INTRAMUSCULAR; INTRAVENOUS
Status: CANCELLED | OUTPATIENT
Start: 2024-02-27

## 2024-02-27 RX ORDER — FENTANYL CITRATE 50 UG/ML
25 INJECTION, SOLUTION INTRAMUSCULAR; INTRAVENOUS EVERY 5 MIN PRN
Status: DISCONTINUED | OUTPATIENT
Start: 2024-02-27 | End: 2024-02-27 | Stop reason: HOSPADM

## 2024-02-27 RX ORDER — HYDRALAZINE HYDROCHLORIDE 20 MG/ML
2.5-5 INJECTION INTRAMUSCULAR; INTRAVENOUS EVERY 10 MIN PRN
Status: DISCONTINUED | OUTPATIENT
Start: 2024-02-27 | End: 2024-02-27 | Stop reason: HOSPADM

## 2024-02-27 RX ADMIN — PROPOFOL 30 MG: 10 INJECTION, EMULSION INTRAVENOUS at 15:22

## 2024-02-27 RX ADMIN — PROPOFOL 40 MG: 10 INJECTION, EMULSION INTRAVENOUS at 15:21

## 2024-02-27 RX ADMIN — SODIUM CHLORIDE, POTASSIUM CHLORIDE, SODIUM LACTATE AND CALCIUM CHLORIDE: 600; 310; 30; 20 INJECTION, SOLUTION INTRAVENOUS at 15:16

## 2024-02-27 RX ADMIN — PROPOFOL 200 MCG/KG/MIN: 10 INJECTION, EMULSION INTRAVENOUS at 15:16

## 2024-02-27 RX ADMIN — LIDOCAINE HYDROCHLORIDE 60 MG: 20 INJECTION, SOLUTION INFILTRATION; PERINEURAL at 15:18

## 2024-02-27 RX ADMIN — ONDANSETRON 4 MG: 2 INJECTION INTRAMUSCULAR; INTRAVENOUS at 15:18

## 2024-02-27 ASSESSMENT — ACTIVITIES OF DAILY LIVING (ADL)
ADLS_ACUITY_SCORE: 35

## 2024-02-27 ASSESSMENT — LIFESTYLE VARIABLES: TOBACCO_USE: 1

## 2024-02-27 NOTE — H&P
Pre-Endoscopy History and Physical     Venkata Lynn MRN# [id number]   YOB: 1965 Age: 58 year old     Date of Procedure: 2/27/24  Primary care provider: Lyndon Stoll  Type of Endoscopy: colonoscopy  Reason for Procedure:   Type of Anesthesia Anticipated: Moderate Sedation    HPI:    Venkata is a 58 year old male who will be undergoing the above procedure.      A history and physical has been performed. The patient's medications and allergies have been reviewed. The risks and benefits of the procedure including the risk of bleeding, perforation, and missed lesions as well as the sedation options and risks were discussed with the patient.  All questions were answered and informed consent was obtained.        Last Colonoscopy: 2014 100/2 single polyp, repeat 10 years  Family History of Colorectal Cancer: No FH CRC  Allergies   Allergen Reactions    Desvenlafaxine Unknown    Pcn [Penicillins] Hives and Rash     Amoxicillin        Current Facility-Administered Medications   Medication    acetaminophen (TYLENOL) tablet 975 mg    dimenhyDRINATE (DRAMAMINE) injection 25 mg    fentaNYL (PF) (SUBLIMAZE) injection 25 mcg    fentaNYL (PF) (SUBLIMAZE) injection 50 mcg    hydrALAZINE (APRESOLINE) injection 2.5-5 mg    HYDROmorphone (DILAUDID) injection 0.2 mg    HYDROmorphone (DILAUDID) injection 0.4 mg    hydrOXYzine HCl (ATARAX) tablet 25 mg    labetalol (NORMODYNE/TRANDATE) syringe 10 mg    lactated ringers infusion    naloxone (NARCAN) injection 0.1 mg    ondansetron (ZOFRAN ODT) ODT tab 4 mg    Or    ondansetron (ZOFRAN) injection 4 mg    prochlorperazine (COMPAZINE) injection 5 mg       Facility-Administered Medications Prior to Admission   Medication Dose Route Frequency Provider Last Rate Last Admin    dexAMETHasone (DECADRON) injection 1 mL  1 mL   Kevon Kwong PA-C   1 mL at 01/11/24 1840    dexAMETHasone (DECADRON) injection 1 mL  1 mL   Jeevan Solis MD   1 mL at 10/17/23 4155     dexAMETHasone (DECADRON) injection 1 mL  1 mL   Jeevan Solis MD   1 mL at 10/17/23 1534    dexAMETHasone (DECADRON) injection 1 mL  1 mL   Jeevan Solis MD   1 mL at 08/22/23 1136    dexAMETHasone (DECADRON) injection 1 mL  1 mL   Jeevan Solis MD   1 mL at 08/22/23 1137    lidocaine 1 % injection 1 mL  1 mL   Kevon Kwong PA-C   1 mL at 01/11/24 1840    lidocaine 1 % injection 1 mL  1 mL   Jeevan Solis MD   1 mL at 10/17/23 1534    lidocaine 1 % injection 1 mL  1 mL   Jeevan Solis MD   1 mL at 10/17/23 1534    lidocaine 1 % injection 1 mL  1 mL   Jeevan Solis MD   1 mL at 08/22/23 1136    lidocaine 1 % injection 1 mL  1 mL   Jeevan Solis MD   1 mL at 08/22/23 1137     Medications Prior to Admission   Medication Sig Dispense Refill Last Dose    albuterol (PROAIR HFA/PROVENTIL HFA/VENTOLIN HFA) 108 (90 Base) MCG/ACT inhaler Inhale 2 puffs into the lungs every 6 hours as needed for shortness of breath / dyspnea 18 g 3 Past Week    ALPRAZolam (XANAX) 1 MG tablet Take 1 tablet (1 mg) by mouth daily as needed for anxiety 30 tablet 5 2/27/2024 at 1000    augmented betamethasone dipropionate (DIPROLENE-AF) 0.05 % external cream    Unknown    azelaic acid (FINACIA) 15 % external gel    Unknown    azelastine (ASTELIN) 0.1 % nasal spray Spray 1 spray into both nostrils 2 times daily 30 mL 5 More than a month    Calcium Carbonate-Vit D-Min (CALCIUM 1200 PO)    2/26/2024    fluticasone (FLONASE) 50 MCG/ACT nasal spray Spray 1 spray into both nostrils daily 16 g 5 More than a month    fluticasone (FLOVENT HFA) 110 MCG/ACT inhaler Inhale 1 puff into the lungs 2 times daily as needed (rescue) 12 g 1 More than a month    Fluticasone-Umeclidin-Vilant (TRELEGY ELLIPTA) 100-62.5-25 MCG/ACT oral inhaler INHALE 1 PUFF INTO THE LUNGS DAILY 3 each 3 2/26/2024    hydrOXYzine HCl (ATARAX) 25 MG tablet TAKE 1 TABLET (25 MG) BY MOUTH 3 TIMES DAILY AS NEEDED FOR ANXIETY 90 tablet 5 Past Week    ipratropium (ATROVENT) 0.06 % nasal spray INHALE  TWO SPRAYS IN EACH NOSTRIL FOUR TIMES A DAY 15 mL 5 Unknown    montelukast (SINGULAIR) 10 MG tablet TAKE ONE TABLET BY MOUTH AT BEDTIME Strength: 10 mg 90 tablet 3 Past Week    Multiple Vitamins-Minerals (MULTIVITAMIN ADULT PO) Take 1 tablet by mouth   2/26/2024    tamsulosin (FLOMAX) 0.4 MG capsule Take 1 capsule (0.4 mg) by mouth daily 90 capsule 3 Past Week    vitamin  B complex with vitamin C (VITAMIN  B COMPLEX) TABS Take 1 tablet by mouth daily   2/26/2024    VITAMIN D, CHOLECALCIFEROL, PO Take 1,000 Units by mouth daily   2/26/2024    zolpidem (AMBIEN) 10 MG tablet Take 1 tablet (10 mg) by mouth nightly as needed for sleep 90 tablet 1 Past Week       Patient Active Problem List   Diagnosis    Hyperlipidemia LDL goal <160    Anxiety    Rhinophyma    Rosacea    Sebaceous hyperplasia    Tobacco abuse    Adjustment reaction    Arthralgia of both knees    Controlled substance agreement signed    Cyst of meniscus of left knee    Cyst of meniscus of right knee    Pain in both knees    Familial tremor    Dyspepsia    Mild persistent asthma without complication    Sprain of left rotator cuff capsule, initial encounter    Lateral epicondylitis of left elbow    Rotator cuff syndrome, right    Trigger finger, acquired    Nontraumatic complete tear of right rotator cuff    Nocturnal leg cramps    Snoring    Benign prostatic hyperplasia with nocturia    Right-sided low back pain with right-sided sciatica    Episodic tension-type headache, not intractable    Seborrheic keratosis    Primary insomnia    Encounter for preventive measure    Encounter for immunization    Seasonal allergic rhinitis    Infection due to 2019 novel coronavirus    Special screening for malignant neoplasm of prostate    Sciatica without back pain, right    Hematochezia    Trigger finger, left ring finger    Special screening for malignant neoplasms, colon    Sebaceous cyst    Screen for colon cancer    Primary osteoarthritis involving multiple joints         Past Medical History:   Diagnosis Date    Acne vulgaris     Anxiety 03/28/2011    Anxiety disorder     Arthritis     both knees    CARDIOVASCULAR SCREENING; LDL GOAL LESS THAN 160 10/31/2010    Controlled substance agreement signed 01/11/2016    Coughing     at night    Dyspepsia 08/16/2016    Elevated blood pressure reading without diagnosis of hypertension 01/11/2016    Episodic tension-type headache, not intractable 03/08/2021    Familial tremor 08/16/2016    Family history of rhinophyma 01/18/2012    Hyperlipidemia LDL goal <160 10/31/2010    The 10-year ASCVD risk score (Lex EISENBERG Jr., et al., 2013) is: 9.5%   Values used to calculate the score:     Age: 56 years     Sex: Male     Is Non- : No     Diabetic: No     Tobacco smoker: Yes     Systolic Blood Pressure: 116 mmHg     Is BP treated: No     HDL Cholesterol: 58 mg/dL     Total Cholesterol: 252 mg/dL     Infection due to 2019 novel coronavirus 11/09/2022    Insomnia     Intermittent asthma 03/28/2011    Knee pain 01/18/2012    Lateral epicondylitis of left elbow 09/14/2018    LBP (low back pain) 01/18/2012    Low back pains     Mild persistent asthma without complication 08/16/2016    Nocturnal leg cramps 06/15/2020    Rhinitis 01/18/2012    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    Right-sided low back pain with right-sided sciatica 10/08/2020    Rosacea 01/18/2012    Rotator cuff syndrome, right 02/12/2019    Seasonal allergic rhinitis 05/02/2022    Sebaceous cyst 02/26/2024    Sebaceous hyperplasia 01/18/2012    Seborrheic keratosis 07/14/2021    Shortness of breath     due to asthma    Snoring 09/29/2020    Tobacco abuse 01/18/2012    Trigger finger, acquired 11/22/2019        Past Surgical History:   Procedure Laterality Date    ARTHROSCOPY KNEE WITH MEDIAL MENISCECTOMY Left 02/22/2016    Procedure: ARTHROSCOPY KNEE WITH MEDIAL MENISCECTOMY;  Surgeon: Chaz Moe MD;  Location: [facility] OR    ARTHROSCOPY SHOULDER ROTATOR CUFF REPAIR,  "BICEP TENODESIS REPAIR Right 12/12/2019    Procedure: Right Arthroscopic Rotator Cuff Repair, Arthroscopic Subacromial Decompression, Arthroscopic Biceps Tenodesis, Arthroscopic Distal Clavicle Excision;  Surgeon: Nils Milligan MD;  Location: [facility] OR    COLONOSCOPY      ENT SURGERY      lipoma removed from neck    ESOPHAGOSCOPY, GASTROSCOPY, DUODENOSCOPY (EGD), COMBINED N/A 09/20/2019    Procedure: ESOPHAGOGASTRODUODENOSCOPY (EGD);  Surgeon: Ethan Davidson MD;  Location:  GI    HEAD & NECK SURGERY      fx root of tooth    RELEASE TRIGGER FINGER Left 01/04/2024    Procedure: RELEASE, LEFT RING TRIGGER FINGER;  Surgeon: Jeevan Solis MD;  Location: [facility] OR    ROTATOR CUFF REPAIR RT/LT Left 01/2017    VASECTOMY         Social History     Tobacco Use    Smoking status: Never     Passive exposure: Never    Smokeless tobacco: Current     Types: Chew    Tobacco comments:     occ   Substance Use Topics    Alcohol use: Yes     Comment: 6-8 beers per week       Family History   Problem Relation Age of Onset    Prostate Cancer Father     Other - See Comments Other         tremor    Colon Cancer No family hx of          PHYSICAL EXAM:   BP (!) 126/100   Pulse 64   Resp 20  Estimated body mass index is 28.93 kg/m  as calculated from the following:    Height as of 2/26/24: 5' 10.75\".    Weight as of 2/26/24: 206 lb.   Mental status - alert and oriented  RESP: lungs clear  CV: RRR    IMPRESSION   ASA Class 2 - Mild systemic disease      Signed Electronically by: Mallorie Butler MD  February 27, 2024    Colorectal Surgery  [phone] (office)  [phone] (pager)  [email or web address]          "

## 2024-02-27 NOTE — ANESTHESIA POSTPROCEDURE EVALUATION
Patient: Venkata Lynn    Procedure: Procedure(s):  Colonoscopy       Anesthesia Type:  MAC    Note:  Disposition: Outpatient   Postop Pain Control: Uneventful            Sign Out: Well controlled pain   PONV: No   Neuro/Psych: Uneventful            Sign Out: Acceptable/Baseline neuro status   Airway/Respiratory: Uneventful            Sign Out: Acceptable/Baseline resp. status   CV/Hemodynamics: Uneventful            Sign Out: Acceptable CV status; No obvious hypovolemia; No obvious fluid overload   Other NRE: NONE   DID A NON-ROUTINE EVENT OCCUR?            Last vitals:  Vitals Value Taken Time   BP     Temp     Pulse     Resp     SpO2         Electronically Signed By: Olamide Amaya  February 27, 2024  3:52 PM

## 2024-02-27 NOTE — ANESTHESIA PREPROCEDURE EVALUATION
Anesthesia Pre-Procedure Evaluation    Patient: Venkata Lynn   MRN: [id number] : 1965        Procedure : Procedure(s):  Colonoscopy          Past Medical History:   Diagnosis Date    Acne vulgaris     Anxiety 2011    Anxiety disorder     Arthritis     both knees    CARDIOVASCULAR SCREENING; LDL GOAL LESS THAN 160 10/31/2010    Controlled substance agreement signed 2016    Coughing     at night    Dyspepsia 2016    Elevated blood pressure reading without diagnosis of hypertension 2016    Episodic tension-type headache, not intractable 2021    Familial tremor 2016    Family history of rhinophyma 2012    Hyperlipidemia LDL goal <160 10/31/2010    The 10-year ASCVD risk score (Lex EISENBERG Jr., et al., 2013) is: 9.5%   Values used to calculate the score:     Age: 56 years     Sex: Male     Is Non- : No     Diabetic: No     Tobacco smoker: Yes     Systolic Blood Pressure: 116 mmHg     Is BP treated: No     HDL Cholesterol: 58 mg/dL     Total Cholesterol: 252 mg/dL     Infection due to 2019 novel coronavirus 2022    Insomnia     Intermittent asthma 2011    Knee pain 2012    Lateral epicondylitis of left elbow 2018    LBP (low back pain) 2012    Low back pains     Mild persistent asthma without complication 2016    Nocturnal leg cramps 06/15/2020    Rhinitis 2012    Rhinophyma 2012    Right-sided low back pain with right-sided sciatica 10/08/2020    Rosacea 2012    Rotator cuff syndrome, right 2019    Seasonal allergic rhinitis 2022    Sebaceous cyst 2024    Sebaceous hyperplasia 2012    Seborrheic keratosis 2021    Shortness of breath     due to asthma    Snoring 2020    Tobacco abuse 2012    Trigger finger, acquired 2019      Past Surgical History:   Procedure Laterality Date    ARTHROSCOPY KNEE WITH MEDIAL MENISCECTOMY Left 2016    Procedure:  ARTHROSCOPY KNEE WITH MEDIAL MENISCECTOMY;  Surgeon: Chaz Moe MD;  Location:  OR    ARTHROSCOPY SHOULDER ROTATOR CUFF REPAIR, BICEP TENODESIS REPAIR Right 12/12/2019    Procedure: Right Arthroscopic Rotator Cuff Repair, Arthroscopic Subacromial Decompression, Arthroscopic Biceps Tenodesis, Arthroscopic Distal Clavicle Excision;  Surgeon: Nils Milligan MD;  Location:  OR    ENT SURGERY      lipoma removed from neck    ESOPHAGOSCOPY, GASTROSCOPY, DUODENOSCOPY (EGD), COMBINED N/A 9/20/2019    Procedure: ESOPHAGOGASTRODUODENOSCOPY (EGD);  Surgeon: Ethan Davidson MD;  Location:  GI    HEAD & NECK SURGERY      fx root of tooth    RELEASE TRIGGER FINGER Left 1/4/2024    Procedure: RELEASE, LEFT RING TRIGGER FINGER;  Surgeon: Jeevan Solis MD;  [place] [facility] [place]    ROTATOR CUFF REPAIR RT/LT Left 01/2017    VASECTOMY        Allergies   Allergen Reactions    Desvenlafaxine Unknown    Pcn [Penicillins] Hives and Rash     Amoxicillin      Social History     Tobacco Use    Smoking status: Never     Passive exposure: Never    Smokeless tobacco: Current     Types: Chew    Tobacco comments:     occ   Substance Use Topics    Alcohol use: Yes     Comment: 6-8 beers per week      Wt Readings from Last 1 Encounters:   02/26/24 93.4 kg (206 lb)        Anesthesia Evaluation   Pt has had prior anesthetic. Type: General.    No history of anesthetic complications       ROS/MED HX  ENT/Pulmonary:     (+)                tobacco use,      asthma                  Neurologic:     (+)      migraines,                          Cardiovascular:     (+) Dyslipidemia hypertension- -   -  - -                                      METS/Exercise Tolerance:     Hematologic:       Musculoskeletal:   (+)  arthritis,             GI/Hepatic:       Renal/Genitourinary:     (+)        BPH,      Endo:       Psychiatric/Substance Use:     (+) psychiatric history anxiety and depression       Infectious Disease:       Malignancy:     "   Other:               OUTSIDE LABS:  CBC:   Lab Results   Component Value Date    WBC 8.7 07/14/2021    WBC 7.7 11/22/2019    HGB 15.8 07/14/2021    HGB 14.1 11/22/2019    HCT 46.9 07/14/2021    HCT 41.9 11/22/2019     07/14/2021     11/22/2019     BMP:   Lab Results   Component Value Date     07/14/2021     11/22/2019    POTASSIUM 4.5 07/14/2021    POTASSIUM 3.9 11/22/2019    CHLORIDE 103 07/14/2021    CHLORIDE 105 11/22/2019    CO2 28 07/14/2021    CO2 24 11/22/2019    BUN 14 07/14/2021    BUN 17 11/22/2019    CR 0.89 07/14/2021    CR 0.95 11/22/2019    GLC 91 07/14/2021    GLC 86 11/22/2019     COAGS: No results found for: \"PTT\", \"INR\", \"FIBR\"  POC: No results found for: \"BGM\", \"HCG\", \"HCGS\"  HEPATIC:   Lab Results   Component Value Date    ALBUMIN 4.2 07/14/2021    PROTTOTAL 8.1 07/14/2021    ALT 38 07/14/2021    AST 22 07/14/2021    ALKPHOS 85 07/14/2021    BILITOTAL 0.4 07/14/2021     OTHER:   Lab Results   Component Value Date    TENZIN 9.5 07/14/2021    TSH 2.22 07/14/2021    SED 4 02/26/2024       Anesthesia Plan    ASA Status:  2    NPO Status:  NPO Appropriate    Anesthesia Type: MAC.     - Reason for MAC: straight local not clinically adequate              Consents    Anesthesia Plan(s) and associated risks, benefits, and realistic alternatives discussed. Questions answered and patient/representative(s) expressed understanding.     - Discussed:     - Discussed with:  Patient            Postoperative Care    Pain management: Multi-modal analgesia.        Comments:               Shayne Craven MD    I have reviewed the pertinent notes and labs in the chart from the past 30 days and (re)examined the patient.  Any updates or changes from those notes are reflected in this note.              # Overweight: Estimated body mass index is 28.93 kg/m  as calculated from the following:    Height as of 2/26/24: 1.797 m (5' 10.75\").    Weight as of 2/26/24: 93.4 kg (206 lb).      "

## 2024-02-27 NOTE — ANESTHESIA CARE TRANSFER NOTE
Patient: Venkata Lynn    Procedure: Procedure(s):  Colonoscopy       Diagnosis: Screening for colon cancer [Z12.11]  Diagnosis Additional Information: No value filed.    Anesthesia Type:   MAC     Note:    Oropharynx: oropharynx clear of all foreign objects  Level of Consciousness: drowsy  Oxygen Supplementation: face mask  Level of Supplemental Oxygen (L/min / FiO2): 6  Independent Airway: airway patency satisfactory and stable  Dentition: dentition unchanged  Vital Signs Stable: post-procedure vital signs reviewed and stable  Report to RN Given: handoff report given  Patient transferred to: PACU    Handoff Report: Identifed the Patient, Identified the Reponsible Provider, Reviewed the pertinent medical history, Discussed the surgical course, Reviewed Intra-OP anesthesia mangement and issues during anesthesia, Set expectations for post-procedure period and Allowed opportunity for questions and acknowledgement of understanding        Electronically Signed By: ABIGAIL Ferguson CRNA  February 27, 2024  3:50 PM

## 2024-03-01 LAB
PATH REPORT.COMMENTS IMP SPEC: NORMAL
PATH REPORT.COMMENTS IMP SPEC: NORMAL
PATH REPORT.FINAL DX SPEC: NORMAL
PATH REPORT.GROSS SPEC: NORMAL
PATH REPORT.MICROSCOPIC SPEC OTHER STN: NORMAL
PATH REPORT.RELEVANT HX SPEC: NORMAL
PHOTO IMAGE: NORMAL

## 2024-03-04 DIAGNOSIS — F41.9 ANXIETY: ICD-10-CM

## 2024-03-04 RX ORDER — ALPRAZOLAM 1 MG
1 TABLET ORAL DAILY PRN
Qty: 30 TABLET | Refills: 0 | Status: SHIPPED | OUTPATIENT
Start: 2024-03-04 | End: 2024-03-26

## 2024-03-04 NOTE — TELEPHONE ENCOUNTER
Sent 1 refill to get to upcoming appointment.  reviewed.    Domenico Baker PA-C on 3/4/2024 at 3:25 PM (covering for Dr. Stoll)

## 2024-03-11 ENCOUNTER — THERAPY VISIT (OUTPATIENT)
Dept: OCCUPATIONAL THERAPY | Facility: CLINIC | Age: 59
End: 2024-03-11
Attending: FAMILY MEDICINE
Payer: COMMERCIAL

## 2024-03-11 DIAGNOSIS — M15.0 PRIMARY OSTEOARTHRITIS INVOLVING MULTIPLE JOINTS: Primary | ICD-10-CM

## 2024-03-11 PROCEDURE — 97760 ORTHOTIC MGMT&TRAING 1ST ENC: CPT | Mod: GO | Performed by: OCCUPATIONAL THERAPIST

## 2024-03-11 PROCEDURE — 97165 OT EVAL LOW COMPLEX 30 MIN: CPT | Mod: GO | Performed by: OCCUPATIONAL THERAPIST

## 2024-03-11 PROCEDURE — 97110 THERAPEUTIC EXERCISES: CPT | Mod: GO | Performed by: OCCUPATIONAL THERAPIST

## 2024-03-11 NOTE — PROGRESS NOTES
OCCUPATIONAL THERAPY EVALUATION  Type of Visit: Evaluation    See electronic medical record for Abuse and Falls Screening details.    Subjective      Presenting condition or subjective complaint: Stiff fingers  Date of onset: 02/26/24 (order date)    Relevant medical history: Asthma   Past Medical History:   Diagnosis Date    Acne vulgaris     Anxiety 03/28/2011    Anxiety disorder     Arthritis     both knees    CARDIOVASCULAR SCREENING; LDL GOAL LESS THAN 160 10/31/2010    Controlled substance agreement signed 01/11/2016    Coughing     at night    Dyspepsia 08/16/2016    Elevated blood pressure reading without diagnosis of hypertension 01/11/2016    Episodic tension-type headache, not intractable 03/08/2021    Familial tremor 08/16/2016    Family history of rhinophyma 01/18/2012    Hyperlipidemia LDL goal <160 10/31/2010    The 10-year ASCVD risk score (Lex EISENBERG Jr., et al., 2013) is: 9.5%   Values used to calculate the score:     Age: 56 years     Sex: Male     Is Non- : No     Diabetic: No     Tobacco smoker: Yes     Systolic Blood Pressure: 116 mmHg     Is BP treated: No     HDL Cholesterol: 58 mg/dL     Total Cholesterol: 252 mg/dL     Infection due to 2019 novel coronavirus 11/09/2022    Insomnia     Intermittent asthma 03/28/2011    Knee pain 01/18/2012    Lateral epicondylitis of left elbow 09/14/2018    LBP (low back pain) 01/18/2012    Low back pains     Mild persistent asthma without complication 08/16/2016    Nocturnal leg cramps 06/15/2020    Rhinitis 01/18/2012    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    Right-sided low back pain with right-sided sciatica 10/08/2020    Rosacea 01/18/2012    Rotator cuff syndrome, right 02/12/2019    Seasonal allergic rhinitis 05/02/2022    Sebaceous cyst 02/26/2024    Sebaceous hyperplasia 01/18/2012    Seborrheic keratosis 07/14/2021    Shortness of breath     due to asthma    Snoring 09/29/2020    Tobacco abuse 01/18/2012    Trigger finger, acquired  11/22/2019   Dates & types of surgery:    Past Surgical History:   Procedure Laterality Date    ARTHROSCOPY KNEE WITH MEDIAL MENISCECTOMY Left 02/22/2016    Procedure: ARTHROSCOPY KNEE WITH MEDIAL MENISCECTOMY;  Surgeon: Chaz Moe MD;  Location:  OR    ARTHROSCOPY SHOULDER ROTATOR CUFF REPAIR, BICEP TENODESIS REPAIR Right 12/12/2019    Procedure: Right Arthroscopic Rotator Cuff Repair, Arthroscopic Subacromial Decompression, Arthroscopic Biceps Tenodesis, Arthroscopic Distal Clavicle Excision;  Surgeon: Nils Milligan MD;  Location: [facility] OR    COLONOSCOPY      COLONOSCOPY N/A 2/27/2024    Procedure: Colonoscopy;  Surgeon: Mallorie Butler MD;  Location:  GI    ENT SURGERY      lipoma removed from neck    ESOPHAGOSCOPY, GASTROSCOPY, DUODENOSCOPY (EGD), COMBINED N/A 09/20/2019    Procedure: ESOPHAGOGASTRODUODENOSCOPY (EGD);  Surgeon: Ethan Davidson MD;  Location:  GI    HEAD & NECK SURGERY      fx root of tooth    RELEASE TRIGGER FINGER Left 01/04/2024    Procedure: RELEASE, LEFT RING TRIGGER FINGER;  Surgeon: Jeevan Solis MD;  Location: [facility] OR    ROTATOR CUFF REPAIR RT/LT Left 01/2017    VASECTOMY       Prior diagnostic imaging/testing results: X-ray     Prior therapy history for the same diagnosis, illness or injury: No      Prior Level of Function  Transfers:   Ambulation:   ADL:   IADL:     Living Environment  Social support: With a significant other or spouse   Type of home: House   Stairs to enter the home: Yes   Is there a railing: No   Ramp:     Stairs inside the home: Yes       Help at home: None  Equipment owned:       Employment: Yes Construction  Hobbies/Interests: Music, sports    Patient goals for therapy:      Pain assessment:      Objective   Right hand dominant  Patient reports symptoms of pain, stiffness/loss of motion, and weakness/loss of strength  -Reports history of scaphoid fracture when he was 21. Has pain along radial side of wrist, pain with  weightbearing, pain with AROM ulnar deviation, and positive for snuffbox tenderness.    Pain Level (Scale 0-10)   3/11/2024   At Rest 0/10   With Use 3-4/10     Pain Description  Date 3/11/2024   Location hand and thumb - MCP joints, PIP joints, some wrist pain   Pain Quality Aching   Frequency intermittent     Pain is worst  daytime   Exacerbated by  Gripping, use, winter months   Relieved by rest   Progression Gradually worsening     Edema  Mild    Sensation   WNL throughout all nerve distributions; per patient report    ROM  Thumb 3/11/2024 3/11/2024   AROM  (PROM) L R   MP /67 /63   IP /56 /40   RABD 59 61   PABD 53 + 59 +   Retropulsion (cm) NT NT   Kapand Opposition Scale (0-10/10) 10 10     Thumb Observation/Appearance   - none  + mild    ++ moderate    +++ severe     3/11/2024   Shoulder deformity present over CMC R: +  L: ++   Edema over the CMC joint R: -  L: -      Provocative Tests  Pain Report:  - none    + mild    ++ moderate    +++ severe     3/11/2024   Crepitus present R: -  L: -   CMC Adduction Stress Test R: -  L: -/+   CMC Extension Stress Test R: -  L: +   Finkelstein's R: -  L: -   WHAT Test R: +  L: +       Strength   (Measured in pounds)  Pain Report: - none  + mild    ++ moderate    +++ severe    3/11/2024 3/11/2024   Trials L R   1  2  3 108 126   Average 108 126     Lat Pinch 3/11/2024 3/11/2024   Trials L R   1  2  3 22 24   Average 22 24     3 Pt Pinch 3/11/2024 3/11/2024   Trials L R   1  2  3 24 23   Average 24 23     Palpation   Pain Report:  - none    + mild    ++ moderate    +++ severe    3/11/2024   CMC Joint Line R: -  L: -   Thenar Bardwell/[employer] R: -  L: -   TFCC/ulnar fovea R: +  L: -/+   1st DC R: -  L: +   Radial Styloid R: -  L: +   FCR R: +  L: -     Assessment & Plan   CLINICAL IMPRESSIONS  Medical Diagnosis: Primary osteoarthritis involving multiple joints    Treatment Diagnosis: B hand pain    Impression/Assessment: Pt is a 58 year old male presenting to  Occupational Therapy due to gradual onset.  The following significant findings have been identified: Impaired ROM and Pain.  These identified deficits interfere with their ability to perform self care tasks, work tasks, recreational activities, household chores, driving , and meal planning and preparation as compared to previous level of function.     Clinical Decision Making (Complexity):  Assessment of Occupational Performance: 5 or more Performance Deficits  Occupational Performance Limitations: bathing/showering, toileting, dressing, feeding, functional mobility, hygiene and grooming, driving and community mobility, health management and maintenance, home establishment and management, meal preparation and cleanup, shopping, work, and leisure activities  Clinical Decision Making (Complexity): Low complexity    PLAN OF CARE  Treatment Interventions:  Modalities:  US and Paraffin  Therapeutic Exercise:  AROM, AAROM, PROM, Tendon Gliding, Blocking, Reverse Blocking, Place and Hold, Contract Relax, Extensor Tracking, Isotonics, Isometrics and Stabilization  Neuromuscular re-education:  Nerve Gliding, Coordination/Dexterity, Sensory re-education, Desensitization, Kinesthetic Training, Proprioceptive Training, Kinesiotaping, Strain Counter Strain, Isometrics, Stabilization   Manual Techniques:  Coordination/Dexterity, Joint mobilization, Scar mobilization, Friction massage, Myofascial release, and Manual edema mobilization  Orthotic Fabrication:  Static and Hand based  Self Care:  Self Care Tasks, Ergonomic Considerations, and Work Tasks    Long Term Goals   OT Goal 1  Goal Identifier: Household IADLs - gripping  Goal Description: Open a tight or new jar with mild to no difficulty  Rationale: In order to maximize safety and independence with performance of self-care activities  Target Date: 05/06/24      Frequency of Treatment: 1x/week  Duration of Treatment: 4 weeks, tapering to biweekly for 4 weeks     Recommended  Referrals to Other Professionals:   Education Assessment: Learner/Method: Patient;Demonstration  Education Comments: PTRX on phone     Risks and benefits of evaluation/treatment have been explained.   Patient/Family/caregiver agrees with Plan of Care.     Evaluation Time:    OT Eval, Low Complexity Minutes (70889): 28       Signing Clinician: Janae Paula OT

## 2024-03-13 ENCOUNTER — PATIENT OUTREACH (OUTPATIENT)
Dept: GASTROENTEROLOGY | Facility: CLINIC | Age: 59
End: 2024-03-13
Payer: COMMERCIAL

## 2024-03-18 ENCOUNTER — THERAPY VISIT (OUTPATIENT)
Dept: OCCUPATIONAL THERAPY | Facility: CLINIC | Age: 59
End: 2024-03-18
Payer: COMMERCIAL

## 2024-03-18 DIAGNOSIS — M15.0 PRIMARY OSTEOARTHRITIS INVOLVING MULTIPLE JOINTS: Primary | ICD-10-CM

## 2024-03-18 PROCEDURE — 97035 APP MDLTY 1+ULTRASOUND EA 15: CPT | Mod: GO | Performed by: OCCUPATIONAL THERAPIST

## 2024-03-18 PROCEDURE — 97110 THERAPEUTIC EXERCISES: CPT | Mod: GO | Performed by: OCCUPATIONAL THERAPIST

## 2024-03-18 PROCEDURE — 97140 MANUAL THERAPY 1/> REGIONS: CPT | Mod: GO | Performed by: OCCUPATIONAL THERAPIST

## 2024-03-26 ENCOUNTER — OFFICE VISIT (OUTPATIENT)
Dept: FAMILY MEDICINE | Facility: CLINIC | Age: 59
End: 2024-03-26
Attending: FAMILY MEDICINE
Payer: COMMERCIAL

## 2024-03-26 VITALS
RESPIRATION RATE: 18 BRPM | SYSTOLIC BLOOD PRESSURE: 98 MMHG | HEIGHT: 71 IN | TEMPERATURE: 97.4 F | WEIGHT: 210 LBS | OXYGEN SATURATION: 98 % | BODY MASS INDEX: 29.4 KG/M2 | HEART RATE: 74 BPM | DIASTOLIC BLOOD PRESSURE: 64 MMHG

## 2024-03-26 DIAGNOSIS — I95.9 HYPOTENSION, UNSPECIFIED HYPOTENSION TYPE: ICD-10-CM

## 2024-03-26 DIAGNOSIS — F51.01 PRIMARY INSOMNIA: ICD-10-CM

## 2024-03-26 DIAGNOSIS — J45.30 MILD PERSISTENT ASTHMA WITHOUT COMPLICATION: ICD-10-CM

## 2024-03-26 DIAGNOSIS — J30.2 SEASONAL ALLERGIC RHINITIS, UNSPECIFIED TRIGGER: ICD-10-CM

## 2024-03-26 DIAGNOSIS — F41.9 ANXIETY: ICD-10-CM

## 2024-03-26 DIAGNOSIS — L72.3 SEBACEOUS CYST: Primary | ICD-10-CM

## 2024-03-26 PROCEDURE — 99214 OFFICE O/P EST MOD 30 MIN: CPT | Mod: 25 | Performed by: FAMILY MEDICINE

## 2024-03-26 PROCEDURE — 10060 I&D ABSCESS SIMPLE/SINGLE: CPT | Performed by: FAMILY MEDICINE

## 2024-03-26 RX ORDER — ZOLPIDEM TARTRATE 10 MG/1
10 TABLET ORAL
Qty: 90 TABLET | Refills: 1 | Status: SHIPPED | OUTPATIENT
Start: 2024-03-26

## 2024-03-26 RX ORDER — FLUTICASONE PROPIONATE 110 UG/1
1 AEROSOL, METERED RESPIRATORY (INHALATION) 2 TIMES DAILY PRN
Qty: 12 G | Refills: 1 | Status: SHIPPED | OUTPATIENT
Start: 2024-03-26

## 2024-03-26 RX ORDER — ALPRAZOLAM 1 MG
1 TABLET ORAL DAILY PRN
Qty: 30 TABLET | Refills: 5 | Status: SHIPPED | OUTPATIENT
Start: 2024-03-26

## 2024-03-26 RX ORDER — ALBUTEROL SULFATE 90 UG/1
2 AEROSOL, METERED RESPIRATORY (INHALATION) EVERY 6 HOURS PRN
Qty: 18 G | Refills: 3 | Status: SHIPPED | OUTPATIENT
Start: 2024-03-26

## 2024-03-26 RX ORDER — IPRATROPIUM BROMIDE 42 UG/1
SPRAY, METERED NASAL
Qty: 15 ML | Refills: 5 | Status: SHIPPED | OUTPATIENT
Start: 2024-03-26

## 2024-03-26 RX ORDER — AZELASTINE 1 MG/ML
1 SPRAY, METERED NASAL 2 TIMES DAILY
Qty: 30 ML | Refills: 5 | Status: SHIPPED | OUTPATIENT
Start: 2024-03-26

## 2024-03-26 NOTE — PROGRESS NOTES
"  Assessment & Plan   Problem List Items Addressed This Visit       Anxiety     He believes his anxiety is mostly related to work and interpersonal interactions.  He also believes is anxiety is well controlled.  No changes         Relevant Medications    ALPRAZolam (XANAX) 1 MG tablet    Hypotension, unspecified hypotension type     Similar to previous blood pressures.  Asymptomatic.  This may be related to Flomax, unlikely at this late date.  No changes         Mild persistent asthma without complication     Season is starting.  Refill quiescent at moment         Relevant Medications    ipratropium (ATROVENT) 0.06 % nasal spray    fluticasone (FLOVENT HFA) 110 MCG/ACT inhaler    azelastine (ASTELIN) 0.1 % nasal spray    albuterol (PROAIR HFA/PROVENTIL HFA/VENTOLIN HFA) 108 (90 Base) MCG/ACT inhaler    Seasonal allergic rhinitis     Season is starting.  Refill         Relevant Medications    ipratropium (ATROVENT) 0.06 % nasal spray    fluticasone (FLOVENT HFA) 110 MCG/ACT inhaler    azelastine (ASTELIN) 0.1 % nasal spray    albuterol (PROAIR HFA/PROVENTIL HFA/VENTOLIN HFA) 108 (90 Base) MCG/ACT inhaler    Sebaceous cyst - Primary     Left nape of the neck.  Quite superficial noninflamed.    Informed consent discussed, signed.  Timeout taken. The lesion was  incised, yielding sebaceous material. The wound was evacuated without complication EBL 0. 0.  Care of the wound discussed.           Relevant Orders    DRAIN SKIN ABSCESS SIMPLE/SINGLE (Completed)               BMI  Estimated body mass index is 29.29 kg/m  as calculated from the following:    Height as of this encounter: 1.803 m (5' 11\").    Weight as of this encounter: 95.3 kg (210 lb).   Weight management plan: Maintain          Med   Champ is a 58 year old, presenting for the following health issues:  Derm Problem    History of Present Illness       Reason for visit:  Cyst    He eats 2-3 servings of fruits and vegetables daily.He consumes 0 sweetened " "beverage(s) daily.He exercises with enough effort to increase his heart rate 30 to 60 minutes per day.  He exercises with enough effort to increase his heart rate 4 days per week. He is missing 1 dose(s) of medications per week.         Skin Lesion  Onset/Duration: 3-4 months roughly   Description  Location: left side back of neck   Color: skin colored  Border description: raised  Character: round  Itching: no  Bleeding:  No  Intensity:  moderate  Progression of Symptoms:  same  Accompanying signs and symptoms:   Bleeding: No  Scaling: No  Excessive sun exposure/tanning: due to work Excessive sun exposure  Sunscreen used: YES  History:           Any previous history of skin cancer: No  Any family history of melanoma: No  Previous episodes of similar lesion: No  Precipitating or alleviating factors: unknown  Therapies tried and outcome: none      He has had no pain at this site.  He has had no dizziness no falls      Objective    BP 98/64 (BP Location: Right arm, Patient Position: Sitting, Cuff Size: Adult Regular)   Pulse 74   Temp 97.4  F (36.3  C) (Oral)   Resp 18   Ht 1.803 m (5' 11\")   Wt 95.3 kg (210 lb)   SpO2 98%   BMI 29.29 kg/m    Body mass index is 29.29 kg/m .  Physical Exam   As described            Signed Electronically by: Lyndon Stoll MD    "

## 2024-03-27 PROBLEM — I95.9 HYPOTENSION, UNSPECIFIED HYPOTENSION TYPE: Status: ACTIVE | Noted: 2024-03-27

## 2024-03-27 NOTE — ASSESSMENT & PLAN NOTE
Left nape of the neck.  Quite superficial noninflamed.    Informed consent discussed, signed.  Timeout taken. The lesion was  incised, yielding sebaceous material. The wound was evacuated without complication EBL 0. 0.  Care of the wound discussed.

## 2024-03-27 NOTE — ASSESSMENT & PLAN NOTE
Similar to previous blood pressures.  Asymptomatic.  This may be related to Flomax, unlikely at this late date.  No changes

## 2024-04-01 ENCOUNTER — THERAPY VISIT (OUTPATIENT)
Dept: OCCUPATIONAL THERAPY | Facility: CLINIC | Age: 59
End: 2024-04-01
Payer: COMMERCIAL

## 2024-04-01 DIAGNOSIS — M15.0 PRIMARY OSTEOARTHRITIS INVOLVING MULTIPLE JOINTS: Primary | ICD-10-CM

## 2024-04-01 PROCEDURE — 97110 THERAPEUTIC EXERCISES: CPT | Mod: GO | Performed by: OCCUPATIONAL THERAPIST

## 2024-04-01 PROCEDURE — 97035 APP MDLTY 1+ULTRASOUND EA 15: CPT | Mod: GO | Performed by: OCCUPATIONAL THERAPIST

## 2024-04-08 ENCOUNTER — THERAPY VISIT (OUTPATIENT)
Dept: OCCUPATIONAL THERAPY | Facility: CLINIC | Age: 59
End: 2024-04-08
Payer: COMMERCIAL

## 2024-04-08 DIAGNOSIS — M15.0 PRIMARY OSTEOARTHRITIS INVOLVING MULTIPLE JOINTS: Primary | ICD-10-CM

## 2024-04-08 PROCEDURE — 97035 APP MDLTY 1+ULTRASOUND EA 15: CPT | Mod: GO | Performed by: OCCUPATIONAL THERAPIST

## 2024-04-08 PROCEDURE — 97110 THERAPEUTIC EXERCISES: CPT | Mod: GO | Performed by: OCCUPATIONAL THERAPIST

## 2024-04-15 ENCOUNTER — THERAPY VISIT (OUTPATIENT)
Dept: OCCUPATIONAL THERAPY | Facility: CLINIC | Age: 59
End: 2024-04-15
Payer: COMMERCIAL

## 2024-04-15 DIAGNOSIS — M15.0 PRIMARY OSTEOARTHRITIS INVOLVING MULTIPLE JOINTS: Primary | ICD-10-CM

## 2024-04-15 PROCEDURE — 97140 MANUAL THERAPY 1/> REGIONS: CPT | Mod: GO | Performed by: OCCUPATIONAL THERAPIST

## 2024-04-15 PROCEDURE — 97035 APP MDLTY 1+ULTRASOUND EA 15: CPT | Mod: GO | Performed by: OCCUPATIONAL THERAPIST

## 2024-05-06 ENCOUNTER — THERAPY VISIT (OUTPATIENT)
Dept: OCCUPATIONAL THERAPY | Facility: CLINIC | Age: 59
End: 2024-05-06
Payer: COMMERCIAL

## 2024-05-06 DIAGNOSIS — M15.0 PRIMARY OSTEOARTHRITIS INVOLVING MULTIPLE JOINTS: Primary | ICD-10-CM

## 2024-05-06 PROCEDURE — 97110 THERAPEUTIC EXERCISES: CPT | Mod: GO | Performed by: OCCUPATIONAL THERAPIST

## 2024-05-06 PROCEDURE — 97035 APP MDLTY 1+ULTRASOUND EA 15: CPT | Mod: GO | Performed by: OCCUPATIONAL THERAPIST

## 2024-05-23 ENCOUNTER — OFFICE VISIT (OUTPATIENT)
Dept: URGENT CARE | Facility: URGENT CARE | Age: 59
End: 2024-05-23
Payer: COMMERCIAL

## 2024-05-23 VITALS
DIASTOLIC BLOOD PRESSURE: 77 MMHG | SYSTOLIC BLOOD PRESSURE: 120 MMHG | TEMPERATURE: 98 F | HEART RATE: 75 BPM | OXYGEN SATURATION: 97 %

## 2024-05-23 DIAGNOSIS — N50.819 CHRONIC PAIN IN TESTICLE: ICD-10-CM

## 2024-05-23 DIAGNOSIS — N45.1 EPIDIDYMITIS: Primary | ICD-10-CM

## 2024-05-23 DIAGNOSIS — G89.29 CHRONIC PAIN IN TESTICLE: ICD-10-CM

## 2024-05-23 PROCEDURE — 99214 OFFICE O/P EST MOD 30 MIN: CPT | Performed by: PHYSICIAN ASSISTANT

## 2024-05-23 RX ORDER — SULFAMETHOXAZOLE/TRIMETHOPRIM 800-160 MG
1 TABLET ORAL 2 TIMES DAILY
Qty: 20 TABLET | Refills: 0 | Status: SHIPPED | OUTPATIENT
Start: 2024-05-23 | End: 2024-06-02

## 2024-05-23 ASSESSMENT — ENCOUNTER SYMPTOMS
HEMATURIA: 0
FREQUENCY: 0
DYSURIA: 0
FEVER: 0

## 2024-05-23 NOTE — PROGRESS NOTES
Assessment & Plan:        ICD-10-CM    1. Epididymitis  N45.1 sulfamethoxazole-trimethoprim (BACTRIM DS) 800-160 MG tablet      2. Chronic pain in testicle  N50.819 Adult Urology  Referral    G89.29             Plan/Clinical Decision Making:    Patient presents with 1 week of  left testicular pain.  No injury or fever. Tenderness left epididymitis. Denies risk of STDs. No urinary symptoms.   Will start on course of Bactrim DS.   Does mention at end of visit has had mild soreness in left testicle for about a year.   Recommend further evaluation, possible imaging with urologist. Referral done today.       Return if symptoms worsen or fail to improve, for in 5-7 days.     At the end of the encounter, I discussed results, diagnosis, medications. Discussed red flags for immediate return to clinic/ER, as well as indications for follow up if no improvement. Patient understood and agreed to plan. Patient was stable for discharge.        Anastasia Alonzo PA-C on 5/23/2024 at 12:46 PM      30 minutes spent on the date of the encounter doing chart review, history and exam, documentation and further activities per the note     Subjective:     HPI:    Champ is a 58 year old male who presents to clinic today for the following health issues:  Chief Complaint   Patient presents with    Urgent Care     Pt presents with pain in the groin area, pt think it from straining it.     HPI    Patient complains of left testicular pain. Worsening pain. Tried ice.   No injury or trauma. Was working in yard and twisted last Saturday.   Had testicular issue left side when he was about 22. Had pain for about a year, saw urologist at [facility] due to chronic testicular pain. Had acute testicular injury that had started symptoms and urologist did a manual detorsion per patient and then he felt better.   No risk of STDs, monogamous, long term marriage.       Review of Systems   Constitutional:  Negative for fever.   Genitourinary:  Positive  for testicular pain. Negative for dysuria, frequency, hematuria, penile discharge, penile pain, penile swelling, scrotal swelling and urgency.         Patient Active Problem List   Diagnosis    Hyperlipidemia LDL goal <160    Anxiety    Rhinophyma    Rosacea    Sebaceous hyperplasia    Tobacco abuse    Adjustment reaction    Arthralgia of both knees    Controlled substance agreement signed    Cyst of meniscus of left knee    Cyst of meniscus of right knee    Pain in both knees    Familial tremor    Dyspepsia    Mild persistent asthma without complication    Sprain of left rotator cuff capsule, initial encounter    Lateral epicondylitis of left elbow    Rotator cuff syndrome, right    Trigger finger, acquired    Nontraumatic complete tear of right rotator cuff    Nocturnal leg cramps    Snoring    Benign prostatic hyperplasia with nocturia    Right-sided low back pain with right-sided sciatica    Episodic tension-type headache, not intractable    Seborrheic keratosis    Primary insomnia    Encounter for preventive measure    Encounter for immunization    Seasonal allergic rhinitis    Infection due to 2019 novel coronavirus    Special screening for malignant neoplasm of prostate    Sciatica without back pain, right    Hematochezia    Trigger finger, left ring finger    Special screening for malignant neoplasms, colon    Sebaceous cyst    Screen for colon cancer    Primary osteoarthritis involving multiple joints    Hypotension, unspecified hypotension type        Past Medical History:   Diagnosis Date    Acne vulgaris     Anxiety 03/28/2011    Anxiety disorder     Arthritis     both knees    CARDIOVASCULAR SCREENING; LDL GOAL LESS THAN 160 10/31/2010    Controlled substance agreement signed 01/11/2016    Coughing     at night    Dyspepsia 08/16/2016    Elevated blood pressure reading without diagnosis of hypertension 01/11/2016    Episodic tension-type headache, not intractable 03/08/2021    Familial tremor 08/16/2016     Family history of rhinophyma 01/18/2012    Hyperlipidemia LDL goal <160 10/31/2010    The 10-year ASCVD risk score (Lex EISENBERG Jr., et al., 2013) is: 9.5%   Values used to calculate the score:     Age: 56 years     Sex: Male     Is Non- : No     Diabetic: No     Tobacco smoker: Yes     Systolic Blood Pressure: 116 mmHg     Is BP treated: No     HDL Cholesterol: 58 mg/dL     Total Cholesterol: 252 mg/dL     Hypotension, unspecified hypotension type 03/27/2024    Infection due to 2019 novel coronavirus 11/09/2022    Insomnia     Intermittent asthma 03/28/2011    Knee pain 01/18/2012    Lateral epicondylitis of left elbow 09/14/2018    LBP (low back pain) 01/18/2012    Low back pains     Mild persistent asthma without complication 08/16/2016    Nocturnal leg cramps 06/15/2020    Rhinitis 01/18/2012    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    Right-sided low back pain with right-sided sciatica 10/08/2020    Rosacea 01/18/2012    Rotator cuff syndrome, right 02/12/2019    Seasonal allergic rhinitis 05/02/2022    Sebaceous cyst 02/26/2024    Sebaceous hyperplasia 01/18/2012    Seborrheic keratosis 07/14/2021    Shortness of breath     due to asthma    Snoring 09/29/2020    Tobacco abuse 01/18/2012    Trigger finger, acquired 11/22/2019       Social History     Tobacco Use    Smoking status: Never     Passive exposure: Never    Smokeless tobacco: Current     Types: Chew    Tobacco comments:     occ   Substance Use Topics    Alcohol use: Yes     Comment: 6-8 beers per week             Objective:     Vitals:    05/23/24 1227   BP: 120/77   Pulse: 75   Temp: 98  F (36.7  C)   TempSrc: Tympanic   SpO2: 97%         Physical Exam   EXAM:   Pleasant, alert, appropriate appearance. NAD.  Head Exam: Normocephalic, atraumatic.  Eye Exam:   non icteric/injection.    ABD: soft, Non-tender,   :no inguinal hernias, normal scrotum, penis,  testes no masses, left epididymis with tenderness.     Results:  No results found for any  visits on 05/23/24.

## 2024-05-28 ENCOUNTER — THERAPY VISIT (OUTPATIENT)
Dept: OCCUPATIONAL THERAPY | Facility: CLINIC | Age: 59
End: 2024-05-28
Payer: COMMERCIAL

## 2024-05-28 DIAGNOSIS — M15.0 PRIMARY OSTEOARTHRITIS INVOLVING MULTIPLE JOINTS: Primary | ICD-10-CM

## 2024-05-28 PROCEDURE — 97035 APP MDLTY 1+ULTRASOUND EA 15: CPT | Mod: GO | Performed by: OCCUPATIONAL THERAPIST

## 2024-05-28 PROCEDURE — 97110 THERAPEUTIC EXERCISES: CPT | Mod: GO | Performed by: OCCUPATIONAL THERAPIST

## 2024-05-28 PROCEDURE — 97140 MANUAL THERAPY 1/> REGIONS: CPT | Mod: GO | Performed by: OCCUPATIONAL THERAPIST

## 2024-06-20 ENCOUNTER — OFFICE VISIT (OUTPATIENT)
Dept: UROLOGY | Facility: CLINIC | Age: 59
End: 2024-06-20
Attending: PHYSICIAN ASSISTANT
Payer: COMMERCIAL

## 2024-06-20 VITALS — HEART RATE: 67 BPM | OXYGEN SATURATION: 97 % | SYSTOLIC BLOOD PRESSURE: 117 MMHG | DIASTOLIC BLOOD PRESSURE: 78 MMHG

## 2024-06-20 DIAGNOSIS — G89.29 CHRONIC PAIN IN TESTICLE: ICD-10-CM

## 2024-06-20 DIAGNOSIS — N50.812 TESTICULAR PAIN, LEFT: Primary | ICD-10-CM

## 2024-06-20 DIAGNOSIS — N50.819 CHRONIC PAIN IN TESTICLE: ICD-10-CM

## 2024-06-20 PROCEDURE — 99203 OFFICE O/P NEW LOW 30 MIN: CPT | Performed by: NURSE PRACTITIONER

## 2024-06-20 NOTE — PROGRESS NOTES
Urology Outpatient Visit    Name: Venkata Lynn    MRN: [id number]   YOB: 1965               Chief Complaint:   Testicular Problem         Impression and Plan:   Impression / Plan:   Venkata Lynn is a 58 year old male with L testicular pain      Plan to obtain scrotal ultrasound for further evaluation.  Conservative measures as listed below for management of scrotal pain   monitor for development of significant pain or discomfort.  Scrotal support: Recommend wearing supportive underwear to help alleviate symptoms.   Scrotal ultrasound  Cold/heat as needed  Advil / Tylenol as needed    Thank you for the opportunity to participate in the care of Venkata Lynn.     ABIGAIL Sandoval, CNP  [facility] - Department of Urology  [phone]          History of Present Illness:   Venkata Lynn is a 58 year old male here for follow-up on testicular pain.  He reports in his 20s he had a torsion that was fixed.  He originally presented on 5-23 with 1 week of left testicular pain.  No history of trauma or fevers.  He had a tender epididymis and was treated for epididymitis with Bactrim.  Sounds like he has had some underlying more mild/chronic left testicular pain ongoing for about a year.  He reports his symptoms improve with rest and are worse with exertion.    History is obtained from patient & EMR          Past Medical History:     Past Medical History:   Diagnosis Date    Acne vulgaris     Anxiety 03/28/2011    Anxiety disorder     Arthritis     both knees    CARDIOVASCULAR SCREENING; LDL GOAL LESS THAN 160 10/31/2010    Controlled substance agreement signed 01/11/2016    Coughing     at night    Dyspepsia 08/16/2016    Elevated blood pressure reading without diagnosis of hypertension 01/11/2016    Episodic tension-type headache, not intractable 03/08/2021    Familial tremor 08/16/2016    Family history of rhinophyma 01/18/2012    Hyperlipidemia LDL goal <160 10/31/2010    The 10-year  ASCVD risk score (Lemingmireya EISENBERG Jr., et al., 2013) is: 9.5%   Values used to calculate the score:     Age: 56 years     Sex: Male     Is Non- : No     Diabetic: No     Tobacco smoker: Yes     Systolic Blood Pressure: 116 mmHg     Is BP treated: No     HDL Cholesterol: 58 mg/dL     Total Cholesterol: 252 mg/dL     Hypotension, unspecified hypotension type 03/27/2024    Infection due to 2019 novel coronavirus 11/09/2022    Insomnia     Intermittent asthma 03/28/2011    Knee pain 01/18/2012    Lateral epicondylitis of left elbow 09/14/2018    LBP (low back pain) 01/18/2012    Low back pains     Mild persistent asthma without complication 08/16/2016    Nocturnal leg cramps 06/15/2020    Rhinitis 01/18/2012    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    Right-sided low back pain with right-sided sciatica 10/08/2020    Rosacea 01/18/2012    Rotator cuff syndrome, right 02/12/2019    Seasonal allergic rhinitis 05/02/2022    Sebaceous cyst 02/26/2024    Sebaceous hyperplasia 01/18/2012    Seborrheic keratosis 07/14/2021    Shortness of breath     due to asthma    Snoring 09/29/2020    Tobacco abuse 01/18/2012    Trigger finger, acquired 11/22/2019          Past Surgical History:     Past Surgical History:   Procedure Laterality Date    ARTHROSCOPY KNEE WITH MEDIAL MENISCECTOMY Left 02/22/2016    Procedure: ARTHROSCOPY KNEE WITH MEDIAL MENISCECTOMY;  Surgeon: Chaz Moe MD;  Location: [facility] OR    ARTHROSCOPY SHOULDER ROTATOR CUFF REPAIR, BICEP TENODESIS REPAIR Right 12/12/2019    Procedure: Right Arthroscopic Rotator Cuff Repair, Arthroscopic Subacromial Decompression, Arthroscopic Biceps Tenodesis, Arthroscopic Distal Clavicle Excision;  Surgeon: Nils Milligan MD;  Location: [facility]    COLONOSCOPY      COLONOSCOPY N/A 2/27/2024    Procedure: Colonoscopy;  Surgeon: Mallorie Butler MD;  Location:  GI    ENT SURGERY      lipoma removed from neck    ESOPHAGOSCOPY, GASTROSCOPY, DUODENOSCOPY (EGD),  COMBINED N/A 09/20/2019    Procedure: ESOPHAGOGASTRODUODENOSCOPY (EGD);  Surgeon: Ethan Davidson MD;  Location:  GI    HEAD & NECK SURGERY      fx root of tooth    RELEASE TRIGGER FINGER Left 01/04/2024    Procedure: RELEASE, LEFT RING TRIGGER FINGER;  Surgeon: Jeevan oSlis MD;  Location: [facility] OR    ROTATOR CUFF REPAIR RT/LT Left 01/2017    VASECTOMY            Social History:     Social History     Tobacco Use    Smoking status: Never     Passive exposure: Never    Smokeless tobacco: Current     Types: Chew    Tobacco comments:     occ   Substance Use Topics    Alcohol use: Yes     Comment: 6-8 beers per week          Family History:     Family History   Problem Relation Age of Onset    Prostate Cancer Father     Other - See Comments Other         tremor    Colon Cancer No family hx of           Allergies:     Allergies   Allergen Reactions    Desvenlafaxine Unknown    Pcn [Penicillins] Hives and Rash     Amoxicillin          Medications:     Current Outpatient Medications   Medication Sig Dispense Refill    albuterol (PROAIR HFA/PROVENTIL HFA/VENTOLIN HFA) 108 (90 Base) MCG/ACT inhaler Inhale 2 puffs into the lungs every 6 hours as needed for shortness of breath 18 g 3    ALPRAZolam (XANAX) 1 MG tablet Take 1 tablet (1 mg) by mouth daily as needed for anxiety 30 tablet 5    augmented betamethasone dipropionate (DIPROLENE-AF) 0.05 % external cream       azelaic acid (FINACIA) 15 % external gel       azelastine (ASTELIN) 0.1 % nasal spray Spray 1 spray into both nostrils 2 times daily 30 mL 5    Calcium Carbonate-Vit D-Min (CALCIUM 1200 PO)       fluticasone (FLONASE) 50 MCG/ACT nasal spray Spray 1 spray into both nostrils daily 16 g 5    fluticasone (FLOVENT HFA) 110 MCG/ACT inhaler Inhale 1 puff into the lungs 2 times daily as needed (rescue) 12 g 1    Fluticasone-Umeclidin-Vilant (TRELEGY ELLIPTA) 100-62.5-25 MCG/ACT oral inhaler INHALE 1 PUFF INTO THE LUNGS DAILY 3 each 3    hydrOXYzine HCl (ATARAX) 25 MG  tablet TAKE 1 TABLET (25 MG) BY MOUTH 3 TIMES DAILY AS NEEDED FOR ANXIETY 90 tablet 5    ipratropium (ATROVENT) 0.06 % nasal spray INHALE TWO SPRAYS IN EACH NOSTRIL FOUR TIMES A DAY 15 mL 5    montelukast (SINGULAIR) 10 MG tablet TAKE ONE TABLET BY MOUTH AT BEDTIME Strength: 10 mg 90 tablet 3    Multiple Vitamins-Minerals (MULTIVITAMIN ADULT PO) Take 1 tablet by mouth      tamsulosin (FLOMAX) 0.4 MG capsule Take 1 capsule (0.4 mg) by mouth daily 90 capsule 3    vitamin  B complex with vitamin C (VITAMIN  B COMPLEX) TABS Take 1 tablet by mouth daily      VITAMIN D, CHOLECALCIFEROL, PO Take 1,000 Units by mouth daily      zolpidem (AMBIEN) 10 MG tablet TAKE 1 TABLET (10 MG) BY MOUTH NIGHTLY AS NEEDED FOR SLEEP 90 tablet 1     Current Facility-Administered Medications   Medication Dose Route Frequency Provider Last Rate Last Admin    dexAMETHasone (DECADRON) injection 1 mL  1 mL   Kevon Kwong PA-C   1 mL at 01/11/24 1840    dexAMETHasone (DECADRON) injection 1 mL  1 mL   Jeevan Solis MD   1 mL at 10/17/23 1534    dexAMETHasone (DECADRON) injection 1 mL  1 mL   Jeevan Solis MD   1 mL at 10/17/23 1534    dexAMETHasone (DECADRON) injection 1 mL  1 mL   Jeevan Solis MD   1 mL at 08/22/23 1136    dexAMETHasone (DECADRON) injection 1 mL  1 mL   Jeevan Solis MD   1 mL at 08/22/23 1137    lidocaine 1 % injection 1 mL  1 mL   Kevon Kwong PA-C   1 mL at 01/11/24 1840    lidocaine 1 % injection 1 mL  1 mL   Jeevan Solis MD   1 mL at 10/17/23 1534    lidocaine 1 % injection 1 mL  1 mL   Jeevan Solis MD   1 mL at 10/17/23 1534    lidocaine 1 % injection 1 mL  1 mL   Jeevan Solis MD   1 mL at 08/22/23 1136    lidocaine 1 % injection 1 mL  1 mL   Jeevan Solis MD   1 mL at 08/22/23 1137          Review of Systems:    ROS: See HPI for pertinent details.  Remainder of 10-point ROS negative.         Physical Exam:   VS:  T: Data Unavailable    HR: 67    BP: 117/78    RR: Data Unavailable     GEN:  Alert.  NAD.   HEENT:   Sclerae anicteric.    CV:  No obvious jugular venous distension.  LUNGS: No respiratory distress, breathing comfortably wo accessory muscle use.  ABD:  ND.     :  nontender nonswollen  SKIN:  Dry. No visible rashes.   NEURO:  CN grossly intact.          Laboratory Data:     Lab Results   Component Value Date    PSA 2.09 11/10/2022     Lab Results   Component Value Date    CR 0.89 07/14/2021    CR 0.95 11/22/2019    CR 0.86 02/20/2018    CR 0.97 03/21/2011     Lab Results   Component Value Date    GFRESTIMATED >90 07/14/2021    GFRESTIMATED >90 11/22/2019    GFRESTIMATED >90 02/20/2018    GFRESTIMATED 85 03/21/2011

## 2024-06-20 NOTE — PATIENT INSTRUCTIONS
monitor for development of significant pain or discomfort.  Scrotal support: Recommend wearing supportive underwear to help alleviate symptoms.   Scrotal ultrasound  Cold/heat as needed  Advil / Tylenol as needed    Please call [phone] to schedule Ultrasound

## 2024-06-20 NOTE — LETTER
6/20/2024       RE: Venkata Lynn  [address] [address] [address]     Dear Colleague,    Thank you for referring your patient, Venkata Lynn, to the [facility] at [facility]. Please see a copy of my visit note below.      Urology Outpatient Visit    Name: Venkata Lynn    MRN: [id number]   YOB: 1965               Chief Complaint:   Testicular Problem         Impression and Plan:   Impression / Plan:   Venkata Lynn is a 58 year old male with L testicular pain      Plan to obtain scrotal ultrasound for further evaluation.  Conservative measures as listed below for management of scrotal pain   monitor for development of significant pain or discomfort.  Scrotal support: Recommend wearing supportive underwear to help alleviate symptoms.   Scrotal ultrasound  Cold/heat as needed  Advil / Tylenol as needed    Thank you for the opportunity to participate in the care of Venkata Lynn.     ABIGAIL Sandoval, CNP   [facility] - Department of Urology  [phone]          History of Present Illness:   Venkata Lynn is a 58 year old male here for follow-up on testicular pain.  He reports in his 20s he had a torsion that was fixed.  He originally presented on 5-23 with 1 week of left testicular pain.  No history of trauma or fevers.  He had a tender epididymis and was treated for epididymitis with Bactrim.  Sounds like he has had some underlying more mild/chronic left testicular pain ongoing for about a year.  He reports his symptoms improve with rest and are worse with exertion.    History is obtained from patient & EMR          Past Medical History:     Past Medical History:   Diagnosis Date    Acne vulgaris     Anxiety 03/28/2011    Anxiety disorder     Arthritis     both knees    CARDIOVASCULAR SCREENING; LDL GOAL LESS THAN 160 10/31/2010    Controlled substance agreement signed 01/11/2016     Coughing     at night    Dyspepsia 08/16/2016    Elevated blood pressure reading without diagnosis of hypertension 01/11/2016    Episodic tension-type headache, not intractable 03/08/2021    Familial tremor 08/16/2016    Family history of rhinophyma 01/18/2012    Hyperlipidemia LDL goal <160 10/31/2010    The 10-year ASCVD risk score (Lex EISENBERG Jr., et al., 2013) is: 9.5%   Values used to calculate the score:     Age: 56 years     Sex: Male     Is Non- : No     Diabetic: No     Tobacco smoker: Yes     Systolic Blood Pressure: 116 mmHg     Is BP treated: No     HDL Cholesterol: 58 mg/dL     Total Cholesterol: 252 mg/dL     Hypotension, unspecified hypotension type 03/27/2024    Infection due to 2019 novel coronavirus 11/09/2022    Insomnia     Intermittent asthma 03/28/2011    Knee pain 01/18/2012    Lateral epicondylitis of left elbow 09/14/2018    LBP (low back pain) 01/18/2012    Low back pains     Mild persistent asthma without complication 08/16/2016    Nocturnal leg cramps 06/15/2020    Rhinitis 01/18/2012    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    Right-sided low back pain with right-sided sciatica 10/08/2020    Rosacea 01/18/2012    Rotator cuff syndrome, right 02/12/2019    Seasonal allergic rhinitis 05/02/2022    Sebaceous cyst 02/26/2024    Sebaceous hyperplasia 01/18/2012    Seborrheic keratosis 07/14/2021    Shortness of breath     due to asthma    Snoring 09/29/2020    Tobacco abuse 01/18/2012    Trigger finger, acquired 11/22/2019          Past Surgical History:     Past Surgical History:   Procedure Laterality Date    ARTHROSCOPY KNEE WITH MEDIAL MENISCECTOMY Left 02/22/2016    Procedure: ARTHROSCOPY KNEE WITH MEDIAL MENISCECTOMY;  Surgeon: Chaz Moe MD;  Location: [facility] OR    ARTHROSCOPY SHOULDER ROTATOR CUFF REPAIR, BICEP TENODESIS REPAIR Right 12/12/2019    Procedure: Right Arthroscopic Rotator Cuff Repair, Arthroscopic Subacromial Decompression, Arthroscopic Biceps Tenodesis,  Arthroscopic Distal Clavicle Excision;  Surgeon: Nils Milligan MD;  Location: [facility]    COLONOSCOPY      COLONOSCOPY N/A 2/27/2024    Procedure: Colonoscopy;  Surgeon: Mallorie Butler MD;  Location:  GI    ENT SURGERY      lipoma removed from neck    ESOPHAGOSCOPY, GASTROSCOPY, DUODENOSCOPY (EGD), COMBINED N/A 09/20/2019    Procedure: ESOPHAGOGASTRODUODENOSCOPY (EGD);  Surgeon: Ethan Davidson MD;  Location:  GI    HEAD & NECK SURGERY      fx root of tooth    RELEASE TRIGGER FINGER Left 01/04/2024    Procedure: RELEASE, LEFT RING TRIGGER FINGER;  Surgeon: Jeevan Solis MD;  Location: [facility] OR    ROTATOR CUFF REPAIR RT/LT Left 01/2017    VASECTOMY            Social History:     Social History     Tobacco Use    Smoking status: Never     Passive exposure: Never    Smokeless tobacco: Current     Types: Chew    Tobacco comments:     occ   Substance Use Topics    Alcohol use: Yes     Comment: 6-8 beers per week          Family History:     Family History   Problem Relation Age of Onset    Prostate Cancer Father     Other - See Comments Other         tremor    Colon Cancer No family hx of           Allergies:     Allergies   Allergen Reactions    Desvenlafaxine Unknown    Pcn [Penicillins] Hives and Rash     Amoxicillin          Medications:     Current Outpatient Medications   Medication Sig Dispense Refill    albuterol (PROAIR HFA/PROVENTIL HFA/VENTOLIN HFA) 108 (90 Base) MCG/ACT inhaler Inhale 2 puffs into the lungs every 6 hours as needed for shortness of breath 18 g 3    ALPRAZolam (XANAX) 1 MG tablet Take 1 tablet (1 mg) by mouth daily as needed for anxiety 30 tablet 5    augmented betamethasone dipropionate (DIPROLENE-AF) 0.05 % external cream       azelaic acid (FINACIA) 15 % external gel       azelastine (ASTELIN) 0.1 % nasal spray Spray 1 spray into both nostrils 2 times daily 30 mL 5    Calcium Carbonate-Vit D-Min (CALCIUM 1200 PO)       fluticasone (FLONASE) 50 MCG/ACT nasal spray Spray 1  spray into both nostrils daily 16 g 5    fluticasone (FLOVENT HFA) 110 MCG/ACT inhaler Inhale 1 puff into the lungs 2 times daily as needed (rescue) 12 g 1    Fluticasone-Umeclidin-Vilant (TRELEGY ELLIPTA) 100-62.5-25 MCG/ACT oral inhaler INHALE 1 PUFF INTO THE LUNGS DAILY 3 each 3    hydrOXYzine HCl (ATARAX) 25 MG tablet TAKE 1 TABLET (25 MG) BY MOUTH 3 TIMES DAILY AS NEEDED FOR ANXIETY 90 tablet 5    ipratropium (ATROVENT) 0.06 % nasal spray INHALE TWO SPRAYS IN EACH NOSTRIL FOUR TIMES A DAY 15 mL 5    montelukast (SINGULAIR) 10 MG tablet TAKE ONE TABLET BY MOUTH AT BEDTIME Strength: 10 mg 90 tablet 3    Multiple Vitamins-Minerals (MULTIVITAMIN ADULT PO) Take 1 tablet by mouth      tamsulosin (FLOMAX) 0.4 MG capsule Take 1 capsule (0.4 mg) by mouth daily 90 capsule 3    vitamin  B complex with vitamin C (VITAMIN  B COMPLEX) TABS Take 1 tablet by mouth daily      VITAMIN D, CHOLECALCIFEROL, PO Take 1,000 Units by mouth daily      zolpidem (AMBIEN) 10 MG tablet TAKE 1 TABLET (10 MG) BY MOUTH NIGHTLY AS NEEDED FOR SLEEP 90 tablet 1     Current Facility-Administered Medications   Medication Dose Route Frequency Provider Last Rate Last Admin    dexAMETHasone (DECADRON) injection 1 mL  1 mL   Kevon Kwong PA-C   1 mL at 01/11/24 1840    dexAMETHasone (DECADRON) injection 1 mL  1 mL   Jeevan Solis MD   1 mL at 10/17/23 1534    dexAMETHasone (DECADRON) injection 1 mL  1 mL   Jeevan Solis MD   1 mL at 10/17/23 1534    dexAMETHasone (DECADRON) injection 1 mL  1 mL   Jeevan Solis MD   1 mL at 08/22/23 1136    dexAMETHasone (DECADRON) injection 1 mL  1 mL   Jeevan Solis MD   1 mL at 08/22/23 1137    lidocaine 1 % injection 1 mL  1 mL   Kevon Kwong PA-C   1 mL at 01/11/24 1840    lidocaine 1 % injection 1 mL  1 mL   Jeevan Solis MD   1 mL at 10/17/23 1534    lidocaine 1 % injection 1 mL  1 mL   Jeevan Solis MD   1 mL at 10/17/23 1534    lidocaine 1 % injection 1 mL  1 mL   Jeevan Solis MD   1 mL at 08/22/23 113     lidocaine 1 % injection 1 mL  1 mL   Jeevan Solis MD   1 mL at 08/22/23 1137          Review of Systems:    ROS: See HPI for pertinent details.  Remainder of 10-point ROS negative.         Physical Exam:   VS:  T: Data Unavailable    HR: 67    BP: 117/78    RR: Data Unavailable     GEN:  Alert.  NAD.   HEENT:  Sclerae anicteric.    CV:  No obvious jugular venous distension.  LUNGS: No respiratory distress, breathing comfortably wo accessory muscle use.  ABD:  ND.     :  nontender nonswollen  SKIN:  Dry. No visible rashes.   NEURO:  CN grossly intact.          Laboratory Data:     Lab Results   Component Value Date    PSA 2.09 11/10/2022     Lab Results   Component Value Date    CR 0.89 07/14/2021    CR 0.95 11/22/2019    CR 0.86 02/20/2018    CR 0.97 03/21/2011     Lab Results   Component Value Date    GFRESTIMATED >90 07/14/2021    GFRESTIMATED >90 11/22/2019    GFRESTIMATED >90 02/20/2018    GFRESTIMATED 85 03/21/2011

## 2024-07-19 ENCOUNTER — HOSPITAL ENCOUNTER (OUTPATIENT)
Dept: ULTRASOUND IMAGING | Facility: CLINIC | Age: 59
Discharge: HOME | End: 2024-07-19
Attending: NURSE PRACTITIONER | Admitting: NURSE PRACTITIONER
Payer: COMMERCIAL

## 2024-07-19 DIAGNOSIS — N50.812 TESTICULAR PAIN, LEFT: ICD-10-CM

## 2024-07-19 PROCEDURE — 76870 US EXAM SCROTUM: CPT

## 2024-07-22 DIAGNOSIS — G25.0 FAMILIAL TREMOR: ICD-10-CM

## 2024-07-22 NOTE — TELEPHONE ENCOUNTER
Patient is requesting a refill on the propranolol ER 80mg.  This medication was canceled by provider but patient thinks he should still be taking this.    Thanks,  Olamide Bernal, Rob  [facility]  [phone]'

## 2024-07-23 RX ORDER — PROPRANOLOL HYDROCHLORIDE 80 MG/1
CAPSULE, EXTENDED RELEASE ORAL
Qty: 60 CAPSULE | Refills: 0 | Status: SHIPPED | OUTPATIENT
Start: 2024-07-23 | End: 2024-09-18

## 2024-07-23 NOTE — TELEPHONE ENCOUNTER
Sending to get to upcoming visit.    Domenico Baker PA-C on 7/23/2024 at 8:12 AM (covering for Dr. Stoll)

## 2024-08-16 ENCOUNTER — ALLIED HEALTH/NURSE VISIT (OUTPATIENT)
Dept: FAMILY MEDICINE | Facility: CLINIC | Age: 59
End: 2024-08-16
Payer: COMMERCIAL

## 2024-08-16 VITALS — SYSTOLIC BLOOD PRESSURE: 122 MMHG | DIASTOLIC BLOOD PRESSURE: 82 MMHG

## 2024-08-16 DIAGNOSIS — I95.9 HYPOTENSION, UNSPECIFIED HYPOTENSION TYPE: Primary | ICD-10-CM

## 2024-08-16 PROCEDURE — 99207 PR NO CHARGE NURSE ONLY: CPT | Performed by: FAMILY MEDICINE

## 2024-08-16 NOTE — PROGRESS NOTES
Blood pressure reviewed/approved.    Enriqueta Romeo, PharmD, [facility]  Pharmacist in Charge/  [facility]  [phone]

## 2024-08-16 NOTE — PROGRESS NOTES
Venkata Lynn was evaluated at [facility] on August 16, 2024 at which time his blood pressure was:    BP Readings from Last 3 Encounters:   08/16/24 122/82   06/20/24 117/78   05/23/24 120/77     Pulse Readings from Last 3 Encounters:   06/20/24 67   05/23/24 75   03/26/24 74       Reviewed lifestyle modifications for blood pressure control and reduction: including making healthy food choices, managing weight, getting regular exercise, smoking cessation, reducing alcohol consumption, monitoring blood pressure regularly.     Symptoms: None    BP Goal:< 140/90 mmHg    BP Assessment:  BP at goal    Potential Reasons for BP too high: NA - Not applicable    BP Follow-Up Plan: Recheck BP in 6 months at pharmacy    Recommendation to Provider: Recheck BP in 6 months    Note completed by:   Ashu Macedo  PharmD Student

## 2024-09-17 DIAGNOSIS — G25.0 FAMILIAL TREMOR: ICD-10-CM

## 2024-09-18 RX ORDER — PROPRANOLOL HYDROCHLORIDE 80 MG/1
CAPSULE, EXTENDED RELEASE ORAL
Qty: 30 CAPSULE | Refills: 0 | Status: SHIPPED | OUTPATIENT
Start: 2024-09-18

## 2024-10-08 ENCOUNTER — OFFICE VISIT (OUTPATIENT)
Dept: FAMILY MEDICINE | Facility: CLINIC | Age: 59
End: 2024-10-08
Payer: COMMERCIAL

## 2024-10-08 VITALS
OXYGEN SATURATION: 97 % | DIASTOLIC BLOOD PRESSURE: 93 MMHG | WEIGHT: 212 LBS | TEMPERATURE: 97.7 F | HEIGHT: 71 IN | HEART RATE: 60 BPM | BODY MASS INDEX: 29.68 KG/M2 | RESPIRATION RATE: 15 BRPM | SYSTOLIC BLOOD PRESSURE: 148 MMHG

## 2024-10-08 DIAGNOSIS — R35.1 BENIGN PROSTATIC HYPERPLASIA WITH NOCTURIA: ICD-10-CM

## 2024-10-08 DIAGNOSIS — F41.9 ANXIETY: ICD-10-CM

## 2024-10-08 DIAGNOSIS — Z00.00 ROUTINE GENERAL MEDICAL EXAMINATION AT A HEALTH CARE FACILITY: Primary | ICD-10-CM

## 2024-10-08 DIAGNOSIS — M65.30 TRIGGER FINGER, ACQUIRED: ICD-10-CM

## 2024-10-08 DIAGNOSIS — L71.9 ROSACEA: ICD-10-CM

## 2024-10-08 DIAGNOSIS — N40.1 BENIGN PROSTATIC HYPERPLASIA WITH NOCTURIA: ICD-10-CM

## 2024-10-08 DIAGNOSIS — G89.29 CHRONIC PAIN OF BOTH KNEES: ICD-10-CM

## 2024-10-08 DIAGNOSIS — Z79.899 CHRONIC PRESCRIPTION BENZODIAZEPINE USE: ICD-10-CM

## 2024-10-08 DIAGNOSIS — J45.30 MILD PERSISTENT ASTHMA WITHOUT COMPLICATION: ICD-10-CM

## 2024-10-08 DIAGNOSIS — M25.561 CHRONIC PAIN OF BOTH KNEES: ICD-10-CM

## 2024-10-08 DIAGNOSIS — M54.41 CHRONIC RIGHT-SIDED LOW BACK PAIN WITH RIGHT-SIDED SCIATICA: ICD-10-CM

## 2024-10-08 DIAGNOSIS — G89.29 CHRONIC RIGHT-SIDED LOW BACK PAIN WITH RIGHT-SIDED SCIATICA: ICD-10-CM

## 2024-10-08 DIAGNOSIS — Z23 NEED FOR INFLUENZA VACCINATION: ICD-10-CM

## 2024-10-08 DIAGNOSIS — G25.0 FAMILIAL TREMOR: ICD-10-CM

## 2024-10-08 DIAGNOSIS — R03.0 ELEVATED BP WITHOUT DIAGNOSIS OF HYPERTENSION: ICD-10-CM

## 2024-10-08 DIAGNOSIS — M25.562 CHRONIC PAIN OF BOTH KNEES: ICD-10-CM

## 2024-10-08 PROBLEM — U07.1 INFECTION DUE TO 2019 NOVEL CORONAVIRUS: Status: RESOLVED | Noted: 2022-11-09 | Resolved: 2024-10-08

## 2024-10-08 PROBLEM — M75.121 NONTRAUMATIC COMPLETE TEAR OF RIGHT ROTATOR CUFF: Status: RESOLVED | Noted: 2019-12-30 | Resolved: 2024-10-08

## 2024-10-08 PROBLEM — M54.31 SCIATICA WITHOUT BACK PAIN, RIGHT: Status: RESOLVED | Noted: 2022-11-09 | Resolved: 2024-10-08

## 2024-10-08 PROBLEM — Z12.11 SCREEN FOR COLON CANCER: Status: RESOLVED | Noted: 2024-02-26 | Resolved: 2024-10-08

## 2024-10-08 PROBLEM — Z29.9 ENCOUNTER FOR PREVENTIVE MEASURE: Status: RESOLVED | Noted: 2021-07-15 | Resolved: 2024-10-08

## 2024-10-08 PROBLEM — Z12.5 SPECIAL SCREENING FOR MALIGNANT NEOPLASM OF PROSTATE: Status: RESOLVED | Noted: 2022-11-09 | Resolved: 2024-10-08

## 2024-10-08 PROBLEM — Z12.11 SPECIAL SCREENING FOR MALIGNANT NEOPLASMS, COLON: Status: RESOLVED | Noted: 2023-11-21 | Resolved: 2024-10-08

## 2024-10-08 PROBLEM — S43.422A SPRAIN OF LEFT ROTATOR CUFF CAPSULE, INITIAL ENCOUNTER: Status: RESOLVED | Noted: 2017-04-06 | Resolved: 2024-10-08

## 2024-10-08 LAB
ANION GAP SERPL CALCULATED.3IONS-SCNC: 10 MMOL/L (ref 7–15)
BUN SERPL-MCNC: 10.7 MG/DL (ref 6–20)
CALCIUM SERPL-MCNC: 9.5 MG/DL (ref 8.8–10.4)
CHLORIDE SERPL-SCNC: 99 MMOL/L (ref 98–107)
CREAT SERPL-MCNC: 0.87 MG/DL (ref 0.67–1.17)
CREAT UR-MCNC: 17 MG/DL
EGFRCR SERPLBLD CKD-EPI 2021: >90 ML/MIN/1.73M2
GLUCOSE SERPL-MCNC: 101 MG/DL (ref 70–99)
HCO3 SERPL-SCNC: 24 MMOL/L (ref 22–29)
POTASSIUM SERPL-SCNC: 4.2 MMOL/L (ref 3.4–5.3)
SODIUM SERPL-SCNC: 133 MMOL/L (ref 135–145)

## 2024-10-08 PROCEDURE — 36415 COLL VENOUS BLD VENIPUNCTURE: CPT

## 2024-10-08 PROCEDURE — 90673 RIV3 VACCINE NO PRESERV IM: CPT

## 2024-10-08 PROCEDURE — G0481 DRUG TEST DEF 8-14 CLASSES: HCPCS

## 2024-10-08 PROCEDURE — 90471 IMMUNIZATION ADMIN: CPT

## 2024-10-08 PROCEDURE — 99396 PREV VISIT EST AGE 40-64: CPT | Mod: 25

## 2024-10-08 PROCEDURE — 80048 BASIC METABOLIC PNL TOTAL CA: CPT

## 2024-10-08 PROCEDURE — 99214 OFFICE O/P EST MOD 30 MIN: CPT | Mod: 25

## 2024-10-08 RX ORDER — ALPRAZOLAM 1 MG/1
1 TABLET ORAL DAILY PRN
Qty: 30 TABLET | Refills: 5 | Status: CANCELLED | OUTPATIENT
Start: 2024-10-08

## 2024-10-08 RX ORDER — TAMSULOSIN HYDROCHLORIDE 0.4 MG/1
0.4 CAPSULE ORAL DAILY
Qty: 90 CAPSULE | Refills: 3 | Status: SHIPPED | OUTPATIENT
Start: 2024-10-08

## 2024-10-08 RX ORDER — PROPRANOLOL HYDROCHLORIDE 80 MG/1
CAPSULE, EXTENDED RELEASE ORAL
Qty: 90 CAPSULE | Refills: 3 | Status: SHIPPED | OUTPATIENT
Start: 2024-10-08

## 2024-10-08 RX ORDER — SERTRALINE HYDROCHLORIDE 25 MG/1
25 TABLET, FILM COATED ORAL DAILY
Qty: 30 TABLET | Refills: 1 | Status: SHIPPED | OUTPATIENT
Start: 2024-10-08

## 2024-10-08 RX ORDER — AZELAIC ACID 0.15 G/G
GEL TOPICAL
Qty: 50 G | Refills: 1 | Status: SHIPPED | OUTPATIENT
Start: 2024-10-08

## 2024-10-08 RX ORDER — BETAMETHASONE DIPROPIONATE 0.5 MG/G
CREAM TOPICAL
Status: CANCELLED | OUTPATIENT
Start: 2024-10-08

## 2024-10-08 SDOH — HEALTH STABILITY: PHYSICAL HEALTH: ON AVERAGE, HOW MANY DAYS PER WEEK DO YOU ENGAGE IN MODERATE TO STRENUOUS EXERCISE (LIKE A BRISK WALK)?: 5 DAYS

## 2024-10-08 ASSESSMENT — ASTHMA QUESTIONNAIRES
QUESTION_3 LAST FOUR WEEKS HOW OFTEN DID YOUR ASTHMA SYMPTOMS (WHEEZING, COUGHING, SHORTNESS OF BREATH, CHEST TIGHTNESS OR PAIN) WAKE YOU UP AT NIGHT OR EARLIER THAN USUAL IN THE MORNING: ONCE OR TWICE
QUESTION_4 LAST FOUR WEEKS HOW OFTEN HAVE YOU USED YOUR RESCUE INHALER OR NEBULIZER MEDICATION (SUCH AS ALBUTEROL): TWO OR THREE TIMES PER WEEK
QUESTION_1 LAST FOUR WEEKS HOW MUCH OF THE TIME DID YOUR ASTHMA KEEP YOU FROM GETTING AS MUCH DONE AT WORK, SCHOOL OR AT HOME: NONE OF THE TIME
QUESTION_2 LAST FOUR WEEKS HOW OFTEN HAVE YOU HAD SHORTNESS OF BREATH: ONCE OR TWICE A WEEK
ACT_TOTALSCORE: 20
ACT_TOTALSCORE: 20
QUESTION_5 LAST FOUR WEEKS HOW WOULD YOU RATE YOUR ASTHMA CONTROL: WELL CONTROLLED

## 2024-10-08 ASSESSMENT — SOCIAL DETERMINANTS OF HEALTH (SDOH): HOW OFTEN DO YOU GET TOGETHER WITH FRIENDS OR RELATIVES?: ONCE A WEEK

## 2024-10-08 NOTE — PROGRESS NOTES
Preventive Care Visit  [facility]  ABIGAIL Sow CNP, Nurse Practitioner Primary Care  Oct 8, 2024    Assessment & Plan     Routine general medical examination at a health care facility  Health maintenance updated    Anxiety  Patient has been on monotherapy of Xanax for many years.  Patient has tried an SNRI and 1 SSRI in the past and had side effects from this.  Discussed with patient that I am not comfortable continuing to prescribe monotherapy of 1mg Xanax and would like to wean off of this. Patient reluctant to this.  Will start with low-dose sertraline and recommend that he decrease xanax to 0.5mg daily for the next month.  Would like to bump sertraline up quicker the patient is hesitant so we will stay on the low-dose for the next month.  Anticipate increasing sertraline at next visit - patient advised of this.  Can also consider addition of Wellbutrin and/or buspirone in the future.  At next visit we will work on continuing to taper Xanax. Also consider psychiatry referral if pt unwilling to decrease xanax/go on preventative/daily therapy.  - sertraline (ZOLOFT) 25 MG tablet; Take 1 tablet (25 mg) by mouth daily.    Elevated BP without diagnosis of hypertension  Will recheck at next visit.  - Basic metabolic panel  (Ca, Cl, CO2, Creat, Gluc, K, Na, BUN); Future  - Basic metabolic panel  (Ca, Cl, CO2, Creat, Gluc, K, Na, BUN)    Trigger finger, acquired  Discussed conservative therapies with patient, which he has already mostly been doing.  He has not tried splints before because he works with his hands during the day.  Recommend that he try using splints at night and can follow-up with Ortho if needed to discuss options.  Patient is agreeable to this plan  - Orthopedic  Referral; Future    Chronic pain of both knees  Chronic right-sided low back pain with right-sided sciatica  Bilateral x-rays last year showed mild osteoarthritis.  Discussed conservative therapy and  "physical therapy and patient would like to try physical therapy. Referral placed  - Physical Therapy  Referral; Future    Familial tremor  Stable, refilled  - propranolol ER (INDERAL LA) 80 MG 24 hr capsule; TAKE ONE CAPSULE BY MOUTH ONCE DAILY. May take one additional capsule per day as needed.    Benign prostatic hyperplasia with nocturia  Patient states that he is still having urinary symptoms even with the Flomax.  Offered to add an additional medication or place urology referral but patient declines.  - tamsulosin (FLOMAX) 0.4 MG capsule; Take 1 capsule (0.4 mg) by mouth daily.    Mild persistent asthma without complication  Still using inhaler 3x/week recommend trial of increased dose of daily inhaler. Will follow up in one month  - Fluticasone-Umeclidin-Vilanterol (TRELEGY ELLIPTA) 200-62.5-25 MCG/ACT oral inhaler; Inhale 1 puff into the lungs daily.    Rosacea  Stable, needs refill  - azelaic acid (FINACIA) 15 % external gel; Apply a thin layer to the affected area(s) of the face twice daily    Chronic prescription benzodiazepine use  Labs today. Will complete CSA at next visit  - [id number] - Urine Drug Confirmation Panel (Comprehensive); Future  - [id number] - Urine Drug Confirmation Panel (Comprehensive)    Need for influenza vaccination  - INFLUENZA VACCINE TRIVALENT(FLUBLOK)          BMI  Estimated body mass index is 29.57 kg/m  as calculated from the following:    Height as of this encounter: 1.803 m (5' 11\").    Weight as of this encounter: 96.2 kg (212 lb).   Weight management plan: Discussed healthy diet and exercise guidelines    Counseling  Appropriate preventive services were addressed with this patient via screening, questionnaire, or discussion as appropriate for fall prevention, nutrition, physical activity, Tobacco-use cessation, social engagement, weight loss and cognition.  Checklist reviewing preventive services available has been given to the patient.  Reviewed patient's diet, " addressing concerns and/or questions.   He is at risk for psychosocial distress and has been provided with information to reduce risk.   The patient reports drinking more than 3 alcoholic drinks per day and/or more than 7 drhnks per week. The patient was counseled and given information about possible harmful effects of excessive alcohol intake.        Med Oakes is a 59 year old, presenting for the following:  Physical, Consult For (Trigger finger right middle finger), and Knee Pain (Bilateral knee pain)        10/8/2024     9:31 AM   Additional Questions   Roomed by Jyothi CARRINGTON        Health Care Directive  Patient does not have a Health Care Directive or Living Will: Patient states has Advance Directive and will bring in a copy to clinic.    HPI    Trigger finger  History of trigger finger on left hand, had surgery for this last year   Now currently having problem with his right hand. He tries to stretch daily and rubs ice cube on area which is helpful   He has not tried a brace before or taking any over-the-counter medications  He works frequently with his hands and has been getting worse  She had 3 injections on his right hand last year but didn't find any of them helpful    Anxiety -   Has been on Xanax daily for many years and finds this very helpful.  He has tried desvenlafaxine and lexapro in the past.  Desvenlafaxine gave him a flat affect and Lexapro caused low libido.  She is hesitant to start a daily medication due to adverse side effects in the past because he finds the Xanax very helpful for increased stress related to his job.  He takes Xanax daily and will take hydroxyzine as needed if he needs it.    Asthma  Has been taking Trelegy Ellipta 100 mcg daily.  States that he still uses albuterol a few times a week for coughing.  Otherwise feels like asthma is well-controlled..      7/24/2023     4:46 PM 2/26/2024     1:38 PM 10/8/2024     9:28 AM   ACT Total Scores   ACT TOTAL SCORE (Goal Greater  than or Equal to 20) 21 23 20   In the past 12 months, how many times did you visit the emergency room for your asthma without being admitted to the hospital? 0 0 0   In the past 12 months, how many times were you hospitalized overnight because of your asthma? 0 0 0      Urinary symptoms  Takes Flomax daily but still has trouble with stream.  He has declined additional medication therapy in the past.        10/8/2024   General Health   How would you rate your overall physical health? Good   Feel stress (tense, anxious, or unable to sleep) Only a little      (!) STRESS CONCERN      10/8/2024   Nutrition   Three or more servings of calcium each day? Yes   Diet: Regular (no restrictions)   How many servings of fruit and vegetables per day? 4 or more   How many sweetened beverages each day? 0-1            10/8/2024   Exercise   Days per week of moderate/strenous exercise 5 days            10/8/2024   Social Factors   Frequency of gathering with friends or relatives Once a week   Worry food won't last until get money to buy more No   Food not last or not have enough money for food? No   Do you have housing? (Housing is defined as stable permanent housing and does not include staying ouside in a car, in a tent, in an abandoned building, in an overnight shelter, or couch-surfing.) Yes   Are you worried about losing your housing? No   Lack of transportation? No   Unable to get utilities (heat,electricity)? No            10/8/2024   Fall Risk   Fallen 2 or more times in the past year? No   Trouble with walking or balance? No             10/8/2024   Dental   Dentist two times every year? Yes            10/8/2024   TB Screening   Were you born outside of the [place]? No            10/8/2024   Substance Use   Alcohol more than 3/day or more than 7/wk Yes   How often do you have a drink containing alcohol 2 to 3 times a week   How many alcohol drinks on typical day 3 or 4   How often do you have 5+ drinks at one occasion Monthly  "  Audit 2/3 Score 3   How often not able to stop drinking once started Never   How often failed to do what normally expected Never   How often needed first drink in am after a heavy drinking session Never   How often feeling of guilt or remorse after drinking Less than monthly   How often unable to remember what happened the night before Never   Have you or someone else been injured because of your drinking No   Has anyone been concerned or suggested you cut down on drinking No   TOTAL SCORE - AUDIT 7   Do you use any other substances recreationally? (!) CANNABIS PRODUCTS        Social History     Tobacco Use    Smoking status: Never     Passive exposure: Never    Smokeless tobacco: Current     Types: Chew    Tobacco comments:     occ   Vaping Use    Vaping status: Never Used   Substance Use Topics    Alcohol use: Yes     Comment: 6-8 beers per week    Drug use: No           10/8/2024   STI Screening   New sexual partner(s) since last STI/HIV test? No      Last PSA:   Prostate Specific Antigen Screen   Date Value Ref Range Status   11/10/2022 2.09 0.00 - 4.00 ug/L Final     ASCVD Risk   The 10-year ASCVD risk score (Maria E SAMUELS, et al., 2019) is: 7.8%    Values used to calculate the score:      Age: 59 years      Sex: Male      Is Non- : No      Diabetic: No      Tobacco smoker: No      Systolic Blood Pressure: 148 mmHg      Is BP treated: No      HDL Cholesterol: 56 mg/dL      Total Cholesterol: 199 mg/dL      Reviewed and updated as needed this visit by Provider     Meds                Review of Systems  Constitutional, HEENT, cardiovascular, pulmonary, gi and gu systems are negative, except as otherwise noted.     Objective    Exam  BP (!) 148/93 (BP Location: Right arm, Patient Position: Chair, Cuff Size: Adult Regular)   Pulse 60   Temp 97.7  F (36.5  C) (Oral)   Resp 15   Ht 1.803 m (5' 11\")   Wt 96.2 kg (212 lb)   SpO2 97%   BMI 29.57 kg/m     Estimated body mass index " "is 29.57 kg/m  as calculated from the following:    Height as of this encounter: 1.803 m (5' 11\").    Weight as of this encounter: 96.2 kg (212 lb).    Physical Exam  GENERAL: alert and no distress  EYES: Eyes grossly normal to inspection, and conjunctivae and sclerae normal  HENT: ear canals and TM's normal, nose and mouth without ulcers or lesions  RESP: lungs clear to auscultation - no rales, rhonchi or wheezes  CV: regular rate and rhythm, normal S1 S2, no S3 or S4, no murmur, click or rub, no peripheral edema  MS: no gross musculoskeletal defects noted, no edema  PSYCH: mentation appears normal, affect normal/bright      Signed Electronically by: ABIGAIL Sow CNP    "

## 2024-10-08 NOTE — PATIENT INSTRUCTIONS
Trigger finger - try bracing at night for 1-2 months. Then can follow up with ortho to discuss options like surgery if needed    Try taking sertraline (zoloft) for one month.   I would like you to try skipping xanax every 3rd day for the next month   Other options we can add in the future are wellbutrin (can help with low libido) and/or buspar (good for anxiety specifically)        Patient Education   Preventive Care Advice   This is general advice given by our system to help you stay healthy. However, your care team may have specific advice just for you. Please talk to your care team about your preventive care needs.  Nutrition  Eat 5 or more servings of fruits and vegetables each day.  Try wheat bread, brown rice and whole grain pasta (instead of white bread, rice, and pasta).  Get enough calcium and vitamin D. Check the label on foods and aim for 100% of the RDA (recommended daily allowance).  Lifestyle  Exercise at least 150 minutes each week  (30 minutes a day, 5 days a week).  Do muscle strengthening activities 2 days a week. These help control your weight and prevent disease.  No smoking.  Wear sunscreen to prevent skin cancer.  Have a dental exam and cleaning every 6 months.  Yearly exams  See your health care team every year to talk about:  Any changes in your health.  Any medicines your care team has prescribed.  Preventive care, family planning, and ways to prevent chronic diseases.  Shots (vaccines)   HPV shots (up to age 26), if you've never had them before.  Hepatitis B shots (up to age 59), if you've never had them before.  COVID-19 shot: Get this shot when it's due.  Flu shot: Get a flu shot every year.  Tetanus shot: Get a tetanus shot every 10 years.  Pneumococcal, hepatitis A, and RSV shots: Ask your care team if you need these based on your risk.  Shingles shot (for age 50 and up)  General health tests  Diabetes screening:  Starting at age 35, Get screened for diabetes at least every 3 years.  If  you are younger than age 35, ask your care team if you should be screened for diabetes.  Cholesterol test: At age 39, start having a cholesterol test every 5 years, or more often if advised.  Bone density scan (DEXA): At age 50, ask your care team if you should have this scan for osteoporosis (brittle bones).  Hepatitis C: Get tested at least once in your life.  STIs (sexually transmitted infections)  Before age 24: Ask your care team if you should be screened for STIs.  After age 24: Get screened for STIs if you're at risk. You are at risk for STIs (including HIV) if:  You are sexually active with more than one person.  You don't use condoms every time.  You or a partner was diagnosed with a sexually transmitted infection.  If you are at risk for HIV, ask about PrEP medicine to prevent HIV.  Get tested for HIV at least once in your life, whether you are at risk for HIV or not.  Cancer screening tests  Cervical cancer screening: If you have a cervix, begin getting regular cervical cancer screening tests starting at age 21.  Breast cancer scan (mammogram): If you've ever had breasts, begin having regular mammograms starting at age 40. This is a scan to check for breast cancer.  Colon cancer screening: It is important to start screening for colon cancer at age 45.  Have a colonoscopy test every 10 years (or more often if you're at risk) Or, ask your provider about stool tests like a FIT test every year or Cologuard test every 3 years.  To learn more about your testing options, visit:   .  For help making a decision, visit:   https://bit.ly/cp89682.  Prostate cancer screening test: If you have a prostate, ask your care team if a prostate cancer screening test (PSA) at age 55 is right for you.  Lung cancer screening: If you are a current or former smoker ages 50 to 80, ask your care team if ongoing lung cancer screenings are right for you.  For informational purposes only. Not to replace the advice of your health care  "provider. Copyright   2023 [facility]. All rights reserved. Clinically reviewed by the [facility]. [employer] 194043 - REV 01/24.  9 Ways to Cut Back on Drinking  Maybe you've found yourself drinking more alcohol than you'd prefer. If you want to cut back, here are some ideas to try.    Think before you drink.  Do you really want a drink, or is it just a habit? If you're used to having a drink at a certain time, try doing something else then.     Look for substitutes.  Find some no-alcohol drinks that you enjoy, like flavored seltzer water, tea with honey, or tonic with a slice of lime. Or try alcohol-free beer or \"virgin\" cocktails (without the alcohol).     Drink more water.  Use water to quench your thirst. Drink a glass of water before you have any alcohol. Have another glass along with every drink or between drinks.     Shrink your drink.  For example, have a bottle of beer instead of a pint. Use a smaller glass for wine. Choose drinks with lower alcohol content (ABV%). Or use less liquor and more mixer in cocktails.     Slow down.  It's easy to drink quickly and without thinking about it. Pay attention, and make each drink last longer.     Do the math.  Total up how much you spend on alcohol each month. How much is that a year? If you cut back, what could you do with the money you save?     Take a break.  Choose a day or two each week when you won't drink at all. Notice how you feel on those days, physically and emotionally. How did you sleep? Do you feel better? Over time, add more break days.     Count calories.  Would you like to lose some weight? For some people that's a good motivator for cutting back. Figure out how many calories are in each drink. How many does that add up to in a day? In a week? In a month?     Practice saying no.  Be ready when someone offers you a drink. Try: \"Thanks, I've had enough.\" Or \"Thanks, but I'm cutting back.\" Or \"No, thanks. I feel " "better when I drink less.\"   Current as of: November 15, 2023  Content Version: 14.2 2024 [facility] [employer].   Care instructions adapted under license by your healthcare professional. If you have questions about a medical condition or this instruction, always ask your healthcare professional. [employer] disclaims any warranty or liability for your use of this information.  Substance Use Disorder: Care Instructions  Overview     You can improve your life and health by stopping your use of alcohol or drugs. When you don't drink or use drugs, you may feel and sleep better. You may get along better with your family, friends, and coworkers. There are medicines and programs that can help with substance use disorder.  How can you care for yourself at home?  Here are some ways to help you stay sober and prevent relapse.  If you have been given medicine to help keep you sober or reduce your cravings, be sure to take it exactly as prescribed.  Talk to your doctor about programs that can help you stop using drugs or drinking alcohol.  Do not keep alcohol or drugs in your home.  Plan ahead. Think about what you'll say if other people ask you to drink or use drugs. Try not to spend time with people who drink or use drugs.  Use the time and money spent on drinking or drugs to do something that's important to you.  Preventing a relapse  Have a plan to deal with relapse. Learn to recognize changes in your thinking that lead you to drink or use drugs. Get help before you start to drink or use drugs again.  Try to stay away from situations, friends, or places that may lead you to drink or use drugs.  If you feel the need to drink alcohol or use drugs again, seek help right away. Call a trusted friend or family member. Some people get support from organizations such as Narcotics Anonymous or [employer] or from treatment facilities.  If you relapse, get help as soon as you can. Some people make a plan with " another person that outlines what they want that person to do for them if they relapse. The plan usually includes how to handle the relapse and who to notify in case of relapse.  Don't give up. Remember that a relapse doesn't mean that you have failed. Use the experience to learn the triggers that lead you to drink or use drugs. Then quit again. Recovery is a lifelong process. Many people have several relapses before they are able to quit for good.  Follow-up care is a key part of your treatment and safety. Be sure to make and go to all appointments, and call your doctor if you are having problems. It's also a good idea to know your test results and keep a list of the medicines you take.  When should you call for help?   Call 911  anytime you think you may need emergency care. For example, call if you or someone else:    Has overdosed or has withdrawal signs. Be sure to tell the emergency workers that you are or someone else is using or trying to quit using drugs. Overdose or withdrawal signs may include:  Losing consciousness.  Seizure.  Seeing or hearing things that aren't there (hallucinations).     Is thinking or talking about suicide or harming others.   Where to get help 24 hours a day, 7 days a week   If you or someone you know talks about suicide, self-harm, a mental health crisis, a substance use crisis, or any other kind of emotional distress, get help right away. You can:    Call the Suicide and Crisis Lifeline at 98.     Call 1-189-378-TALK (1-906.285.2885).     Text HOME to 568829 to access the Crisis Text Line.   Consider saving these numbers in your phone.  Go to [employer]Re[employer].org for more information or to chat online.  Call your doctor now or seek immediate medical care if:    You are having withdrawal symptoms. These may include nausea or vomiting, sweating, shakiness, and anxiety.   Watch closely for changes in your health, and be sure to contact your doctor if:    You have a relapse.     You need  "more help or support to stop.   Where can you learn more?  Go to https://www.[employer].net/patiented  Enter H573 in the search box to learn more about \"Substance Use Disorder: Care Instructions.\"  Current as of: November 15, 2023  Content Version: 14.2 2024 [facility] [employer] [facility].   Care instructions adapted under license by your healthcare professional. If you have questions about a medical condition or this instruction, always ask your healthcare professional. [employer] disclaims any warranty or liability for your use of this information.       "

## 2024-10-09 ENCOUNTER — PATIENT OUTREACH (OUTPATIENT)
Dept: CARE COORDINATION | Facility: CLINIC | Age: 59
End: 2024-10-09
Payer: COMMERCIAL

## 2024-10-09 DIAGNOSIS — F41.9 ANXIETY: ICD-10-CM

## 2024-10-09 RX ORDER — ALPRAZOLAM 1 MG/1
1 TABLET ORAL DAILY PRN
Qty: 20 TABLET | Refills: 0 | Status: CANCELLED | OUTPATIENT
Start: 2024-10-09

## 2024-10-10 RX ORDER — ALPRAZOLAM 0.5 MG
0.5 TABLET ORAL DAILY PRN
Qty: 30 TABLET | Refills: 0 | Status: SHIPPED | OUTPATIENT
Start: 2024-10-10

## 2024-10-11 ENCOUNTER — PATIENT OUTREACH (OUTPATIENT)
Dept: CARE COORDINATION | Facility: CLINIC | Age: 59
End: 2024-10-11
Payer: COMMERCIAL

## 2024-10-18 ENCOUNTER — MYC MEDICAL ADVICE (OUTPATIENT)
Dept: FAMILY MEDICINE | Facility: CLINIC | Age: 59
End: 2024-10-18

## 2024-10-18 DIAGNOSIS — F41.9 ANXIETY: Primary | ICD-10-CM

## 2024-10-18 NOTE — TELEPHONE ENCOUNTER
See [employer] message.     Replied via [employer].     Tara EDMOND RN   Clinic RN  [facility]

## 2024-10-24 DIAGNOSIS — F51.01 PRIMARY INSOMNIA: ICD-10-CM

## 2024-10-24 DIAGNOSIS — F41.9 ANXIETY: ICD-10-CM

## 2024-10-24 RX ORDER — ZOLPIDEM TARTRATE 10 MG/1
10 TABLET ORAL
Qty: 90 TABLET | Refills: 1 | Status: SHIPPED | OUTPATIENT
Start: 2024-10-24

## 2024-10-24 RX ORDER — HYDROXYZINE HYDROCHLORIDE 25 MG/1
25 TABLET, FILM COATED ORAL 3 TIMES DAILY PRN
Qty: 90 TABLET | Refills: 5 | Status: SHIPPED | OUTPATIENT
Start: 2024-10-24

## 2024-10-24 NOTE — TELEPHONE ENCOUNTER
Patient calling for a refill for hydroxyzine and zolpidem.     Script and pharmacy pended below.   Yanira Schaeffer RN

## 2024-11-18 ENCOUNTER — OFFICE VISIT (OUTPATIENT)
Dept: INTERNAL MEDICINE | Facility: CLINIC | Age: 59
End: 2024-11-18
Payer: COMMERCIAL

## 2024-11-18 VITALS
DIASTOLIC BLOOD PRESSURE: 86 MMHG | SYSTOLIC BLOOD PRESSURE: 122 MMHG | WEIGHT: 203.5 LBS | OXYGEN SATURATION: 96 % | RESPIRATION RATE: 16 BRPM | TEMPERATURE: 97.4 F | HEART RATE: 93 BPM | BODY MASS INDEX: 28.49 KG/M2 | HEIGHT: 71 IN

## 2024-11-18 DIAGNOSIS — E78.5 HYPERLIPIDEMIA LDL GOAL <160: ICD-10-CM

## 2024-11-18 DIAGNOSIS — Z79.899 CHRONIC PRESCRIPTION BENZODIAZEPINE USE: ICD-10-CM

## 2024-11-18 DIAGNOSIS — F41.9 ANXIETY: ICD-10-CM

## 2024-11-18 DIAGNOSIS — J45.30 MILD PERSISTENT ASTHMA WITHOUT COMPLICATION: Primary | ICD-10-CM

## 2024-11-18 PROCEDURE — 99214 OFFICE O/P EST MOD 30 MIN: CPT | Performed by: INTERNAL MEDICINE

## 2024-11-18 PROCEDURE — 96127 BRIEF EMOTIONAL/BEHAV ASSMT: CPT | Performed by: INTERNAL MEDICINE

## 2024-11-18 RX ORDER — ALPRAZOLAM 0.5 MG
0.5 TABLET ORAL DAILY PRN
Qty: 12 TABLET | Refills: 0 | Status: SHIPPED | OUTPATIENT
Start: 2024-11-18

## 2024-11-18 NOTE — NURSING NOTE
"/86   Pulse 93   Temp 97.4  F (36.3  C) (Tympanic)   Resp 16   Ht 1.803 m (5' 11\")   Wt 92.3 kg (203 lb 8 oz)   SpO2 96%   BMI 28.38 kg/m      "

## 2024-11-18 NOTE — PROGRESS NOTES
Assessment & Plan        (F41.9) Anxiety  Plan: Patient has stopped his Zoloft which was started by his previous provider.  Discussed extensively with the patient that alprazolam is not for daily use, if patient requires daily anxiety medication he should be on any SSRI or SNRI.  Patient does not like to take daily medication at this time and states that he will use alprazolam only as needed.  Patient is also scheduling appointment with psychiatric provider for further evaluation and management of his anxiety symptoms.  Also given information on [facility].   Refilled ALPRAZolam (XANAX) 0.5 MG tablet 12 tablets/month at this time to take as needed basis.explained clearly about the medication,insructions and side effects. Advised not to drive or operate any machinery while on this med.    Last Urine drug screen on 10/08/24 did not detect xanax, specimen diluted, rpt Urine drug screen before next refill.             (J45.30) Mild persistent asthma without complication    Plan: stable on Trelegy Ellipta , montelukast and as needed albuterol inhaler      (E78.5) Hyperlipidemia LDL goal <160  Plan: Not on any medications at this time, continue to follow low-fat diet and regular exercise     (Z79.899) Chronic prescription benzodiazepine use  Plan: Last Urine drug screen on 10/08/24 did not detect xanax, specimen diluted, rpt Urine drug screen before next refill.   Drug Confirmation Panel Urine with Creat - lab collect            Subjective   Champ is a 59 year old, presenting for the following health issues:  Lipids, Anxiety, and Asthma      11/18/2024     1:08 PM   Additional Questions   Roomed by kelly   Accompanied by self         11/18/2024     1:08 PM   Patient Reported Additional Medications   Patient reports taking the following new medications none     HPI      Hyperlipidemia Follow-Up    Are you regularly taking any medication or supplement to lower your cholesterol?   No  Are you having muscle  aches or other side effects that you think could be caused by your cholesterol lowering medication?  NA    Anxiety   How are you doing with your anxiety since your last visit? No change.  Patient states that he has history of anxiety for several years since he was in college.  Patient states that he has tried Zoloft, lexapro, desvenlafaxine in the past and he felt emotionless with these medications and does not like to take daily medications for anxiety.  Currently on Xanax 0.5 mg as needed ,has not seen a psychiatric provider, patient was seen by  Bhavya Michaud APRN CNP on 10/08/24 who recommended that patient should not be taking Xanax on a daily basis and  recommended a referral to psychiatric provider.    Are you having other symptoms that might be associated with anxiety? No  Have you had a significant life event? No   Are you feeling depressed? No  Do you have any concerns with your use of alcohol or other drugs? No         Asthma Follow-Up    Was ACT completed today?  Completed on 10/8/2024      10/8/2024     9:28 AM   ACT Total Scores   ACT TOTAL SCORE (Goal Greater than or Equal to 20) 20   In the past 12 months, how many times did you visit the emergency room for your asthma without being admitted to the hospital? 0    In the past 12 months, how many times were you hospitalized overnight because of your asthma? 0        Patient-reported           Past Medical History:   Diagnosis Date    Acne vulgaris     Anxiety 03/28/2011    Anxiety disorder     Arthritis     both knees    CARDIOVASCULAR SCREENING; LDL GOAL LESS THAN 160 10/31/2010    Controlled substance agreement signed 01/11/2016    Coughing     at night    Dyspepsia 08/16/2016    Elevated blood pressure reading without diagnosis of hypertension 01/11/2016    Episodic tension-type headache, not intractable 03/08/2021    Familial tremor 08/16/2016    Family history of rhinophyma 01/18/2012    Hyperlipidemia LDL goal <160 10/31/2010    The 10-year  ASCVD risk score (Lex EISENBERG Jr., et al., 2013) is: 9.5%   Values used to calculate the score:     Age: 56 years     Sex: Male     Is Non- : No     Diabetic: No     Tobacco smoker: Yes     Systolic Blood Pressure: 116 mmHg     Is BP treated: No     HDL Cholesterol: 58 mg/dL     Total Cholesterol: 252 mg/dL     Hypotension, unspecified hypotension type 03/27/2024    Infection due to 2019 novel coronavirus 11/09/2022    Insomnia     Intermittent asthma 03/28/2011    Knee pain 01/18/2012    Lateral epicondylitis of left elbow 09/14/2018    LBP (low back pain) 01/18/2012    Low back pains     Mild persistent asthma without complication 08/16/2016    Nocturnal leg cramps 06/15/2020    Rhinitis 01/18/2012    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    Right-sided low back pain with right-sided sciatica 10/08/2020    Rosacea 01/18/2012    Rotator cuff syndrome, right 02/12/2019    Seasonal allergic rhinitis 05/02/2022    Sebaceous cyst 02/26/2024    Sebaceous hyperplasia 01/18/2012    Seborrheic keratosis 07/14/2021    Shortness of breath     due to asthma    Snoring 09/29/2020    Tobacco abuse 01/18/2012    Trigger finger, acquired 11/22/2019       Current Outpatient Medications   Medication Sig Dispense Refill    albuterol (PROAIR HFA/PROVENTIL HFA/VENTOLIN HFA) 108 (90 Base) MCG/ACT inhaler Inhale 2 puffs into the lungs every 6 hours as needed for shortness of breath 18 g 3    ALPRAZolam (XANAX) 0.5 MG tablet Take 1 tablet (0.5 mg) by mouth daily as needed for anxiety. 12 tablet 0    azelaic acid (FINACIA) 15 % external gel Apply a thin layer to the affected area(s) of the face twice daily 50 g 1    azelastine (ASTELIN) 0.1 % nasal spray Spray 1 spray into both nostrils 2 times daily 30 mL 5    Calcium Carbonate-Vit D-Min (CALCIUM 1200 PO)       fluticasone (FLONASE) 50 MCG/ACT nasal spray Spray 1 spray into both nostrils daily 16 g 5    Fluticasone-Umeclidin-Vilanterol (TRELEGY ELLIPTA) 200-62.5-25 MCG/ACT oral  "inhaler Inhale 1 puff into the lungs daily. 3 each 3    hydrOXYzine HCl (ATARAX) 25 MG tablet Take 1 tablet (25 mg) by mouth 3 times daily as needed for anxiety. 90 tablet 5    ipratropium (ATROVENT) 0.06 % nasal spray INHALE TWO SPRAYS IN EACH NOSTRIL FOUR TIMES A DAY 15 mL 5    montelukast (SINGULAIR) 10 MG tablet TAKE ONE TABLET BY MOUTH AT BEDTIME Strength: 10 mg 90 tablet 3    Multiple Vitamins-Minerals (MULTIVITAMIN ADULT PO) Take 1 tablet by mouth      propranolol ER (INDERAL LA) 80 MG 24 hr capsule TAKE ONE CAPSULE BY MOUTH ONCE DAILY. May take one additional capsule per day as needed. 90 capsule 3    tamsulosin (FLOMAX) 0.4 MG capsule Take 1 capsule (0.4 mg) by mouth daily. 90 capsule 3    vitamin  B complex with vitamin C (VITAMIN  B COMPLEX) TABS Take 1 tablet by mouth daily      VITAMIN D, CHOLECALCIFEROL, PO Take 1,000 Units by mouth daily      zolpidem (AMBIEN) 10 MG tablet Take 1 tablet (10 mg) by mouth nightly as needed for sleep. 90 tablet 1    augmented betamethasone dipropionate (DIPROLENE-AF) 0.05 % external cream  (Patient not taking: Reported on 11/18/2024)           Review of Systems  CONSTITUTIONAL: NEGATIVE for fever, chills,   RESP: NEGATIVE for significant cough or SOB  CV: NEGATIVE for chest pain, palpitations or peripheral edema  PSYCHIATRIC: HX anxiety      Objective    /86   Pulse 93   Temp 97.4  F (36.3  C) (Tympanic)   Resp 16   Ht 1.803 m (5' 11\")   Wt 92.3 kg (203 lb 8 oz)   SpO2 96%   BMI 28.38 kg/m    Body mass index is 28.38 kg/m .  Physical Exam   GENERAL: alert and no distress  EYES: Eyes grossly normal to inspection, PERRL and conjunctivae and sclerae normal  RESP: lungs clear to auscultation - no rales, rhonchi or wheezes  CV: regular rate and rhythm, normal S1 S2,    MS: no gross musculoskeletal defects noted, no edema  NEURO: Normal strength and tone, mentation intact and speech normal  PSYCH: mentation appears normal, affect normal/bright          Signed " Electronically by: Cyndi Cote MD

## 2024-11-20 PROBLEM — Z79.899 CHRONIC PRESCRIPTION BENZODIAZEPINE USE: Status: ACTIVE | Noted: 2024-11-20

## 2024-12-04 NOTE — LETTER
10/17/2023         RE: Venkata Lynn  [id number] [address] [facility] [id number]        Dear Colleague,    Thank you for referring your patient, Venkata Lynn, to the [facility] [place]. Please see a copy of my visit note below.    Orthopaedic Surgery Hand and Upper Extremity Clinic Progress Note  Date: Oct 17, 2023    Patient Name: Venkata Lynn  MRN: [id number]    Consult requested by: Lyndon Stoll    CHIEF COMPLAINT: trigger finger, left ring finger, right middle finger    Dominant Hand:right  Occupation: construction      HPI:  10/17/23 Patient is present for a follow up trigger finger injection on 8/22/23. Patient reports that he received partial relief. Patient states that the ring finger on his left hand started catching a couple days after and the long finger on the right hand has gotten much better but still catches from time to time. He continues to have difficulty fully flexing of the left ring finger.      Mr. Venkata Lynn is a 58 year old male right hand dominant who presents with trigger finger of left ring finger. This has been ongoing for a couple of years. He describes intermittent locking that can take hours to fully extend finger. Right middle finger also occasionally triggers but not nearly as bad as the left. He also notes history of arthritis in bilateral hands.       PMH  Diabetes: no  Thyroid Problems: no  Smoking: no      VITALS:  There were no vitals filed for this visit.    EXAM:  General: NAD, A&Ox3  HEENT: NC/AT  CV: RRR by peripheral pulse  Pulmonary: Non-labored breathing on RA  LUE:  Skin intact, no deformity  Mild tenderness to palpation with palpable nodule at the A1 pulley of the left ring finger.  No visible triggering today.  Intact FDP, FDS, EDC  Sensation intact to light touch median, radial, ulnar nerve distributions  Warm well-perfused, capillary fill less than 2 seconds    RUE:  Skin intact, no deformity  Mild tenderness to  palpation with palpable nodule at the A1 pulley of the right middle finger.  No visible triggering today.  Intact FDP, FDS, EDC  Sensation intact to light touch median, radial, ulnar nerve distributions  Warm well-perfused, capillary fill less than 2 seconds    IMAGING:    None    I have personally reviewed the above images and labs.         IMPRESSION AND RECOMMENDATIONS:  Mr. Venkata Lynn is a 58 year old male right hand dominant with left ring and right middle trigger fingers.    Patient wishes to have repeat injections.    After obtaining written consent, I cleansed the skin over the left ring finger A1 pulley with chlorhexidine.  I then anesthetized skin with ethyl chloride freeze spray after which injected 1 cc of 1% lidocaine and 1 cc of dexamethasone 4 mg/mL into the left ring finger A1 pulley and flexor tendon sheath.  I then repeated this and injected the same cocktail into the right middle finger A1 pulley and flexor tendon sheath.  Patient tolerated this well without complication.    If in 6 weeks, the patient has noticed improvement but has residual symptoms, could consider one additional injection. If left ring finger particularly remains the same, he would benefit from trigger finger release.    All questions answered.  The patient voiced understanding agreement.  Follow-up with me as needed.  Hand / Upper Extremity Injection/Arthrocentesis: R long A1    Date/Time: 10/17/2023 3:34 PM    Performed by: Jeevan Solis MD  Authorized by: Jeevan Solis MD    Needle Size:  25 G  Guidance: landmark    Condition: trigger finger    Location:  Long finger    Site:  R long A1  Medications:  1 mL dexAMETHasone 120 MG/30ML; 1 mL lidocaine 1 %  Outcome:  Tolerated well, no immediate complications  Procedure discussed: discussed risks, benefits, and alternatives    Timeout: timeout called immediately prior to procedure    Prep: patient was prepped and draped in usual sterile fashion    Hand / Upper Extremity  2 Injection/Arthrocentesis: L ring A1    Date/Time: 10/17/2023 3:34 PM    Performed by: Jeevan Solis MD  Authorized by: Jeevan Solis MD    Needle Size:  25 G  Guidance: landmark    Approach:  Volar  Condition: trigger finger    Location:  Ring finger    Site:  L ring A1  Medications:  1 mL dexAMETHasone 120 MG/30ML; 1 mL lidocaine 1 %  Outcome:  Tolerated well, no immediate complications  Procedure discussed: discussed risks, benefits, and alternatives    Timeout: timeout called immediately prior to procedure    Prep: patient was prepped and draped in usual sterile fashion        Jeevan Solis MD    Hand, Upper Extremity & Microvascular Surgery  Department of Orthopaedic Surgery  [facility]               Again, thank you for allowing me to participate in the care of your patient.        Sincerely,        Jeevan Solis MD   no

## 2024-12-11 ENCOUNTER — THERAPY VISIT (OUTPATIENT)
Dept: PHYSICAL THERAPY | Facility: CLINIC | Age: 59
End: 2024-12-11
Payer: COMMERCIAL

## 2024-12-11 DIAGNOSIS — M25.561 CHRONIC PAIN OF BOTH KNEES: ICD-10-CM

## 2024-12-11 DIAGNOSIS — G89.29 CHRONIC PAIN OF BOTH KNEES: ICD-10-CM

## 2024-12-11 DIAGNOSIS — M25.562 CHRONIC PAIN OF BOTH KNEES: ICD-10-CM

## 2024-12-11 DIAGNOSIS — G89.29 CHRONIC RIGHT-SIDED LOW BACK PAIN WITH RIGHT-SIDED SCIATICA: ICD-10-CM

## 2024-12-11 DIAGNOSIS — M54.41 CHRONIC RIGHT-SIDED LOW BACK PAIN WITH RIGHT-SIDED SCIATICA: ICD-10-CM

## 2024-12-11 PROCEDURE — 97530 THERAPEUTIC ACTIVITIES: CPT | Mod: GP | Performed by: PHYSICAL THERAPIST

## 2024-12-11 PROCEDURE — 97112 NEUROMUSCULAR REEDUCATION: CPT | Mod: GP | Performed by: PHYSICAL THERAPIST

## 2024-12-11 PROCEDURE — 97161 PT EVAL LOW COMPLEX 20 MIN: CPT | Mod: GP | Performed by: PHYSICAL THERAPIST

## 2024-12-11 PROCEDURE — 97110 THERAPEUTIC EXERCISES: CPT | Mod: GP | Performed by: PHYSICAL THERAPIST

## 2024-12-11 ASSESSMENT — ACTIVITIES OF DAILY LIVING (ADL)
HOW_WOULD_YOU_RATE_THE_OVERALL_FUNCTION_OF_YOUR_KNEE_DURING_YOUR_USUAL_DAILY_ACTIVITIES?: NEARLY NORMAL
HOW_WOULD_YOU_RATE_THE_CURRENT_FUNCTION_OF_YOUR_KNEE_DURING_YOUR_USUAL_DAILY_ACTIVITIES_ON_A_SCALE_FROM_0_TO_100_WITH_100_BEING_YOUR_LEVEL_OF_KNEE_FUNCTION_PRIOR_TO_YOUR_INJURY_AND_0_BEING_THE_INABILITY_TO_PERFORM_ANY_OF_YOUR_USUAL_DAILY_ACTIVITIES?: 75
PAIN: THE SYMPTOM AFFECTS MY ACTIVITY SLIGHTLY
STIFFNESS: THE SYMPTOM AFFECTS MY ACTIVITY SLIGHTLY
HOW_WOULD_YOU_RATE_THE_OVERALL_FUNCTION_OF_YOUR_KNEE_DURING_YOUR_USUAL_DAILY_ACTIVITIES?: NEARLY NORMAL
STIFFNESS: THE SYMPTOM AFFECTS MY ACTIVITY SLIGHTLY
AS_A_RESULT_OF_YOUR_KNEE_INJURY,_HOW_WOULD_YOU_RATE_YOUR_CURRENT_LEVEL_OF_DAILY_ACTIVITY?: NEARLY NORMAL
HOW_WOULD_YOU_RATE_THE_CURRENT_FUNCTION_OF_YOUR_KNEE_DURING_YOUR_USUAL_DAILY_ACTIVITIES_ON_A_SCALE_FROM_0_TO_100_WITH_100_BEING_YOUR_LEVEL_OF_KNEE_FUNCTION_PRIOR_TO_YOUR_INJURY_AND_0_BEING_THE_INABILITY_TO_PERFORM_ANY_OF_YOUR_USUAL_DAILY_ACTIVITIES?: 75
KNEE_ACTIVITY_OF_DAILY_LIVING_SUM: 6
PAIN: THE SYMPTOM AFFECTS MY ACTIVITY SLIGHTLY
AS_A_RESULT_OF_YOUR_KNEE_INJURY,_HOW_WOULD_YOU_RATE_YOUR_CURRENT_LEVEL_OF_DAILY_ACTIVITY?: NEARLY NORMAL
PLEASE_INDICATE_YOR_PRIMARY_REASON_FOR_REFERRAL_TO_THERAPY:: KNEE

## 2024-12-11 NOTE — PROGRESS NOTES
PHYSICAL THERAPY EVALUATION  Type of Visit: Evaluation       Fall Risk Screen:  Fall screen completed by: PT  Have you fallen 2 or more times in the past year?: (Patient-Rptd) No  Have you fallen and had an injury in the past year?: (Patient-Rptd) No  Is patient a fall risk?: No    Subjective     Pt c/o chronic/recurrent B knee and LBP x years.  Notes L>R knee pain recently.  Denies any current injury to back or knees.  States LBP very minimal currently but can stiffen up without specific injury. MD order date 10/8/2024.  PMH:  L knee scope 2012 and multiple episodes of therapy for knees and low back with good response..          Presenting condition or subjective complaint: (Patient-Rptd) both knees are achy, left knee has been feeling a lot more so lately  Date of onset: 10/08/24 (MD order date)    Relevant medical history:     Dates & types of surgery: (Patient-Rptd) 2012 left knee arthoscopic, left shoulder rotator cuff 2016, right rotator cuff 2020, left trigger finger 2023    Prior diagnostic imaging/testing results: (Patient-Rptd) X-ray     Prior therapy history for the same diagnosis, illness or injury: (Patient-Rptd) Yes      Prior Level of Function  Transfers: Independent  Ambulation: Independent  ADL: Independent  IADL: Driving, Finances, Housekeeping, Laundry, Meal preparation, Medication management, Work    Living Environment  Social support: (Patient-Rptd) With a significant other or spouse   Type of home: (Patient-Rptd) House   Stairs to enter the home:         Ramp: (Patient-Rptd) No   Stairs inside the home: (Patient-Rptd) Yes (Patient-Rptd) 14 Is there a railing: (Patient-Rptd) Yes     Help at home: (Patient-Rptd) None  Equipment owned:       Employment: (Patient-Rptd) Yes (Patient-Rptd) residential contractor  Hobbies/Interests: (Patient-Rptd) hiking, reading, music,motorcycling    Patient goals for therapy: (Patient-Rptd) hurts to go up and down stairs especially if carrying something.    Pain  assessment: Pain present     Objective   KNEE EVALUATION  PAIN: Pain Level at Rest: 2/10  Pain Level with Use: 6/10  Pain Location: knee  Pain Quality: Aching and Sharp  Pain Frequency: constant  Pain is Worst: daytime  Pain is Exacerbated By: standing, stairs, bending/squat, after sitting  Pain is Relieved By: NSAIDs, otc medications, rest, stretch, and use  Pain Progression: Unchanged  INTEGUMENTARY (edema, incisions): WFL  POSTURE: WFL  GAIT:  Weightbearing Status: WBAT  Assistive Device(s): None  Gait Deviations: WNL  BALANCE/PROPRIOCEPTION: Single Leg Stance Eyes Open (seconds): 20 sec B  WEIGHTBEARING ALIGNMENT:   NON-WEIGHTBEARING ALIGNMENT:   ROM: AROM WFL  PROM WFL    STRENGTH:  R knee flex/ext 5/5 with good/fair QS, L knee flex 5/5, ext 5-/5 and fair/delayed QS.  B glute  med 4/5, max 4+/5  FLEXIBILITY: WFL  SPECIAL TESTS:  + L pat compression  FUNCTIONAL TESTS: Double Leg Squat: Anterior knee translation, Knee valgus, Hip internal rotation, and Improper use of glutes/hips  PALPATION:  L>R TTP MJL  JOINT MOBILITY: WFL  LUMBAR SPINE EVALUATION  PAIN: Pain Level at Rest: 1/10  Pain Level with Use: 3/10  Pain Location: lumbar spine  Pain Quality: Aching  Pain Frequency: intermittent  Pain is Worst: daytime  Pain is Exacerbated By: prolonged sitting, standing, lifting  Pain is Relieved By: NSAIDs, otc medications, rest, stretch, and use  Pain Progression: Improved  INTEGUMENTARY (edema, incisions): WNL  POSTURE:   GAIT:   Weightbearing Status:   Assistive Device(s):   Gait Deviations:   BALANCE/PROPRIOCEPTION:   WEIGHTBEARING ALIGNMENT:   NON-WEIGHTBEARING ALIGNMENT:    ROM:   (Degrees) Left AROM Left PROM  Right AROM Right PROM   Hip Flexion       Hip Extension       Hip Abduction       Hip Adduction       Hip Internal Rotation       Hip External Rotation       Knee Flexion       Knee Extension       Lumbar Side glide     Lumbar Flexion WNL   Lumbar Extension Min loss +/-  ANNA: NE during, decreased pain  after     Lx ROM: B SB ERT  Pain:   End feel:   PELVIC/SI SCREEN: WFL  STRENGTH:  Fair core stab mm recruitment    MYOTOMES: WNL  DTR S:   CORD SIGNS:   DERMATOMES: WNL  NEURAL TENSION: Lumbar WNL  FLEXIBILITY: WNL  LUMBAR/HIP Special Tests:    PELVIS/SI SPECIAL TESTS:   FUNCTIONAL TESTS:   PALPATION: WNL  SPINAL SEGMENTAL CONCLUSIONS:  very mild hypomobile L2-4      Assessment & Plan   CLINICAL IMPRESSIONS  Medical Diagnosis: Chronic right-sided low back pain with right-sided sciatica, Chronic pain of both knees    Treatment Diagnosis: B knee and LBP   Impression/Assessment: Patient is a 59 year old male with B knee and LBP complaints.  The following significant findings have been identified: Pain, Decreased ROM/flexibility, Decreased joint mobility, Decreased strength, Decreased proprioception, Impaired muscle performance, Decreased activity tolerance, and Impaired posture. These impairments interfere with their ability to perform self care tasks, work tasks, recreational activities, household chores, driving , household mobility, and community mobility as compared to previous level of function.     Clinical Decision Making (Complexity):  Clinical Presentation: Stable/Uncomplicated  Clinical Presentation Rationale: based on medical and personal factors listed in PT evaluation  Clinical Decision Making (Complexity): Low complexity    PLAN OF CARE  Treatment Interventions:  Modalities: Cryotherapy, Ultrasound  Interventions: Manual Therapy, Neuromuscular Re-education, Therapeutic Activity, Therapeutic Exercise    Long Term Goals     PT Goal 1  Goal Identifier: Sitting  Goal Description: Be able to sit  1+ hour painfree during and getting up afterwards  Rationale:  (for personal hygiene;to allow rest from standing;for community transportation;for job requirements in their work place)  Target Date: 03/05/25  PT Goal 2  Goal Identifier: Stairs  Goal Description: Be able to ascend and descend stairs without railing  normal pattern pain free  Rationale:  (to enter/leave the house safely;to reach upper level of home safely;to reach lower level of home safely;for safe community access to buildings;for safe community ambulaton)  Target Date: 03/05/25      Frequency of Treatment: 2x/month  Duration of Treatment: 12 weeks    Recommended Referrals to Other Professionals:   Education Assessment:   Learner/Method: Patient;Demonstration;Pictures/Video    Risks and benefits of evaluation/treatment have been explained.   Patient/Family/caregiver agrees with Plan of Care.     Evaluation Time:     PT Eval, Low Complexity Minutes (42665): 20       Signing Clinician: Wilder Brothers PT

## 2025-01-23 ENCOUNTER — THERAPY VISIT (OUTPATIENT)
Dept: PHYSICAL THERAPY | Facility: CLINIC | Age: 60
End: 2025-01-23
Payer: COMMERCIAL

## 2025-01-23 DIAGNOSIS — G89.29 CHRONIC RIGHT-SIDED LOW BACK PAIN WITH RIGHT-SIDED SCIATICA: Primary | ICD-10-CM

## 2025-01-23 DIAGNOSIS — M54.41 CHRONIC RIGHT-SIDED LOW BACK PAIN WITH RIGHT-SIDED SCIATICA: Primary | ICD-10-CM

## 2025-01-23 DIAGNOSIS — G89.29 CHRONIC PAIN OF BOTH KNEES: ICD-10-CM

## 2025-01-23 DIAGNOSIS — M25.562 CHRONIC PAIN OF BOTH KNEES: ICD-10-CM

## 2025-01-23 DIAGNOSIS — M25.561 CHRONIC PAIN OF BOTH KNEES: ICD-10-CM

## 2025-02-18 ENCOUNTER — APPOINTMENT (OUTPATIENT)
Age: 60
Setting detail: DERMATOLOGY
End: 2025-02-18

## 2025-02-18 DIAGNOSIS — L71.8 OTHER ROSACEA: ICD-10-CM | Status: WELL CONTROLLED

## 2025-02-18 DIAGNOSIS — D22 MELANOCYTIC NEVI: ICD-10-CM

## 2025-02-18 PROBLEM — D22.9 MELANOCYTIC NEVI, UNSPECIFIED: Status: ACTIVE | Noted: 2025-02-18

## 2025-02-18 PROCEDURE — ? PRESCRIPTION

## 2025-02-18 PROCEDURE — ? COUNSELING

## 2025-02-18 PROCEDURE — 99213 OFFICE O/P EST LOW 20 MIN: CPT

## 2025-02-18 PROCEDURE — ? PRESCRIPTION MEDICATION MANAGEMENT

## 2025-02-18 RX ORDER — AZELAIC ACID 0.15 G/G
GEL TOPICAL
Qty: 50 | Refills: 3 | Status: ERX | COMMUNITY
Start: 2025-02-18

## 2025-02-18 RX ADMIN — AZELAIC ACID: 0.15 GEL TOPICAL at 00:00

## 2025-02-18 ASSESSMENT — LOCATION DETAILED DESCRIPTION DERM: LOCATION DETAILED: INFERIOR MID FOREHEAD

## 2025-02-18 ASSESSMENT — LOCATION ZONE DERM: LOCATION ZONE: FACE

## 2025-02-18 ASSESSMENT — LOCATION SIMPLE DESCRIPTION DERM: LOCATION SIMPLE: INFERIOR FOREHEAD

## 2025-02-18 NOTE — PROCEDURE: PRESCRIPTION MEDICATION MANAGEMENT
Detail Level: Zone
Render In Strict Bullet Format?: No
Continue Regimen: Azelaic acid 15% gel twice daily

## 2025-03-03 DIAGNOSIS — J45.30 MILD PERSISTENT ASTHMA WITHOUT COMPLICATION: ICD-10-CM

## 2025-03-03 RX ORDER — MONTELUKAST SODIUM 10 MG/1
TABLET ORAL
Qty: 90 TABLET | Refills: 1 | Status: SHIPPED | OUTPATIENT
Start: 2025-03-03

## 2025-05-20 DIAGNOSIS — F51.01 PRIMARY INSOMNIA: ICD-10-CM

## 2025-05-22 RX ORDER — ZOLPIDEM TARTRATE 10 MG/1
10 TABLET ORAL
Qty: 90 TABLET | Refills: 1 | OUTPATIENT
Start: 2025-05-22

## 2025-06-12 ENCOUNTER — OFFICE VISIT (OUTPATIENT)
Dept: FAMILY MEDICINE | Facility: CLINIC | Age: 60
End: 2025-06-12
Payer: COMMERCIAL

## 2025-06-12 VITALS
DIASTOLIC BLOOD PRESSURE: 67 MMHG | TEMPERATURE: 98 F | OXYGEN SATURATION: 98 % | HEART RATE: 63 BPM | BODY MASS INDEX: 28.28 KG/M2 | RESPIRATION RATE: 16 BRPM | SYSTOLIC BLOOD PRESSURE: 100 MMHG | WEIGHT: 202 LBS | HEIGHT: 71 IN

## 2025-06-12 DIAGNOSIS — F51.01 PRIMARY INSOMNIA: ICD-10-CM

## 2025-06-12 DIAGNOSIS — L71.9 ROSACEA: ICD-10-CM

## 2025-06-12 DIAGNOSIS — J45.30 MILD PERSISTENT ASTHMA WITHOUT COMPLICATION: ICD-10-CM

## 2025-06-12 DIAGNOSIS — F41.1 GENERALIZED ANXIETY DISORDER: Primary | ICD-10-CM

## 2025-06-12 PROCEDURE — G0452 MOLECULAR PATHOLOGY INTERPR: HCPCS | Mod: 26 | Performed by: PATHOLOGY

## 2025-06-12 RX ORDER — ZOLPIDEM TARTRATE 10 MG/1
10 TABLET ORAL
Qty: 90 TABLET | Refills: 1 | Status: SHIPPED | OUTPATIENT
Start: 2025-06-12

## 2025-06-12 RX ORDER — ALPRAZOLAM 1 MG/1
1 TABLET ORAL DAILY
Qty: 30 TABLET | Refills: 3 | Status: SHIPPED | OUTPATIENT
Start: 2025-06-12

## 2025-06-12 RX ORDER — ALBUTEROL SULFATE 90 UG/1
2 INHALANT RESPIRATORY (INHALATION) EVERY 6 HOURS PRN
Qty: 18 G | Refills: 3 | Status: SHIPPED | OUTPATIENT
Start: 2025-06-12

## 2025-06-12 RX ORDER — ALPRAZOLAM 0.5 MG
1 TABLET ORAL DAILY
Qty: 30 TABLET | Refills: 3 | Status: SHIPPED | OUTPATIENT
Start: 2025-06-12 | End: 2025-06-12

## 2025-06-12 ASSESSMENT — ASTHMA QUESTIONNAIRES
QUESTION_2 LAST FOUR WEEKS HOW OFTEN HAVE YOU HAD SHORTNESS OF BREATH: ONCE OR TWICE A WEEK
QUESTION_4 LAST FOUR WEEKS HOW OFTEN HAVE YOU USED YOUR RESCUE INHALER OR NEBULIZER MEDICATION (SUCH AS ALBUTEROL): NOT AT ALL
QUESTION_3 LAST FOUR WEEKS HOW OFTEN DID YOUR ASTHMA SYMPTOMS (WHEEZING, COUGHING, SHORTNESS OF BREATH, CHEST TIGHTNESS OR PAIN) WAKE YOU UP AT NIGHT OR EARLIER THAN USUAL IN THE MORNING: ONCE OR TWICE
QUESTION_5 LAST FOUR WEEKS HOW WOULD YOU RATE YOUR ASTHMA CONTROL: WELL CONTROLLED
ACT_TOTALSCORE: 22
QUESTION_1 LAST FOUR WEEKS HOW MUCH OF THE TIME DID YOUR ASTHMA KEEP YOU FROM GETTING AS MUCH DONE AT WORK, SCHOOL OR AT HOME: NONE OF THE TIME

## 2025-06-12 ASSESSMENT — ANXIETY QUESTIONNAIRES
6. BECOMING EASILY ANNOYED OR IRRITABLE: SEVERAL DAYS
3. WORRYING TOO MUCH ABOUT DIFFERENT THINGS: SEVERAL DAYS
4. TROUBLE RELAXING: NOT AT ALL
1. FEELING NERVOUS, ANXIOUS, OR ON EDGE: SEVERAL DAYS
7. FEELING AFRAID AS IF SOMETHING AWFUL MIGHT HAPPEN: SEVERAL DAYS
2. NOT BEING ABLE TO STOP OR CONTROL WORRYING: SEVERAL DAYS
GAD7 TOTAL SCORE: 5
IF YOU CHECKED OFF ANY PROBLEMS ON THIS QUESTIONNAIRE, HOW DIFFICULT HAVE THESE PROBLEMS MADE IT FOR YOU TO DO YOUR WORK, TAKE CARE OF THINGS AT HOME, OR GET ALONG WITH OTHER PEOPLE: SOMEWHAT DIFFICULT
8. IF YOU CHECKED OFF ANY PROBLEMS, HOW DIFFICULT HAVE THESE MADE IT FOR YOU TO DO YOUR WORK, TAKE CARE OF THINGS AT HOME, OR GET ALONG WITH OTHER PEOPLE?: SOMEWHAT DIFFICULT
5. BEING SO RESTLESS THAT IT IS HARD TO SIT STILL: NOT AT ALL
GAD7 TOTAL SCORE: 5

## 2025-06-12 NOTE — PROGRESS NOTES
Assessment & Plan         Generalized anxiety disorder  Not controlled, intolerant of Lexapro and Zoloft, currently on propranolol.  Symptom severity and its impact on life, I recommend he restarts Xanax which he was previously prescribed.  Recommend pharmacogenetics testing continue propranolol, I will follow-up with patient in 3 months for recheck.  - Pharmacogenomics profile; Future  - Pharmacogenomics profile  - ALPRAZolam (XANAX) 1 MG tablet; Take 1 tablet (1 mg) by mouth daily.    Primary insomnia  Continue current medical management, PDMP reviewed.  - zolpidem (AMBIEN) 10 MG tablet; Take 1 tablet (10 mg) by mouth nightly as needed for sleep.    Mild persistent asthma without complication  Possible diagnosis of asthma versus anxiety reaction, okay to continue albuterol.  - albuterol (PROAIR HFA/PROVENTIL HFA/VENTOLIN HFA) 108 (90 Base) MCG/ACT inhaler; Inhale 2 puffs into the lungs every 6 hours as needed for shortness of breath.    Rosacea  Facial rash most suggestive of rosacea, start topical metronidazole.  - metroNIDAZOLE (METROCREAM) 0.75 % external cream; Apply topically 2 times daily.          Med Oakes is a 59 year old, presenting for the following health issues:  Recheck Medication (Might have a reaction to Trelegy )        6/12/2025     5:10 PM   Additional Questions   Roomed by Mabel Conawy MA     History of Present Illness       COPD:  He presents for follow up of COPD.  Overall, COPD symptoms are stable since last visit.  He has no fatigue or shortness of breath with exertion and no shortness of breath at rest.  He sometimes coughs and does have change in sputum. No recent fever. He can walk 1-2 miles without stopping to rest. He can walk 3 or more flights of stairs without resting. The patient has had no ED, urgent care, or hospital admissions because of COPD since the last visit.     Mental Health Follow-up:  Patient presents to follow-up on Anxiety.    Patient's anxiety since last  "visit has been:  Medium  The patient is not having other symptoms associated with anxiety.  Any significant life events: No  Patient is not feeling anxious or having panic attacks.  Patient has no concerns about alcohol or drug use.    He eats 2-3 servings of fruits and vegetables daily.He consumes 0 sweetened beverage(s) daily.He exercises with enough effort to increase his heart rate 30 to 60 minutes per day.  He exercises with enough effort to increase his heart rate 5 days per week. He is missing 1 dose(s) of medications per week.      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    He also has difficulty with shutting things down at night and has been taking Ambien to help him with insomnia.    Also has noticed a rash on his face.    Non-smoker known history of COPD was prescribed Trelegy which he finds the medication not helpful.                      Objective    /67 (BP Location: Right arm, Patient Position: Sitting, Cuff Size: Adult Large)   Pulse 63   Temp 98  F (36.7  C) (Oral)   Resp 16   Ht 1.803 m (5' 11\")   Wt 91.6 kg (202 lb)   SpO2 98%   BMI 28.17 kg/m    Body mass index is 28.17 kg/m .  Physical Exam  Vitals reviewed.   Cardiovascular:      Rate and Rhythm: Normal rate and regular rhythm.   Pulmonary:      Effort: Pulmonary effort is normal.      Breath sounds: Normal breath sounds.   Skin:     Comments: ertyheamtous papules extending on his facial cheeks and over his nose   Neurological:      Mental Status: He is alert.   Psychiatric:      Comments: Well groomed, appears anxious.  Linear, nonpressured speech.  No " hallucinations or psychosis.                    Signed Electronically by: Nas Stephen MD

## 2025-06-30 LAB
GO4PGX COMMENTS: NORMAL
GO4PGX DISCLAIMER: NORMAL
GO4PGX LIMITATIONS: NORMAL
GO4PGX METHODOLOGY: NORMAL
LAB DIRECTOR RESULTS: NORMAL
LOCATION OF TASK: NORMAL
PGX ABOUT THE TEST: NORMAL
PGX VARIANTS: NORMAL

## 2025-07-01 ENCOUNTER — RESULTS FOLLOW-UP (OUTPATIENT)
Dept: FAMILY MEDICINE | Facility: CLINIC | Age: 60
End: 2025-07-01

## (undated) DEVICE — GLOVE PROTEXIS POWDER FREE SMT 8.5 2D72PT85X

## (undated) DEVICE — DRAPE STERI U 1015

## (undated) DEVICE — SU FIBERWIRE 2 38" 2-STRAND WHITE&BLUE W/O NDL AR-7240

## (undated) DEVICE — BNDG KLING 2" 2231

## (undated) DEVICE — DRAPE MAYO STAND 23X54 8337

## (undated) DEVICE — PREP DURAPREP REMOVER 4OZ 8611

## (undated) DEVICE — TUBING SYSTEM ARTHREX PATIENT REDEUCE AR-6421

## (undated) DEVICE — SOL WATER IRRIG 500ML BOTTLE 2F7113

## (undated) DEVICE — DRAPE U-POUCH 34X29" 1067

## (undated) DEVICE — ABLATOR ARTHREX APOLLO RF MP90 ASPIRATING 90DEG AR-9811

## (undated) DEVICE — BRUSH SURGICAL SCRUB W/4% CHG SOL 25ML 371073

## (undated) DEVICE — GLOVE BIOGEL PI MICRO SZ 7.0 48570

## (undated) DEVICE — SU MONOCRYL 3-0 PS-1 27" Y936H

## (undated) DEVICE — DRSG STERI STRIP 1/2X4" R1547

## (undated) DEVICE — BLADE KNIFE BEAVER MINI BEAVER6400

## (undated) DEVICE — PREP DURAPREP 26ML APL 8630

## (undated) DEVICE — IMM KIT SHOULDER STABILIZATION 7210573

## (undated) DEVICE — LINEN ORTHO PACK 5446

## (undated) DEVICE — KIT ENDO TURNOVER/PROCEDURE W/CLEAN A SCOPE LINERS 103888

## (undated) DEVICE — TUBING SYSTEM ARTHREX PUMP REDEUCE AR-6411

## (undated) DEVICE — NDL ARTHREX MULTIFIRE/FASTPASS SCORPION AR-13995N

## (undated) DEVICE — SUCTION MANIFOLD NEPTUNE 2 SYS 4 PORT 0702-020-000

## (undated) DEVICE — PREP CHLORAPREP 26ML TINTED ORANGE  260815

## (undated) DEVICE — COVER CAMERA IN-LIGHT DISP LT-C02

## (undated) DEVICE — SOL NACL 0.9% IRRIG 3000ML BAG 2B7477

## (undated) DEVICE — STRAP STIRRUP W/SLIP 30187-030

## (undated) DEVICE — ARTHROSCOPIC CANNULA TWIST-IN PURPLE 7MMX7CM AR-6570

## (undated) DEVICE — PIN GUIDE ARTHREX 2.4MM DRILL  AR-1250L

## (undated) DEVICE — SOL HYDROGEN PEROXIDE 3% 4OZ BOTTLE F0010

## (undated) DEVICE — SU MONOCRYL 4-0 PS-2 27" UND Y426H

## (undated) DEVICE — SU PDS II 0 CTX 36" Z370T

## (undated) DEVICE — DRSG GAUZE 4X4" 3033

## (undated) DEVICE — GLOVE PROTEXIS POWDER FREE 8.5 ORTHOPEDIC 2D73ET85

## (undated) DEVICE — Device

## (undated) DEVICE — ENDO BITE BLOCK ADULT OLYMPUS LATEX FREE MAJ-1632

## (undated) DEVICE — IMM KIT SHOULDER TMAX MASK FACE 7210559

## (undated) DEVICE — SOL NACL 0.9% IRRIG 500ML BOTTLE 2F7123

## (undated) DEVICE — PACK HAND CUSTOM ASC

## (undated) DEVICE — DRAPE SHOULDER PACK SPLITS 29365

## (undated) DEVICE — PACK SHOULDER CUSTOM ASC SOP15ASUMA

## (undated) DEVICE — DRSG STERI STRIP 1X5" R1548

## (undated) RX ORDER — ACETAMINOPHEN 325 MG/1
TABLET ORAL
Status: DISPENSED
Start: 2019-12-12

## (undated) RX ORDER — VANCOMYCIN HYDROCHLORIDE 1 G/20ML
INJECTION, POWDER, LYOPHILIZED, FOR SOLUTION INTRAVENOUS
Status: DISPENSED
Start: 2019-12-12

## (undated) RX ORDER — OXYCODONE HYDROCHLORIDE 5 MG/1
TABLET ORAL
Status: DISPENSED
Start: 2019-12-12

## (undated) RX ORDER — EPINEPHRINE NASAL SOLUTION 1 MG/ML
SOLUTION NASAL
Status: DISPENSED
Start: 2019-12-12

## (undated) RX ORDER — ONDANSETRON 2 MG/ML
INJECTION INTRAMUSCULAR; INTRAVENOUS
Status: DISPENSED
Start: 2019-12-12

## (undated) RX ORDER — FENTANYL CITRATE 50 UG/ML
INJECTION, SOLUTION INTRAMUSCULAR; INTRAVENOUS
Status: DISPENSED
Start: 2019-12-12

## (undated) RX ORDER — FENTANYL CITRATE 50 UG/ML
INJECTION, SOLUTION INTRAMUSCULAR; INTRAVENOUS
Status: DISPENSED
Start: 2019-09-20

## (undated) RX ORDER — BUPIVACAINE HYDROCHLORIDE 5 MG/ML
INJECTION, SOLUTION EPIDURAL; INTRACAUDAL
Status: DISPENSED
Start: 2019-12-12

## (undated) RX ORDER — GABAPENTIN 300 MG/1
CAPSULE ORAL
Status: DISPENSED
Start: 2019-12-12

## (undated) RX ORDER — LIDOCAINE HYDROCHLORIDE AND EPINEPHRINE 10; 10 MG/ML; UG/ML
INJECTION, SOLUTION INFILTRATION; PERINEURAL
Status: DISPENSED
Start: 2024-01-04

## (undated) RX ORDER — VANCOMYCIN HYDROCHLORIDE 500 MG/10ML
INJECTION, POWDER, LYOPHILIZED, FOR SOLUTION INTRAVENOUS
Status: DISPENSED
Start: 2019-12-12

## (undated) RX ORDER — KETAMINE HCL IN 0.9 % NACL 50 MG/5 ML
SYRINGE (ML) INTRAVENOUS
Status: DISPENSED
Start: 2019-12-12

## (undated) RX ORDER — PROPOFOL 10 MG/ML
INJECTION, EMULSION INTRAVENOUS
Status: DISPENSED
Start: 2019-12-12